# Patient Record
Sex: FEMALE | Race: BLACK OR AFRICAN AMERICAN | NOT HISPANIC OR LATINO
[De-identification: names, ages, dates, MRNs, and addresses within clinical notes are randomized per-mention and may not be internally consistent; named-entity substitution may affect disease eponyms.]

---

## 2017-03-17 ENCOUNTER — APPOINTMENT (OUTPATIENT)
Dept: INTERNAL MEDICINE | Facility: CLINIC | Age: 67
End: 2017-03-17

## 2017-03-17 VITALS
DIASTOLIC BLOOD PRESSURE: 90 MMHG | SYSTOLIC BLOOD PRESSURE: 181 MMHG | WEIGHT: 119 LBS | TEMPERATURE: 98.1 F | HEART RATE: 73 BPM | BODY MASS INDEX: 22.47 KG/M2 | HEIGHT: 61 IN | OXYGEN SATURATION: 98 %

## 2017-04-03 ENCOUNTER — APPOINTMENT (OUTPATIENT)
Dept: SURGERY | Facility: CLINIC | Age: 67
End: 2017-04-03

## 2017-04-03 VITALS
TEMPERATURE: 98 F | DIASTOLIC BLOOD PRESSURE: 87 MMHG | SYSTOLIC BLOOD PRESSURE: 186 MMHG | HEART RATE: 70 BPM | WEIGHT: 116.25 LBS | HEIGHT: 61 IN | BODY MASS INDEX: 21.95 KG/M2 | OXYGEN SATURATION: 98 %

## 2017-04-03 DIAGNOSIS — J45.909 UNSPECIFIED ASTHMA, UNCOMPLICATED: ICD-10-CM

## 2017-05-03 PROBLEM — J45.909 ASTHMA: Status: ACTIVE | Noted: 2017-04-03

## 2017-05-08 ENCOUNTER — APPOINTMENT (OUTPATIENT)
Dept: SURGERY | Facility: CLINIC | Age: 67
End: 2017-05-08

## 2017-05-08 VITALS
SYSTOLIC BLOOD PRESSURE: 200 MMHG | HEIGHT: 61 IN | BODY MASS INDEX: 22.28 KG/M2 | WEIGHT: 118 LBS | OXYGEN SATURATION: 97 % | DIASTOLIC BLOOD PRESSURE: 90 MMHG | HEART RATE: 64 BPM

## 2017-05-18 ENCOUNTER — EMERGENCY (EMERGENCY)
Facility: HOSPITAL | Age: 67
LOS: 1 days | Discharge: HOME | End: 2017-05-18
Attending: EMERGENCY MEDICINE | Admitting: EMERGENCY MEDICINE
Payer: MEDICARE

## 2017-05-18 VITALS
DIASTOLIC BLOOD PRESSURE: 79 MMHG | OXYGEN SATURATION: 95 % | SYSTOLIC BLOOD PRESSURE: 169 MMHG | HEART RATE: 94 BPM | RESPIRATION RATE: 19 BRPM | TEMPERATURE: 97 F

## 2017-05-18 VITALS
RESPIRATION RATE: 18 BRPM | TEMPERATURE: 98 F | OXYGEN SATURATION: 98 % | HEART RATE: 76 BPM | DIASTOLIC BLOOD PRESSURE: 81 MMHG | SYSTOLIC BLOOD PRESSURE: 168 MMHG | WEIGHT: 115.52 LBS

## 2017-05-18 DIAGNOSIS — Z79.2 LONG TERM (CURRENT) USE OF ANTIBIOTICS: ICD-10-CM

## 2017-05-18 DIAGNOSIS — I25.10 ATHEROSCLEROTIC HEART DISEASE OF NATIVE CORONARY ARTERY WITHOUT ANGINA PECTORIS: ICD-10-CM

## 2017-05-18 DIAGNOSIS — Z79.899 OTHER LONG TERM (CURRENT) DRUG THERAPY: ICD-10-CM

## 2017-05-18 DIAGNOSIS — R20.2 PARESTHESIA OF SKIN: ICD-10-CM

## 2017-05-18 DIAGNOSIS — E78.5 HYPERLIPIDEMIA, UNSPECIFIED: ICD-10-CM

## 2017-05-18 DIAGNOSIS — R10.9 UNSPECIFIED ABDOMINAL PAIN: ICD-10-CM

## 2017-05-18 DIAGNOSIS — N18.5 CHRONIC KIDNEY DISEASE, STAGE 5: ICD-10-CM

## 2017-05-18 DIAGNOSIS — I12.9 HYPERTENSIVE CHRONIC KIDNEY DISEASE WITH STAGE 1 THROUGH STAGE 4 CHRONIC KIDNEY DISEASE, OR UNSPECIFIED CHRONIC KIDNEY DISEASE: ICD-10-CM

## 2017-05-18 LAB
ALBUMIN SERPL ELPH-MCNC: 4 G/DL — SIGNIFICANT CHANGE UP (ref 3.3–5)
ALP SERPL-CCNC: 116 U/L — SIGNIFICANT CHANGE UP (ref 40–120)
ALT FLD-CCNC: 7 U/L — LOW (ref 10–45)
ANION GAP SERPL CALC-SCNC: 17 MMOL/L — SIGNIFICANT CHANGE UP (ref 5–17)
AST SERPL-CCNC: 21 U/L — SIGNIFICANT CHANGE UP (ref 10–40)
BASOPHILS NFR BLD AUTO: 0.6 % — SIGNIFICANT CHANGE UP (ref 0–2)
BILIRUB SERPL-MCNC: 0.3 MG/DL — SIGNIFICANT CHANGE UP (ref 0.2–1.2)
BUN SERPL-MCNC: 12 MG/DL — SIGNIFICANT CHANGE UP (ref 7–23)
CALCIUM SERPL-MCNC: 9.6 MG/DL — SIGNIFICANT CHANGE UP (ref 8.4–10.5)
CHLORIDE SERPL-SCNC: 96 MMOL/L — SIGNIFICANT CHANGE UP (ref 96–108)
CO2 SERPL-SCNC: 22 MMOL/L — SIGNIFICANT CHANGE UP (ref 22–31)
CREAT SERPL-MCNC: 3.1 MG/DL — HIGH (ref 0.5–1.3)
EOSINOPHIL NFR BLD AUTO: 1.6 % — SIGNIFICANT CHANGE UP (ref 0–6)
GLUCOSE SERPL-MCNC: 91 MG/DL — SIGNIFICANT CHANGE UP (ref 70–99)
HCT VFR BLD CALC: 30.3 % — LOW (ref 34.5–45)
HGB BLD-MCNC: 10.4 G/DL — LOW (ref 11.5–15.5)
LIDOCAIN IGE QN: 41 U/L — SIGNIFICANT CHANGE UP (ref 7–60)
LYMPHOCYTES # BLD AUTO: 22 % — SIGNIFICANT CHANGE UP (ref 13–44)
MCHC RBC-ENTMCNC: 33.1 PG — SIGNIFICANT CHANGE UP (ref 27–34)
MCHC RBC-ENTMCNC: 34.3 G/DL — SIGNIFICANT CHANGE UP (ref 32–36)
MCV RBC AUTO: 96.5 FL — SIGNIFICANT CHANGE UP (ref 80–100)
MONOCYTES NFR BLD AUTO: 9.2 % — SIGNIFICANT CHANGE UP (ref 2–14)
NEUTROPHILS NFR BLD AUTO: 66.6 % — SIGNIFICANT CHANGE UP (ref 43–77)
PLATELET # BLD AUTO: 257 K/UL — SIGNIFICANT CHANGE UP (ref 150–400)
POTASSIUM SERPL-MCNC: 5.1 MMOL/L — SIGNIFICANT CHANGE UP (ref 3.5–5.3)
POTASSIUM SERPL-SCNC: 5.1 MMOL/L — SIGNIFICANT CHANGE UP (ref 3.5–5.3)
PROT SERPL-MCNC: 7.5 G/DL — SIGNIFICANT CHANGE UP (ref 6–8.3)
RBC # BLD: 3.14 M/UL — LOW (ref 3.8–5.2)
RBC # FLD: 14.1 % — SIGNIFICANT CHANGE UP (ref 10.3–16.9)
SODIUM SERPL-SCNC: 135 MMOL/L — SIGNIFICANT CHANGE UP (ref 135–145)
WBC # BLD: 5.1 K/UL — SIGNIFICANT CHANGE UP (ref 3.8–10.5)
WBC # FLD AUTO: 5.1 K/UL — SIGNIFICANT CHANGE UP (ref 3.8–10.5)

## 2017-05-18 PROCEDURE — 36415 COLL VENOUS BLD VENIPUNCTURE: CPT

## 2017-05-18 PROCEDURE — 99284 EMERGENCY DEPT VISIT MOD MDM: CPT | Mod: 25

## 2017-05-18 PROCEDURE — 70450 CT HEAD/BRAIN W/O DYE: CPT | Mod: 26

## 2017-05-18 PROCEDURE — 80053 COMPREHEN METABOLIC PANEL: CPT

## 2017-05-18 PROCEDURE — 83690 ASSAY OF LIPASE: CPT

## 2017-05-18 PROCEDURE — 70450 CT HEAD/BRAIN W/O DYE: CPT

## 2017-05-18 PROCEDURE — 85025 COMPLETE CBC W/AUTO DIFF WBC: CPT

## 2017-05-18 PROCEDURE — 93010 ELECTROCARDIOGRAM REPORT: CPT

## 2017-05-18 PROCEDURE — 93005 ELECTROCARDIOGRAM TRACING: CPT

## 2017-05-18 RX ORDER — CYCLOBENZAPRINE HYDROCHLORIDE 10 MG/1
1 TABLET, FILM COATED ORAL
Qty: 16 | Refills: 0 | OUTPATIENT
Start: 2017-05-18

## 2017-05-18 NOTE — ED PROVIDER NOTE - NS ED MD SCRIBE ATTENDING SCRIBE SECTIONS
HISTORY OF PRESENT ILLNESS/PROGRESS NOTE/RESULTS/PHYSICAL EXAM/HIV/VITAL SIGNS( Pullset)/REVIEW OF SYSTEMS/PAST MEDICAL/SURGICAL/SOCIAL HISTORY/DISPOSITION

## 2017-05-18 NOTE — ED PROVIDER NOTE - PMH
Anemia  2/2 CKD  Asthma    CAD (coronary artery disease)    CKD (chronic kidney disease)  Stage 5CKD  CVA (cerebral infarction)    HLD (hyperlipidemia)    Hypertension    MI, old

## 2017-05-18 NOTE — ED PROVIDER NOTE - MEDICAL DECISION MAKING DETAILS
Pt remains well-appearing, afebrile, nontoxic. No acute findings on neuro exam. Head CT without acute findings. Discussed findings with pt's nephrologist Dr. Sepulveda, who will f/u with pt. Pt requesting muscle relaxant for chronic back pain - will prescribe.

## 2017-05-18 NOTE — ED PROVIDER NOTE - OBJECTIVE STATEMENT
65 yo F with hx of CVA (1.5 months ago), CKD, CAD, HLD, HTN, and MI p/w occasional arm numbness. Pt reports numbness from elbow down, either in the left arm or the right arm. Pt gets dialysis in left arm. Pt was sent here by Dr. Jay Sepulveda at the dialysis center. Last dialysis treatment today. No recent falls or head strikes. Pt also notes chronic lower back pain, occasional headaches, LE muscle spasms, and right side pain. 65 yo F with hx of CVA (1.5 months ago), CKD, CAD, HLD, HTN, and MI p/w occasional arm numbness. Pt reports numbness from the elbow down, either in the left arm or the right arm. Pt gets dialysis in left arm. Pt was sent here by Dr. Jay Sepulveda at the dialysis center. Last dialysis treatment today. No recent falls or head strikes. Pt also notes chronic lower back pain, occasional headaches, LE muscle spasms, and right side pain.

## 2017-05-18 NOTE — ED PROVIDER NOTE - PROGRESS NOTE DETAILS
The scribe's documentation has been prepared under my direction and personally reviewed by me in its entirety. I confirm that the note above accurately reflects all work, treatment, procedures, and medical decision making performed by me.  - Dr Dale

## 2017-05-18 NOTE — ED ADULT TRIAGE NOTE - CHIEF COMPLAINT QUOTE
c/o lower back pain and abdominal pain . pt reports lower back pain x 1 year and RUQ pain x 3 months described as sharp pain. Lay n/v/d, f/c. PHX HTN, ESRD.

## 2017-05-18 NOTE — ED PROVIDER NOTE - NEUROLOGICAL, MLM
Cranial nerves 2-12 are intact. Normal speech. Normal sensory exam. 5/5 bl upper extremity and lower extremity strength.

## 2017-06-09 ENCOUNTER — RECORD ABSTRACTING (OUTPATIENT)
Age: 67
End: 2017-06-09

## 2017-06-09 DIAGNOSIS — Z63.4 DISAPPEARANCE AND DEATH OF FAMILY MEMBER: ICD-10-CM

## 2017-06-09 DIAGNOSIS — Z82.49 FAMILY HISTORY OF ISCHEMIC HEART DISEASE AND OTHER DISEASES OF THE CIRCULATORY SYSTEM: ICD-10-CM

## 2017-06-09 DIAGNOSIS — R94.31 ABNORMAL ELECTROCARDIOGRAM [ECG] [EKG]: ICD-10-CM

## 2017-06-09 SDOH — SOCIAL STABILITY - SOCIAL INSECURITY: DISSAPEARANCE AND DEATH OF FAMILY MEMBER: Z63.4

## 2017-07-06 ENCOUNTER — INPATIENT (INPATIENT)
Facility: HOSPITAL | Age: 67
LOS: 3 days | Discharge: HOME | DRG: 291 | End: 2017-07-10
Attending: STUDENT IN AN ORGANIZED HEALTH CARE EDUCATION/TRAINING PROGRAM | Admitting: STUDENT IN AN ORGANIZED HEALTH CARE EDUCATION/TRAINING PROGRAM
Payer: MEDICARE

## 2017-07-06 VITALS
HEART RATE: 87 BPM | OXYGEN SATURATION: 88 % | TEMPERATURE: 96 F | RESPIRATION RATE: 28 BRPM | HEIGHT: 62 IN | WEIGHT: 115.08 LBS | DIASTOLIC BLOOD PRESSURE: 91 MMHG | SYSTOLIC BLOOD PRESSURE: 201 MMHG

## 2017-07-06 DIAGNOSIS — J96.01 ACUTE RESPIRATORY FAILURE WITH HYPOXIA: ICD-10-CM

## 2017-07-06 DIAGNOSIS — R65.10 SYSTEMIC INFLAMMATORY RESPONSE SYNDROME (SIRS) OF NON-INFECTIOUS ORIGIN WITHOUT ACUTE ORGAN DYSFUNCTION: ICD-10-CM

## 2017-07-06 DIAGNOSIS — R63.8 OTHER SYMPTOMS AND SIGNS CONCERNING FOOD AND FLUID INTAKE: ICD-10-CM

## 2017-07-06 DIAGNOSIS — I50.9 HEART FAILURE, UNSPECIFIED: ICD-10-CM

## 2017-07-06 DIAGNOSIS — I16.1 HYPERTENSIVE EMERGENCY: ICD-10-CM

## 2017-07-06 DIAGNOSIS — J90 PLEURAL EFFUSION, NOT ELSEWHERE CLASSIFIED: ICD-10-CM

## 2017-07-06 DIAGNOSIS — I25.10 ATHEROSCLEROTIC HEART DISEASE OF NATIVE CORONARY ARTERY WITHOUT ANGINA PECTORIS: ICD-10-CM

## 2017-07-06 DIAGNOSIS — R74.8 ABNORMAL LEVELS OF OTHER SERUM ENZYMES: ICD-10-CM

## 2017-07-06 DIAGNOSIS — R04.0 EPISTAXIS: ICD-10-CM

## 2017-07-06 DIAGNOSIS — N18.6 END STAGE RENAL DISEASE: ICD-10-CM

## 2017-07-06 DIAGNOSIS — I10 ESSENTIAL (PRIMARY) HYPERTENSION: ICD-10-CM

## 2017-07-06 DIAGNOSIS — I77.0 ARTERIOVENOUS FISTULA, ACQUIRED: Chronic | ICD-10-CM

## 2017-07-06 DIAGNOSIS — R06.02 SHORTNESS OF BREATH: ICD-10-CM

## 2017-07-06 DIAGNOSIS — J45.909 UNSPECIFIED ASTHMA, UNCOMPLICATED: ICD-10-CM

## 2017-07-06 LAB
ALBUMIN SERPL ELPH-MCNC: 4.1 G/DL — SIGNIFICANT CHANGE UP (ref 3.3–5)
ALP SERPL-CCNC: 179 U/L — HIGH (ref 40–120)
ALT FLD-CCNC: 12 U/L — SIGNIFICANT CHANGE UP (ref 10–45)
ANION GAP SERPL CALC-SCNC: 16 MMOL/L — SIGNIFICANT CHANGE UP (ref 5–17)
APTT BLD: 33.5 SEC — SIGNIFICANT CHANGE UP (ref 27.5–37.4)
AST SERPL-CCNC: 24 U/L — SIGNIFICANT CHANGE UP (ref 10–40)
BASE EXCESS BLDV CALC-SCNC: -0.1 MMOL/L — SIGNIFICANT CHANGE UP
BASOPHILS NFR BLD AUTO: 0.3 % — SIGNIFICANT CHANGE UP (ref 0–2)
BILIRUB SERPL-MCNC: 0.4 MG/DL — SIGNIFICANT CHANGE UP (ref 0.2–1.2)
BUN SERPL-MCNC: 37 MG/DL — HIGH (ref 7–23)
CALCIUM SERPL-MCNC: 10.3 MG/DL — SIGNIFICANT CHANGE UP (ref 8.4–10.5)
CHLORIDE SERPL-SCNC: 89 MMOL/L — LOW (ref 96–108)
CK MB CFR SERPL CALC: 1.9 NG/ML — SIGNIFICANT CHANGE UP (ref 0–6.7)
CK MB CFR SERPL CALC: 2.2 NG/ML — SIGNIFICANT CHANGE UP (ref 0–6.7)
CK SERPL-CCNC: 59 U/L — SIGNIFICANT CHANGE UP (ref 25–170)
CK SERPL-CCNC: 81 U/L — SIGNIFICANT CHANGE UP (ref 25–170)
CO2 SERPL-SCNC: 22 MMOL/L — SIGNIFICANT CHANGE UP (ref 22–31)
CREAT SERPL-MCNC: 6 MG/DL — HIGH (ref 0.5–1.3)
EOSINOPHIL NFR BLD AUTO: 1.3 % — SIGNIFICANT CHANGE UP (ref 0–6)
GAS PNL BLDV: SIGNIFICANT CHANGE UP
GLUCOSE SERPL-MCNC: 105 MG/DL — HIGH (ref 70–99)
HBV SURFACE AB SER-ACNC: SIGNIFICANT CHANGE UP
HBV SURFACE AG SER-ACNC: SIGNIFICANT CHANGE UP
HCO3 BLDV-SCNC: 23 MMOL/L — SIGNIFICANT CHANGE UP (ref 20–27)
HCT VFR BLD CALC: 34.2 % — LOW (ref 34.5–45)
HCV AB S/CO SERPL IA: 0.16 S/CO — SIGNIFICANT CHANGE UP
HCV AB SERPL-IMP: SIGNIFICANT CHANGE UP
HGB BLD-MCNC: 11.5 G/DL — SIGNIFICANT CHANGE UP (ref 11.5–15.5)
INR BLD: 1.02 — SIGNIFICANT CHANGE UP (ref 0.88–1.16)
LACTATE SERPL-SCNC: 1 MMOL/L — SIGNIFICANT CHANGE UP (ref 0.5–2)
LYMPHOCYTES # BLD AUTO: 5.4 % — LOW (ref 13–44)
MAGNESIUM SERPL-MCNC: 1.8 MG/DL — SIGNIFICANT CHANGE UP (ref 1.6–2.6)
MCHC RBC-ENTMCNC: 31.1 PG — SIGNIFICANT CHANGE UP (ref 27–34)
MCHC RBC-ENTMCNC: 33.6 G/DL — SIGNIFICANT CHANGE UP (ref 32–36)
MCV RBC AUTO: 92.4 FL — SIGNIFICANT CHANGE UP (ref 80–100)
MONOCYTES NFR BLD AUTO: 6.3 % — SIGNIFICANT CHANGE UP (ref 2–14)
NEUTROPHILS NFR BLD AUTO: 86.7 % — HIGH (ref 43–77)
NT-PROBNP SERPL-SCNC: HIGH PG/ML (ref 0–300)
PCO2 BLDV: 32 MMHG — LOW (ref 41–51)
PH BLDV: 7.46 — HIGH (ref 7.32–7.43)
PHOSPHATE SERPL-MCNC: 4.5 MG/DL — SIGNIFICANT CHANGE UP (ref 2.5–4.5)
PLATELET # BLD AUTO: 323 K/UL — SIGNIFICANT CHANGE UP (ref 150–400)
PO2 BLDV: 90 MMHG — SIGNIFICANT CHANGE UP
POTASSIUM SERPL-MCNC: 5.9 MMOL/L — HIGH (ref 3.5–5.3)
POTASSIUM SERPL-SCNC: 5.9 MMOL/L — HIGH (ref 3.5–5.3)
PROT SERPL-MCNC: 7.7 G/DL — SIGNIFICANT CHANGE UP (ref 6–8.3)
PROTHROM AB SERPL-ACNC: 11.3 SEC — SIGNIFICANT CHANGE UP (ref 9.8–12.7)
RBC # BLD: 3.7 M/UL — LOW (ref 3.8–5.2)
RBC # FLD: 19.1 % — HIGH (ref 10.3–16.9)
SAO2 % BLDV: 97 % — SIGNIFICANT CHANGE UP
SODIUM SERPL-SCNC: 127 MMOL/L — LOW (ref 135–145)
TROPONIN T SERPL-MCNC: 0.02 NG/ML — HIGH (ref 0–0.01)
TROPONIN T SERPL-MCNC: 0.03 NG/ML — HIGH (ref 0–0.01)
WBC # BLD: 17 K/UL — HIGH (ref 3.8–10.5)
WBC # FLD AUTO: 17 K/UL — HIGH (ref 3.8–10.5)

## 2017-07-06 PROCEDURE — 30903 CONTROL OF NOSEBLEED: CPT

## 2017-07-06 PROCEDURE — 99291 CRITICAL CARE FIRST HOUR: CPT | Mod: 25

## 2017-07-06 PROCEDURE — 99291 CRITICAL CARE FIRST HOUR: CPT

## 2017-07-06 PROCEDURE — 93306 TTE W/DOPPLER COMPLETE: CPT | Mod: 26

## 2017-07-06 PROCEDURE — 93010 ELECTROCARDIOGRAM REPORT: CPT

## 2017-07-06 PROCEDURE — 93010 ELECTROCARDIOGRAM REPORT: CPT | Mod: 59

## 2017-07-06 PROCEDURE — 71010: CPT | Mod: 26

## 2017-07-06 RX ORDER — AZITHROMYCIN 500 MG/1
500 TABLET, FILM COATED ORAL ONCE
Qty: 0 | Refills: 0 | Status: COMPLETED | OUTPATIENT
Start: 2017-07-07 | End: 2017-07-07

## 2017-07-06 RX ORDER — IPRATROPIUM/ALBUTEROL SULFATE 18-103MCG
3 AEROSOL WITH ADAPTER (GRAM) INHALATION
Qty: 0 | Refills: 0 | Status: COMPLETED | OUTPATIENT
Start: 2017-07-06 | End: 2017-07-06

## 2017-07-06 RX ORDER — AZITHROMYCIN 500 MG/1
500 TABLET, FILM COATED ORAL ONCE
Qty: 0 | Refills: 0 | Status: DISCONTINUED | OUTPATIENT
Start: 2017-07-06 | End: 2017-07-06

## 2017-07-06 RX ORDER — CARVEDILOL PHOSPHATE 80 MG/1
25 CAPSULE, EXTENDED RELEASE ORAL EVERY 12 HOURS
Qty: 0 | Refills: 0 | Status: DISCONTINUED | OUTPATIENT
Start: 2017-07-06 | End: 2017-07-10

## 2017-07-06 RX ORDER — FUROSEMIDE 40 MG
40 TABLET ORAL ONCE
Qty: 0 | Refills: 0 | Status: COMPLETED | OUTPATIENT
Start: 2017-07-06 | End: 2017-07-06

## 2017-07-06 RX ORDER — CINACALCET 30 MG/1
60 TABLET, FILM COATED ORAL DAILY
Qty: 0 | Refills: 0 | Status: DISCONTINUED | OUTPATIENT
Start: 2017-07-06 | End: 2017-07-10

## 2017-07-06 RX ORDER — NIFEDIPINE 30 MG
90 TABLET, EXTENDED RELEASE 24 HR ORAL ONCE
Qty: 0 | Refills: 0 | Status: COMPLETED | OUTPATIENT
Start: 2017-07-06 | End: 2017-07-06

## 2017-07-06 RX ORDER — CARVEDILOL PHOSPHATE 80 MG/1
25 CAPSULE, EXTENDED RELEASE ORAL ONCE
Qty: 0 | Refills: 0 | Status: COMPLETED | OUTPATIENT
Start: 2017-07-06 | End: 2017-07-06

## 2017-07-06 RX ORDER — HYDRALAZINE HCL 50 MG
10 TABLET ORAL ONCE
Qty: 0 | Refills: 0 | Status: COMPLETED | OUTPATIENT
Start: 2017-07-06 | End: 2017-07-06

## 2017-07-06 RX ORDER — HEPARIN SODIUM 5000 [USP'U]/ML
5000 INJECTION INTRAVENOUS; SUBCUTANEOUS EVERY 8 HOURS
Qty: 0 | Refills: 0 | Status: DISCONTINUED | OUTPATIENT
Start: 2017-07-06 | End: 2017-07-10

## 2017-07-06 RX ORDER — NIFEDIPINE 30 MG
90 TABLET, EXTENDED RELEASE 24 HR ORAL DAILY
Qty: 0 | Refills: 0 | Status: DISCONTINUED | OUTPATIENT
Start: 2017-07-06 | End: 2017-07-10

## 2017-07-06 RX ORDER — CEFTRIAXONE 500 MG/1
1 INJECTION, POWDER, FOR SOLUTION INTRAMUSCULAR; INTRAVENOUS EVERY 24 HOURS
Qty: 0 | Refills: 0 | Status: DISCONTINUED | OUTPATIENT
Start: 2017-07-07 | End: 2017-07-07

## 2017-07-06 RX ORDER — FUROSEMIDE 40 MG
80 TABLET ORAL ONCE
Qty: 0 | Refills: 0 | Status: COMPLETED | OUTPATIENT
Start: 2017-07-06 | End: 2017-07-06

## 2017-07-06 RX ORDER — SIMVASTATIN 20 MG/1
20 TABLET, FILM COATED ORAL AT BEDTIME
Qty: 0 | Refills: 0 | Status: DISCONTINUED | OUTPATIENT
Start: 2017-07-06 | End: 2017-07-10

## 2017-07-06 RX ORDER — CEFTRIAXONE 500 MG/1
1 INJECTION, POWDER, FOR SOLUTION INTRAMUSCULAR; INTRAVENOUS ONCE
Qty: 0 | Refills: 0 | Status: COMPLETED | OUTPATIENT
Start: 2017-07-06 | End: 2017-07-06

## 2017-07-06 RX ORDER — ASPIRIN/CALCIUM CARB/MAGNESIUM 324 MG
81 TABLET ORAL DAILY
Qty: 0 | Refills: 0 | Status: DISCONTINUED | OUTPATIENT
Start: 2017-07-06 | End: 2017-07-10

## 2017-07-06 RX ORDER — FUROSEMIDE 40 MG
80 TABLET ORAL
Qty: 0 | Refills: 0 | Status: DISCONTINUED | OUTPATIENT
Start: 2017-07-06 | End: 2017-07-09

## 2017-07-06 RX ADMIN — Medication 90 MILLIGRAM(S): at 06:26

## 2017-07-06 RX ADMIN — CARVEDILOL PHOSPHATE 25 MILLIGRAM(S): 80 CAPSULE, EXTENDED RELEASE ORAL at 06:26

## 2017-07-06 RX ADMIN — Medication 90 MILLIGRAM(S): at 10:13

## 2017-07-06 RX ADMIN — Medication 80 MILLIGRAM(S): at 17:39

## 2017-07-06 RX ADMIN — CARVEDILOL PHOSPHATE 25 MILLIGRAM(S): 80 CAPSULE, EXTENDED RELEASE ORAL at 17:39

## 2017-07-06 RX ADMIN — Medication 20 MILLIGRAM(S): at 06:26

## 2017-07-06 RX ADMIN — Medication 3 MILLILITER(S): at 03:52

## 2017-07-06 RX ADMIN — HEPARIN SODIUM 5000 UNIT(S): 5000 INJECTION INTRAVENOUS; SUBCUTANEOUS at 21:17

## 2017-07-06 RX ADMIN — SIMVASTATIN 20 MILLIGRAM(S): 20 TABLET, FILM COATED ORAL at 21:17

## 2017-07-06 RX ADMIN — Medication 3 MILLILITER(S): at 03:12

## 2017-07-06 RX ADMIN — Medication 80 MILLIGRAM(S): at 10:12

## 2017-07-06 RX ADMIN — Medication 10 MILLIGRAM(S): at 04:43

## 2017-07-06 RX ADMIN — Medication 81 MILLIGRAM(S): at 11:50

## 2017-07-06 RX ADMIN — Medication 40 MILLIGRAM(S): at 04:44

## 2017-07-06 RX ADMIN — Medication 10 MILLIGRAM(S): at 03:12

## 2017-07-06 RX ADMIN — Medication 3 MILLILITER(S): at 03:33

## 2017-07-06 RX ADMIN — CEFTRIAXONE 100 GRAM(S): 500 INJECTION, POWDER, FOR SOLUTION INTRAMUSCULAR; INTRAVENOUS at 06:26

## 2017-07-06 NOTE — ED PROVIDER NOTE - OBJECTIVE STATEMENT
66F hx HTN, ESRD (TRS), CAD, CVA, asthma, c/o SOB. pt states SOB worsening tonight. no chest pain, no cough, no fevers. no n/v/d.  +LE swelling. pt states was fully dialyzed tuesday.  SOB worse with exertion and laying down.  pt also c/o R nose bleed, put guaze inside nose. states has been compliant with BP medication.

## 2017-07-06 NOTE — CONSULT NOTE ADULT - PROBLEM SELECTOR RECOMMENDATION 9
- the patient needs urgent dialysis for fluid overload and uncontrolled BP   - we will do 3 hours with 4 liters ultrafiltration   - she has left arm AVF   - on sensipar   - follow up the daily weight and urine output and fluid intake   - put her on renal diet after the NPO   - the pulmonafry team is involved - considering thoracocentesis

## 2017-07-06 NOTE — CONSULT NOTE ADULT - ASSESSMENT
This is 66 year old female with past medical history stated above. In fluid overload and uncontrolled blood pressure

## 2017-07-06 NOTE — H&P ADULT - PROBLEM SELECTOR PLAN 6
patient with troponin of .02 likely secondary to demand ischemia in setting of malignant HTN; EKG unchanged from prior with no Acute ST-T wave changes  -	continue to trend

## 2017-07-06 NOTE — H&P ADULT - PROBLEM SELECTOR PLAN 2
upon arrival; Patient with CXR showing pleural effusion and pulmonary congestion on both the right and left side. PE also showing JVD, crackles at the right and left lung fields and LE edema with epistaxis s/p nasal packing; S/p enalapril 20mg , Lasix, hydralazine 10 mg oral and IV; nifedipine 90 mg; Carvedilol 25 mg with minimal response  -	Goal of around 160-170 in the next 24 hours  -	c/w home meds of nifedipine, Lasix, carvedilol, enalapril and metolazone   -	Renal consulted- will need HD today  -IV labetalol (HR above 90) or IV hydralazine (HR below 90)as needed if pressures elevated

## 2017-07-06 NOTE — ED PROVIDER NOTE - CARE PLAN
Principal Discharge DX:	Pleural effusion  Secondary Diagnosis:	SOB (shortness of breath)  Secondary Diagnosis:	Epistaxis

## 2017-07-06 NOTE — ED ADULT NURSE NOTE - OBJECTIVE STATEMENT
Pt c/o sob and epitaxis from both nares. Pt with dialysis Tues, Thurs, and Saturday. O2 Sat  on arrival 88% on RA. Pt place on 2L NC. Pt has no pitting edema in BLE. (L>R)

## 2017-07-06 NOTE — H&P ADULT - PROBLEM SELECTOR PLAN 4
patient with recurrent multiple ED visits in the past for epistaxis requiring nasal packing; s/p nasal packing in the ED; most likely 2/2 to Hypertensive emergency  -monitor H/H  - continue with nasal packing

## 2017-07-06 NOTE — H&P ADULT - NSHPPHYSICALEXAM_GEN_ALL_CORE
VITAL SIGNS:  T(C): 35.6 (07-06-17 @ 02:18), Max: 35.6 (07-06-17 @ 02:18)  T(F): 96 (07-06-17 @ 02:18), Max: 96 (07-06-17 @ 02:18)  HR: 84 (07-06-17 @ 06:07) (80 - 87)  BP: 201/99 (07-06-17 @ 06:07) (193/93 - 205/98)  BP(mean): --  RR: 16 (07-06-17 @ 06:07) (16 - 28)  SpO2: 93% (07-06-17 @ 06:07) (88% - 98%)  Wt(kg): --    PHYSICAL EXAM:    Constitutional: Seated in stretcher in mild respiratory distress  Head: NC/AT  Eyes: PERRL, EOMI, clear conjunctiva  ENT: no nasal discharge; uvula midline, no oropharyngeal erythema or exudates; MMM  Neck: supple; +JVD   Respiratory: tachypneic with increased work of breathing, unable to speak in full sentences, decreased breath sounds on the R lower lung field, crackles on R middle/upper lung fields, R lung with crackles in middle and lower lung fields  Cardiac: +S1/S2 +S3; RRR; no M/R  Gastrointestinal: soft, NT/ND; no rebound or guarding; +BSx4  Extremities: WWP, no clubbing or cyanosis; 1+ pedal edema  Musculoskeletal: 2/5 strength in the b/l lower extremities  Vascular: 2+ radial, DP pulses B/L  Dermatologic: skin warm, dry and intact  Lymphatic: no submandibular or cervical LAD  Neurologic: AAOx3; CNII-XII grossly intact  Psychiatric: affect and characteristics of appearance, verbalizations, behaviors are appropriate

## 2017-07-06 NOTE — ED PROVIDER NOTE - CRITICAL CARE PROVIDED
interpretation of diagnostic studies/additional history taking/documentation/consultation with other physicians/direct patient care (not related to procedure)

## 2017-07-06 NOTE — H&P ADULT - NSHPLABSRESULTS_GEN_ALL_CORE
.  LABS:                         11.5   17.0  )-----------( 323      ( 06 Jul 2017 03:05 )             34.2     07-06    127<L>  |  89<L>  |  37<H>  ----------------------------<  105<H>  5.9<H>   |  22  |  6.00<H>    Ca    10.3      06 Jul 2017 03:05  Phos  4.5     07-06  Mg     1.8     07-06    TPro  7.7  /  Alb  4.1  /  TBili  0.4  /  DBili  x   /  AST  24  /  ALT  12  /  AlkPhos  179<H>  07-06        CARDIAC MARKERS ( 06 Jul 2017 03:05 )  x     / 0.02 ng/mL / 81 U/L / x     / 1.9 ng/mL      Serum Pro-Brain Natriuretic Peptide: [id number] pg/mL (07-06 @ 03:05)    Lactate, Blood: 1.0 mmoL/L (07-06 @ 03:20)      RADIOLOGY, EKG & ADDITIONAL TESTS: Reviewed.

## 2017-07-06 NOTE — CONSULT NOTE ADULT - PROBLEM SELECTOR RECOMMENDATION 2
likely from ESRD/CHF vs complicated parapneumonic effusion  - Bedside US after HD showed large effusion on the Lt side and small one on the Rt  - Effusions appeared to be simple without loculations  - At present, patient does not appear to be in distress and no coags available  - Will consider thoracentesis in the am if patient continues to have shortness of breath  - Send PT/PTT  - Repeat CXR in am.

## 2017-07-06 NOTE — CONSULT NOTE ADULT - SUBJECTIVE AND OBJECTIVE BOX
Patient is a 66y with past medical history for HTN, on HD - M, W, F. She came overnight for nose bleeding found to be in respiratory distress, and to have uncontrolled /80. The patient is complaining of Shortness of breath. Last HD was on Thursday - 3 hours with 4 litetrs UF         PAST MEDICAL & SURGICAL HISTORY:  Anemia: 2/2 CKD  Asthma  MI, old  CAD (coronary artery disease)  HLD (hyperlipidemia)  CKD (chronic kidney disease): Stage 5CKD  CVA (cerebral infarction)  Hypertension  AV fistula      MEDICATIONS  (STANDING):  heparin  Injectable 5000 Unit(s) SubCutaneous every 8 hours  simvastatin 20 milliGRAM(s) Oral at bedtime  NIFEdipine XL 90 milliGRAM(s) Oral daily  cinacalcet 60 milliGRAM(s) Oral daily  furosemide    Tablet 80 milliGRAM(s) Oral two times a day  aspirin enteric coated 81 milliGRAM(s) Oral daily  carvedilol 25 milliGRAM(s) Oral every 12 hours  metolazone 10 milliGRAM(s) Oral daily    MEDICATIONS  (PRN):      Allergies    No Known Allergies    Intolerances        SOCIAL HISTORY:    FAMILY HISTORY:  No pertinent family history in first degree relatives      T(C): , Max: 36.5 (07-06-17 @ 12:30)  T(F): , Max: 97.7 (07-06-17 @ 12:30)  HR: 80 (07-06-17 @ 12:53)  BP: 215/98 (07-06-17 @ 12:53)  BP(mean): 140 (07-06-17 @ 12:53)  RR: 16 (07-06-17 @ 12:53)  SpO2: 97% (07-06-17 @ 12:53)  Wt(kg): --    Height (cm): 157.48 (07-06 @ 02:18)  Weight (kg): 52.2 (07-06 @ 02:18)  BMI (kg/m2): 21 (07-06 @ 02:18)  BSA (m2): 1.51 (07-06 @ 02:18)      LABS:                        11.5   17.0  )-----------( 323      ( 06 Jul 2017 03:05 )             34.2     07-06    127<L>  |  89<L>  |  37<H>  ----------------------------<  105<H>  5.9<H>   |  22  |  6.00<H>    Ca    10.3      06 Jul 2017 03:05  Phos  4.5     07-06  Mg     1.8     07-06    TPro  7.7  /  Alb  4.1  /  TBili  0.4  /  DBili  x   /  AST  24  /  ALT  12  /  AlkPhos  179<H>  07-06    Hepatitis B Surface Antibody: Nonreact (07-06 @ 14:17)  Hepatitis C Virus S/CO Ratio: 0.16 S/CO (07-06 @ 14:17)        Physical exam: alert and oriented, in respiratory distress, sitting in her bed, crackles on both sides of the lungs, decreased breath sounds on the left base, no tachycardia, abdomen not distended, normal bowel sounds, no peripheral edema       RADIOLOGY & ADDITIONAL STUDIES:    the XR - pulmonary congestion with bilateral pleural effusion, prominent on the left side, possible consolidation on the right lower lobe

## 2017-07-06 NOTE — H&P ADULT - ATTENDING COMMENTS
Pt improved post HD but pulm evaluating for left sided thoracentesis. continue abx and ENT to see for epistaxis.

## 2017-07-06 NOTE — CONSULT NOTE ADULT - ASSESSMENT
65 y/o lady admitted to the hospital for a CHF exacerbation and in an fluid overload state. She has a PMH of ESRD on HD (TTS), HFpEF, HTN, CAD and questionable CVA's in the past. The patient was found to be in HTN urgency on admission. CXR from admission showed b/l pleural effusions.

## 2017-07-06 NOTE — CONSULT NOTE ADULT - SUBJECTIVE AND OBJECTIVE BOX
67yo F with PMHx of ESRD on HD, CAD, dCHF (EF 75% , HTN, asthma presents with shortness of breath and epistaxis. Patient reports that shortness of breath has been ongoing for the past few months, but worsened this afternoon. At baseline, patient can walk half a block. She also reports lower extremity swelling and orthopnea. No cough, fever, chills, sore throat. Patient was recently in [place], [place] and came back on Saturday. Also reports epistaxis which started this afternoon as well. She has had two episodes over the past week, for which she went to the ED and had nasal packing. She also has prior episodes of epistaxis which improved with nasal packing.     In the ED, VS: 96.0 201/91 87 28 88% RA  Given Duonebs, Lasix 40mg IV, Hydralazine 10mg IV    PMHx: as above  PSHx:   Home Meds: Albuterol, Nifedipine 90mg BID, Enalapril 20mg BID, Carvedilol 25mg BID, Furosemide 80mg QD, Meotlazone 10mg QD, Sensipar 60mg QD, Simvastatin 20mg QHS, Aspirin 81mg QD  Allergies: NKDA  SHx: denies alcohol use, illicit drug use, no cigarette use. Lives with daughter      OBJECTIVE:    VITAL SIGNS:  ICU Vital Signs Last 24 Hrs  T(C): 35.6 (2017 02:18), Max: 35.6 (2017 02:18)  T(F): 96 (2017 02:18), Max: 96 (2017 02:18)  HR: 80 (2017 04:45) (80 - 87)  BP: 197/94 (2017 04:45) (193/93 - 203/100)  BP(mean): --  ABP: --  ABP(mean): --  RR: 20 (2017 04:45) (20 - 28)  SpO2: 98% (2017 04:45) (88% - 98%)    PHYSICAL EXAM:    General: mild respiratory distress on nonrebreather  HEENT: NC/AT; PERRL, clear conjunctiva, blood soaked nasal packing in R nostril  Neck: supple  Respiratory: diminished breath sounds on L to mid lung field with fine crackles, diminished breath sounds on R base with crackles  Cardiovascular: +S1/S2; RRR, no murmurs or rubs  Abdomen: soft, NT/ND; +BS x4, vertical  scar  Extremities: WWP, 2+ peripheral pulses b/l; trace LE edema  Skin: normal color and turgor; no rash  Neurological: AAOx3, no focal deficits    MEDICATIONS:  MEDICATIONS  (STANDING):    MEDICATIONS  (PRN):      ALLERGIES:  Allergies    No Known Allergies        LABS:                        11.5   17.0  )-----------( 323      ( 2017 03:05 )             34.2     07-06    127<L>  |  89<L>  |  37<H>  ----------------------------<  105<H>  5.9<H>   |  22  |  6.00<H>    Ca    10.3      2017 03:05  Phos  4.5     07-06  Mg     1.8     07-    TPro  7.7  /  Alb  4.1  /  TBili  0.4  /  DBili  x   /  AST  24  /  ALT  12  /  AlkPhos  179<H>  -          RADIOLOGY & ADDITIONAL TESTS: Reviewed.    ASSESSMENT: 67yo F with PMHx of ESRD on HD, CAD, dCHF (EF 75% , HTN, asthma presents with shortness of breath and epistaxis.    PLAN:    #NEURO:    #CARDIO:    #PULM    FEN - no IVF; replete lytes prn; NPO    PPx - HSQ    CODE - FULL.    DISPO - continue telemetry monitoring in ICU-step down level of care on [facility]. 67yo F with PMHx of ESRD on HD, CAD, dCHF (EF 75% 2015), HTN, asthma presents with shortness of breath and epistaxis. Patient reports that shortness of breath has been ongoing for the past few months, but worsened this afternoon. At baseline, patient can walk half a block. She also reports lower extremity swelling and orthopnea. No cough, fever, chills, sore throat. Patient was recently in [place], [place] and came back on Saturday. Also reports epistaxis which started this afternoon as well. She has had two episodes over the past week, for which she went to the ED and had nasal packing. She also has prior episodes of epistaxis which improved with nasal packing.     In the ED, VS: 96.0 201/91 87 28 88% RA  Given Duonebs, Lasix 40mg IV, Hydralazine 10mg IV    PMHx: as above  PSHx:   Home Meds: Albuterol, Nifedipine 90mg BID, Enalapril 20mg BID, Carvedilol 25mg BID, Furosemide 80mg QD, Meotlazone 10mg QD, Sensipar 60mg QD, Simvastatin 20mg QHS, Aspirin 81mg QD  Allergies: NKDA  SHx: denies alcohol use, illicit drug use, no cigarette use. Lives with daughter      OBJECTIVE:    VITAL SIGNS:  ICU Vital Signs Last 24 Hrs  T(C): 35.6 (2017 02:18), Max: 35.6 (2017 02:18)  T(F): 96 (2017 02:18), Max: 96 (2017 02:18)  HR: 80 (2017 04:45) (80 - 87)  BP: 197/94 (2017 04:45) (193/93 - 203/100)  BP(mean): --  ABP: --  ABP(mean): --  RR: 20 (2017 04:45) (20 - 28)  SpO2: 98% (2017 04:45) (88% - 98%)    PHYSICAL EXAM:    General: mild respiratory distress on nonrebreather  HEENT: NC/AT; PERRL, clear conjunctiva, blood soaked nasal packing in R nostril  Neck: supple  Respiratory: diminished breath sounds on L to mid lung field with fine crackles, diminished breath sounds on R base with crackles  Cardiovascular: +S1/S2; RRR, no murmurs or rubs  Abdomen: soft, NT/ND; +BS x4, vertical  scar  Extremities: WWP, 2+ peripheral pulses b/l; trace LE edema  Skin: normal color and turgor; no rash  Neurological: AAOx3, no focal deficits    LABS:                        11.5   17.0  )-----------( 323      ( 2017 03:05 )             34.2     07-06    127<L>  |  89<L>  |  37<H>  ----------------------------<  105<H>  5.9<H>   |  22  |  6.00<H>    Ca    10.3      2017 03:05  Phos  4.5     07-06  Mg     1.8     07-    TPro  7.7  /  Alb  4.1  /  TBili  0.4  /  DBili  x   /  AST  24  /  ALT  12  /  AlkPhos  179<H>  -          RADIOLOGY & ADDITIONAL TESTS: Reviewed.    ASSESSMENT: 67yo F with PMHx of ESRD on HD, CAD, dCHF (EF 75% 2015), HTN, asthma presents with shortness of breath and epistaxis.    PLAN:    #NEURO: AAOx3, no focal deficits.     #CARDIO  CHFpEF (Echo 2015 with EF     #PULM    FEN - no IVF; replete lytes prn; NPO    PPx - HSQ    CODE - FULL.    DISPO - continue telemetry monitoring in ICU-step down level of care on [facility]. 65yo F with PMHx of ESRD on HD, CAD, dCHF (EF 75% 2015), HTN, asthma presents with shortness of breath and epistaxis. Patient reports that shortness of breath has been ongoing for the past few months, but worsened this afternoon. At baseline, patient can walk half a block. She also reports lower extremity swelling and orthopnea. No cough, fever, chills, sore throat. Patient was recently in [place], [place] and came back on Saturday. Also reports epistaxis which started this afternoon as well. She has had two episodes over the past week, for which she went to the ED and had nasal packing. She also has prior episodes of epistaxis which improved with nasal packing.     In the ED, VS: 96.0 201/91 87 28 88% RA  Given Duonebs, Lasix 40mg IV, Hydralazine 10mg IV    PMHx: as above  PSHx:   Home Meds: Albuterol, Nifedipine 90mg BID, Enalapril 20mg BID, Carvedilol 25mg BID, Furosemide 80mg QD, Meotlazone 10mg QD, Sensipar 60mg QD, Simvastatin 20mg QHS, Aspirin 81mg QD  Allergies: NKDA  SHx: denies alcohol use, illicit drug use, no cigarette use. Lives with daughter      OBJECTIVE:    VITAL SIGNS:  ICU Vital Signs Last 24 Hrs  T(C): 35.6 (2017 02:18), Max: 35.6 (2017 02:18)  T(F): 96 (2017 02:18), Max: 96 (2017 02:18)  HR: 80 (2017 04:45) (80 - 87)  BP: 197/94 (2017 04:45) (193/93 - 203/100)  BP(mean): --  ABP: --  ABP(mean): --  RR: 20 (2017 04:45) (20 - 28)  SpO2: 98% (2017 04:45) (88% - 98%)    PHYSICAL EXAM:    General: mild respiratory distress on nonrebreather  HEENT: NC/AT; PERRL, clear conjunctiva, blood soaked nasal packing in R nostril  Neck: supple  Respiratory: diminished breath sounds on L to mid lung field with fine crackles, diminished breath sounds on R base with crackles  Cardiovascular: +S1/S2; RRR, no murmurs or rubs  Abdomen: soft, NT/ND; +BS x4, vertical  scar  Extremities: WWP, 2+ peripheral pulses b/l; trace LE edema, LUE fistula with palpable thrill  Skin: normal color and turgor; no rash  Neurological: AAOx3, no focal deficits    LABS:                        11.5   17.0  )-----------( 323      ( 2017 03:05 )             34.2     07-06    127<L>  |  89<L>  |  37<H>  ----------------------------<  105<H>  5.9<H>   |  22  |  6.00<H>    Ca    10.3      2017 03:05  Phos  4.5     07-06  Mg     1.8     07-06    TPro  7.7  /  Alb  4.1  /  TBili  0.4  /  DBili  x   /  AST  24  /  ALT  12  /  AlkPhos  179<H>  07-06          RADIOLOGY & ADDITIONAL TESTS: Reviewed.    ASSESSMENT: 65yo F with PMHx of ESRD on HD, CAD, dCHF (EF 75% 2015), HTN, asthma presents with shortness of breath and epistaxis.    PLAN:    #NEURO: AAOx3, no focal deficits.     #CARDIO  HTN Urgency - SBP 190s-200s not controlled with Hydralazine 10mg IV x2. Asymptomatic  -resume home medications  -may require additional Labetalol / Hydralazine pushes to decrease BP 25% for the first 24 hours  CHFpEF (Echo 2015 with EF 75%) - patient with bilateral pleural effusions, L>>R, orthopnea and trace edema. BNP 18k. May have CHF exacerbation.   -Recommend Lasix 80mg IVQ, Metolazone 10mg   -required HD today      #PULM  -Bilateral pleural effusions - L>>R. Likely 2/2 CHF exacerbation vs. PNA  -Saturating well on Nasal Mask, maintain O2 sat > 94%, monitor closely for increase in work of breathing    #ID  Patient meets SIRS criteria with hypothermia, tachycardia, tachypnea and leukocytosis of 17, neutrophil predominant. No lactic acidosis. Has bilateral pleural effusions, cannot rule out underlying consolidations. Sepsis 2/2 PNA.   -Ceftriaxone / Zithromax for CAP   -obtain blood and sputum cultures    #RENAL  ESRD on HD - requires HD today. Has hyponatremia which may be from fluid overload and hyperkalemia 5.9. No EKG changes.   -contact renal in the AM    #HEM   Recurrent epistaxis of unknown etiology. Stopped with nasal packing  -consider ENT consult if bleeding recurs    FEN - no IVF; replete lytes prn; renal diet    PPx - HSQ    CODE - FULL.    DISPO - medicine telemetry

## 2017-07-06 NOTE — H&P ADULT - ASSESSMENT
67yo F with PMHx of ESRD on HD (T,TH,SAT), CAD, dCHF (EF 75% 11/2015), HTN, ?CVAs, asthma presents with shortness of breath and epistaxis secondary to hypertensive emergency and CHF exacerbation with likely underlying pneumonia.

## 2017-07-06 NOTE — H&P ADULT - PROBLEM SELECTOR PLAN 5
patient with dialysis on T,TH, SAT- patient is compliant with regiment- BMP also showing hyperkalemia of 5.9  -	nephro consulted- will dialyze TODAY patient with dialysis on T,TH, SAT- patient is compliant with regiment- BMP also showing hyperkalemia of 5.9  -	nephro consulted- will dialyze TODAY  -  continue home dose sincalet 60 mg QD

## 2017-07-06 NOTE — ED PROVIDER NOTE - MEDICAL DECISION MAKING DETAILS
SOB, CIFUENTES, crackles, decreased BS, hypoxic on RA, hypertense, no chest pain  -check labs, ekg, cxr, albuterol/atrovent nebs, hydralazine

## 2017-07-06 NOTE — PROGRESS NOTE ADULT - SUBJECTIVE AND OBJECTIVE BOX
Patient was seen and evaluated on dialysis.   Patient is tolerating the procedure well.   HR: 88 (07-06-17 @ 15:30)  BP: 177/83 (07-06-17 @ 15:30) pusle 65, /67  Continue dialysis:   Dialyzer:  180        QB:  400      QD: 500 2k bath  Goal UF 4 over 3 Hours

## 2017-07-06 NOTE — ED PROVIDER NOTE - PROGRESS NOTE DETAILS
R rhinorocket placed, pt with large L pleural effusion. oxygenation improves with NRB.  ICU consulted pt accepted to [facility], recommend ceft/azithro R rhinorocket placed, pt with b/l pleural effusion L>R. oxygenation improves with NRB.  ICU consulted

## 2017-07-06 NOTE — H&P ADULT - PROBLEM SELECTOR PLAN 7
CAD- currently asymptomatic; EKG unchanged from prior; patient unclear about whether he has had a n CATH in the past   -	f/u with Dr Dunham regarding outpatient records  -	continue ASA 81 mg QD  -	continue simvastatin 20 mg QD     CVA- patient with reported hx of strokes in the past; patient reports episode of slurring 2 weeks that resolved on its own- did not seek medical attention at this time; neuro exam with non-focal findings; CN 2-12 intact B/L; patient currently complaing of generalized B/L LE weakness- described as “feeling heavy”; most likely 2/2 to her underlying edema  -continue to monitor  -consider CT scan if symptoms of overall weakness doesn’t resolve CAD- currently asymptomatic; EKG unchanged from prior; patient unclear about whether she has had a diagnostic catherization in the past   -	f/u with Dr Dunham regarding outpatient records  -	continue ASA 81 mg QD  -	continue simvastatin 20 mg QD     CVA- patient with reported hx of strokes in the past; patient reports episode of slurring 2 weeks that resolved on its own- did not seek medical attention at this time; neuro exam with non-focal findings; CN 2-12 intact B/L; patient currently complaing of generalized B/L LE weakness- described as “feeling heavy”; most likely 2/2 to her underlying edema  -continue to monitor  -consider CT scan if symptoms of overall weakness doesn’t resolve

## 2017-07-06 NOTE — CONSULT NOTE ADULT - PROBLEM SELECTOR RECOMMENDATION 9
likely 2/2 CHF exacerbation. Possible underlying pneumonia cannot be ruled out  - CXR shows b/l moderate-large pleural effusions with vascular congestion  - Received dialysis with removal of 3.6L of fluids  - IV diuretics per primary, though as patient is oligouric/anuric unlikely to have an effect  - O2 as needed. Currently on ventimask and comfortable  - No accessory muscle use, in no acute distress  - antibiotics per primary  - blood cultures ordered

## 2017-07-06 NOTE — H&P ADULT - HISTORY OF PRESENT ILLNESS
In the ED Vital Signs: Tmax 96, HR 80 - 87, /93 - 205/98, RR 20 - 28, SpO2: 88% - 98%. Pt received 65yo F with PMHx of ESRD on HD (T,TH,SAT), CAD, dCHF (EF 75% 11/2015), HTN, asthma presents with shortness of breath and epistaxis. Patient reports that shortness of breath has been ongoing for the past few months, but worsened this afternoon. At baseline, patient can walk half a block. She also reports lower extremity swelling and orthopnea. No cough, fever, chills, sore throat. Patient was recently in [place], [place] and came back on Saturday. Also reports epistaxis which started this afternoon. She has had two episodes over the past week, for which she went to the ED and had nasal packing. She also has prior episodes of epistaxis which improved with nasal packing.     In the ED Vital Signs: Tmax 96, HR 80 - 87, /93 - 205/98, RR 20 - 28, SpO2: 88% - 98% on RA. Given Duonebs, Lasix 40mg IV, Hydralazine 10mg IV. Pt admitted to [facility] for telemetry monitoring of hypertensive emergency. 67yo F with PMHx of ESRD on HD (T,TH,SAT), CAD, dCHF (EF 75% 11/2015), HTN, ?CVAs, asthma presents with shortness of breath and epistaxis. Patient reports that shortness of breath has been ongoing for the past few months, but worsened this afternoon. At baseline, patient can walk half a block. She also reports worsening lower extremity swelling, orthopnea and PND. Patient recently returned from [place] on Saturday, where she was hospitalized for epistaxis secondary to HTN and received 1 unit of PRBC. Also reports epistaxis which started this afternoon. She has had two episodes over the past week, for which she went to the ED and had nasal packing. She also has prior episodes of epistaxis which improved with nasal packing. +Weakness, fatigue, and nausea. States that she cannot lift her b/l lower extremities d/t weakness since yesterday evening.  No associated pain, urinary or fecal incontinence. She denies headache, dizziness, vision changes, chest pain, palpitations, cough, abdominal pain, vomiting, diarrhea, F/U/D, fever and chills.     In the ED Vital Signs: Tmax 96, HR 80 - 87, /93 - 205/98, RR 20 - 28, SpO2: 88% - 98% on RA. Given Duonebs, Lasix 40mg IV, Hydralazine 10mg IV. Pt admitted to [facility] for telemetry monitoring of hypertensive emergency.

## 2017-07-06 NOTE — CONSULT NOTE ADULT - PROBLEM SELECTOR RECOMMENDATION 2
- the patient is with uncontrolled BP   - she is on Nefedipine XL 90, she is on Carvediolol 12.5 mg twice a day, metolazone 10 mg, clonidine 0.1 mg every 8 hours  - consider adding Hydralazine 50 mg three times a day

## 2017-07-06 NOTE — ED PROVIDER NOTE - ENMT, MLM
Airway patent, Nasal mucosa clear. Mouth with normal mucosa. Throat has no vesicles, no oropharyngeal exudates and uvula is midline. blood from R nares

## 2017-07-06 NOTE — CONSULT NOTE ADULT - SUBJECTIVE AND OBJECTIVE BOX
Patient is a 66y old  Female who presents with a chief complaint of shortness of breath    HPI:  67yo F with PMHx of ESRD on HD (T,TH,SAT), CAD, dCHF (EF 75% 11/2015), HTN, ?CVAs, asthma presents with shortness of breath and epistaxis. Patient reports that shortness of breath has been ongoing for the past few months, but worsened this afternoon. At baseline, patient can walk half a block. She also reports worsening lower extremity swelling, orthopnea and PND. Patient recently returned from [place] on Saturday, where she was hospitalized for epistaxis secondary to HTN and received 1 unit of PRBC.     Patient is now s/p HD with removal of 3.6L and feels much better. She reports that she has never had fluid removed from her lungs. She says she does make some urine and has been getting aggressive diuresis while in the hospital.    +Weakness, fatigue, and nausea. States that she cannot lift her b/l lower extremities d/t weakness since yesterday evening.  No associated pain, urinary or fecal incontinence. She denies headache, dizziness, vision changes, chest pain, palpitations, cough, abdominal pain, vomiting, diarrhea, F/U/D, fever and chills.       PAST MEDICAL & SURGICAL HISTORY:  Anemia: 2/2 CKD  Asthma  MI, old  CAD (coronary artery disease)  HLD (hyperlipidemia)  CKD (chronic kidney disease): Stage 5CKD  CVA (cerebral infarction)  Hypertension  AV fistula      FAMILY HISTORY:  No pertinent family history in first degree relatives      SOCIAL HISTORY:  Smoking Status: [ ] Current, [ ] Former, [x] Never  Pack Years:    MEDICATIONS:  Pulmonary:    Antimicrobials:  azithromycin  IVPB 500 milliGRAM(s) IV Intermittent Once    Anticoagulants:  heparin  Injectable 5000 Unit(s) SubCutaneous every 8 hours  aspirin enteric coated 81 milliGRAM(s) Oral daily    Onc:    GI/:    Endocrine:  simvastatin 20 milliGRAM(s) Oral at bedtime    Cardiac:  NIFEdipine XL 90 milliGRAM(s) Oral daily  furosemide    Tablet 80 milliGRAM(s) Oral two times a day  carvedilol 25 milliGRAM(s) Oral every 12 hours  metolazone 10 milliGRAM(s) Oral daily    Other Medications:  simvastatin 20 milliGRAM(s) Oral at bedtime  cinacalcet 60 milliGRAM(s) Oral daily      Allergies    No Known Allergies    Intolerances        Vital Signs Last 24 Hrs  T(C): 36.5 (06 Jul 2017 12:53), Max: 36.5 (06 Jul 2017 12:30)  T(F): 97.7 (06 Jul 2017 12:53), Max: 97.7 (06 Jul 2017 12:30)  HR: 80 (06 Jul 2017 12:53) (76 - 87)  BP: 215/98 (06 Jul 2017 12:53) (189/93 - 215/98)  BP(mean): 140 (06 Jul 2017 12:53) (140 - 144)  RR: 16 (06 Jul 2017 12:53) (16 - 28)  SpO2: 97% (06 Jul 2017 12:53) (88% - 100%)    Physical Exam:  Constitutional: NAD. Resting comfortably in bed  Eyes: PERRL, EOMI, clear conjunctiva  Nose: Nasal packing in place.  Neck: supple; No JVD at present  Respiratory: Not tachypneic, decreased breath sounds b/l bases L>R. mild crackles b/l bases.  Cardiac: +S1/S2 +S3; RRR; no M/R  Gastrointestinal: soft, NT/ND; no rebound or guarding; +BSx4  Extremities: WWP, no clubbing or cyanosis; 1+ pedal edema  Musculoskeletal: 2/5 strength in the b/l lower extremities  Neurologic: AAOx3; CNII-XII grossly intact  Psychiatric: affect and characteristics of appearance, verbalizations, behaviors are appropriate      LABS:      CBC Full  -  ( 06 Jul 2017 03:05 )  WBC Count : 17.0 K/uL  Hemoglobin : 11.5 g/dL  Hematocrit : 34.2 %  Platelet Count - Automated : 323 K/uL  Mean Cell Volume : 92.4 fL  Mean Cell Hemoglobin : 31.1 pg  Mean Cell Hemoglobin Concentration : 33.6 g/dL  Auto Neutrophil # : x  Auto Lymphocyte # : x  Auto Monocyte # : x  Auto Eosinophil # : x  Auto Basophil # : x  Auto Neutrophil % : 86.7 %  Auto Lymphocyte % : 5.4 %  Auto Monocyte % : 6.3 %  Auto Eosinophil % : 1.3 %  Auto Basophil % : 0.3 %    07-06    127<L>  |  89<L>  |  37<H>  ----------------------------<  105<H>  5.9<H>   |  22  |  6.00<H>    Ca    10.3      06 Jul 2017 03:05  Phos  4.5     07-06  Mg     1.8     07-06    TPro  7.7  /  Alb  4.1  /  TBili  0.4  /  DBili  x   /  AST  24  /  ALT  12  /  AlkPhos  179<H>  07-06                      RADIOLOGY & ADDITIONAL STUDIES (The following images were personally reviewed): Patient is a 66y old  Female who presents with a chief complaint of shortness of breath    HPI:  67yo F with PMHx of ESRD on HD (T,TH,SAT), CAD, dCHF (EF 75% 11/2015), HTN, ?CVAs, asthma presents with shortness of breath and epistaxis. Patient reports that shortness of breath has been ongoing for the past few months, but worsened this afternoon. At baseline, patient can walk half a block. She also reports worsening lower extremity swelling, orthopnea and PND. Patient recently returned from [place] on Saturday, where she was hospitalized for epistaxis secondary to HTN and received 1 unit of PRBC.     Patient is now s/p HD with removal of 3.6L and feels much better. She reports that she has never had fluid removed from her lungs. She says she does make some urine and has been getting aggressive diuresis while in the hospital.    +Weakness, fatigue, and nausea. States that she cannot lift her b/l lower extremities d/t weakness since yesterday evening.  No associated pain, urinary or fecal incontinence. She denies headache, dizziness, vision changes, chest pain, palpitations, cough, abdominal pain, vomiting, diarrhea, F/U/D, fever and chills.       ROS:  Per HPI, otherwise 12 point ROS negative    PAST MEDICAL & SURGICAL HISTORY:  Anemia: 2/2 CKD  Asthma  MI, old  CAD (coronary artery disease)  HLD (hyperlipidemia)  CKD (chronic kidney disease): Stage 5CKD  CVA (cerebral infarction)  Hypertension  AV fistula      FAMILY HISTORY:  No pertinent family history in first degree relatives      SOCIAL HISTORY:  Smoking Status: [ ] Current, [ ] Former, [x] Never  Pack Years:    MEDICATIONS:  Pulmonary:    Antimicrobials:  azithromycin  IVPB 500 milliGRAM(s) IV Intermittent Once    Anticoagulants:  heparin  Injectable 5000 Unit(s) SubCutaneous every 8 hours  aspirin enteric coated 81 milliGRAM(s) Oral daily    Onc:    GI/:    Endocrine:  simvastatin 20 milliGRAM(s) Oral at bedtime    Cardiac:  NIFEdipine XL 90 milliGRAM(s) Oral daily  furosemide    Tablet 80 milliGRAM(s) Oral two times a day  carvedilol 25 milliGRAM(s) Oral every 12 hours  metolazone 10 milliGRAM(s) Oral daily    Other Medications:  simvastatin 20 milliGRAM(s) Oral at bedtime  cinacalcet 60 milliGRAM(s) Oral daily      Allergies    No Known Allergies    Intolerances        Vital Signs Last 24 Hrs  T(C): 36.5 (06 Jul 2017 12:53), Max: 36.5 (06 Jul 2017 12:30)  T(F): 97.7 (06 Jul 2017 12:53), Max: 97.7 (06 Jul 2017 12:30)  HR: 80 (06 Jul 2017 12:53) (76 - 87)  BP: 215/98 (06 Jul 2017 12:53) (189/93 - 215/98)  BP(mean): 140 (06 Jul 2017 12:53) (140 - 144)  RR: 16 (06 Jul 2017 12:53) (16 - 28)  SpO2: 97% (06 Jul 2017 12:53) (88% - 100%)    Physical Exam:  Constitutional: NAD. Resting comfortably in bed  Eyes: PERRL, EOMI, clear conjunctiva  Nose: Nasal packing in place.  Neck: supple; No JVD at present  Respiratory: Not tachypneic, decreased breath sounds b/l bases L>R. mild crackles b/l bases.  Cardiac: +S1/S2 +S3; RRR; no M/R  Gastrointestinal: soft, NT/ND; no rebound or guarding; +BSx4  Extremities: WWP, no clubbing or cyanosis; 1+ pedal edema  Musculoskeletal: 2/5 strength in the b/l lower extremities  Neurologic: AAOx3; CNII-XII grossly intact  Psychiatric: affect and characteristics of appearance, verbalizations, behaviors are appropriate      LABS:      CBC Full  -  ( 06 Jul 2017 03:05 )  WBC Count : 17.0 K/uL  Hemoglobin : 11.5 g/dL  Hematocrit : 34.2 %  Platelet Count - Automated : 323 K/uL  Mean Cell Volume : 92.4 fL  Mean Cell Hemoglobin : 31.1 pg  Mean Cell Hemoglobin Concentration : 33.6 g/dL  Auto Neutrophil # : x  Auto Lymphocyte # : x  Auto Monocyte # : x  Auto Eosinophil # : x  Auto Basophil # : x  Auto Neutrophil % : 86.7 %  Auto Lymphocyte % : 5.4 %  Auto Monocyte % : 6.3 %  Auto Eosinophil % : 1.3 %  Auto Basophil % : 0.3 %    07-06    127<L>  |  89<L>  |  37<H>  ----------------------------<  105<H>  5.9<H>   |  22  |  6.00<H>    Ca    10.3      06 Jul 2017 03:05  Phos  4.5     07-06  Mg     1.8     07-06    TPro  7.7  /  Alb  4.1  /  TBili  0.4  /  DBili  x   /  AST  24  /  ALT  12  /  AlkPhos  179<H>  07-06                      RADIOLOGY & ADDITIONAL STUDIES (The following images were personally reviewed): Patient is a 66y old  Female who presents with a chief complaint of shortness of breath    HPI:  65yo F with PMHx of ESRD on HD (T,TH,SAT), CAD, dCHF (EF 75% 11/2015), HTN, ?CVAs, asthma presents with shortness of breath and epistaxis. Patient reports that shortness of breath has been ongoing for the past few months, but worsened this afternoon. At baseline, patient can walk half a block. She also reports worsening lower extremity swelling, orthopnea and PND. Patient recently returned from [place] on Saturday, where she was hospitalized for epistaxis secondary to HTN and received 1 unit of PRBC.     Patient is now s/p HD with removal of 3.6L and feels much better. She reports that she has never had fluid removed from her lungs. She says she does make some urine and has been getting aggressive diuresis while in the hospital.    +Weakness, fatigue, and nausea. States that she cannot lift her b/l lower extremities d/t weakness since yesterday evening.  No associated pain, urinary or fecal incontinence. She denies headache, dizziness, vision changes, chest pain, palpitations, cough, abdominal pain, vomiting, diarrhea, F/U/D, fever and chills.       ROS:  Per HPI, otherwise 12 point ROS negative    PAST MEDICAL & SURGICAL HISTORY:  Anemia: 2/2 CKD  Asthma  MI, old  CAD (coronary artery disease)  HLD (hyperlipidemia)  CKD (chronic kidney disease): Stage 5CKD  CVA (cerebral infarction)  Hypertension  AV fistula      FAMILY HISTORY:  No pertinent family history in first degree relatives      SOCIAL HISTORY:  Smoking Status: [ ] Current, [ ] Former, [x] Never  Pack Years:    MEDICATIONS:  Pulmonary:    Antimicrobials:  azithromycin  IVPB 500 milliGRAM(s) IV Intermittent Once    Anticoagulants:  heparin  Injectable 5000 Unit(s) SubCutaneous every 8 hours  aspirin enteric coated 81 milliGRAM(s) Oral daily    Onc:    GI/:    Endocrine:  simvastatin 20 milliGRAM(s) Oral at bedtime    Cardiac:  NIFEdipine XL 90 milliGRAM(s) Oral daily  furosemide    Tablet 80 milliGRAM(s) Oral two times a day  carvedilol 25 milliGRAM(s) Oral every 12 hours  metolazone 10 milliGRAM(s) Oral daily    Other Medications:  simvastatin 20 milliGRAM(s) Oral at bedtime  cinacalcet 60 milliGRAM(s) Oral daily      Allergies    No Known Allergies    Intolerances        Vital Signs Last 24 Hrs  T(C): 36.5 (06 Jul 2017 12:53), Max: 36.5 (06 Jul 2017 12:30)  T(F): 97.7 (06 Jul 2017 12:53), Max: 97.7 (06 Jul 2017 12:30)  HR: 80 (06 Jul 2017 12:53) (76 - 87)  BP: 215/98 (06 Jul 2017 12:53) (189/93 - 215/98)  BP(mean): 140 (06 Jul 2017 12:53) (140 - 144)  RR: 16 (06 Jul 2017 12:53) (16 - 28)  SpO2: 97% (06 Jul 2017 12:53) (88% - 100%)    Physical Exam:  Constitutional: NAD. Resting comfortably in bed  Eyes: PERRL, EOMI, clear conjunctiva  Nose: Nasal packing in place.  Neck: supple; No JVD at present  Respiratory: Not tachypneic, decreased breath sounds b/l bases L>R. mild crackles b/l bases.  Cardiac: +S1/S2 +S3; RRR; no M/R  Gastrointestinal: soft, NT/ND; no rebound or guarding; +BSx4  Extremities: WWP, no clubbing or cyanosis; 1+ pedal edema  Musculoskeletal: 2/5 strength in the b/l lower extremities  Neurologic: AAOx3; CNII-XII grossly intact  Psychiatric: affect and characteristics of appearance, verbalizations, behaviors are appropriate      LABS:      CBC Full  -  ( 06 Jul 2017 03:05 )  WBC Count : 17.0 K/uL  Hemoglobin : 11.5 g/dL  Hematocrit : 34.2 %  Platelet Count - Automated : 323 K/uL  Mean Cell Volume : 92.4 fL  Mean Cell Hemoglobin : 31.1 pg  Mean Cell Hemoglobin Concentration : 33.6 g/dL  Auto Neutrophil # : x  Auto Lymphocyte # : x  Auto Monocyte # : x  Auto Eosinophil # : x  Auto Basophil # : x  Auto Neutrophil % : 86.7 %  Auto Lymphocyte % : 5.4 %  Auto Monocyte % : 6.3 %  Auto Eosinophil % : 1.3 %  Auto Basophil % : 0.3 %    07-06    127<L>  |  89<L>  |  37<H>  ----------------------------<  105<H>  5.9<H>   |  22  |  6.00<H>    Ca    10.3      06 Jul 2017 03:05  Phos  4.5     07-06  Mg     1.8     07-06    TPro  7.7  /  Alb  4.1  /  TBili  0.4  /  DBili  x   /  AST  24  /  ALT  12  /  AlkPhos  179<H>  07-06      RADIOLOGY & ADDITIONAL STUDIES (The following images were personally reviewed):

## 2017-07-06 NOTE — H&P ADULT - PROBLEM SELECTOR PLAN 3
patient met criteria with elevated leukocytosis with neutrophil predominance and tachypnea; findings most likely 2/2 to underlying PNA  -	Continue with cef and azithromycin   -	f/u bcx

## 2017-07-06 NOTE — H&P ADULT - PROBLEM SELECTOR PLAN 8
Acute on chronic Diastolic heart failure- recent echo with EF of 70%   -	contact Dr Dunham, patients outpatient cardiologist  -	repeat echo   -	DASH diet

## 2017-07-07 ENCOUNTER — RESULT REVIEW (OUTPATIENT)
Age: 67
End: 2017-07-07

## 2017-07-07 DIAGNOSIS — Z29.9 ENCOUNTER FOR PROPHYLACTIC MEASURES, UNSPECIFIED: ICD-10-CM

## 2017-07-07 DIAGNOSIS — D64.89 OTHER SPECIFIED ANEMIAS: ICD-10-CM

## 2017-07-07 LAB
ANION GAP SERPL CALC-SCNC: 12 MMOL/L — SIGNIFICANT CHANGE UP (ref 5–17)
APTT BLD: 29.2 SEC — SIGNIFICANT CHANGE UP (ref 27.5–37.4)
B PERT IGG+IGM PNL SER: SIGNIFICANT CHANGE UP
BLD GP AB SCN SERPL QL: NEGATIVE — SIGNIFICANT CHANGE UP
BUN SERPL-MCNC: 22 MG/DL — SIGNIFICANT CHANGE UP (ref 7–23)
CALCIUM SERPL-MCNC: 9.8 MG/DL — SIGNIFICANT CHANGE UP (ref 8.4–10.5)
CHLORIDE SERPL-SCNC: 92 MMOL/L — LOW (ref 96–108)
CHOLEST FLD-MCNC: 28 MG/DL — SIGNIFICANT CHANGE UP
CO2 SERPL-SCNC: 27 MMOL/L — SIGNIFICANT CHANGE UP (ref 22–31)
COLOR FLD: YELLOW — SIGNIFICANT CHANGE UP
COMMENT - FLUIDS: SIGNIFICANT CHANGE UP
CREAT SERPL-MCNC: 4.9 MG/DL — HIGH (ref 0.5–1.3)
FLUID INTAKE SUBSTANCE CLASS: SIGNIFICANT CHANGE UP
FLUID SEGMENTED GRANULOCYTES: 3 % — SIGNIFICANT CHANGE UP
GLUCOSE FLD-MCNC: 104 MG/DL — SIGNIFICANT CHANGE UP
GLUCOSE SERPL-MCNC: 106 MG/DL — HIGH (ref 70–99)
HCT VFR BLD CALC: 29 % — LOW (ref 34.5–45)
HGB BLD-MCNC: 9.7 G/DL — LOW (ref 11.5–15.5)
INR BLD: 1.07 — SIGNIFICANT CHANGE UP (ref 0.88–1.16)
LDH SERPL L TO P-CCNC: 57 U/L — SIGNIFICANT CHANGE UP
LYMPHOCYTES # FLD: 18 % — SIGNIFICANT CHANGE UP
MAGNESIUM SERPL-MCNC: 2 MG/DL — SIGNIFICANT CHANGE UP (ref 1.6–2.6)
MCHC RBC-ENTMCNC: 31 PG — SIGNIFICANT CHANGE UP (ref 27–34)
MCHC RBC-ENTMCNC: 33.4 G/DL — SIGNIFICANT CHANGE UP (ref 32–36)
MCV RBC AUTO: 92.7 FL — SIGNIFICANT CHANGE UP (ref 80–100)
MONOS+MACROS # FLD: 19 % — SIGNIFICANT CHANGE UP
PH FLD: 7.56 — SIGNIFICANT CHANGE UP
PHOSPHATE SERPL-MCNC: 4.7 MG/DL — HIGH (ref 2.5–4.5)
PLATELET # BLD AUTO: 221 K/UL — SIGNIFICANT CHANGE UP (ref 150–400)
POTASSIUM SERPL-MCNC: 4.6 MMOL/L — SIGNIFICANT CHANGE UP (ref 3.5–5.3)
POTASSIUM SERPL-SCNC: 4.6 MMOL/L — SIGNIFICANT CHANGE UP (ref 3.5–5.3)
PROT FLD-MCNC: 2.1 G/DL — SIGNIFICANT CHANGE UP
PROTHROM AB SERPL-ACNC: 11.9 SEC — SIGNIFICANT CHANGE UP (ref 9.8–12.7)
RBC # BLD: 3.13 M/UL — LOW (ref 3.8–5.2)
RBC # FLD: 19.6 % — HIGH (ref 10.3–16.9)
RCV VOL RI: 1370 /UL — HIGH (ref 0–5)
RH IG SCN BLD-IMP: POSITIVE — SIGNIFICANT CHANGE UP
SODIUM SERPL-SCNC: 131 MMOL/L — LOW (ref 135–145)
SPECIMEN SOURCE FLD: SIGNIFICANT CHANGE UP
TOTAL NUCLEATED CELL COUNT, BODY FLUID: 40 /UL — HIGH (ref 0–5)
TUBE TYPE: SIGNIFICANT CHANGE UP
WBC # BLD: 5.4 K/UL — SIGNIFICANT CHANGE UP (ref 3.8–10.5)
WBC # FLD AUTO: 5.4 K/UL — SIGNIFICANT CHANGE UP (ref 3.8–10.5)

## 2017-07-07 PROCEDURE — 99222 1ST HOSP IP/OBS MODERATE 55: CPT | Mod: 25,GC

## 2017-07-07 PROCEDURE — 75572 CT HRT W/3D IMAGE: CPT | Mod: 26

## 2017-07-07 PROCEDURE — 99233 SBSQ HOSP IP/OBS HIGH 50: CPT | Mod: GC

## 2017-07-07 PROCEDURE — 71010: CPT | Mod: 26,76

## 2017-07-07 PROCEDURE — 32555 ASPIRATE PLEURA W/ IMAGING: CPT

## 2017-07-07 RX ORDER — NIFEDIPINE 30 MG
60 TABLET, EXTENDED RELEASE 24 HR ORAL DAILY
Qty: 0 | Refills: 0 | Status: DISCONTINUED | OUTPATIENT
Start: 2017-07-07 | End: 2017-07-07

## 2017-07-07 RX ORDER — AZITHROMYCIN 500 MG/1
250 TABLET, FILM COATED ORAL DAILY
Qty: 0 | Refills: 0 | Status: COMPLETED | OUTPATIENT
Start: 2017-07-08 | End: 2017-07-10

## 2017-07-07 RX ORDER — OXYMETAZOLINE HYDROCHLORIDE 0.5 MG/ML
3 SPRAY NASAL ONCE
Qty: 0 | Refills: 0 | Status: COMPLETED | OUTPATIENT
Start: 2017-07-07 | End: 2017-07-07

## 2017-07-07 RX ORDER — NIFEDIPINE 30 MG
60 TABLET, EXTENDED RELEASE 24 HR ORAL DAILY
Qty: 0 | Refills: 0 | Status: DISCONTINUED | OUTPATIENT
Start: 2017-07-07 | End: 2017-07-10

## 2017-07-07 RX ADMIN — Medication 90 MILLIGRAM(S): at 10:11

## 2017-07-07 RX ADMIN — HEPARIN SODIUM 5000 UNIT(S): 5000 INJECTION INTRAVENOUS; SUBCUTANEOUS at 05:35

## 2017-07-07 RX ADMIN — CARVEDILOL PHOSPHATE 25 MILLIGRAM(S): 80 CAPSULE, EXTENDED RELEASE ORAL at 17:34

## 2017-07-07 RX ADMIN — Medication 60 MILLIGRAM(S): at 23:09

## 2017-07-07 RX ADMIN — OXYMETAZOLINE HYDROCHLORIDE 3 SPRAY(S): 0.5 SPRAY NASAL at 23:09

## 2017-07-07 RX ADMIN — Medication 80 MILLIGRAM(S): at 05:35

## 2017-07-07 RX ADMIN — Medication 80 MILLIGRAM(S): at 17:34

## 2017-07-07 RX ADMIN — HEPARIN SODIUM 5000 UNIT(S): 5000 INJECTION INTRAVENOUS; SUBCUTANEOUS at 23:08

## 2017-07-07 RX ADMIN — Medication 0.1 MILLIGRAM(S): at 00:20

## 2017-07-07 RX ADMIN — CARVEDILOL PHOSPHATE 25 MILLIGRAM(S): 80 CAPSULE, EXTENDED RELEASE ORAL at 05:35

## 2017-07-07 RX ADMIN — AZITHROMYCIN 255 MILLIGRAM(S): 500 TABLET, FILM COATED ORAL at 05:29

## 2017-07-07 RX ADMIN — SIMVASTATIN 20 MILLIGRAM(S): 20 TABLET, FILM COATED ORAL at 23:09

## 2017-07-07 RX ADMIN — CEFTRIAXONE 100 GRAM(S): 500 INJECTION, POWDER, FOR SOLUTION INTRAMUSCULAR; INTRAVENOUS at 06:29

## 2017-07-07 RX ADMIN — CINACALCET 60 MILLIGRAM(S): 30 TABLET, FILM COATED ORAL at 11:26

## 2017-07-07 RX ADMIN — HEPARIN SODIUM 5000 UNIT(S): 5000 INJECTION INTRAVENOUS; SUBCUTANEOUS at 13:57

## 2017-07-07 RX ADMIN — Medication 0.1 MILLIGRAM(S): at 07:53

## 2017-07-07 RX ADMIN — Medication 81 MILLIGRAM(S): at 11:25

## 2017-07-07 RX ADMIN — Medication 20 MILLIGRAM(S): at 17:34

## 2017-07-07 NOTE — PROGRESS NOTE ADULT - PROBLEM SELECTOR PLAN 1
Likely 2/2 fluid overload and b/l pleural effusions. s/p HD (7/6) with 4L removed and thoracentesis (7/7) with 1.1L removed. Improved since admission  Echo: EF 65-70%. RVOT concern for veg v thrombus v mass. BCx: neg @ 1d   Followed by Cards: Dr. Shannan Coelho  - Per card, CT Cardiac structural w/ con  - f/u thoracentesis labs  - continue ceftriaxone and azithromycin (complete 7/10) 2/2 admission with SIRS and PNA  - strict IO  - continue  Lasix 80 PO BID, metolazone 10 mg POd  - Duoneb and Supplemental Oxygen PRN

## 2017-07-07 NOTE — PROGRESS NOTE ADULT - PROBLEM SELECTOR PLAN 3
Recurrent multiple ED visits in the past for epistaxis requiring nasal packing; s/p nasal rhino-rocket. HH stable  - ENT: no active bleeding, deflate w/ afrin when HTN controlled

## 2017-07-07 NOTE — PROGRESS NOTE ADULT - PROBLEM SELECTOR PLAN 2
upon arrival c/b epistaxis; Patient currently controlled 140-150s systolic. Followed by cards.   - c/w home meds of nifedipine 60mg PO, Lasix, carvedilol 25mg BID, enalapril 20mg BID, Nifedipine 60mg POd and metolazone 10mg POd  - Renal following: plan for HD 7/8

## 2017-07-07 NOTE — PROGRESS NOTE ADULT - ATTENDING COMMENTS
Comfortable post 1100cc removed from left pleural space. Pt for SEAN today to assess RVOT artifact vs. veg vs thrombus. Blood Cx done and pending. CXR post thoracentesis pending. continue CAP rx for now and f/u with renal. No epistaxis and BP well controlled overnight. Adjust BP meds per Cardiology. Comfortable post 1100cc removed from left pleural space. Pt for SEAN today to assess RVOT artifact vs. veg vs thrombus. Blood Cx done and pending. CXR post thoracentesis pending. continue CAP rx for now and f/u with renal. No epistaxis and BP well controlled overnight. Adjust BP meds per Cardiology.  CXR post thoracentesis markedly improved. some perihilar air bronchograms noted so complete 7 days of CAP treatment.

## 2017-07-07 NOTE — PROGRESS NOTE ADULT - PROBLEM SELECTOR PLAN 1
likely 2/2 CHF exacerbation. Possible underlying pneumonia cannot be ruled out  - CXR shows b/l moderate-large pleural effusions with vascular congestion  - O2 as needed. Currently on ventimask and comfortable  - No accessory muscle use, in no acute distress  - antibiotics per primary  - blood cultures ordered.

## 2017-07-07 NOTE — PROGRESS NOTE ADULT - PROBLEM SELECTOR PLAN 2
- the BP with better control now on Nifedipine 60 mg, Lasix 80 mg, metalazone, Hydralazine 10 mg every 8 hours, clonidine 0.1 mg three times a day, carvedilol 25 mg twice a day, Enalaprin 20 mg daily

## 2017-07-07 NOTE — PROGRESS NOTE ADULT - ASSESSMENT
65yo F with PMHx of ESRD on HD (T,TH,SAT), CAD, dCHF (EF 75% 11/2015), HTN, asthma presents with shortness of breath and epistaxis secondary to hypertensive emergency, found to have be fluid overloaded with b/l pleural effusions s/p thoracentesis (1.1L) and HD. On Echo, concerning for RVOT vegetation v thrombus with plans for CT Cardiac w con

## 2017-07-07 NOTE — CONSULT NOTE ADULT - ASSESSMENT
well known to me. has mid cavitary obliteration treated with carvedilol to help with her chronic sob. is not compliant with meds and diet. there is some cognitive delay.  would resume all home meds.  echo reviewed would obtain structural CT to evaluate the mass most likely clot clinically does not appear to have endocarditis and would be a very unusual location. no need for SEAN may not really add much at this time.

## 2017-07-07 NOTE — PROGRESS NOTE ADULT - ASSESSMENT
67 y/o lady admitted to the hospital for a CHF exacerbation and in an fluid overload state. She has a PMH of ESRD on HD (TTS), HFpEF, HTN, CAD and questionable CVA's in the past. The patient was found to be in HTN urgency on admission. CXR from admission showed b/l pleural effusions.

## 2017-07-07 NOTE — PROGRESS NOTE ADULT - PROBLEM SELECTOR PLAN 1
- the patient received HD on 7/6 - 3 hours with 4.1 liters UF   - next HD tomorrow - 7/7  - s/p thoracocentesis - 1.5 liters evacuate - XR after the procedure looks better  - keep her on renal diet   - she is on cinacalcet

## 2017-07-07 NOTE — PROGRESS NOTE ADULT - ASSESSMENT
This is 66 year old female with past medical history stated above. Last HD yesterday on 7/6/2017 3 hours with UF of 4.1 liters. Today feels better, s/p thoracocentesis in the morning 1.5 liters evacauted. With better control of the BP.

## 2017-07-07 NOTE — CONSULT NOTE ADULT - SUBJECTIVE AND OBJECTIVE BOX
CC: Epistaxis    HPI:  Patient is a 66y with past medical history for HTN, ESRD on HD. Seen in ED overnight for epistaxis, rhinorocket placed in the right with good effect. Admitted for respiratory distress, and uncontrolled /80.     Pt reports that she has epistaxis occasionally, usually self limiting. Follow by an ENT regarding her nosebleeds. Minimal blood tinged discharge from packing.    PAST MEDICAL & SURGICAL HISTORY:  Anemia: 2/2 CKD  Asthma  MI, old  CAD (coronary artery disease)  HLD (hyperlipidemia)  CKD (chronic kidney disease): Stage 5CKD  CVA (cerebral infarction)  Hypertension  AV fistula      MEDICATIONS  (STANDING):  heparin  Injectable 5000 Unit(s) SubCutaneous every 8 hours  simvastatin 20 milliGRAM(s) Oral at bedtime  NIFEdipine XL 90 milliGRAM(s) Oral daily  cinacalcet 60 milliGRAM(s) Oral daily  furosemide    Tablet 80 milliGRAM(s) Oral two times a day  aspirin enteric coated 81 milliGRAM(s) Oral daily  carvedilol 25 milliGRAM(s) Oral every 12 hours  metolazone 10 milliGRAM(s) Oral daily    Exam:  NAD, comfortable at rest  rhinorocket in right nares  No bleeding from anterior  Old blood in the posterior nasopharynx  no active bleed noted  Last SBP 190s

## 2017-07-07 NOTE — PROCEDURE NOTE - NSPROCDETAILS_GEN_ALL_CORE
connection to syringe/ultrasound assessment of effusion (localization)/location identified, draped/prepped, sterile technique used, needle inserted/introduced/catheter inserted over needle/ultrasound assessment of effusion (size)/Seldinger technique

## 2017-07-07 NOTE — PROGRESS NOTE ADULT - SUBJECTIVE AND OBJECTIVE BOX
Interval Events:  Patient seen and examined at bedside. She says she feels much better this am. Reports mild shortness of breath.    MEDICATIONS:  Pulmonary:    Antimicrobials:  cefTRIAXone   IVPB 1 Gram(s) IV Intermittent every 24 hours    Anticoagulants:  heparin  Injectable 5000 Unit(s) SubCutaneous every 8 hours  aspirin enteric coated 81 milliGRAM(s) Oral daily    Cardiac:  NIFEdipine XL 90 milliGRAM(s) Oral daily  furosemide    Tablet 80 milliGRAM(s) Oral two times a day  carvedilol 25 milliGRAM(s) Oral every 12 hours  metolazone 10 milliGRAM(s) Oral daily  enalapril 20 milliGRAM(s) Oral two times a day  NIFEdipine XL 60 milliGRAM(s) Oral daily    Endocrine:  simvastatin 20 milliGRAM(s) Oral at bedtime    Allergies    No Known Allergies    Intolerances        Vital Signs Last 24 Hrs  T(C): 36.6 (07 Jul 2017 09:42), Max: 36.9 (06 Jul 2017 21:35)  T(F): 97.9 (07 Jul 2017 09:42), Max: 98.4 (06 Jul 2017 21:35)  HR: 65 (07 Jul 2017 08:45) (65 - 88)  BP: 143/74 (07 Jul 2017 08:45) (143/74 - 215/98)  BP(mean): 114 (07 Jul 2017 01:03) (110 - 140)  RR: 20 (07 Jul 2017 08:45) (16 - 20)  SpO2: 99% (07 Jul 2017 08:45) (97% - 100%)    07-06 @ 07:01  -  07-07 @ 07:00  --------------------------------------------------------  IN: 250 mL / OUT: 4100 mL / NET: -3850 mL    Physical Exam:  Constitutional: NAD. Resting comfortably in bed. On a venti-mask  Eyes: PERRL, EOMI, clear conjunctiva  Nose: Nasal packing in place.  Neck: supple; No JVD at present  Respiratory: Not tachypneic, decreased breath sounds b/l bases L>R. Mild crackles b/l bases.  Cardiac: +S1/S2 +S3; RRR; no M/R  Gastrointestinal: soft, NT/ND; no rebound or guarding; +BSx4  Extremities: WWP, no clubbing or cyanosis; 1+ pedal edema  Musculoskeletal: 2/5 strength in the b/l lower extremities  Neurologic: AAOx3.  Psychiatric: affect and characteristics of appearance, verbalizations, behaviors are appropriate        LABS:      CBC Full  -  ( 07 Jul 2017 06:20 )  WBC Count : 5.4 K/uL  Hemoglobin : 9.7 g/dL  Hematocrit : 29.0 %  Platelet Count - Automated : 221 K/uL  Mean Cell Volume : 92.7 fL  Mean Cell Hemoglobin : 31.0 pg  Mean Cell Hemoglobin Concentration : 33.4 g/dL  Auto Neutrophil # : x  Auto Lymphocyte # : x  Auto Monocyte # : x  Auto Eosinophil # : x  Auto Basophil # : x  Auto Neutrophil % : x  Auto Lymphocyte % : x  Auto Monocyte % : x  Auto Eosinophil % : x  Auto Basophil % : x    07-07    131<L>  |  92<L>  |  22  ----------------------------<  106<H>  4.6   |  27  |  4.90<H>    Ca    9.8      07 Jul 2017 06:20  Phos  4.7     07-07  Mg     2.0     07-07    TPro  7.7  /  Alb  4.1  /  TBili  0.4  /  DBili  x   /  AST  24  /  ALT  12  /  AlkPhos  179<H>  07-06    PT/INR - ( 07 Jul 2017 06:20 )   PT: 11.9 sec;   INR: 1.07          PTT - ( 07 Jul 2017 06:20 )  PTT:29.2 sec    RADIOLOGY & ADDITIONAL STUDIES (The following images were personally reviewed):< from: Xray Chest 1 View AP-PORTABLE IMMEDIATE (07.06.17 @ 03:00) >  Bilateral effusions and underlying infiltrate/atelectasis. Possible   air-fluid level of the right lung base. PA and lateral views the chest   are recommended for further evaluation.    < end of copied text >

## 2017-07-07 NOTE — PROGRESS NOTE ADULT - PROBLEM SELECTOR PLAN 4
HD on T,TH, SAT  - Renal following: HD planned for tomorrow  -  continue home dose cinacalcet 60 mg QD

## 2017-07-07 NOTE — CONSULT NOTE ADULT - SUBJECTIVE AND OBJECTIVE BOX
INTERVAL HISTORY: 66 female esrd on hd admitted volume overload hypertensive emergency with epistaxis. status post hemodialysis and thoracentesis. today she feels ok no complaints asked to see patient for mass in RVOT   	  MEDICATIONS:  NIFEdipine XL 90 milliGRAM(s) Oral daily  furosemide    Tablet 80 milliGRAM(s) Oral two times a day  carvedilol 25 milliGRAM(s) Oral every 12 hours  metolazone 10 milliGRAM(s) Oral daily  enalapril 20 milliGRAM(s) Oral two times a day  NIFEdipine XL 60 milliGRAM(s) Oral daily  cefTRIAXone   IVPB 1 Gram(s) IV Intermittent every 24 hours  simvastatin 20 milliGRAM(s) Oral at bedtime  heparin  Injectable 5000 Unit(s) SubCutaneous every 8 hours  aspirin enteric coated 81 milliGRAM(s) Oral daily  oxymetazoline 0.05% Nasal Spray 3 Spray(s) Right Nostril once      REVIEW OF SYSTEMS:  CONSTITUTIONAL: No fever, weight loss, or fatigue  EYES: No eye pain, visual disturbances, or discharge  ENMT:  No difficulty hearing, tinnitus, vertigo; No sinus or throat pain  NECK: No pain or stiffness  RESPIRATORY: No cough, wheezing, chills or hemoptysis; No Shortness of Breath  CARDIOVASCULAR: No chest pain, palpitations, passing out, dizziness, or leg swelling  GASTROINTESTINAL: No abdominal or epigastric pain. No nausea, vomiting, or hematemesis; No diarrhea or constipation. No melena or hematochezia.  GENITOURINARY: No dysuria, frequency, hematuria, or incontinence  NEUROLOGICAL: No headaches, memory loss, loss of strength, numbness, or tremors  SKIN: No itching, burning, rashes, or lesions   LYMPH Nodes: No enlarged glands  ENDOCRINE: No heat or cold intolerance; No hair loss  MUSCULOSKELETAL: No joint pain or swelling; No muscle, back, or extremity pain  PSYCHIATRIC: No depression, anxiety, mood swings, or difficulty sleeping  HEME/LYMPH: No easy bruising, or bleeding gums  ALLERY AND IMMUNOLOGIC: No hives or eczema	    [ ] All others negative	  [ ] Unable to obtain    PHYSICAL EXAM:  T(C): 36.6 (07-07-17 @ 09:42), Max: 36.9 (07-06-17 @ 21:35)  HR: 68 (07-07-17 @ 13:00) (65 - 88)  BP: 155/81 (07-07-17 @ 13:00) (143/74 - 177/83)  RR: 16 (07-07-17 @ 13:00) (16 - 20)  SpO2: 98% (07-07-17 @ 13:00) (97% - 100%)  Wt(kg): --  I&O's Summary    06 Jul 2017 07:01  -  07 Jul 2017 07:00  --------------------------------------------------------  IN: 250 mL / OUT: 4100 mL / NET: -3850 mL          Appearance: Normal	  HEENT:   Normal oral mucosa, PERRL, EOMI Nasal packing  Lymphatic: No lymphadenopathy  Cardiovascular: Normal S1 S2 JUAN JOSE, No JVD, No murmurs, No edema  Respiratory: Lungs clear to auscultation	  Psychiatry: A & O x 3, Mood & affect appropriate  Gastrointestinal:  Soft, Non-tender, + BS	  Skin: No rashes, No ecchymoses, No cyanosis  Neurologic: Non-focal  Extremities: Normal range of motion, No clubbing, cyanosis or edema  Vascular: Peripheral pulses palpable 2+ bilaterally    TELEMETRY: 	 sinus rhythm  ECG:  	  RADIOLOGY:   DIAGNOSTIC TESTING:  [ ] Echocardiogram:  normal ef mobile echodensity rvot near pulmonic valve. no phtn.                          9.7    5.4   )-----------( 221      ( 07 Jul 2017 06:20 )             29.0     07-07    131<L>  |  92<L>  |  22  ----------------------------<  106<H>  4.6   |  27  |  4.90<H>    Ca    9.8      07 Jul 2017 06:20  Phos  4.7     07-07  Mg     2.0     07-07    TPro  7.7  /  Alb  4.1  /  TBili  0.4  /  DBili  x   /  AST  24  /  ALT  12  /  AlkPhos  179<H>  07-06    proBNP:   Lipid Profile:   HgA1c:   TSH:     ASSESSMENT/PLAN:

## 2017-07-07 NOTE — PROGRESS NOTE ADULT - SUBJECTIVE AND OBJECTIVE BOX
Patient is a 66y with past medical history for HTN, on HD - M, W, F. She came overnight for nose bleeding found to be in respiratory distress, and to have uncontrolled /80. The patient is complaining of Shortness of breath. The patient was on HD yesterday 3 hours with 4.1 ultrafiltration. Today feels better, earlier in morning got thoracocentesis 1.5 liter evacuated.     Patient seen and examined at bedside.     heparin  Injectable 5000 Unit(s) every 8 hours  simvastatin 20 milliGRAM(s) at bedtime  NIFEdipine XL 90 milliGRAM(s) daily  cinacalcet 60 milliGRAM(s) daily  furosemide    Tablet 80 milliGRAM(s) two times a day  aspirin enteric coated 81 milliGRAM(s) daily  carvedilol 25 milliGRAM(s) every 12 hours  metolazone 10 milliGRAM(s) daily  oxymetazoline 0.05% Nasal Spray 3 Spray(s) once  enalapril 20 milliGRAM(s) two times a day  NIFEdipine XL 60 milliGRAM(s) daily      Allergies    No Known Allergies    Intolerances        T(C): , Max: 36.9 (07-06-17 @ 21:35)  T(F): , Max: 98.4 (07-06-17 @ 21:35)  HR: 68 (07-07-17 @ 13:00)  BP: 155/81 (07-07-17 @ 13:00)  BP(mean): 114 (07-07-17 @ 01:03)  RR: 16 (07-07-17 @ 13:00)  SpO2: 98% (07-07-17 @ 13:00)  Wt(kg): --    07-06 @ 07:01  -  07-07 @ 07:00  --------------------------------------------------------  IN:    IV PiggyBack: 250 mL  Total IN: 250 mL    OUT:    Other: 4100 mL  Total OUT: 4100 mL    Total NET: -3850 mL    Physical exam: alert and oriented, normal air entry in the lungs, no wheezing, crackles bilaterally,   abdomen soft, non tender,, non distended, mild peripheral edema      07-07 @ 07:01  -  07-07 @ 15:46  --------------------------------------------------------  IN:  Total IN: 0 mL    OUT:    Other: 1100 mL  Total OUT: 1100 mL    Total NET: -1100 mL              LABS:                        9.7    5.4   )-----------( 221      ( 07 Jul 2017 06:20 )             29.0     07-07    131<L>  |  92<L>  |  22  ----------------------------<  106<H>  4.6   |  27  |  4.90<H>    Ca    9.8      07 Jul 2017 06:20  Phos  4.7     07-07  Mg     2.0     07-07    TPro  7.7  /  Alb  4.1  /  TBili  0.4  /  DBili  x   /  AST  24  /  ALT  12  /  AlkPhos  179<H>  07-06      PT/INR - ( 07 Jul 2017 06:20 )   PT: 11.9 sec;   INR: 1.07          PTT - ( 07 Jul 2017 06:20 )  PTT:29.2 sec          RADIOLOGY & ADDITIONAL STUDIES:

## 2017-07-07 NOTE — PROGRESS NOTE ADULT - SUBJECTIVE AND OBJECTIVE BOX
OVERNIGHT EVENTS: ANGELA     SUBJECTIVE / INTERVAL HPI: Patient seen and examined at bedside.   Patient slept well overnight. Pt notes improved SOB, currently SpO2 95-97% on RA. Improved epistaxis with decreased BP (currently 140-150 systolic), rhino-rocket in place.  Denies CP, endorses mild SOB (improved since yesterday), HA, N/V/D, Ab Pain, neuro sx     VITAL SIGNS:  Vital Signs Last 24 Hrs  T(C): 36.6 (07 Jul 2017 04:55), Max: 36.9 (06 Jul 2017 21:35)  T(F): 97.9 (07 Jul 2017 04:55), Max: 98.4 (06 Jul 2017 21:35)  HR: 71 (07 Jul 2017 05:00) (71 - 88)  BP: 156/83 (07 Jul 2017 05:00) (154/79 - 215/98)  BP(mean): 114 (07 Jul 2017 01:03) (110 - 144)  RR: 18 (07 Jul 2017 05:00) (16 - 20)  SpO2: 97% (07 Jul 2017 05:00) (94% - 100%)    PHYSICAL EXAM:    General: WDWN  HEENT: NC/AT; PERRL, anicteric sclera; MMM  Neck: supple  Cardiovascular: +S1/S2; RRR  Respiratory: CTA B/L; no W/R/R  Gastrointestinal: soft, NT/ND; +BSx4  Extremities: WWP; no edema, clubbing or cyanosis  Vascular: 2+ radial, DP/PT pulses B/L  Neurological: AAOx3; no focal deficits    MEDICATIONS:  MEDICATIONS  (STANDING):  heparin  Injectable 5000 Unit(s) SubCutaneous every 8 hours  simvastatin 20 milliGRAM(s) Oral at bedtime  NIFEdipine XL 90 milliGRAM(s) Oral daily  cinacalcet 60 milliGRAM(s) Oral daily  furosemide    Tablet 80 milliGRAM(s) Oral two times a day  aspirin enteric coated 81 milliGRAM(s) Oral daily  carvedilol 25 milliGRAM(s) Oral every 12 hours  cefTRIAXone   IVPB 1 Gram(s) IV Intermittent every 24 hours  metolazone 10 milliGRAM(s) Oral daily  cloNIDine 0.1 milliGRAM(s) Oral every 8 hours    MEDICATIONS  (PRN):      ALLERGIES:  Allergies    No Known Allergies    Intolerances        LABS:                        9.7    5.4   )-----------( 221      ( 07 Jul 2017 06:20 )             29.0     07-07    131<L>  |  92<L>  |  22  ----------------------------<  106<H>  4.6   |  27  |  4.90<H>    Ca    9.8      07 Jul 2017 06:20  Phos  4.7     07-07  Mg     2.0     07-07    TPro  7.7  /  Alb  4.1  /  TBili  0.4  /  DBili  x   /  AST  24  /  ALT  12  /  AlkPhos  179<H>  07-06    PT/INR - ( 07 Jul 2017 06:20 )   PT: 11.9 sec;   INR: 1.07          PTT - ( 07 Jul 2017 06:20 )  PTT:29.2 sec    CAPILLARY BLOOD GLUCOSE  122 (07 Jul 2017 06:55)          RADIOLOGY & ADDITIONAL TESTS: Reviewed.  7/06: Echocardiogram: EF 65-70%. Mild echodensity in RVOT, differntial vegetation v thrombus OVERNIGHT EVENTS: ANGELA     SUBJECTIVE / INTERVAL HPI: Patient seen and examined at bedside.   Patient slept well overnight. Pt notes improved SOB, currently SpO2 95-97% on RA. Improved epistaxis with decreased BP (currently 140-150 systolic), rhino-rocket in place.  Denies CP, endorses mild SOB (improved since yesterday), HA, N/V/D, Ab Pain, neuro sx.    In AM, patient had thoracocentesis removed 1.1L fluid from L lung, labs sent.      VITAL SIGNS:  Vital Signs Last 24 Hrs  T(C): 36.6 (07 Jul 2017 04:55), Max: 36.9 (06 Jul 2017 21:35)  T(F): 97.9 (07 Jul 2017 04:55), Max: 98.4 (06 Jul 2017 21:35)  HR: 71 (07 Jul 2017 05:00) (71 - 88)  BP: 156/83 (07 Jul 2017 05:00) (154/79 - 215/98)  BP(mean): 114 (07 Jul 2017 01:03) (110 - 144)  RR: 18 (07 Jul 2017 05:00) (16 - 20)  SpO2: 97% (07 Jul 2017 05:00) (94% - 100%)    PHYSICAL EXAM:    General: WDWN  HEENT: NC/AT; PERRL, anicteric sclera; MMM  Neck: supple  Cardiovascular: +S1/S2; RRR  Respiratory: CTA B/L; no W/R/R  Gastrointestinal: soft, NT/ND; +BSx4  Extremities: WWP; no edema, clubbing or cyanosis  Vascular: 2+ radial, DP/PT pulses B/L  Neurological: AAOx3; no focal deficits    MEDICATIONS:  MEDICATIONS  (STANDING):  heparin  Injectable 5000 Unit(s) SubCutaneous every 8 hours  simvastatin 20 milliGRAM(s) Oral at bedtime  NIFEdipine XL 90 milliGRAM(s) Oral daily  cinacalcet 60 milliGRAM(s) Oral daily  furosemide    Tablet 80 milliGRAM(s) Oral two times a day  aspirin enteric coated 81 milliGRAM(s) Oral daily  carvedilol 25 milliGRAM(s) Oral every 12 hours  cefTRIAXone   IVPB 1 Gram(s) IV Intermittent every 24 hours  metolazone 10 milliGRAM(s) Oral daily  cloNIDine 0.1 milliGRAM(s) Oral every 8 hours    MEDICATIONS  (PRN):      ALLERGIES:  Allergies    No Known Allergies    Intolerances        LABS:                        9.7    5.4   )-----------( 221      ( 07 Jul 2017 06:20 )             29.0     07-07    131<L>  |  92<L>  |  22  ----------------------------<  106<H>  4.6   |  27  |  4.90<H>    Ca    9.8      07 Jul 2017 06:20  Phos  4.7     07-07  Mg     2.0     07-07    TPro  7.7  /  Alb  4.1  /  TBili  0.4  /  DBili  x   /  AST  24  /  ALT  12  /  AlkPhos  179<H>  07-06    PT/INR - ( 07 Jul 2017 06:20 )   PT: 11.9 sec;   INR: 1.07          PTT - ( 07 Jul 2017 06:20 )  PTT:29.2 sec    CAPILLARY BLOOD GLUCOSE  122 (07 Jul 2017 06:55)    RADIOLOGY & ADDITIONAL TESTS: Reviewed.  7/06: Echocardiogram: EF 65-70%. Mild echodensity in RVOT, differntial vegetation v thrombus

## 2017-07-07 NOTE — CONSULT NOTE ADULT - CONSULT REQUESTED BY NAME
Dr. Jackson
Dr. Finn
[facility]
Internal Medicine
[facility]

## 2017-07-07 NOTE — CONSULT NOTE ADULT - ASSESSMENT
65yo p/w uncontrolled hypertension and epistaxis. No current nosebleed w rhinorocket in place.   - Rhinorocket can be deflated when hypertension is controlled.  - If there is no obvious bleeding, spray afrin around the rhinorocket and remove deflated rhinorocket gently.   - If bleeding recurs after removing rhinorocket spray afrin and hold pressure for 20 mins   - Contact ENT if bleeding is not controlled.   - Follow up with ENT after discharge    D/w Chief

## 2017-07-07 NOTE — PROGRESS NOTE ADULT - PROBLEM SELECTOR PLAN 2
likely from ESRD/CHF  - S/p Lt thoracentesis on 7/7/17 with removal of 1L of serous fluid  - Fluid studies consistent with transudate.  - post CXR shows no Pneumothorax.  - Send PT/PTT  - Repeat CXR in am..

## 2017-07-08 LAB
ANION GAP SERPL CALC-SCNC: 15 MMOL/L — SIGNIFICANT CHANGE UP (ref 5–17)
BUN SERPL-MCNC: 37 MG/DL — HIGH (ref 7–23)
CALCIUM SERPL-MCNC: 9.4 MG/DL — SIGNIFICANT CHANGE UP (ref 8.4–10.5)
CHLORIDE SERPL-SCNC: 89 MMOL/L — LOW (ref 96–108)
CO2 SERPL-SCNC: 25 MMOL/L — SIGNIFICANT CHANGE UP (ref 22–31)
CREAT SERPL-MCNC: 7 MG/DL — HIGH (ref 0.5–1.3)
GLUCOSE SERPL-MCNC: 86 MG/DL — SIGNIFICANT CHANGE UP (ref 70–99)
GRAM STN FLD: SIGNIFICANT CHANGE UP
HCT VFR BLD CALC: 30.9 % — LOW (ref 34.5–45)
HGB BLD-MCNC: 10.4 G/DL — LOW (ref 11.5–15.5)
LDH SERPL L TO P-CCNC: 279 U/L — HIGH (ref 50–242)
MAGNESIUM SERPL-MCNC: 2 MG/DL — SIGNIFICANT CHANGE UP (ref 1.6–2.6)
MCHC RBC-ENTMCNC: 31.1 PG — SIGNIFICANT CHANGE UP (ref 27–34)
MCHC RBC-ENTMCNC: 33.7 G/DL — SIGNIFICANT CHANGE UP (ref 32–36)
MCV RBC AUTO: 92.5 FL — SIGNIFICANT CHANGE UP (ref 80–100)
NIGHT BLUE STAIN TISS: SIGNIFICANT CHANGE UP
PHOSPHATE SERPL-MCNC: 5.1 MG/DL — HIGH (ref 2.5–4.5)
PLATELET # BLD AUTO: 256 K/UL — SIGNIFICANT CHANGE UP (ref 150–400)
POTASSIUM SERPL-MCNC: 4.7 MMOL/L — SIGNIFICANT CHANGE UP (ref 3.5–5.3)
POTASSIUM SERPL-SCNC: 4.7 MMOL/L — SIGNIFICANT CHANGE UP (ref 3.5–5.3)
PROT SERPL-MCNC: 6.4 G/DL — SIGNIFICANT CHANGE UP (ref 6–8.3)
RBC # BLD: 3.34 M/UL — LOW (ref 3.8–5.2)
RBC # FLD: 19.4 % — HIGH (ref 10.3–16.9)
SODIUM SERPL-SCNC: 129 MMOL/L — LOW (ref 135–145)
SPECIMEN SOURCE: SIGNIFICANT CHANGE UP
SPECIMEN SOURCE: SIGNIFICANT CHANGE UP
WBC # BLD: 6.8 K/UL — SIGNIFICANT CHANGE UP (ref 3.8–10.5)
WBC # FLD AUTO: 6.8 K/UL — SIGNIFICANT CHANGE UP (ref 3.8–10.5)

## 2017-07-08 PROCEDURE — 99232 SBSQ HOSP IP/OBS MODERATE 35: CPT | Mod: GC

## 2017-07-08 PROCEDURE — 99233 SBSQ HOSP IP/OBS HIGH 50: CPT | Mod: GC

## 2017-07-08 PROCEDURE — 71010: CPT | Mod: 26

## 2017-07-08 RX ADMIN — HEPARIN SODIUM 5000 UNIT(S): 5000 INJECTION INTRAVENOUS; SUBCUTANEOUS at 21:58

## 2017-07-08 RX ADMIN — Medication 60 MILLIGRAM(S): at 19:00

## 2017-07-08 RX ADMIN — Medication 81 MILLIGRAM(S): at 18:57

## 2017-07-08 RX ADMIN — HEPARIN SODIUM 5000 UNIT(S): 5000 INJECTION INTRAVENOUS; SUBCUTANEOUS at 05:14

## 2017-07-08 RX ADMIN — CARVEDILOL PHOSPHATE 25 MILLIGRAM(S): 80 CAPSULE, EXTENDED RELEASE ORAL at 21:58

## 2017-07-08 RX ADMIN — AZITHROMYCIN 250 MILLIGRAM(S): 500 TABLET, FILM COATED ORAL at 18:57

## 2017-07-08 RX ADMIN — Medication 80 MILLIGRAM(S): at 05:14

## 2017-07-08 RX ADMIN — CINACALCET 60 MILLIGRAM(S): 30 TABLET, FILM COATED ORAL at 18:57

## 2017-07-08 RX ADMIN — Medication 20 MILLIGRAM(S): at 18:58

## 2017-07-08 RX ADMIN — SIMVASTATIN 20 MILLIGRAM(S): 20 TABLET, FILM COATED ORAL at 21:58

## 2017-07-08 RX ADMIN — CARVEDILOL PHOSPHATE 25 MILLIGRAM(S): 80 CAPSULE, EXTENDED RELEASE ORAL at 05:13

## 2017-07-08 RX ADMIN — Medication 20 MILLIGRAM(S): at 05:14

## 2017-07-08 NOTE — PROGRESS NOTE ADULT - SUBJECTIVE AND OBJECTIVE BOX
CC: PLEURAL EFFUSION, SOB      INTERVAL HISTORY: feels okay'  complains of some discomfort at site of paracentesis      ROS: No chest pain, no sob, no abd pain. No n/v/d    PAST MEDICAL & SURGICAL HISTORY:  Anemia: 2/2 CKD  Asthma  MI, old  CAD (coronary artery disease)  HLD (hyperlipidemia)  CKD (chronic kidney disease): Stage 5CKD  CVA (cerebral infarction)  Hypertension  No pertinent past medical history  AV fistula  No significant past surgical history      PHYSICAL EXAM:  T(C): 36.8 (07-08-17 @ 05:09), Max: 36.8 (07-08-17 @ 05:09)  HR: 71 (07-08-17 @ 05:00)  BP: 137/72 (07-08-17 @ 05:00) (137/72 - 156/72)  RR: 16 (07-08-17 @ 05:00)  SpO2: 94% (07-08-17 @ 05:00)  Wt(kg): --  I&O's Summary    07 Jul 2017 07:01  -  08 Jul 2017 07:00  --------------------------------------------------------  IN: 0 mL / OUT: 1100 mL / NET: -1100 mL      Weight 52.2 (07-06 @ 02:18)  General: AAO x 3,  NAD.  HEENT: moist mucous membranes, no pallor/cyanosis.  Neck: no JVD visible.  Cardiac: S1, S2. RRR. No murmurs   Respratory: poor inspiratory flow; decreased BS  Abdomen: soft. nontender. nondistended  Skin: no rashes.  Extremities: no LE edema b/l  Access: + bruit in LAF      DATA:                        10.4<L>  6.8   )-----------( 256      ( 08 Jul 2017 06:37 )             30.9<L>        129<L>  |  89<L>  |  37<H>  ----------------------------<  86     Ca:9.4   (08 Jul 2017 06:37)  4.7     |  25     |  7.00<H>      eGFR if Non : 6 <L>  eGFR if : 7 <L>    TPro  6.4 g/dL  /  Alb  x      /  TBili  x      /  DBili  x      /  AST  x      /  ALT  x      /  AlkPhos  x      08 Jul 2017 06:37  Lactate Dehydrogenase, Serum: 279 U/L <H> (07-08 @ 06:37)                    MEDICATIONS  (STANDING):  heparin  Injectable 5000 Unit(s) SubCutaneous every 8 hours  simvastatin 20 milliGRAM(s) Oral at bedtime  NIFEdipine XL 90 milliGRAM(s) Oral daily  cinacalcet 60 milliGRAM(s) Oral daily  furosemide    Tablet 80 milliGRAM(s) Oral two times a day  aspirin enteric coated 81 milliGRAM(s) Oral daily  carvedilol 25 milliGRAM(s) Oral every 12 hours  metolazone 10 milliGRAM(s) Oral daily  azithromycin   Tablet 250 milliGRAM(s) Oral daily  enalapril 20 milliGRAM(s) Oral two times a day  NIFEdipine XL 60 milliGRAM(s) Oral daily    MEDICATIONS  (PRN):

## 2017-07-08 NOTE — PROGRESS NOTE ADULT - SUBJECTIVE AND OBJECTIVE BOX
Patient was seen and evaluated on dialysis.   Patient is tolerating the procedure well.   HR: 69 (07-08-17 @ 14:20)  BP: 145/77 (07-08-17 @ 14:20)  Continue dialysis:   Dialyzer: [id number] QB: 400 QD: 500 2K bath    Goal UF 3kg over 3 Hours

## 2017-07-08 NOTE — PROGRESS NOTE ADULT - SUBJECTIVE AND OBJECTIVE BOX
INTERVAL HPI/OVERNIGHT EVENTS: No acute events overnight.     SUBJECTIVE: Patient seen and examined at bedside. Patient denies nausea, vomiting, fevers, chills, chest pain, shortness of breath, and abdominal pain.    OBJECTIVE:    VITAL SIGNS:  ICU Vital Signs Last 24 Hrs  T(C): 36.8 (08 Jul 2017 05:09), Max: 36.8 (08 Jul 2017 05:09)  T(F): 98.3 (08 Jul 2017 05:09), Max: 98.3 (08 Jul 2017 05:09)  HR: 68 (08 Jul 2017 09:30) (66 - 71)  BP: 148/72 (08 Jul 2017 09:30) (137/72 - 156/72)  BP(mean): 99 (08 Jul 2017 05:00) (99 - 103)  ABP: --  ABP(mean): --  RR: 17 (08 Jul 2017 09:30) (15 - 17)  SpO2: 96% (08 Jul 2017 09:30) (94% - 98%)        07-07 @ 07:01  -  07-08 @ 07:00  --------------------------------------------------------  IN: 0 mL / OUT: 1100 mL / NET: -1100 mL      CAPILLARY BLOOD GLUCOSE  122 (07 Jul 2017 06:55)          PHYSICAL EXAM:    General: NAD  HEENT: NC/AT; clear conjunctiva  Neck: supple  Respiratory: CTA b/l, no crackles, wheezes, or rhonchi appreciated  Cardiovascular: +S1/S2; RRR, no murmurs, rubs, or gallops appreciated  Abdomen: soft, NT/ND; +BS x4  Extremities: no lower extremity edema noted  Skin: normal color and turgor  Neurological: alert and oriented to person and situation (time not assessed), no focal deficits appreciated    MEDICATIONS:  MEDICATIONS  (STANDING):  heparin  Injectable 5000 Unit(s) SubCutaneous every 8 hours  simvastatin 20 milliGRAM(s) Oral at bedtime  NIFEdipine XL 90 milliGRAM(s) Oral daily  cinacalcet 60 milliGRAM(s) Oral daily  furosemide    Tablet 80 milliGRAM(s) Oral two times a day  aspirin enteric coated 81 milliGRAM(s) Oral daily  carvedilol 25 milliGRAM(s) Oral every 12 hours  metolazone 10 milliGRAM(s) Oral daily  azithromycin   Tablet 250 milliGRAM(s) Oral daily  enalapril 20 milliGRAM(s) Oral two times a day  NIFEdipine XL 60 milliGRAM(s) Oral daily    MEDICATIONS  (PRN):      ALLERGIES:  Allergies    No Known Allergies    Intolerances        LABS:                        10.4   6.8   )-----------( 256      ( 08 Jul 2017 06:37 )             30.9     07-08    129<L>  |  89<L>  |  37<H>  ----------------------------<  86  4.7   |  25  |  7.00<H>    Ca    9.4      08 Jul 2017 06:37  Phos  5.1     07-08  Mg     2.0     07-08    TPro  6.4  /  Alb  x   /  TBili  x   /  DBili  x   /  AST  x   /  ALT  x   /  AlkPhos  x   07-08    PT/INR - ( 07 Jul 2017 06:20 )   PT: 11.9 sec;   INR: 1.07          PTT - ( 07 Jul 2017 06:20 )  PTT:29.2 sec      RADIOLOGY & ADDITIONAL TESTS: Reviewed.    ASSESSMENT: 66F PMH ESRD Tu/Th/Sat HD, CAD, diastolic CHF (EF 75% 11/2015), HTN, asthma, presents w/ SOB, epistaxis, d/t to hypertensive emergency, found to be fluid overloaded. Plueral effusion s/p thoracentesis with 1.1L drained. Echo concerning for RVOT vegetation v thrombus. Planned cardiac CT w/ contrast      PLAN:  1. SOB   - Fluid overload   - B/l pleural effusion s/p 4L removed HD 2 days ago, 1.1L removed via thoracentesis yesterday   - Improved since admission   - diastolic     FEN - no IVF; replete lytes prn; NPO    PPx - HSQ    CODE - FULL.    DISPO - continue telemetry monitoring in ICU-step down level of care on [facility]. INTERVAL HPI/OVERNIGHT EVENTS: No acute events overnight.     SUBJECTIVE: Patient seen and examined at bedside. Patient denies nausea, vomiting, fevers, chills, chest pain, shortness of breath, and abdominal pain.    OBJECTIVE:    VITAL SIGNS:  ICU Vital Signs Last 24 Hrs  T(C): 36.8 (08 Jul 2017 05:09), Max: 36.8 (08 Jul 2017 05:09)  T(F): 98.3 (08 Jul 2017 05:09), Max: 98.3 (08 Jul 2017 05:09)  HR: 68 (08 Jul 2017 09:30) (66 - 71)  BP: 148/72 (08 Jul 2017 09:30) (137/72 - 156/72)  BP(mean): 99 (08 Jul 2017 05:00) (99 - 103)  ABP: --  ABP(mean): --  RR: 17 (08 Jul 2017 09:30) (15 - 17)  SpO2: 96% (08 Jul 2017 09:30) (94% - 98%)        07-07 @ 07:01  -  07-08 @ 07:00  --------------------------------------------------------  IN: 0 mL / OUT: 1100 mL / NET: -1100 mL      CAPILLARY BLOOD GLUCOSE  122 (07 Jul 2017 06:55)          PHYSICAL EXAM:    General: NAD  HEENT: NC/AT; clear conjunctiva  Neck: supple  Respiratory: Fine crackles on exam b/l worst at bases, no rhonchi or wheeze appreciated  Cardiovascular: +S1/S2; RRR, 3/6 systolic murmur appreciated at R sternal border  Abdomen: soft, ND, mild tenderness to palpation in LLQ, LUQ; +BS x4  Extremities: no lower extremity edema noted  Skin: normal color and turgor  Neurological: alert and oriented to person and situation (time not assessed), no focal deficits appreciated    MEDICATIONS:  MEDICATIONS  (STANDING):  heparin  Injectable 5000 Unit(s) SubCutaneous every 8 hours  simvastatin 20 milliGRAM(s) Oral at bedtime  NIFEdipine XL 90 milliGRAM(s) Oral daily  cinacalcet 60 milliGRAM(s) Oral daily  furosemide    Tablet 80 milliGRAM(s) Oral two times a day  aspirin enteric coated 81 milliGRAM(s) Oral daily  carvedilol 25 milliGRAM(s) Oral every 12 hours  metolazone 10 milliGRAM(s) Oral daily  azithromycin   Tablet 250 milliGRAM(s) Oral daily  enalapril 20 milliGRAM(s) Oral two times a day  NIFEdipine XL 60 milliGRAM(s) Oral daily    MEDICATIONS  (PRN):      ALLERGIES:  Allergies    No Known Allergies    Intolerances        LABS:                        10.4   6.8   )-----------( 256      ( 08 Jul 2017 06:37 )             30.9     07-08    129<L>  |  89<L>  |  37<H>  ----------------------------<  86  4.7   |  25  |  7.00<H>    Ca    9.4      08 Jul 2017 06:37  Phos  5.1     07-08  Mg     2.0     07-08    TPro  6.4  /  Alb  x   /  TBili  x   /  DBili  x   /  AST  x   /  ALT  x   /  AlkPhos  x   07-08    PT/INR - ( 07 Jul 2017 06:20 )   PT: 11.9 sec;   INR: 1.07          PTT - ( 07 Jul 2017 06:20 )  PTT:29.2 sec      RADIOLOGY & ADDITIONAL TESTS: Reviewed.    ASSESSMENT: 66F PMH ESRD Tu/Th/Sat HD, CAD, diastolic CHF (EF 75% 11/2015), HTN, asthma, presents w/ SOB, epistaxis, d/t to hypertensive emergency, found to be fluid overloaded. Plueral effusion s/p thoracentesis with 1.1L drained. Echo concerning for RVOT vegetation v thrombus. Planned cardiac CT w/ contrast      PLAN:  1. SOB   - Fluid overload.    - B/l pleural effusion s/p 4L removed HD 2 days ago, 1.1L removed via thoracentesis yesterday   - Improved since admission   - Can d/c Lasix    2. ESRD   - HD today, 3kg UF as tolerated per renal   - Cinecalcet    3. HTN   - Enalapril, nifedipine, coreg   - Per renal, can d/c diuretics since UOP minimal    4. Epistaxis   - s/p rhino-rocket, CBC stable   - ENT: no active bleed, deflate w/ Afrin when HTN controlled    5. CAD   - Simvastatin   - ASA 81    6. Asthma   - Duoneb PRN    FEN - no IVF; replete lytes prn; renal diet    PPx - HSQ, SCD    CODE - FULL.    DISPO - continue telemetry monitoring in ICU-step down level of care on [address].

## 2017-07-08 NOTE — PROGRESS NOTE ADULT - ASSESSMENT
67yo F with PMHx of ESRD on HD (T,TH,SAT), CAD, dCHF (EF 75% 11/2015), HTN, asthma presents with shortness of breath and epistaxis secondary to hypertensive emergency

## 2017-07-08 NOTE — PROGRESS NOTE ADULT - SUBJECTIVE AND OBJECTIVE BOX
ENT Consult Note Update     Patient is a 66y with past medical history for HTN, ESRD on HD. Seen in ED overnight for epistaxis, rhinorocket placed in the right naris with good effect. Admitted for respiratory distress, and uncontrolled /80.     Pt reports that she has epistaxis occasionally, usually self limiting. Follow by an ENT regarding her nosebleeds. Minimal blood tinged discharge from packing.    Overnight events/Interval HPI: BP control significantly improved. Now normotensive. No additional episodes of epistaxis.       Allergies    No Known Allergies    Intolerances        MEDICATIONS:  Antiinfectives:   azithromycin   Tablet 250 milliGRAM(s) Oral daily    IV fluids:    Hematologic/Anticoagulation:  heparin  Injectable 5000 Unit(s) SubCutaneous every 8 hours  aspirin enteric coated 81 milliGRAM(s) Oral daily    Pain medications/Neuro:    Endocrine Medications:   simvastatin 20 milliGRAM(s) Oral at bedtime    All other standing medications:   NIFEdipine XL 90 milliGRAM(s) Oral daily  cinacalcet 60 milliGRAM(s) Oral daily  furosemide    Tablet 80 milliGRAM(s) Oral two times a day  carvedilol 25 milliGRAM(s) Oral every 12 hours  metolazone 10 milliGRAM(s) Oral daily  enalapril 20 milliGRAM(s) Oral two times a day  NIFEdipine XL 60 milliGRAM(s) Oral daily    All other PRN medications:      Vital Signs Last 24 Hrs  T(C): 36.4 (08 Jul 2017 10:13), Max: 36.8 (08 Jul 2017 05:09)  T(F): 97.5 (08 Jul 2017 10:13), Max: 98.3 (08 Jul 2017 05:09)  HR: 68 (08 Jul 2017 10:45) (66 - 71)  BP: 148/72 (08 Jul 2017 10:45) (137/72 - 156/72)  BP(mean): 99 (08 Jul 2017 05:00) (99 - 103)  RR: 17 (08 Jul 2017 10:45) (15 - 17)  SpO2: 96% (08 Jul 2017 09:30) (94% - 98%)      07-07 @ 07:01  -  07-08 @ 07:00  --------------------------------------------------------  IN:  Total IN: 0 mL    OUT:    Other: 1100 mL  Total OUT: 1100 mL    Total NET: -1100 mL            PHYSICAL EXAM:  NAD, comfortable at rest  rhinorocket in right naris  No bleeding anteriorly  OC/Opx clear no     LABS:  CBC-                        10.4   6.8   )-----------( 256      ( 08 Jul 2017 06:37 )             30.9     BMP/CMP-  08 Jul 2017 06:37    129    |  89     |  37     ----------------------------<  86     4.7     |  25     |  7.00     Ca    9.4        08 Jul 2017 06:37  Phos  5.1       08 Jul 2017 06:37  Mg     2.0       08 Jul 2017 06:37    TPro  6.4    /  Alb  x      /  TBili  x      /  DBili  x      /  AST  x      /  ALT  x      /  AlkPhos  x      08 Jul 2017 06:37    Coagulation Studies-  PT/INR - ( 07 Jul 2017 06:20 )   PT: 11.9 sec;   INR: 1.07          PTT - ( 07 Jul 2017 06:20 )  PTT:29.2 sec  Endocrine Panel-  Calcium, Total Serum: 9.4 mg/dL (07-08 @ 06:37)      65yo p/w uncontrolled hypertension and epistaxis. Now with BP controlled and no active bleeding. Balloon taken down with no e/o recurrent bleeding. Plan to keep deflated rhinorocket in place overnight and re-evaluate in the morning.   - Please DO NOT remove rhinorocket  - If pt has recurrent episode of epistaxis, please reinflate the rhinorocket and page ENT   - Will plan to d/c rhinorocket tomorrow if no recurrent episodes of epistaxis.   - Follow up with ENT after discharge    D/w attending

## 2017-07-08 NOTE — PROGRESS NOTE ADULT - PROBLEM SELECTOR PLAN 2
BP better controlled now  s/p UF with HD on Thursday  further UF today  on Enalapril, Nifedipine. Coreg  can discontinue diuretics since UO minimal

## 2017-07-08 NOTE — PROGRESS NOTE ADULT - PROBLEM SELECTOR PLAN 1
The pleural effusion is transudative in nature.  Culture negative.  Likely secondary to heart failure exacerbation and renal failure.  Recommend continued dialysis with fluid removal.  CT chest The pleural effusion is transudative in nature.  Culture negative.  Likely secondary to heart failure exacerbation and renal failure.  Recommend continued dialysis with fluid removal.

## 2017-07-08 NOTE — PROGRESS NOTE ADULT - SUBJECTIVE AND OBJECTIVE BOX
Interval Events:  Patient seen and examined at bedside.  Breathing feels improved.          Vital Signs Last 24 Hrs  T(C): 36.4 (08 Jul 2017 10:13), Max: 36.8 (08 Jul 2017 05:09)  T(F): 97.5 (08 Jul 2017 10:13), Max: 98.3 (08 Jul 2017 05:09)  HR: 68 (08 Jul 2017 09:30) (66 - 71)  BP: 148/72 (08 Jul 2017 09:30) (137/72 - 156/72)  BP(mean): 99 (08 Jul 2017 05:00) (99 - 103)  RR: 17 (08 Jul 2017 09:30) (15 - 17)  SpO2: 96% (08 Jul 2017 09:30) (94% - 98%)    07-07 @ 07:01  -  07-08 @ 07:00  --------------------------------------------------------  IN: 0 mL / OUT: 1100 mL / NET: -1100 mL          PHYSICAL EXAM:    General: Well developed; well nourished; in no acute distress  Neck: Supple; non tender; no masses  Respiratory: +rales bilaterally  Cardiovascular: Regular rate and rhythm. S1 and S2 Normal; No murmurs, gallops or rubs  Extremities: Normal range of motion, No clubbing, cyanosis or edema  Neurological: Alert and oriented x3  Skin: Warm and dry. No obvious rash    LABS:      CBC Full  -  ( 08 Jul 2017 06:37 )  WBC Count : 6.8 K/uL  Hemoglobin : 10.4 g/dL  Hematocrit : 30.9 %  Platelet Count - Automated : 256 K/uL  Mean Cell Volume : 92.5 fL  Mean Cell Hemoglobin : 31.1 pg  Mean Cell Hemoglobin Concentration : 33.7 g/dL      07-08    129<L>  |  89<L>  |  37<H>  ----------------------------<  86  4.7   |  25  |  7.00<H>    Ca    9.4      08 Jul 2017 06:37  Phos  5.1     07-08  Mg     2.0     07-08    TPro  6.4  /  Alb  x   /  TBili  x   /  DBili  x   /  AST  x   /  ALT  x   /  AlkPhos  x   07-08    PT/INR - ( 07 Jul 2017 06:20 )   PT: 11.9 sec;   INR: 1.07          PTT - ( 07 Jul 2017 06:20 )  PTT:29.2 sec                  RADIOLOGY & ADDITIONAL STUDIES (The following images were personally reviewed):   CXR: improvement in left sided effusion. Interval Events:  Patient seen and examined at bedside.  Breathing feels improved.          Vital Signs Last 24 Hrs  T(C): 36.4 (08 Jul 2017 10:13), Max: 36.8 (08 Jul 2017 05:09)  T(F): 97.5 (08 Jul 2017 10:13), Max: 98.3 (08 Jul 2017 05:09)  HR: 68 (08 Jul 2017 09:30) (66 - 71)  BP: 148/72 (08 Jul 2017 09:30) (137/72 - 156/72)  BP(mean): 99 (08 Jul 2017 05:00) (99 - 103)  RR: 17 (08 Jul 2017 09:30) (15 - 17)  SpO2: 96% (08 Jul 2017 09:30) (94% - 98%)    07-07 @ 07:01  -  07-08 @ 07:00  --------------------------------------------------------  IN: 0 mL / OUT: 1100 mL / NET: -1100 mL          PHYSICAL EXAM:    General: Well developed; well nourished; in no acute distress  Neck: Supple; non tender; no masses  Respiratory: +rales bilaterally  Cardiovascular: Regular rate and rhythm. S1 and S2 Normal; No murmurs, gallops or rubs  Extremities: Normal range of motion, No clubbing, cyanosis or edema  Neurological: Alert and oriented x3  Skin: Warm and dry. No obvious rash    LABS:      CBC Full  -  ( 08 Jul 2017 06:37 )  WBC Count : 6.8 K/uL  Hemoglobin : 10.4 g/dL  Hematocrit : 30.9 %  Platelet Count - Automated : 256 K/uL  Mean Cell Volume : 92.5 fL  Mean Cell Hemoglobin : 31.1 pg  Mean Cell Hemoglobin Concentration : 33.7 g/dL      07-08    129<L>  |  89<L>  |  37<H>  ----------------------------<  86  4.7   |  25  |  7.00<H>    Ca    9.4      08 Jul 2017 06:37  Phos  5.1     07-08  Mg     2.0     07-08    TPro  6.4  /  Alb  x   /  TBili  x   /  DBili  x   /  AST  x   /  ALT  x   /  AlkPhos  x   07-08    PT/INR - ( 07 Jul 2017 06:20 )   PT: 11.9 sec;   INR: 1.07          PTT - ( 07 Jul 2017 06:20 )  PTT:29.2 sec          RADIOLOGY & ADDITIONAL STUDIES (The following images were personally reviewed):   CXR: improvement in left sided effusion.

## 2017-07-09 DIAGNOSIS — I50.33 ACUTE ON CHRONIC DIASTOLIC (CONGESTIVE) HEART FAILURE: ICD-10-CM

## 2017-07-09 LAB
ANION GAP SERPL CALC-SCNC: 14 MMOL/L — SIGNIFICANT CHANGE UP (ref 5–17)
BUN SERPL-MCNC: 22 MG/DL — SIGNIFICANT CHANGE UP (ref 7–23)
CALCIUM SERPL-MCNC: 8.9 MG/DL — SIGNIFICANT CHANGE UP (ref 8.4–10.5)
CHLORIDE SERPL-SCNC: 94 MMOL/L — LOW (ref 96–108)
CO2 SERPL-SCNC: 27 MMOL/L — SIGNIFICANT CHANGE UP (ref 22–31)
CREAT SERPL-MCNC: 5.1 MG/DL — HIGH (ref 0.5–1.3)
CULTURE RESULTS: NO GROWTH — SIGNIFICANT CHANGE UP
GLUCOSE SERPL-MCNC: 89 MG/DL — SIGNIFICANT CHANGE UP (ref 70–99)
HCT VFR BLD CALC: 30.1 % — LOW (ref 34.5–45)
HGB BLD-MCNC: 10 G/DL — LOW (ref 11.5–15.5)
MAGNESIUM SERPL-MCNC: 1.9 MG/DL — SIGNIFICANT CHANGE UP (ref 1.6–2.6)
MCHC RBC-ENTMCNC: 31 PG — SIGNIFICANT CHANGE UP (ref 27–34)
MCHC RBC-ENTMCNC: 33.2 G/DL — SIGNIFICANT CHANGE UP (ref 32–36)
MCV RBC AUTO: 93.2 FL — SIGNIFICANT CHANGE UP (ref 80–100)
PHOSPHATE SERPL-MCNC: 4.7 MG/DL — HIGH (ref 2.5–4.5)
PLATELET # BLD AUTO: 236 K/UL — SIGNIFICANT CHANGE UP (ref 150–400)
POTASSIUM SERPL-MCNC: 4.1 MMOL/L — SIGNIFICANT CHANGE UP (ref 3.5–5.3)
POTASSIUM SERPL-SCNC: 4.1 MMOL/L — SIGNIFICANT CHANGE UP (ref 3.5–5.3)
RBC # BLD: 3.23 M/UL — LOW (ref 3.8–5.2)
RBC # FLD: 19.5 % — HIGH (ref 10.3–16.9)
SODIUM SERPL-SCNC: 135 MMOL/L — SIGNIFICANT CHANGE UP (ref 135–145)
SPECIMEN SOURCE: SIGNIFICANT CHANGE UP
WBC # BLD: 6.1 K/UL — SIGNIFICANT CHANGE UP (ref 3.8–10.5)
WBC # FLD AUTO: 6.1 K/UL — SIGNIFICANT CHANGE UP (ref 3.8–10.5)

## 2017-07-09 PROCEDURE — 99233 SBSQ HOSP IP/OBS HIGH 50: CPT | Mod: GC

## 2017-07-09 RX ORDER — POLYETHYLENE GLYCOL 3350 17 G/17G
17 POWDER, FOR SOLUTION ORAL DAILY
Qty: 0 | Refills: 0 | Status: DISCONTINUED | OUTPATIENT
Start: 2017-07-09 | End: 2017-07-10

## 2017-07-09 RX ADMIN — Medication 90 MILLIGRAM(S): at 09:58

## 2017-07-09 RX ADMIN — SIMVASTATIN 20 MILLIGRAM(S): 20 TABLET, FILM COATED ORAL at 21:56

## 2017-07-09 RX ADMIN — CARVEDILOL PHOSPHATE 25 MILLIGRAM(S): 80 CAPSULE, EXTENDED RELEASE ORAL at 05:13

## 2017-07-09 RX ADMIN — Medication 80 MILLIGRAM(S): at 18:12

## 2017-07-09 RX ADMIN — CARVEDILOL PHOSPHATE 25 MILLIGRAM(S): 80 CAPSULE, EXTENDED RELEASE ORAL at 18:12

## 2017-07-09 RX ADMIN — Medication 60 MILLIGRAM(S): at 19:39

## 2017-07-09 RX ADMIN — Medication 81 MILLIGRAM(S): at 11:14

## 2017-07-09 RX ADMIN — HEPARIN SODIUM 5000 UNIT(S): 5000 INJECTION INTRAVENOUS; SUBCUTANEOUS at 05:14

## 2017-07-09 RX ADMIN — Medication 20 MILLIGRAM(S): at 05:13

## 2017-07-09 RX ADMIN — Medication 80 MILLIGRAM(S): at 05:14

## 2017-07-09 RX ADMIN — AZITHROMYCIN 250 MILLIGRAM(S): 500 TABLET, FILM COATED ORAL at 11:14

## 2017-07-09 RX ADMIN — Medication 20 MILLIGRAM(S): at 15:50

## 2017-07-09 NOTE — PROGRESS NOTE ADULT - ASSESSMENT
65yo F with PMHx of ESRD on HD (T,TH,SAT), CAD, dCHF (EF 75% 11/2015), HTN, asthma presents with shortness of breath and epistaxis secondary to hypertensive emergency

## 2017-07-09 NOTE — PROGRESS NOTE ADULT - PROBLEM SELECTOR PLAN 3
Recurrent multiple ED visits in the past for epistaxis requiring nasal packing; s/p nasal rhino-rocket. HH stable, no longer having epistaxis  - ENT: no active bleeding. If bleeding resumes, spray afrin 5+, firm pressure 15min. if no resolve, consult ENT  Sinus Precautions: no nose blowing, sneeze open mouth, HOB 30deg, no foreign bodies. F/u Dr. Briggs outpatient

## 2017-07-09 NOTE — PROGRESS NOTE ADULT - SUBJECTIVE AND OBJECTIVE BOX
TRANSFER NOTE [facility] to [facility]    OVERNIGHT EVENTS: ANGELA  SUBJECTIVE / INTERVAL HPI: Patient seen and examined at bedside.   Patient slept well. no longer c/o SOB, OOB to chair, able to lie flat. No longer rhino-rocket in place, fell out but seen by ENT. No loner having epistaxis.   Denies CP, SOB, N/V/D, abd pain, numbness, tingling, HA, new neuro symptoms, change in bowel/bladder habits      VITAL SIGNS:  Vital Signs Last 24 Hrs  T(C): 36.9 (09 Jul 2017 09:52), Max: 36.9 (09 Jul 2017 09:52)  T(F): 98.4 (09 Jul 2017 09:52), Max: 98.4 (09 Jul 2017 09:52)  HR: 71 (09 Jul 2017 08:53) (69 - 81)  BP: 144/75 (09 Jul 2017 08:53) (144/75 - 169/86)  BP(mean): 103 (09 Jul 2017 08:53) (103 - 114)  RR: 16 (09 Jul 2017 08:53) (16 - 18)  SpO2: 98% (09 Jul 2017 08:53) (96% - 99%)    PHYSICAL EXAM:    General: WDWN  HEENT: NC/AT; PERRL, anicteric sclera; MMM  Neck: supple  Cardiovascular: +S1/S2; RRR  Respiratory: CTA B/L; no W/R/R  Gastrointestinal: soft, NT/ND; +BSx4  Extremities: WWP; no edema, clubbing or cyanosis  Vascular: 2+ radial, DP/PT pulses B/L  Neurological: AAOx3; no focal deficits    MEDICATIONS:  MEDICATIONS  (STANDING):  heparin  Injectable 5000 Unit(s) SubCutaneous every 8 hours  simvastatin 20 milliGRAM(s) Oral at bedtime  NIFEdipine XL 90 milliGRAM(s) Oral daily  cinacalcet 60 milliGRAM(s) Oral daily  furosemide    Tablet 80 milliGRAM(s) Oral two times a day  aspirin enteric coated 81 milliGRAM(s) Oral daily  carvedilol 25 milliGRAM(s) Oral every 12 hours  metolazone 10 milliGRAM(s) Oral daily  azithromycin   Tablet 250 milliGRAM(s) Oral daily  enalapril 20 milliGRAM(s) Oral two times a day  NIFEdipine XL 60 milliGRAM(s) Oral daily    MEDICATIONS  (PRN):      ALLERGIES:  Allergies    No Known Allergies    Intolerances        LABS:                        10.0   6.1   )-----------( 236      ( 09 Jul 2017 07:52 )             30.1     07-09    135  |  94<L>  |  22  ----------------------------<  89  4.1   |  27  |  5.10<H>    Ca    8.9      09 Jul 2017 07:52  Phos  4.7     07-09  Mg     1.9     07-09    TPro  6.4  /  Alb  x   /  TBili  x   /  DBili  x   /  AST  x   /  ALT  x   /  AlkPhos  x   07-08        CAPILLARY BLOOD GLUCOSE          RADIOLOGY & ADDITIONAL TESTS: Reviewed.    ASSESSMENT:    PLAN: TRANSFER NOTE [facility] to [facility]    HOSPITAL COURSE    67yo F with PMHx of ESRD on HD (T,TH,SAT), CAD, dCHF (EF 75%), HTN, asthma presents with SOB and epistaxis secondary to hypertensive emergency, found to have be fluid overloaded with b/l pleural effusions. Patient received hemodialysis (4L removed) with mild improvement in SOB and minimally change to sBP as they remained in the 190s. Patient was started on 0.1mg clonidine TID with significant improvement sBP 150s and subsequent cessation of her epistaxis. Pulmonary was consulted for her sb/l effusions and performed a thoracentesis (1.1L, transudative) with a further improvement in patient's SOB.  An Echo showed a "mobile hypodensity," concerning for either a thrombus or vegetation, although patient has been afebrile, WBC returned to WNL and BCx negative. A follow up CT Cardiac showed thickening pulmonary valve; patient is followed closely by Dr. Coelho who readjusted her medications, specifically discontinuing clonidine 2/2 compliance concerns. Patient's BP have remained stable in 140-150s. ENT has been following for epistaxis and removed the rhino-rocket with recs for outpatient f/u with Dr. Briggs. Patient is no longer SOB, tolerating PO, ambulating and has been HD stable- she is safe for transfer to regional medical floor.     OVERNIGHT EVENTS: ANGELA  SUBJECTIVE / INTERVAL HPI: Patient seen and examined at bedside.   Patient slept well. no longer c/o SOB, OOB to chair, able to lie flat. No longer rhino-rocket in place, fell out but seen by ENT. No loner having epistaxis.   Denies CP, SOB, N/V/D, abd pain, numbness, tingling, HA, new neuro symptoms, change in bowel/bladder habits      VITAL SIGNS:  Vital Signs Last 24 Hrs  T(C): 36.9 (09 Jul 2017 09:52), Max: 36.9 (09 Jul 2017 09:52)  T(F): 98.4 (09 Jul 2017 09:52), Max: 98.4 (09 Jul 2017 09:52)  HR: 71 (09 Jul 2017 08:53) (69 - 81)  BP: 144/75 (09 Jul 2017 08:53) (144/75 - 169/86)  BP(mean): 103 (09 Jul 2017 08:53) (103 - 114)  RR: 16 (09 Jul 2017 08:53) (16 - 18)  SpO2: 98% (09 Jul 2017 08:53) (96% - 99%)    PHYSICAL EXAM:    General: WDWN  HEENT: NC/AT; PERRL, anicteric sclera; MMM  Neck: supple  Cardiovascular: +S1/S2; RRR  Respiratory: CTA B/L; no W/R/R  Gastrointestinal: soft, NT/ND; +BSx4  Extremities: WWP; no edema, clubbing or cyanosis  Vascular: 2+ radial, DP/PT pulses B/L  Neurological: AAOx3; no focal deficits    MEDICATIONS:  MEDICATIONS  (STANDING):  heparin  Injectable 5000 Unit(s) SubCutaneous every 8 hours  simvastatin 20 milliGRAM(s) Oral at bedtime  NIFEdipine XL 90 milliGRAM(s) Oral daily  cinacalcet 60 milliGRAM(s) Oral daily  furosemide    Tablet 80 milliGRAM(s) Oral two times a day  aspirin enteric coated 81 milliGRAM(s) Oral daily  carvedilol 25 milliGRAM(s) Oral every 12 hours  metolazone 10 milliGRAM(s) Oral daily  azithromycin   Tablet 250 milliGRAM(s) Oral daily  enalapril 20 milliGRAM(s) Oral two times a day  NIFEdipine XL 60 milliGRAM(s) Oral daily    MEDICATIONS  (PRN):      ALLERGIES:  Allergies    No Known Allergies    Intolerances        LABS:                        10.0   6.1   )-----------( 236      ( 09 Jul 2017 07:52 )             30.1     07-09    135  |  94<L>  |  22  ----------------------------<  89  4.1   |  27  |  5.10<H>    Ca    8.9      09 Jul 2017 07:52  Phos  4.7     07-09  Mg     1.9     07-09    TPro  6.4  /  Alb  x   /  TBili  x   /  DBili  x   /  AST  x   /  ALT  x   /  AlkPhos  x   07-08        CAPILLARY BLOOD GLUCOSE          RADIOLOGY & ADDITIONAL TESTS: Reviewed.    ASSESSMENT:    PLAN:

## 2017-07-09 NOTE — PROGRESS NOTE ADULT - PROBLEM SELECTOR PLAN 1
Likely 2/2 fluid overload and b/l pleural effusions. s/p HD (7/6) with 4L removed and thoracentesis (7/7) with 1.1L removed. Improved since admission  Echo: EF 65-70%. CT cardiac: PV thickening BCx: neg   Followed by Cards: Dr. Shannan Coelho  - continue ceftriaxone and azithromycin (complete 7/10) 2/2 admission with SIRS and PNA  - strict IO  - continue  Lasix 80 PO BID, metolazone 10 mg POd  - Duoneb and Supplemental Oxygen PRN  HD sched T TH Sat

## 2017-07-09 NOTE — PROGRESS NOTE ADULT - ASSESSMENT
65yo F with PMHx of ESRD on HD (T,TH,SAT), CAD, dCHF (EF 75% 11/2015), HTN, asthma presents with shortness of breath and epistaxis secondary to hypertensive emergency, found to have be fluid overloaded with b/l pleural effusions s/p thoracentesis (1.1L) and HD; symptoms have since resolved. Cardiac CT showed thickening of the pulmonic valve.

## 2017-07-09 NOTE — PROGRESS NOTE ADULT - ASSESSMENT
67yo F with PMHx of ESRD on HD (T,TH,SAT), CAD, dCHF (EF 75% 11/2015), HTN, asthma presents with shortness of breath and epistaxis secondary to hypertensive emergency, found to have be fluid overloaded with b/l pleural effusions s/p thoracentesis (1.1L) and HD; symptoms have since resolved. Cardiac CT showed thickening of the pulmonic valve. 66F PMH ESRD on HD (T,TH,SAT), CAD, dCHF (EF 75% 11/2015), HTN, asthma presents with shortness of breath and epistaxis found to have hypertensive emergency, found to have be fluid overloaded with b/l pleural effusions s/p thoracentesis (1.1L) and HD; symptoms have since resolved. Cardiac CT showed thickening of the pulmonic valve, could not rule out small mass on leaflet. Afebrile during length of stay w/ resolved WBC

## 2017-07-09 NOTE — PROGRESS NOTE ADULT - PROBLEM SELECTOR PLAN 7
Regular, Renal restrictions      FULL CODE Regular, Renal restrictions. Tolerating PO well      FULL CODE

## 2017-07-09 NOTE — PROGRESS NOTE ADULT - PROBLEM SELECTOR PLAN 2
upon arrival c/b epistaxis; Patient currently controlled 140-150s systolic. Followed by cards.   - c/w home meds of nifedipine 60mg and 90mg POd, Lasix 80mg BID, carvedilol 25mg BID, enalapril 20mg BID  and metolazone 10mg POd  - Renal following: plan for HD T TH Sat

## 2017-07-09 NOTE — PROGRESS NOTE ADULT - PROBLEM SELECTOR PLAN 1
Likely 2/2 fluid overload and b/l pleural effusions. s/p HD (7/6) with 4L removed and thoracentesis (7/7) with 1.1L removed. Improved since admission  Echo: EF 65-70%. CT cardiac: PV thickening BCx: neg   Followed by Cards: Dr. Shannan Coelho  - continue ceftriaxone and azithromycin (complete 7/10) 2/2 admission with SIRS and PNA  - strict IO  - continue  Lasix 80 PO BID, metolazone 10 mg POd  - Duoneb and Supplemental Oxygen PRN  HD sched T TH Sat h/o dCHF; initially dyspneic likely 2/2 fluid overload and b/l pleural effusions. s/p HD x2 and thoracentesis (7/7) with 1.1L removed. Dyspnea improved since admission, lungs CTA  Echo: EF 65-70%. CT cardiac: PV thickening but could not rule out small mass on leaflet; BCx: neg   Followed by Cards: Dr. Shannan Coelho  - pt finishing CTX/Azithro for PNA suspicion as pt met SIRS criteria (tachypnea and elevated WBC to 17) on admission and CXR concerning for congestion vs infiltrate  - strict IO  - continue  Lasix 80 PO BID, metolazone 10 mg POd, enalipril 20mg bid, coreg 25mg bid, nifedipine 60mg qd  - Duoneb and Supplemental Oxygen PRN  HD sched T TH Sat; planned next for Tuesday h/o dCHF; initially dyspneic likely 2/2 fluid overload and b/l pleural effusions. s/p HD x2 and thoracentesis (7/7) with 1.1L removed. Dyspnea improved since admission, lungs CTA; possibly CHF exacerbation 2/2 PNA infxn vs HD non-compliance vs med non-compliance  Echo: EF 65-70%. CT cardiac: PV thickening but could not rule out small mass on leaflet; BCx: neg   Followed by Cards: Dr. Shannan Coelho  - pt finishing CTX/Azithro for PNA suspicion as pt met SIRS criteria (tachypnea and elevated WBC to 17) on admission and CXR concerning for congestion vs infiltrate  - strict IO  - continue  Lasix 80 PO BID, metolazone 10 mg POd, enalipril 20mg bid, coreg 25mg bid, nifedipine 60mg qd  - Duoneb and Supplemental Oxygen PRN  HD sched T TH Sat; planned next for Tuesday

## 2017-07-09 NOTE — PROGRESS NOTE ADULT - SUBJECTIVE AND OBJECTIVE BOX
TRANSFER ACCEPTANCE NOTE:  Hospital Course:  66F PMH ESRD on HD (T,TH,SAT), CAD, dCHF (EF 75%), HTN, asthma presents with SOB and epistaxis secondary to hypertensive emergency, found to have be fluid overloaded with b/l pleural effusions. Patient received hemodialysis (4L removed) with mild improvement in SOB and minimally change to sBP as they remained in the 190s. Patient was started on 0.1mg clonidine TID with significant improvement sBP 150s and subsequent cessation of her epistaxis. Pulmonary was consulted for her sb/l effusions and performed a thoracentesis (1.1L, transudative) with a further improvement in patient's SOB.  An Echo showed a "mobile hypodensity," concerning for either a thrombus or vegetation, although patient has been afebrile, WBC returned to WNL and BCx negative. A follow up CT Cardiac showed thickening pulmonary valve; patient is followed closely by Dr. Coelho who readjusted her medications, specifically discontinuing clonidine 2/2 compliance concerns. Patient's BP have remained stable in 140-150s. ENT has been following for epistaxis and removed the rhino-rocket with recs for outpatient f/u with Dr. Briggs. Patient is no longer SOB, tolerating PO, ambulating and has been HD stable- she is safe for transfer to regional medical floor.     VITAL SIGNS:  T(F): 98.4 (07-09-17 @ 09:52)  HR: 79 (07-09-17 @ 12:49)  BP: 155/86 (07-09-17 @ 12:49)  RR: 16 (07-09-17 @ 12:49)  SpO2: 100% (07-09-17 @ 12:49)  Wt(kg): --    PHYSICAL EXAM:    Constitutional:  Eyes:  ENMT:  Neck:  Respiratory:  Cardiovascular:  Gastrointestinal:  Extremities:  Vascular:  Neurological:  Musculoskeletal:    MEDICATIONS  (STANDING):  heparin  Injectable 5000 Unit(s) SubCutaneous every 8 hours  simvastatin 20 milliGRAM(s) Oral at bedtime  NIFEdipine XL 90 milliGRAM(s) Oral daily  cinacalcet 60 milliGRAM(s) Oral daily  furosemide    Tablet 80 milliGRAM(s) Oral two times a day  aspirin enteric coated 81 milliGRAM(s) Oral daily  carvedilol 25 milliGRAM(s) Oral every 12 hours  metolazone 10 milliGRAM(s) Oral daily  azithromycin   Tablet 250 milliGRAM(s) Oral daily  enalapril 20 milliGRAM(s) Oral two times a day  NIFEdipine XL 60 milliGRAM(s) Oral daily    MEDICATIONS  (PRN):      Allergies    No Known Allergies    Intolerances        LABS:                        10.0   6.1   )-----------( 236      ( 09 Jul 2017 07:52 )             30.1     07-09    135  |  94<L>  |  22  ----------------------------<  89  4.1   |  27  |  5.10<H>    Ca    8.9      09 Jul 2017 07:52  Phos  4.7     07-09  Mg     1.9     07-09    TPro  6.4  /  Alb  x   /  TBili  x   /  DBili  x   /  AST  x   /  ALT  x   /  AlkPhos  x   07-08          RADIOLOGY & ADDITIONAL TESTS: TRANSFER ACCEPTANCE NOTE:  Hospital Course:  66F PMH ESRD on HD (T,TH,SAT), CAD, dCHF (EF 75%), HTN, asthma p/w dyspnea and epistaxis found to be in HTN emergency w/ SBP in 200s and found to have be fluid overloaded with b/l pleural effusions. There was concern for PNA as pt w/ b/l CXR findings read as possible congestion vs infiltrate and was thus started on Abx (CTX, azithro). Patient received hemodialysis (4L removed) with mild improvement in SOB and minimally change to SBP as they remained in the 190s. Was started on 0.1mg clonidine TID with significant improvement SBP 150s and subsequent cessation of her epistaxis. Pulmonary was consulted for her b/l effusions and performed a thoracentesis (1.1L, transudative) with a further improvement in patient's SOB.  An Echo EF 65-70% showed a "mobile hypodensity," concerning for either a thrombus or vegetation, although patient has been afebrile, WBC returned to WNL and BCx negative. A follow up CT Cardiac showed thickening pulmonary valve but could not exclude small mobile leaflet mass. Not started on empiric endocarditis tx as pt afebrile, WBC resolved, and no evidence of pulmonary emboli. patient is followed closely by Dr. Coelho who readjusted her medications, specifically discontinuing clonidine 2/2 compliance concerns. Patient's BP have remained stable in 140-150s. ENT has been following for epistaxis who placed rhino-rocket which was removed today 7/9 with recs for outpatient f/u with Dr. Briggs. Patient is no longer SOB, tolerating PO, ambulating and has been HD stable and accepted for transfer to [facility]    VITAL SIGNS:  T(F): 98.4 (07-09-17 @ 09:52)  HR: 79 (07-09-17 @ 12:49)  BP: 155/86 (07-09-17 @ 12:49)  RR: 16 (07-09-17 @ 12:49)  SpO2: 100% (07-09-17 @ 12:49)  Wt(kg): --    PHYSICAL EXAM:    Constitutional: WDWN, NAD  Eyes:  ENMT:  Neck:  Respiratory:  Cardiovascular:  Gastrointestinal:  Extremities:  Vascular:  Neurological:  Musculoskeletal:    MEDICATIONS  (STANDING):  heparin  Injectable 5000 Unit(s) SubCutaneous every 8 hours  simvastatin 20 milliGRAM(s) Oral at bedtime  NIFEdipine XL 90 milliGRAM(s) Oral daily  cinacalcet 60 milliGRAM(s) Oral daily  furosemide    Tablet 80 milliGRAM(s) Oral two times a day  aspirin enteric coated 81 milliGRAM(s) Oral daily  carvedilol 25 milliGRAM(s) Oral every 12 hours  metolazone 10 milliGRAM(s) Oral daily  azithromycin   Tablet 250 milliGRAM(s) Oral daily  enalapril 20 milliGRAM(s) Oral two times a day  NIFEdipine XL 60 milliGRAM(s) Oral daily    MEDICATIONS  (PRN):      Allergies    No Known Allergies    Intolerances        LABS:                        10.0   6.1   )-----------( 236      ( 09 Jul 2017 07:52 )             30.1     07-09    135  |  94<L>  |  22  ----------------------------<  89  4.1   |  27  |  5.10<H>    Ca    8.9      09 Jul 2017 07:52  Phos  4.7     07-09  Mg     1.9     07-09    TPro  6.4  /  Alb  x   /  TBili  x   /  DBili  x   /  AST  x   /  ALT  x   /  AlkPhos  x   07-08          RADIOLOGY & ADDITIONAL TESTS: TRANSFER ACCEPTANCE NOTE:  Hospital Course:  66F PMH ESRD on HD (T,TH,SAT), CAD, dCHF (EF 75%), HTN, asthma p/w dyspnea and epistaxis found to be in HTN emergency w/ SBP in 200s and found to have be fluid overloaded with b/l pleural effusions. There was concern for PNA as pt w/ b/l CXR findings read as possible congestion vs infiltrate and was thus started on Abx (CTX, azithro). Patient received hemodialysis (4L removed) with mild improvement in SOB and minimally change to SBP as they remained in the 190s. Was started on 0.1mg clonidine TID with significant improvement SBP 150s and subsequent cessation of her epistaxis. Pulmonary was consulted for her b/l effusions and performed a thoracentesis (1.1L, transudative) with a further improvement in patient's SOB.  An Echo EF 65-70% showed a "mobile hypodensity," concerning for either a thrombus or vegetation, although patient has been afebrile, WBC returned to WNL and BCx negative. A follow up CT Cardiac showed thickening pulmonary valve but could not exclude small mobile leaflet mass. Not started on empiric endocarditis tx as pt afebrile, WBC resolved, and no evidence of pulmonary emboli. patient is followed closely by Dr. Coelho who readjusted her medications, specifically discontinuing clonidine 2/2 compliance concerns. Patient's BP have remained stable in 140-150s. ENT has been following for epistaxis who placed rhino-rocket which was removed today 7/9 with recs for outpatient f/u with Dr. Briggs. Patient is no longer SOB, tolerating PO, ambulating and has been HD stable and accepted for transfer to [facility]    VITAL SIGNS:  T(F): 98.4 (07-09-17 @ 09:52)  HR: 79 (07-09-17 @ 12:49)  BP: 155/86 (07-09-17 @ 12:49)  RR: 16 (07-09-17 @ 12:49)  SpO2: 100% (07-09-17 @ 12:49)  Wt(kg): --    PHYSICAL EXAM:    Constitutional: WDWN, NAD  ENMT: MMM, no JVD  Respiratory: CTA b/l  Cardiovascular: RRR, S1/S2, 2-3/6 systolic murmur best heard in pulmonic region  Gastrointestinal: BS present, soft, ND, tender between epigastric and RUQ regions where reducible mass was palpated (pt states she has been seen in the past for this and states she was told no intervention is needed at this time  Extremities: no edema in LE b/l    MEDICATIONS  (STANDING):  heparin  Injectable 5000 Unit(s) SubCutaneous every 8 hours  simvastatin 20 milliGRAM(s) Oral at bedtime  NIFEdipine XL 90 milliGRAM(s) Oral daily  cinacalcet 60 milliGRAM(s) Oral daily  furosemide    Tablet 80 milliGRAM(s) Oral two times a day  aspirin enteric coated 81 milliGRAM(s) Oral daily  carvedilol 25 milliGRAM(s) Oral every 12 hours  metolazone 10 milliGRAM(s) Oral daily  azithromycin   Tablet 250 milliGRAM(s) Oral daily  enalapril 20 milliGRAM(s) Oral two times a day  NIFEdipine XL 60 milliGRAM(s) Oral daily    MEDICATIONS  (PRN):      Allergies    No Known Allergies    Intolerances        LABS:                        10.0   6.1   )-----------( 236      ( 09 Jul 2017 07:52 )             30.1     07-09    135  |  94<L>  |  22  ----------------------------<  89  4.1   |  27  |  5.10<H>    Ca    8.9      09 Jul 2017 07:52  Phos  4.7     07-09  Mg     1.9     07-09    TPro  6.4  /  Alb  x   /  TBili  x   /  DBili  x   /  AST  x   /  ALT  x   /  AlkPhos  x   07-08          RADIOLOGY & ADDITIONAL TESTS:

## 2017-07-09 NOTE — PROGRESS NOTE ADULT - SUBJECTIVE AND OBJECTIVE BOX
CC: PLEURAL EFFUSION, SOB      INTERVAL HISTORY: feels okay  no complaints      ROS: No chest pain, no sob, no abd pain. No n/v/d    PAST MEDICAL & SURGICAL HISTORY:  Anemia: 2/2 CKD  Asthma  MI, old  CAD (coronary artery disease)  HLD (hyperlipidemia)  CKD (chronic kidney disease): Stage 5CKD  CVA (cerebral infarction)  Hypertension  No pertinent past medical history  AV fistula  No significant past surgical history      PHYSICAL EXAM:  T(C): 36.8 (07-09-17 @ 05:28), Max: 36.8 (07-08-17 @ 13:10)  HR: 74 (07-09-17 @ 05:18)  BP: 149/77 (07-09-17 @ 05:18) (144/79 - 169/86)  RR: 16 (07-09-17 @ 05:18)  SpO2: 99% (07-09-17 @ 05:18)  Wt(kg): --  I&O's Summary    08 Jul 2017 07:01  -  09 Jul 2017 07:00  --------------------------------------------------------  IN: 0 mL / OUT: 3100 mL / NET: -3100 mL      Weight 52.2 (07-06 @ 02:18)  General: AAO x 3,  NAD.  HEENT: moist mucous membranes, no pallor/cyanosis.  Neck: no JVD visible.  Cardiac: S1, S2. RRR. No murmurs   Respratory: CTA b/l, no access muscle use.   Abdomen: soft. nontender. nondistended  Skin: no rashes.  Extremities: no LE edema b/l  Access: + bruit in LAF      DATA:                        10.4<L>  6.8   )-----------( 256      ( 08 Jul 2017 06:37 )             30.9<L>        129<L>  |  89<L>  |  37<H>  ----------------------------<  86     Ca:9.4   (08 Jul 2017 06:37)  4.7     |  25     |  7.00<H>        TPro  6.4 g/dL  /  Alb  x      /  TBili  x      /  DBili  x      /  AST  x      /  ALT  x      /  AlkPhos  x      08 Jul 2017 06:37  Lactate Dehydrogenase, Serum: 279 U/L <H> (07-08 @ 06:37)                    MEDICATIONS  (STANDING):  heparin  Injectable 5000 Unit(s) SubCutaneous every 8 hours  simvastatin 20 milliGRAM(s) Oral at bedtime  NIFEdipine XL 90 milliGRAM(s) Oral daily  cinacalcet 60 milliGRAM(s) Oral daily  furosemide    Tablet 80 milliGRAM(s) Oral two times a day  aspirin enteric coated 81 milliGRAM(s) Oral daily  carvedilol 25 milliGRAM(s) Oral every 12 hours  metolazone 10 milliGRAM(s) Oral daily  azithromycin   Tablet 250 milliGRAM(s) Oral daily  enalapril 20 milliGRAM(s) Oral two times a day  NIFEdipine XL 60 milliGRAM(s) Oral daily    MEDICATIONS  (PRN):

## 2017-07-09 NOTE — PROGRESS NOTE ADULT - SUBJECTIVE AND OBJECTIVE BOX
ENT [facility] Consult NOTE    Patient is a 66y with past medical history for HTN, ESRD on HD. Seen in ED overnight for epistaxis, rhinorocket placed in the right naris with good effect. Admitted for respiratory distress, and uncontrolled /80.     Pt reports that she has epistaxis occasionally, usually self limiting. Follow by an ENT regarding her nosebleeds. Minimal blood tinged discharge from packing.    Overnight events: balloon taken down yesterday. per patient, rhinorocket fell out overnight and was put back in by primary team. No additional episodes of bleeding. BP controlled.      Allergies    No Known Allergies    Intolerances        MEDICATIONS:  Antiinfectives:   azithromycin   Tablet 250 milliGRAM(s) Oral daily    IV fluids:    Hematologic/Anticoagulation:  heparin  Injectable 5000 Unit(s) SubCutaneous every 8 hours  aspirin enteric coated 81 milliGRAM(s) Oral daily    Pain medications/Neuro:    Endocrine Medications:   simvastatin 20 milliGRAM(s) Oral at bedtime    All other standing medications:   NIFEdipine XL 90 milliGRAM(s) Oral daily  cinacalcet 60 milliGRAM(s) Oral daily  furosemide    Tablet 80 milliGRAM(s) Oral two times a day  carvedilol 25 milliGRAM(s) Oral every 12 hours  metolazone 10 milliGRAM(s) Oral daily  enalapril 20 milliGRAM(s) Oral two times a day  NIFEdipine XL 60 milliGRAM(s) Oral daily    All other PRN medications:      Vital Signs Last 24 Hrs  T(C): 36.9 (09 Jul 2017 09:52), Max: 36.9 (09 Jul 2017 09:52)  T(F): 98.4 (09 Jul 2017 09:52), Max: 98.4 (09 Jul 2017 09:52)  HR: 71 (09 Jul 2017 08:53) (69 - 81)  BP: 144/75 (09 Jul 2017 08:53) (144/75 - 169/86)  BP(mean): 103 (09 Jul 2017 08:53) (103 - 114)  RR: 16 (09 Jul 2017 08:53) (16 - 18)  SpO2: 98% (09 Jul 2017 08:53) (96% - 99%)      07-08 @ 07:01  -  07-09 @ 07:00  --------------------------------------------------------  IN:  Total IN: 0 mL    OUT:    Other: 3100 mL  Total OUT: 3100 mL    Total NET: -3100 mL      07-09 @ 07:01  -  07-09 @ 11:08  --------------------------------------------------------  IN:    Oral Fluid: 150 mL  Total IN: 150 mL    OUT:  Total OUT: 0 mL    Total NET: 150 mL            PHYSICAL EXAM:    NAD, comfortable at rest  rhinorocket falling out of right naris, removed  No bleeding     LABS:  CBC-                        10.0   6.1   )-----------( 236      ( 09 Jul 2017 07:52 )             30.1     BMP/CMP-  09 Jul 2017 07:52    135    |  94     |  22     ----------------------------<  89     4.1     |  27     |  5.10     Ca    8.9        09 Jul 2017 07:52  Phos  4.7       09 Jul 2017 07:52  Mg     1.9       09 Jul 2017 07:52    TPro  6.4    /  Alb  x      /  TBili  x      /  DBili  x      /  AST  x      /  ALT  x      /  AlkPhos  x      08 Jul 2017 06:37    Coagulation Studies-    Endocrine Panel-  Calcium, Total Serum: 8.9 mg/dL (07-09 @ 07:52)          67yo p/w uncontrolled hypertension and epistaxis. Now with BP controlled and no active bleeding. Balloon taken down yesterday with no e/o recurrent bleeding. Now removed.   - If pt has recurrent episode of epistaxis, please spray afrin 5+ sprays hold steady, firm pressure x 15 min at the cartilaginous portion of the nose. If bleeding does not stop, page ENT   - Sinus precautions: no nose blowing x 1 week, sneeze with mouth open, keep head of bed elevated 30 degrees, no foreign bodies in the nose, bowel regimen (no straining)  - Follow up with Dr. Briggs 1-2 weeks after discharge or can follow up with outside ENT    d/w Dr. Briggs who agrees with the above     page ENT with any questions or concerns.

## 2017-07-09 NOTE — PROGRESS NOTE ADULT - PROBLEM SELECTOR PLAN 2
upon arrival c/b epistaxis; Patient currently controlled 140-150s systolic. Followed by cards.   - c/w home meds of nifedipine 60mg and 90mg POd, Lasix 80mg BID, carvedilol 25mg BID, enalapril 20mg BID  and metolazone 10mg POd  - Renal following: plan for HD T TH Sat  upon arrival c/b epistaxis; SBP minimally decreased to 190s s/p HD, started on clonidine 0.1mg TID w/ significant improvement. Patient currently controlled 140-150s systolic. Followed by cards - d/c-ed clonidine for probable compliance issues.  - c/w home meds of nifedipine 60mg and 90mg POd, Lasix 80mg BID, carvedilol 25mg BID, enalapril 20mg BID  and metolazone 10mg POd  - Renal following: plan for HD T TH Sat

## 2017-07-10 ENCOUNTER — TRANSCRIPTION ENCOUNTER (OUTPATIENT)
Age: 67
End: 2017-07-10

## 2017-07-10 VITALS
HEART RATE: 74 BPM | DIASTOLIC BLOOD PRESSURE: 89 MMHG | TEMPERATURE: 97 F | OXYGEN SATURATION: 99 % | SYSTOLIC BLOOD PRESSURE: 174 MMHG | RESPIRATION RATE: 17 BRPM

## 2017-07-10 LAB
ANION GAP SERPL CALC-SCNC: 17 MMOL/L — SIGNIFICANT CHANGE UP (ref 5–17)
BUN SERPL-MCNC: 35 MG/DL — HIGH (ref 7–23)
CALCIUM SERPL-MCNC: 9.2 MG/DL — SIGNIFICANT CHANGE UP (ref 8.4–10.5)
CHLORIDE SERPL-SCNC: 90 MMOL/L — LOW (ref 96–108)
CO2 SERPL-SCNC: 24 MMOL/L — SIGNIFICANT CHANGE UP (ref 22–31)
CREAT SERPL-MCNC: 6.7 MG/DL — HIGH (ref 0.5–1.3)
GLUCOSE SERPL-MCNC: 85 MG/DL — SIGNIFICANT CHANGE UP (ref 70–99)
HCT VFR BLD CALC: 31.2 % — LOW (ref 34.5–45)
HGB BLD-MCNC: 10.5 G/DL — LOW (ref 11.5–15.5)
MAGNESIUM SERPL-MCNC: 1.9 MG/DL — SIGNIFICANT CHANGE UP (ref 1.6–2.6)
MCHC RBC-ENTMCNC: 31.3 PG — SIGNIFICANT CHANGE UP (ref 27–34)
MCHC RBC-ENTMCNC: 33.7 G/DL — SIGNIFICANT CHANGE UP (ref 32–36)
MCV RBC AUTO: 93.1 FL — SIGNIFICANT CHANGE UP (ref 80–100)
NON-GYNECOLOGICAL CYTOLOGY STUDY: SIGNIFICANT CHANGE UP
PHOSPHATE SERPL-MCNC: 5.4 MG/DL — HIGH (ref 2.5–4.5)
PLATELET # BLD AUTO: 260 K/UL — SIGNIFICANT CHANGE UP (ref 150–400)
POTASSIUM SERPL-MCNC: 4.2 MMOL/L — SIGNIFICANT CHANGE UP (ref 3.5–5.3)
POTASSIUM SERPL-SCNC: 4.2 MMOL/L — SIGNIFICANT CHANGE UP (ref 3.5–5.3)
RBC # BLD: 3.35 M/UL — LOW (ref 3.8–5.2)
RBC # FLD: 19.5 % — HIGH (ref 10.3–16.9)
SODIUM SERPL-SCNC: 131 MMOL/L — LOW (ref 135–145)
WBC # BLD: 6.7 K/UL — SIGNIFICANT CHANGE UP (ref 3.8–10.5)
WBC # FLD AUTO: 6.7 K/UL — SIGNIFICANT CHANGE UP (ref 3.8–10.5)

## 2017-07-10 PROCEDURE — 87015 SPECIMEN INFECT AGNT CONCNTJ: CPT

## 2017-07-10 PROCEDURE — 96374 THER/PROPH/DIAG INJ IV PUSH: CPT | Mod: XU

## 2017-07-10 PROCEDURE — 75572 CT HRT W/3D IMAGE: CPT

## 2017-07-10 PROCEDURE — 82803 BLOOD GASES ANY COMBINATION: CPT

## 2017-07-10 PROCEDURE — 87102 FUNGUS ISOLATION CULTURE: CPT

## 2017-07-10 PROCEDURE — 86803 HEPATITIS C AB TEST: CPT

## 2017-07-10 PROCEDURE — 93306 TTE W/DOPPLER COMPLETE: CPT | Mod: 26

## 2017-07-10 PROCEDURE — 84157 ASSAY OF PROTEIN OTHER: CPT

## 2017-07-10 PROCEDURE — 85027 COMPLETE CBC AUTOMATED: CPT

## 2017-07-10 PROCEDURE — 89051 BODY FLUID CELL COUNT: CPT

## 2017-07-10 PROCEDURE — 99291 CRITICAL CARE FIRST HOUR: CPT | Mod: 25

## 2017-07-10 PROCEDURE — 87075 CULTR BACTERIA EXCEPT BLOOD: CPT

## 2017-07-10 PROCEDURE — 94640 AIRWAY INHALATION TREATMENT: CPT

## 2017-07-10 PROCEDURE — 82945 GLUCOSE OTHER FLUID: CPT

## 2017-07-10 PROCEDURE — 83880 ASSAY OF NATRIURETIC PEPTIDE: CPT

## 2017-07-10 PROCEDURE — 88305 TISSUE EXAM BY PATHOLOGIST: CPT

## 2017-07-10 PROCEDURE — 96376 TX/PRO/DX INJ SAME DRUG ADON: CPT | Mod: XU

## 2017-07-10 PROCEDURE — 82553 CREATINE MB FRACTION: CPT

## 2017-07-10 PROCEDURE — 83735 ASSAY OF MAGNESIUM: CPT

## 2017-07-10 PROCEDURE — 87116 MYCOBACTERIA CULTURE: CPT

## 2017-07-10 PROCEDURE — 84484 ASSAY OF TROPONIN QUANT: CPT

## 2017-07-10 PROCEDURE — 87340 HEPATITIS B SURFACE AG IA: CPT

## 2017-07-10 PROCEDURE — 30903 CONTROL OF NOSEBLEED: CPT | Mod: RT

## 2017-07-10 PROCEDURE — 93306 TTE W/DOPPLER COMPLETE: CPT

## 2017-07-10 PROCEDURE — 36415 COLL VENOUS BLD VENIPUNCTURE: CPT

## 2017-07-10 PROCEDURE — 83615 LACTATE (LD) (LDH) ENZYME: CPT

## 2017-07-10 PROCEDURE — 93005 ELECTROCARDIOGRAM TRACING: CPT | Mod: XU

## 2017-07-10 PROCEDURE — 84155 ASSAY OF PROTEIN SERUM: CPT

## 2017-07-10 PROCEDURE — 86706 HEP B SURFACE ANTIBODY: CPT

## 2017-07-10 PROCEDURE — 80053 COMPREHEN METABOLIC PANEL: CPT

## 2017-07-10 PROCEDURE — 80048 BASIC METABOLIC PNL TOTAL CA: CPT

## 2017-07-10 PROCEDURE — 83986 ASSAY PH BODY FLUID NOS: CPT

## 2017-07-10 PROCEDURE — 71045 X-RAY EXAM CHEST 1 VIEW: CPT

## 2017-07-10 PROCEDURE — 86850 RBC ANTIBODY SCREEN: CPT

## 2017-07-10 PROCEDURE — 90935 HEMODIALYSIS ONE EVALUATION: CPT

## 2017-07-10 PROCEDURE — 87070 CULTURE OTHR SPECIMN AEROBIC: CPT

## 2017-07-10 PROCEDURE — 84311 SPECTROPHOTOMETRY: CPT

## 2017-07-10 PROCEDURE — 85730 THROMBOPLASTIN TIME PARTIAL: CPT

## 2017-07-10 PROCEDURE — 99239 HOSP IP/OBS DSCHRG MGMT >30: CPT

## 2017-07-10 PROCEDURE — 86900 BLOOD TYPING SEROLOGIC ABO: CPT

## 2017-07-10 PROCEDURE — 87206 SMEAR FLUORESCENT/ACID STAI: CPT

## 2017-07-10 PROCEDURE — 87040 BLOOD CULTURE FOR BACTERIA: CPT

## 2017-07-10 PROCEDURE — 85610 PROTHROMBIN TIME: CPT

## 2017-07-10 PROCEDURE — 85025 COMPLETE CBC W/AUTO DIFF WBC: CPT

## 2017-07-10 PROCEDURE — 84100 ASSAY OF PHOSPHORUS: CPT

## 2017-07-10 PROCEDURE — 88112 CYTOPATH CELL ENHANCE TECH: CPT

## 2017-07-10 PROCEDURE — 96375 TX/PRO/DX INJ NEW DRUG ADDON: CPT | Mod: XU

## 2017-07-10 PROCEDURE — 87205 SMEAR GRAM STAIN: CPT

## 2017-07-10 PROCEDURE — 86901 BLOOD TYPING SEROLOGIC RH(D): CPT

## 2017-07-10 PROCEDURE — 82550 ASSAY OF CK (CPK): CPT

## 2017-07-10 PROCEDURE — 83605 ASSAY OF LACTIC ACID: CPT

## 2017-07-10 RX ORDER — ALBUTEROL 90 UG/1
2 AEROSOL, METERED ORAL
Qty: 1 | Refills: 0 | OUTPATIENT
Start: 2017-07-10 | End: 2017-08-09

## 2017-07-10 RX ORDER — NIFEDIPINE 30 MG
1 TABLET, EXTENDED RELEASE 24 HR ORAL
Qty: 30 | Refills: 0 | OUTPATIENT
Start: 2017-07-10 | End: 2017-08-09

## 2017-07-10 RX ORDER — NIFEDIPINE 30 MG
90 TABLET, EXTENDED RELEASE 24 HR ORAL DAILY
Qty: 0 | Refills: 0 | Status: CANCELLED | OUTPATIENT
Start: 2018-06-05 | End: 2017-07-10

## 2017-07-10 RX ORDER — ASPIRIN/CALCIUM CARB/MAGNESIUM 324 MG
1 TABLET ORAL
Qty: 0 | Refills: 0 | COMMUNITY
Start: 2017-07-10

## 2017-07-10 RX ORDER — CARVEDILOL PHOSPHATE 80 MG/1
1 CAPSULE, EXTENDED RELEASE ORAL
Qty: 0 | Refills: 0 | COMMUNITY

## 2017-07-10 RX ORDER — CARVEDILOL PHOSPHATE 80 MG/1
1 CAPSULE, EXTENDED RELEASE ORAL
Qty: 60 | Refills: 0 | OUTPATIENT
Start: 2017-07-10 | End: 2017-08-09

## 2017-07-10 RX ORDER — FUROSEMIDE 40 MG
1 TABLET ORAL
Qty: 60 | Refills: 0 | OUTPATIENT
Start: 2017-07-10 | End: 2017-08-09

## 2017-07-10 RX ORDER — SIMVASTATIN 20 MG/1
1 TABLET, FILM COATED ORAL
Qty: 30 | Refills: 0 | OUTPATIENT
Start: 2017-07-10 | End: 2017-08-09

## 2017-07-10 RX ORDER — FUROSEMIDE 40 MG
80 TABLET ORAL
Qty: 0 | Refills: 0 | Status: DISCONTINUED | OUTPATIENT
Start: 2017-07-10 | End: 2017-07-10

## 2017-07-10 RX ORDER — ASPIRIN/CALCIUM CARB/MAGNESIUM 324 MG
1 TABLET ORAL
Qty: 30 | Refills: 0 | OUTPATIENT
Start: 2017-07-10 | End: 2017-08-09

## 2017-07-10 RX ORDER — CINACALCET 30 MG/1
1 TABLET, FILM COATED ORAL
Qty: 0 | Refills: 0 | COMMUNITY

## 2017-07-10 RX ORDER — FUROSEMIDE 40 MG
1 TABLET ORAL
Qty: 0 | Refills: 0 | COMMUNITY

## 2017-07-10 RX ORDER — SIMVASTATIN 20 MG/1
1 TABLET, FILM COATED ORAL
Qty: 0 | Refills: 0 | COMMUNITY
Start: 2017-07-10

## 2017-07-10 RX ORDER — CINACALCET 30 MG/1
1 TABLET, FILM COATED ORAL
Qty: 30 | Refills: 0 | OUTPATIENT
Start: 2017-07-10 | End: 2017-08-09

## 2017-07-10 RX ORDER — ASPIRIN/CALCIUM CARB/MAGNESIUM 324 MG
1 TABLET ORAL
Qty: 0 | Refills: 0 | COMMUNITY

## 2017-07-10 RX ORDER — NIFEDIPINE 30 MG
1 TABLET, EXTENDED RELEASE 24 HR ORAL
Qty: 30 | Refills: 0 | COMMUNITY
Start: 2017-07-10 | End: 2017-08-09

## 2017-07-10 RX ORDER — FUROSEMIDE 40 MG
40 TABLET ORAL ONCE
Qty: 0 | Refills: 0 | Status: COMPLETED | OUTPATIENT
Start: 2017-07-10 | End: 2017-07-10

## 2017-07-10 RX ADMIN — CARVEDILOL PHOSPHATE 25 MILLIGRAM(S): 80 CAPSULE, EXTENDED RELEASE ORAL at 17:58

## 2017-07-10 RX ADMIN — Medication 80 MILLIGRAM(S): at 17:59

## 2017-07-10 RX ADMIN — Medication 20 MILLIGRAM(S): at 06:19

## 2017-07-10 RX ADMIN — CARVEDILOL PHOSPHATE 25 MILLIGRAM(S): 80 CAPSULE, EXTENDED RELEASE ORAL at 06:19

## 2017-07-10 RX ADMIN — AZITHROMYCIN 250 MILLIGRAM(S): 500 TABLET, FILM COATED ORAL at 14:09

## 2017-07-10 RX ADMIN — Medication 40 MILLIGRAM(S): at 09:05

## 2017-07-10 RX ADMIN — Medication 81 MILLIGRAM(S): at 14:09

## 2017-07-10 RX ADMIN — Medication 20 MILLIGRAM(S): at 17:58

## 2017-07-10 RX ADMIN — Medication 60 MILLIGRAM(S): at 18:00

## 2017-07-10 RX ADMIN — Medication 90 MILLIGRAM(S): at 09:05

## 2017-07-10 RX ADMIN — CINACALCET 60 MILLIGRAM(S): 30 TABLET, FILM COATED ORAL at 14:09

## 2017-07-10 NOTE — PROGRESS NOTE ADULT - PROVIDER SPECIALTY LIST ADULT
ENT
ENT
Internal Medicine
Nephrology
Pulmonology
Pulmonology
Internal Medicine

## 2017-07-10 NOTE — PROGRESS NOTE ADULT - PROBLEM SELECTOR PLAN 3
Recurrent multiple ED visits in the past for epistaxis requiring nasal packing; s/p nasal rhino-rocket. HH stable, no longer having epistaxis  - ENT: no active bleeding. If bleeding resumes, spray afrin 5+, firm pressure 15min. if no resolve, consult ENT  Sinus Precautions: no nose blowing, sneeze open mouth, HOB 30deg, no foreign bodies. F/u Dr. Briggs outpatient Recurrent multiple ED visits in the past for epistaxis requiring nasal packing; s/p nasal rhino-rocket. HH stable, no longer having epistaxis  - ENT: no active bleeding. If bleeding resumes, spray afrin 5+, firm pressure 15min. if no resolve, consult ENT  Sinus Precautions: no nose blowing, sneeze open mouth, HOB 30deg, no foreign bodies, bowel regimen (no straining)  - F/u Dr. Briggs outpatient

## 2017-07-10 NOTE — DISCHARGE NOTE ADULT - ADDITIONAL INSTRUCTIONS
Dr. Vero Briggs MD, ENT  Address: [address] [place] [address], [address], [place] [address]  Phone: [phone]    Dr. Fito Dunham MD Cardiology  Address: [address] [place] [address]  Phone: [phone] You will follow up with your Cardiologist Dr. Dunham on Thursday July 20th at 1:30 PM  You will follow up with your ENT doctor Dr. Briggs as outpatient on Friday July 28th at 1:30 PM    Dr. Vero Briggs MD, ENT  Address: [address] [place] [address], [address] [place] [address]  Phone: [phone]    Dr. Fito Dunham MD Cardiology  Address: [address] [place] [address]  Phone: [phone]

## 2017-07-10 NOTE — DISCHARGE NOTE ADULT - SECONDARY DIAGNOSIS.
Hypertensive emergency Epistaxis SIRS (systemic inflammatory response syndrome) Acute on chronic diastolic congestive heart failure ESRD (end stage renal disease)

## 2017-07-10 NOTE — PROGRESS NOTE ADULT - SUBJECTIVE AND OBJECTIVE BOX
Patient is a 66y with past medical history for HTN, on HD - M, W, F. She came overnight for nose bleeding found to be in respiratory distress, and to have uncontrolled /80. Re sarted on HD in the hospital last HD on 7/8 with UF - 3.1 liters. Today feels better    Patient seen and examined at bedside.     heparin  Injectable 5000 Unit(s) every 8 hours  simvastatin 20 milliGRAM(s) at bedtime  NIFEdipine XL 90 milliGRAM(s) daily  cinacalcet 60 milliGRAM(s) daily  aspirin enteric coated 81 milliGRAM(s) daily  carvedilol 25 milliGRAM(s) every 12 hours  azithromycin   Tablet 250 milliGRAM(s) daily  enalapril 20 milliGRAM(s) two times a day  NIFEdipine XL 60 milliGRAM(s) daily  polyethylene glycol 3350 17 Gram(s) daily PRN  furosemide    Tablet 80 milliGRAM(s) two times a day  metolazone 10 milliGRAM(s) daily      Allergies    No Known Allergies    Intolerances        T(C): , Max: 37.2 (07-09-17 @ 20:33)  T(F): , Max: 99 (07-09-17 @ 20:33)  HR: 71 (07-10-17 @ 09:33)  BP: 159/76 (07-10-17 @ 09:33)  BP(mean): 114 (07-09-17 @ 12:49)  RR: 17 (07-10-17 @ 09:33)  SpO2: 97% (07-10-17 @ 09:33)  Wt(kg): --    07-09 @ 07:01  -  07-10 @ 07:00  --------------------------------------------------------  IN:    Oral Fluid: 390 mL  Total IN: 390 mL    OUT:  Total OUT: 0 mL    Total NET: 390 mL      Physical exam: alert and oriented, mild JVD   Lungs: still has crackles bilaterally, no wheezing, normal air entry in the lungs   Heart: rrr, normal s1/s2, no murmurs, rubs or gallops,   Abdomen - soft, non-tender, non-distended, normal bowel sounds   Extremities: has mild peripehral edema, Av fistula on the left fore arm         LABS:                        10.5   6.7   )-----------( 260      ( 10 Jul 2017 06:41 )             31.2     07-10    131<L>  |  90<L>  |  35<H>  ----------------------------<  85  4.2   |  24  |  6.70<H>    Ca    9.2      10 Jul 2017 06:41  Phos  5.4     07-10  Mg     1.9     07-10                  RADIOLOGY & ADDITIONAL STUDIES:    < from: Xray Chest 1 View AP -PORTABLE-Routine (07.08.17 @ 07:19) >  Impression: Congestion and/or infiltrates. Small bilateral effusions    < end of copied text >

## 2017-07-10 NOTE — PROGRESS NOTE ADULT - SUBJECTIVE AND OBJECTIVE BOX
PGY1 EVENT NOTE    House staff made aware by nurse that pt was refusing Hep SubQ injections. Spoke to pt at bedside. Pt understands that the injections are to prevent blood clot formation in blood vessels. Pt states "I don't want these injections because they hurt and I already have a hernia in my abdomen." The benefits and risks of heparin were discussed with the pt; risks including blood clot formation that could potentially be harmful. Pt seemed more amendable to heparin injections after this conversation and wanted to think about it. Will f/u with pt regarding this matter.

## 2017-07-10 NOTE — PROGRESS NOTE ADULT - PROBLEM SELECTOR PLAN 2
upon arrival c/b epistaxis; SBP minimally decreased to 190s s/p HD, started on clonidine 0.1mg TID w/ significant improvement. Patient currently controlled 170s systolic. Followed by cards - d/c-ed clonidine for probable compliance issues.  - c/w home meds of nifedipine 60mg and 90mg POd, carvedilol 25mg BID, enalapril 20mg BID   - Renal following: plan for HD T TH Sat

## 2017-07-10 NOTE — DISCHARGE NOTE ADULT - CARE PROVIDERS DIRECT ADDRESSES
[email or web address]

## 2017-07-10 NOTE — DISCHARGE NOTE ADULT - PLAN OF CARE
Resolution of shortness of breath You were found to have fluid in your lungs, which was aspirated with a needle. Analysis of the fluid suggests the etiology of the fluid retention to be a combination of acute exacerbation of heart failure and end stage renal disease. You will follow up with your Cardiologist Dr. Dunham on Thursday July 20th at 1:30 PM Blood pressure control with systolic blood pressure goal of 140-160s Your blood pressure was found to be extremely elevated, which caused you to have nose bleeds. You were started on medication to control your blood pressure. You will continue your home blood pressure medication as prescribed. Cessation of nose bleed You were found to have nose bleed as a result of your high blood pressure. The bleeding was controlled with nose packing.  If you have recurrent episode of epistaxis, spray afrin in each nostril hold steady, firm pressure for 15 min at the cartilaginous portion of the nose. If bleeding does not stop, go to the hospital.   As a precaution you should not blow your nose for 1 week,  sneeze with mouth open, or introduce foreign bodies in the nose. Avoid straining when using the bathroom. Keep the head of your bed elevated 30 degrees. You will follow up with Dr. Briggs as outpatient on Friday July 28th at 1:30 PM Continue dialysis You will continue with your usual dialysis schedule as outpatient You were found to have fluids in your lung caused by an exacerbation of your heart failure. will follow up with your cardiologist Dr. Dunham as outpatient at the address listed bellow. You were found to have fluid in your lungs, which was aspirated with a needle. Analysis of the fluid suggests the etiology of the fluid retention to be a combination of acute exacerbation of heart failure and end stage renal disease. You were given antibiotics during your stay to cover any possibility of concurrent lung infection. You completed the full antibiotic during your stay. You will follow up with your Cardiologist Dr. Dunham on Thursday July 20th at 1:30 PM Your blood pressure was found to be extremely elevated, which caused you to have nose bleeds. You were started on medication to control your blood pressure. Please continue your home blood pressure medication as prescribed.

## 2017-07-10 NOTE — PROGRESS NOTE ADULT - ASSESSMENT
This is 66 year old female with past medical history stated above. Admitted for fluid overload and uncontrolled BP Last HD on 7/8 - 3.1 liters UF> today no need of urgent HD

## 2017-07-10 NOTE — DISCHARGE NOTE ADULT - NS AS ACTIVITY OBS
Showering allowed/No Heavy lifting/straining/Bathing allowed/Driving allowed/Walking-Outdoors allowed/Walking-Indoors allowed/Stairs allowed

## 2017-07-10 NOTE — PROGRESS NOTE ADULT - PROBLEM SELECTOR PLAN 2
- the BP with better control now on Nifedipine 60 mg and 90 mg , Lasix 80 mg, metalazone, Hydralazine 10 mg every 8 hours, carvedilol 25 mg twice a day, Enalaprin 20 mg daily. The blood pressure is again trending up after the clonidine was stopped in the hospital  Consider re starting the clonidine if the BP is still on the higher side

## 2017-07-10 NOTE — PROGRESS NOTE ADULT - PROBLEM SELECTOR PLAN 1
h/o dCHF; initially dyspneic likely 2/2 fluid overload and b/l pleural effusions. s/p HD x2 and thoracentesis (7/7) with 1.1L removed, protein ratio 0.3, transudative. Dyspnea improved since admission, lungs CTA; possibly CHF exacerbation 2/2 PNA infxn vs HD non-compliance vs med non-compliance  Echo: EF 65-70%. CT cardiac: PV thickening but could not rule out small mass on leaflet; BCx: neg   Followed by Cards: Dr. Shannan Coelho  - pt finishing [facility] for PNA suspicion as pt met SIRS criteria (tachypnea and elevated WBC to 17) on admission and CXR concerning for congestion vs infiltrate  - strict IO  - continue enalipril 20mg bid, coreg 25mg bid, nifedipine 60mg and 90mg qd  - Duoneb and Supplemental Oxygen PRN  HD sched T TH Sat; planned next for Tuesday h/o dCHF; initially dyspneic likely 2/2 fluid overload and b/l pleural effusions. s/p HD x2 and thoracentesis (7/7) with 1.1L removed, protein ratio 0.3, transudative. Dyspnea improved since admission, lungs CTA; possibly CHF exacerbation 2/2 PNA infxn vs HD non-compliance vs med non-compliance  Echo: EF 65-70%. CT cardiac: PV thickening but could not rule out small mass on leaflet; BCx: neg   Followed by Cards: Dr. Shannan Coelho  - pt finishing [facility] for PNA suspicion as pt met SIRS criteria (tachypnea and elevated WBC to 17) on admission and CXR concerning for congestion vs infiltrate  - strict IO  - continue enalipril 20mg bid, coreg 25mg bid, nifedipine 60mg and 90mg qd  HD sched T TH Sat; planned next for Tuesday h/o dCHF; initially dyspneic likely 2/2 fluid overload and b/l pleural effusions. s/p HD x2 and thoracentesis (7/7) with 1.1L removed, protein ratio 0.3, transudative. Dyspnea improved since admission, lungs CTA; possibly CHF exacerbation 2/2 PNA infxn vs HD non-compliance vs med non-compliance  Echo: EF 65-70%. CT cardiac: PV thickening but could not rule out small mass on leaflet; BCx: neg   Followed by Cards: Dr. Shannan Coelho  - pt finishing [facility] today (500 mg IV once, 250 mg IV x2 doses) s/p CTX 1g IV x 2 for PNA suspicion as pt met SIRS criteria (tachypnea and elevated WBC to 17) on admission and CXR concerning for congestion vs infiltrate  - strict IO  - continue enalipril 20mg bid, coreg 25mg bid, nifedipine 60mg and 90mg qd  HD sched T TH Sat; planned next for Tuesday

## 2017-07-10 NOTE — DISCHARGE NOTE ADULT - MEDICATION SUMMARY - MEDICATIONS TO STOP TAKING
I will STOP taking the medications listed below when I get home from the hospital:  None I will STOP taking the medications listed below when I get home from the hospital:    cyclobenzaprine 5 mg oral tablet  -- 1 tab(s) by mouth 2 times a day  -- Some non-prescription drugs may aggravate your condition.  Read all labels carefully.  If a warning appears, check with your doctor before taking.  This drug may impair the ability to drive or operate machinery.  Use care until you become familiar with its effects.

## 2017-07-10 NOTE — PROGRESS NOTE ADULT - SUBJECTIVE AND OBJECTIVE BOX
INTERVAL HPI/OVERNIGHT EVENTS: No acute events overnight, pt refusing Hep SQ. , BP meds given at 6:15 am    ROS  CV: Denies chest pain, palpitations, headaches, blurry vision, epistaxis    RESP: Denies SOB  GI: Denies abdominal pain, constipation, diarrhea, nausea, vomiting  : Denies dysuria, hematuria, flank or back pain  ID: Denies fevers, chills  MSK: Denies joint pain   DERM: Denies any rashes, bruising, pruritis  ENDO: Denies heat or cold intolerances, changes in weight, thinning of hair  PSYCH: Denies any mood changes    VITAL SIGNS:  T(F): 98 (07-10-17 @ 05:45)  HR: 77 (07-10-17 @ 05:45)  BP: 172/84 (07-10-17 @ 05:45)  RR: 18 (07-10-17 @ 05:45)  SpO2: 97% (07-10-17 @ 05:45)  Wt(kg): --    PHYSICAL EXAM:    Constitutional: WDWN, NAD, resting comfortably   Head: NC/AT  Eyes: PERRL, EOMI, anicteric sclera, no mucosal pallor  ENT: no nasal discharge; nasal mucosa pink with no dry blood, uvula midline, no oropharyngeal erythema or exudates; MMM  Neck: supple; no JVD or thyromegaly, no lymphadenopathy  Respiratory: CTA B/L; no W/R/R, no retractions  Cardiac: +S1/S2; RRR; holosystolic murmur, no rubs or gallops   Gastrointestinal: abdomen soft, NT/ND; no rebound or guarding; +BSx4  Back: spine midline, no bony tenderness or step-offs; no CVAT B/L  Extremities: warm; no calf tenderness, no pedal edema, no cyanosis, no clubbing  Musculoskeletal: NROM x4; no joint swelling, tenderness or erythema  Vascular: 2+ DP/PT pulses B/L  Dermatologic: no rash, no petechia   Lymphatic: no submandibular or cervical LAD  Neurologic: AAOx3; CNII-XII grossly intact; sensory symmetrically intact in B/L LE   Psychiatric: pleasant and conversive; affect and characteristics of appearance, verbalizations, behaviors are appropriate      MEDICATIONS  (STANDING):  heparin  Injectable 5000 Unit(s) SubCutaneous every 8 hours  simvastatin 20 milliGRAM(s) Oral at bedtime  NIFEdipine XL 90 milliGRAM(s) Oral daily  cinacalcet 60 milliGRAM(s) Oral daily  aspirin enteric coated 81 milliGRAM(s) Oral daily  carvedilol 25 milliGRAM(s) Oral every 12 hours  azithromycin   Tablet 250 milliGRAM(s) Oral daily  enalapril 20 milliGRAM(s) Oral two times a day  NIFEdipine XL 60 milliGRAM(s) Oral daily    MEDICATIONS  (PRN):  polyethylene glycol 3350 17 Gram(s) Oral daily PRN Constipation      Allergies    No Known Allergies    Intolerances        LABS:                        10.5   6.7   )-----------( 260      ( 10 Jul 2017 06:41 )             31.2     07-10    131<L>  |  90<L>  |  35<H>  ----------------------------<  85  4.2   |  24  |  6.70<H>    Ca    9.2      10 Jul 2017 06:41  Phos  5.4     07-10  Mg     1.9     07-10            RADIOLOGY & ADDITIONAL TESTS:  < from: Xray Chest 1 View AP -PORTABLE-Routine (07.08.17 @ 07:19) >  Congestion and/or infiltrates. Small bilateral effusions    < end of copied text >  < from: CT Heart without Coronaries w/ IV Cont (07.07.17 @ 14:53) >  Bibasilar consolidation and groundglass opacity consistent with   infectious/inflammatory pneumonitis, with possible underlying component   of pulmonary edema; moderate cardiomegaly.    < end of copied text > INTERVAL HPI/OVERNIGHT EVENTS: No acute events overnight, pt refusing Hep SQ. , BP meds given at 6:15 am    ROS  CV: Denies chest pain, palpitations, headaches, blurry vision, epistaxis    RESP: Denies SOB  GI: Denies abdominal pain, constipation, diarrhea, nausea, vomiting  : Denies dysuria, hematuria, flank or back pain  ID: Denies fevers, chills  MSK: Denies joint pain   DERM: Denies any rashes, bruising, pruritis  ENDO: Denies heat or cold intolerances, changes in weight, thinning of hair  PSYCH: Denies any mood changes    VITAL SIGNS:  T(F): 98 (07-10-17 @ 05:45)  HR: 77 (07-10-17 @ 05:45)  BP: 172/84 (07-10-17 @ 05:45)  RR: 18 (07-10-17 @ 05:45)  SpO2: 97% (07-10-17 @ 05:45)  Wt(kg): --    PHYSICAL EXAM:    Constitutional: WDWN, NAD, resting comfortably   Head: NC/AT  Eyes: PERRL, EOMI, anicteric sclera, no mucosal pallor  ENT: no nasal discharge; nasal mucosa pink with no dry blood, uvula midline, no oropharyngeal erythema or exudates; MMM  Neck: supple; no JVD or thyromegaly, no lymphadenopathy  Respiratory: CTA B/L; no W/R/R, no retractions  Cardiac: +S1/S2; RRR; holosystolic murmur, no rubs or gallops   Gastrointestinal: abdomen soft, NT/ND; no rebound or guarding; +BSx4  Back: spine midline, no bony tenderness or step-offs; no CVAT B/L  Extremities: warm; no calf tenderness, no pedal edema, no cyanosis, no clubbing  Musculoskeletal: NROM x4; no joint swelling, tenderness or erythema  Vascular: 2+ DP/PT pulses B/L  Dermatologic: no rash, no petechia   Lymphatic: no submandibular or cervical LAD  Neurologic: AAOx3; CNII-XII grossly intact; sensory symmetrically intact in B/L LE   Psychiatric: pleasant and conversive; affect and characteristics of appearance, verbalizations, behaviors are appropriate      MEDICATIONS  (STANDING):  heparin  Injectable 5000 Unit(s) SubCutaneous every 8 hours  simvastatin 20 milliGRAM(s) Oral at bedtime  NIFEdipine XL 90 milliGRAM(s) Oral daily  cinacalcet 60 milliGRAM(s) Oral daily  aspirin enteric coated 81 milliGRAM(s) Oral daily  carvedilol 25 milliGRAM(s) Oral every 12 hours  azithromycin   Tablet 250 milliGRAM(s) Oral daily  enalapril 20 milliGRAM(s) Oral two times a day  NIFEdipine XL 60 milliGRAM(s) Oral daily    MEDICATIONS  (PRN):  polyethylene glycol 3350 17 Gram(s) Oral daily PRN Constipation      Allergies    No Known Allergies    Intolerances        LABS:                        10.5   6.7   )-----------( 260      ( 10 Jul 2017 06:41 )             31.2     07-10    131<L>  |  90<L>  |  35<H>  ----------------------------<  85  4.2   |  24  |  6.70<H>    Ca    9.2      10 Jul 2017 06:41  Phos  5.4     07-10  Mg     1.9     07-10            RADIOLOGY & ADDITIONAL TESTS:  < from: Xray Chest 1 View AP -PORTABLE-Routine (07.08.17 @ 07:19) >  Congestion and/or infiltrates. Small bilateral effusions    < end of copied text >  < from: CT Heart without Coronaries w/ IV Cont (07.07.17 @ 14:53) >  Bibasilar consolidation and groundglass opacity consistent with   infectious/inflammatory pneumonitis, with possible underlying component   of pulmonary edema; moderate cardiomegaly.    < end of copied text >    ECHO (7/6/17): There is severe concentric left ventricular hypertrophy.The left   ventricular ejection fraction is estimated to be 65-70%.The right ventricle is normal   in size and function.The left atrium is dilated.Aortic valve sclerosis.No   aortic regurgitation noted. Mitral annular calcification noted. Mitral valve   thickening noted.There is trace mitral regurgitation.There is trace tricuspid  regurgitation. There is trace pulmonic regurgitation.A small to moderate pericardial effusion is noted predominantly along the   right sided chambers. Mobile echodensity   noted in the RVOT near the pulmonic valve, differential includes vegetation versus   thrombus

## 2017-07-10 NOTE — DISCHARGE NOTE ADULT - MEDICATION SUMMARY - MEDICATIONS TO TAKE
I will START or STAY ON the medications listed below when I get home from the hospital:    Aspirin Enteric Coated 81 mg oral delayed release tablet  -- 1 tab(s) by mouth once a day - for coronary artery disease  -- Indication: For CAD (coronary artery disease)    enalapril 20 mg oral tablet  -- 1 tab(s) by mouth 2 times a day - for hypertension  -- Indication: For Essential hypertension    simvastatin 20 mg oral tablet  -- 1 tab(s) by mouth once a day (at bedtime) - for high cholesterol  -- Indication: For CAD (coronary artery disease)    Tessalon Perles 100 mg oral capsule  -- 1 cap(s) by mouth every 8 hours  -- May cause drowsiness.  Alcohol may intensify this effect.  Use care when operating dangerous machinery.  Swallow whole.  Do not crush.    -- Indication: For Cough    carvedilol 25 mg oral tablet  -- 1 tab(s) by mouth 2 times a day - for hypertension  -- Indication: For Essential hypertension    Ventolin HFA 90 mcg/inh inhalation aerosol  -- 2 puff(s) inhaled every 6 hours - for asthma  -- For inhalation only.  It is very important that you take or use this exactly as directed.  Do not skip doses or discontinue unless directed by your doctor.  Obtain medical advice before taking any non-prescription drugs as some may affect the action of this medication.  Shake well before use.    -- Indication: For Asthma    Ventolin HFA 90 mcg/inh inhalation aerosol  -- 2 puff(s) inhaled every 6 hours  -- For inhalation only.  It is very important that you take or use this exactly as directed.  Do not skip doses or discontinue unless directed by your doctor.  Obtain medical advice before taking any non-prescription drugs as some may affect the action of this medication.  Shake well before use.    -- Indication: For Asthma    NIFEdipine 60 mg oral tablet, extended release  -- 1 tab(s) by mouth once a day in the morning - for hypertension  -- Indication: For Essential hypertension    NIFEdipine 90 mg oral tablet, extended release  -- 1 tab(s) by mouth once a day at bedtime- for hypertension  -- Indication: For Essential hypertension    furosemide 80 mg oral tablet  -- 1 tab(s) by mouth 2 times a day - for hypertension  -- Indication: For Essential hypertension    metOLazone 10 mg oral tablet  -- 1 tab(s) by mouth once a day - for hypertension  -- Indication: For Essential hypertension    Sensipar 60 mg oral tablet  -- 1 tab(s) by mouth once a day - for end stage renal disease  -- Indication: For ESRD (end stage renal disease) I will START or STAY ON the medications listed below when I get home from the hospital:    aspirin 81 mg oral delayed release tablet  -- 1 tab(s) by mouth once a day  -- Indication: For CAD (coronary artery disease)    enalapril 20 mg oral tablet  -- 1 tab(s) by mouth 2 times a day  -- Indication: For CAD (coronary artery disease)    simvastatin 20 mg oral tablet  -- 1 tab(s) by mouth once a day (at bedtime)  -- Indication: For CAD (coronary artery disease)    carvedilol 25 mg oral tablet  -- 1 tab(s) by mouth every 12 hours  -- Indication: For CAD (coronary artery disease)    Ventolin HFA 90 mcg/inh inhalation aerosol  -- 2 puff(s) inhaled every 6 hours - for asthma  -- For inhalation only.  It is very important that you take or use this exactly as directed.  Do not skip doses or discontinue unless directed by your doctor.  Obtain medical advice before taking any non-prescription drugs as some may affect the action of this medication.  Shake well before use.    -- Indication: For Asthma    NIFEdipine 60 mg oral tablet, extended release  -- 1 tab(s) by mouth once a day in the morning  -- Indication: For Hypertension    NIFEdipine 90 mg oral tablet, extended release  -- 1 tab(s) by mouth once a day at bedtime  -- Indication: For Hypertension    metOLazone 10 mg oral tablet  -- 1 tab(s) by mouth once a day 30 minutes before PM torsemide  -- Indication: For CHF (congestive heart failure)    torsemide 20 mg oral tablet  -- 2 tab(s) by mouth 2 times a day  -- Avoid prolonged or excessive exposure to direct and/or artificial sunlight while taking this medication.  It is very important that you take or use this exactly as directed.  Do not skip doses or discontinue unless directed by your doctor.  It may be advisable to drink a full glass orange juice or eat a banana daily while taking this medication.    -- Indication: For CHF (congestive heart failure)    Sensipar 60 mg oral tablet  -- 1 tab(s) by mouth once a day  -- Indication: For ESRD (end stage renal disease)

## 2017-07-10 NOTE — PROGRESS NOTE ADULT - ATTENDING COMMENTS
Pt. seen and examined by me at 2:15PM; I have read Dr. Richardson's note, I agree w/ his findings and plan of care as documented; today's TTE reviewed, no mass seen on pulmonic valve; cont. BP rx regimen per Renal and Cardiology recs; next HD tomorrow; d/c home today w/ outpatient ENT f/u

## 2017-07-10 NOTE — DISCHARGE NOTE ADULT - CARE PROVIDER_API CALL
Fito Dunham), Cardiology; Internal Medicine  [address] [place] [address]  Phone: [phone]  Fax: [phone]    Vero Briggs), Otolaryngology  [address] [place] [address]  Phone: [phone]  Fax: [phone]

## 2017-07-10 NOTE — DISCHARGE NOTE ADULT - CARE PLAN
Principal Discharge DX:	Fluid overload  Goal:	Resolution of shortness of breath  Instructions for follow-up, activity and diet:	You were found to have fluid in your lungs, which was aspirated with a needle. Analysis of the fluid suggests the etiology of the fluid retention to be a combination of acute exacerbation of heart failure and end stage renal disease. You will follow up with your Cardiologist Dr. Dunham on Thursday July 20th at 1:30 PM  Secondary Diagnosis:	Hypertensive emergency  Goal:	Blood pressure control with systolic blood pressure goal of 140-160s  Instructions for follow-up, activity and diet:	Your blood pressure was found to be extremely elevated, which caused you to have nose bleeds. You were started on medication to control your blood pressure. You will continue your home blood pressure medication as prescribed.  Secondary Diagnosis:	Epistaxis  Secondary Diagnosis:	SIRS (systemic inflammatory response syndrome)  Secondary Diagnosis:	Acute on chronic diastolic congestive heart failure  Secondary Diagnosis:	ESRD (end stage renal disease) Principal Discharge DX:	Fluid overload  Goal:	Resolution of shortness of breath  Instructions for follow-up, activity and diet:	You were found to have fluid in your lungs, which was aspirated with a needle. Analysis of the fluid suggests the etiology of the fluid retention to be a combination of acute exacerbation of heart failure and end stage renal disease. You were given antibiotics during your stay to cover any possibility of concurrent lung infection. You completed the full antibiotic during your stay. You will follow up with your Cardiologist Dr. Dunham on Thursday July 20th at 1:30 PM  Secondary Diagnosis:	Hypertensive emergency  Goal:	Blood pressure control with systolic blood pressure goal of 140-160s  Instructions for follow-up, activity and diet:	Your blood pressure was found to be extremely elevated, which caused you to have nose bleeds. You were started on medication to control your blood pressure. You will continue your home blood pressure medication as prescribed.  Secondary Diagnosis:	Epistaxis  Goal:	Cessation of nose bleed  Instructions for follow-up, activity and diet:	You were found to have nose bleed as a result of your high blood pressure. The bleeding was controlled with nose packing.  If you have recurrent episode of epistaxis, spray afrin in each nostril hold steady, firm pressure for 15 min at the cartilaginous portion of the nose. If bleeding does not stop, go to the hospital.   As a precaution you should not blow your nose for 1 week,  sneeze with mouth open, or introduce foreign bodies in the nose. Avoid straining when using the bathroom. Keep the head of your bed elevated 30 degrees. You will follow up with Dr. Briggs as outpatient on Friday July 28th at 1:30 PM  Secondary Diagnosis:	Acute on chronic diastolic congestive heart failure  Goal:	Resolution of shortness of breath  Instructions for follow-up, activity and diet:	You were found to have fluids in your lung caused by an exacerbation of your heart failure. will follow up with your cardiologist Dr. Dunham as outpatient at the address listed bellow.  Secondary Diagnosis:	ESRD (end stage renal disease)  Goal:	Continue dialysis  Instructions for follow-up, activity and diet:	You will continue with your usual dialysis schedule as outpatient Principal Discharge DX:	Fluid overload  Goal:	Resolution of shortness of breath  Instructions for follow-up, activity and diet:	You were found to have fluid in your lungs, which was aspirated with a needle. Analysis of the fluid suggests the etiology of the fluid retention to be a combination of acute exacerbation of heart failure and end stage renal disease. You were given antibiotics during your stay to cover any possibility of concurrent lung infection. You completed the full antibiotic during your stay. You will follow up with your Cardiologist Dr. Dunham on Thursday July 20th at 1:30 PM  Secondary Diagnosis:	Hypertensive emergency  Goal:	Blood pressure control with systolic blood pressure goal of 140-160s  Instructions for follow-up, activity and diet:	Your blood pressure was found to be extremely elevated, which caused you to have nose bleeds. You were started on medication to control your blood pressure. Please continue your home blood pressure medication as prescribed.  Secondary Diagnosis:	Epistaxis  Goal:	Cessation of nose bleed  Instructions for follow-up, activity and diet:	You were found to have nose bleed as a result of your high blood pressure. The bleeding was controlled with nose packing.  If you have recurrent episode of epistaxis, spray afrin in each nostril hold steady, firm pressure for 15 min at the cartilaginous portion of the nose. If bleeding does not stop, go to the hospital.   As a precaution you should not blow your nose for 1 week,  sneeze with mouth open, or introduce foreign bodies in the nose. Avoid straining when using the bathroom. Keep the head of your bed elevated 30 degrees. You will follow up with Dr. Briggs as outpatient on Friday July 28th at 1:30 PM  Secondary Diagnosis:	Acute on chronic diastolic congestive heart failure  Goal:	Resolution of shortness of breath  Instructions for follow-up, activity and diet:	You were found to have fluids in your lung caused by an exacerbation of your heart failure. will follow up with your cardiologist Dr. Dunham as outpatient at the address listed bellow.  Secondary Diagnosis:	ESRD (end stage renal disease)  Goal:	Continue dialysis  Instructions for follow-up, activity and diet:	You will continue with your usual dialysis schedule as outpatient

## 2017-07-10 NOTE — DISCHARGE NOTE ADULT - HOSPITAL COURSE
67 yo F with PMHx ESRD on HD (T,TH,SAT), CAD, dCHF (EF 75%), poorly controlled HTN, asthma p/w dyspnea and epistaxis found to be in HTN emergency w/ SBP in 200s and found to have be fluid overloaded with b/l pleural effusions. There was concern for PNA as pt w/ b/l CXR findings read as possible congestion vs infiltrate and was thus pt was started on Abx (CTX, azithro). Patient received hemodialysis (4L removed) with mild improvement in SOB and minimally change to SBP as they remained in the 190s. Was started on 0.1mg clonidine TID with significant improvement SBP 150s and subsequent cessation of her epistaxis. Pulmonary was consulted for her b/l effusions and performed a thoracentesis (1.1L, transudative) with a further improvement in patient's SOB.    ECHO with EF 65-70% showed a "mobile hypodensity," concerning for either a thrombus or vegetation, although patient has been afebrile, WBC returned to WNL and BCx negative after 3 days. A follow up CT Cardiac showed thickening pulmonary valve but could not exclude small mobile leaflet mass. Pt was not started on empiric endocarditis tx as pt afebrile, WBC resolved, and there was no evidence of pulmonary emboli. Clonidine was DCed 2/2 compliance concerns. Patient's BP have remained stable in 140-150s. ENT has been following for epistaxis who placed rhino-rocket which was removed today 7/9 with recs for outpatient f/u with Dr. Briggs. Diueretics were DCed due to poor urine output, SBPs subsequently increased in 170s. Lasix 40 mg IV was restarted. Patient is no longer SOB, tolerating PO, ambulating and has been HD stable. 65 yo F with PMHx ESRD on HD (T,TH,SAT), CAD, dCHF (EF 75%), poorly controlled HTN, asthma p/w dyspnea and epistaxis found to be in HTN emergency w/ SBP in 200s and found to have be fluid overloaded with b/l pleural effusions. There was concern for PNA as pt w/ b/l CXR findings read as possible congestion vs infiltrate and was thus pt was started on Abx (CTX, azithro). Patient received hemodialysis (4L removed) with mild improvement in SOB and minimally change to SBP as they remained in the 190s. Was started on 0.1mg clonidine TID with significant improvement SBP 150s and subsequent cessation of her epistaxis. Pulmonary was consulted for her b/l effusions and performed a thoracentesis (1.1L, transudative) with a further improvement in patient's SOB.    ECHO with EF 65-70% showed a "mobile hypodensity," concerning for either a thrombus or vegetation, although patient has been afebrile, WBC returned to WNL and BCx negative after 3 days. A follow up CT Cardiac showed thickening pulmonary valve but could not exclude small mobile leaflet mass. Pt was not started on empiric endocarditis tx as pt afebrile, WBC resolved, and there was no evidence of pulmonary emboli. Clonidine was DCed 2/2 compliance concerns. Patient's BP have remained stable in 140-150s. ENT has been following for epistaxis who placed rhino-rocket which was removed today 7/9 with recs for outpatient f/u with Dr. Briggs. Diueretics were DCed due to poor urine output, SBPs subsequently increased in 170s. Lasix 40 mg IV was given with good response with SBP 150s. Home lasix and metolazole were restarted.  Patient is no longer experienced SOB, was tolerating PO, ambulating and has been HD stable.    Repeat echo was done to assess for possible thrombus 65 yo F with PMHx ESRD on HD (T,TH,SAT), CAD, dCHF (EF 75%), poorly controlled HTN, asthma p/w dyspnea and epistaxis found to be in HTN emergency w/ SBP in 200s and found to have be fluid overloaded with b/l pleural effusions. There was concern for PNA as pt w/ b/l CXR findings read as possible congestion vs infiltrate and was thus pt was started on Abx (CTX, azithro). Patient received hemodialysis (4L removed) with mild improvement in SOB and minimally change to SBP as they remained in the 190s. Was started on 0.1mg clonidine TID with significant improvement SBP 150s and subsequent cessation of her epistaxis. Pulmonary was consulted for her b/l effusions and performed a thoracentesis (1.1L, transudative) with a further improvement in patient's SOB.    ECHO with EF 65-70% showed a "mobile hypodensity," concerning for either a thrombus or vegetation, although patient has been afebrile, WBC returned to WNL and BCx negative after 3 days. A follow up CT Cardiac showed thickening pulmonary valve but could not exclude small mobile leaflet mass. Pt was not started on empiric endocarditis tx as pt afebrile, WBC resolved, and there was no evidence of pulmonary emboli. Clonidine was DCed 2/2 compliance concerns. Patient's BP have remained stable in 140-150s. ENT has been following for epistaxis who placed rhino-rocket which was removed today 7/9 with recs for outpatient f/u with Dr. Briggs. Diueretics were DCed due to poor urine output, SBPs subsequently increased in 170s. Lasix 40 mg IV was given with good response with SBP 150s. Home lasix and metolazole were restarted.  Patient is no longer experienced SOB, was tolerating PO, ambulating and has been HD stable.    Repeat echo was done to evaluate the right heart given previous ECHO results.    ECHO (7/6/17): There is severe concentric left ventricular hypertrophy. The left ventricular ejection fraction is estimated to be 65-70%.The right ventricle is normal in size and function. The left atrium is dilated. Aortic valve sclerosis. No aortic regurgitation noted. Mitral annular calcification noted. Mitral valve thickening noted. There is trace mitral regurgitation. There is trace tricuspid regurgitation. There is trace pulmonic regurgitation. A small to moderate pericardial effusion is noted predominantly along the right sided chambers. Mobile echodensity noted in the RVOT near the pulmonic valve, differential includes vegetation versus thrombus 67 yo F with PMHx ESRD on HD (T,TH,SAT), CAD, dCHF (EF 75%), poorly controlled HTN, asthma p/w dyspnea and epistaxis found to be in HTN emergency w/ SBP in 200s and found to have be fluid overloaded with b/l pleural effusions. There was concern for PNA as pt w/ b/l CXR findings read as possible congestion vs infiltrate and was thus pt was started on Abx (CTX, azithro). Patient received hemodialysis (4L removed) with mild improvement in SOB and minimally change to SBP as they remained in the 190s. Was started on 0.1mg clonidine TID with significant improvement SBP 150s and subsequent cessation of her epistaxis. Pulmonary was consulted for her b/l effusions and performed a thoracentesis (1.1L, transudative) with a further improvement in patient's SOB.    ECHO with EF 65-70% showed a "mobile hypodensity," concerning for either a thrombus or vegetation, although patient has been afebrile, WBC returned to WNL and BCx negative after 3 days. A follow up CT Cardiac showed thickening pulmonary valve but could not exclude small mobile leaflet mass. Pt was not started on empiric endocarditis tx as pt afebrile, WBC resolved, and there was no evidence of pulmonary emboli. Clonidine was DCed 2/2 compliance concerns. Patient's BP have remained stable in 140-150s. ENT has been following for epistaxis who placed rhino-rocket which was removed today 7/9 with recs for outpatient f/u with Dr. Briggs. Diueretics were DCed due to poor urine output, SBPs subsequently increased in 170s. Lasix 40 mg IV was given with good response with SBP 150s. Home lasix and metolazole were restarted.  Patient is no longer experienced SOB, was tolerating PO, ambulating and has been HD stable.    Repeat echo was done to evaluate the right heart given previous ECHO results.    ECHO (7/6/17): There is severe concentric left ventricular hypertrophy. The left ventricular ejection fraction is estimated to be 65-70%.The right ventricle is normal in size and function. The left atrium is dilated. Aortic valve sclerosis. No aortic regurgitation noted. Mitral annular calcification noted. Mitral valve thickening noted. There is trace mitral regurgitation. There is trace tricuspid regurgitation. There is trace pulmonic regurgitation. A small to moderate pericardial effusion is noted predominantly along the right sided chambers. Mobile echodensity noted in the RVOT near the pulmonic valve, differential includes vegetation versus thrombus      ECHO (7/10/17): he left ventricular ejection fraction is estimated to be 60-65%There is mild concentric left ventricular hypertrophy.The left   atrial size is normal.The mitral inflow pattern is consistent with impaired left ventricular relaxation with mildly elevated left atrial pressure   (8-14mmHg). Right atrial size is normal. The right ventricle is normal in size and function.  There is mild to moderate aortic valve thickening. No aortic regurgitation noted.  There is severe mitral annular calcification. There is mild to moderate mitral regurgitation. The mean pressure gradient is 10 mmHg,   at a heart rate of 67 bpm.  There is trace tricuspid regurgitation.There   is no   echocardiographic evidence for pulmonary hypertension.Structurally normal   pulmonic valve. There is trace pulmonic regurgitation. No mass noted on   pulmonic valve.  A small pericardial effusion noted.Left pleural effusion   noted. 65 yo F with PMHx ESRD on HD (T,TH,SAT), CAD, dCHF (EF 75%), poorly controlled HTN, asthma p/w dyspnea and epistaxis found to be in HTN emergency w/ SBP in 200s and found to have be fluid overloaded with b/l pleural effusions. There was concern for PNA as pt w/ b/l CXR findings read as possible congestion vs infiltrate and was thus pt was started on Abx (CTX, azithro). Patient received hemodialysis (4L removed) with mild improvement in SOB and minimally change to SBP as they remained in the 190s. Was started on 0.1mg clonidine TID with significant improvement SBP 150s and subsequent cessation of her epistaxis. Pulmonary was consulted for her b/l effusions and performed a thoracentesis (1.1L, transudative) with a further improvement in patient's SOB.    ECHO with EF 65-70% showed a "mobile hypodensity," concerning for either a thrombus or vegetation, although patient has been afebrile, WBC returned to WNL and BCx negative after 3 days. A follow up CT Cardiac showed thickening pulmonary valve but could not exclude small mobile leaflet mass. Pt was not started on empiric endocarditis tx as pt afebrile, WBC resolved, and there was no evidence of pulmonary emboli. Clonidine was DCed 2/2 compliance concerns. Patient's BP have remained stable in 140-150s. ENT has been following for epistaxis who placed rhino-rocket which was removed today 7/9 with recs for outpatient f/u with Dr. Briggs. Diueretics were DCed due to poor urine output, SBPs subsequently increased in 170s. Lasix 40 mg IV was given with good response with SBP 150s. Home lasix and metolazole were restarted.  Patient is no longer experienced SOB, was tolerating PO, ambulating and has been HD stable.  Repeat echo negative for possible thrombus in the right heart.     ECHO (7/6/17): There is severe concentric left ventricular hypertrophy. The left ventricular ejection fraction is estimated to be 65-70%.The right ventricle is normal in size and function. The left atrium is dilated. Aortic valve sclerosis. No aortic regurgitation noted. Mitral annular calcification noted. Mitral valve thickening noted. There is trace mitral regurgitation. There is trace tricuspid regurgitation. There is trace pulmonic regurgitation. A small to moderate pericardial effusion is noted predominantly along the right sided chambers. Mobile echodensity noted in the RVOT near the pulmonic valve, differential includes vegetation versus thrombus      ECHO (7/10/17): The left ventricular ejection fraction is estimated to be 60-65%There is mild concentric left ventricular hypertrophy.The left   atrial size is normal.The mitral inflow pattern is consistent with impaired left ventricular relaxation with mildly elevated left atrial pressure   (8-14mmHg). Right atrial size is normal. The right ventricle is normal in size and function.  There is mild to moderate aortic valve thickening. No aortic regurgitation noted.  There is severe mitral annular calcification. There is mild to moderate mitral regurgitation. The mean pressure gradient is 10 mmHg, at a heart rate of 67 bpm. There is trace tricuspid regurgitation. There is no echocardiographic evidence for pulmonary hypertension. Structurally normal pulmonic valve. There is trace pulmonic regurgitation. No mass noted on pulmonic valve.  A small pericardial effusion noted. Left pleural effusion noted. 65 yo F with PMHx ESRD on HD (T,TH,SAT), CAD, dCHF (EF 75%), poorly controlled HTN, asthma p/w dyspnea and epistaxis found to be in HTN emergency w/ SBP in 200s and found to have be fluid overloaded with b/l pleural effusions. There was concern for PNA as pt w/ b/l CXR findings read as possible congestion vs infiltrate and was thus pt was started on Abx (CTX, azithro). Patient received hemodialysis (4L removed) with mild improvement in SOB and minimally change to SBP as they remained in the 190s. Was started on 0.1mg clonidine TID with significant improvement SBP 150s and subsequent cessation of her epistaxis. Pulmonary was consulted for her b/l effusions and performed a thoracentesis (1.1L, transudative) with a further improvement in patient's SOB.    ECHO with EF 65-70% showed a "mobile hypodensity," concerning for either a thrombus or vegetation, although patient has been afebrile, WBC returned to WNL and BCx negative after 3 days. A follow up CT Cardiac showed thickening pulmonary valve but could not exclude small mobile leaflet mass. Pt was not started on empiric endocarditis tx as pt afebrile, WBC resolved, and there was no evidence of pulmonary emboli. Clonidine was DCed 2/2 compliance concerns. Patient's BP have remained stable in 140-150s. ENT has been following for epistaxis who placed rhino-rocket which was removed today 7/9 with recs for outpatient f/u with Dr. Briggs. Diueretics were DCed due to poor urine output, SBPs subsequently increased in 170s. Lasix 40 mg IV was given with good response with SBP 150s. Home lasix and metolazole were restarted.  Patient is no longer experienced SOB, was tolerating PO, ambulating and has been HD stable.  Repeat echo negative for possible thrombus in the right heart which was neg. Pt HD stable and ready for d/c.     ECHO (7/6/17): There is severe concentric left ventricular hypertrophy. The left ventricular ejection fraction is estimated to be 65-70%.The right ventricle is normal in size and function. The left atrium is dilated. Aortic valve sclerosis. No aortic regurgitation noted. Mitral annular calcification noted. Mitral valve thickening noted. There is trace mitral regurgitation. There is trace tricuspid regurgitation. There is trace pulmonic regurgitation. A small to moderate pericardial effusion is noted predominantly along the right sided chambers. Mobile echodensity noted in the RVOT near the pulmonic valve, differential includes vegetation versus thrombus      ECHO (7/10/17): The left ventricular ejection fraction is estimated to be 60-65%There is mild concentric left ventricular hypertrophy.The left   atrial size is normal.The mitral inflow pattern is consistent with impaired left ventricular relaxation with mildly elevated left atrial pressure   (8-14mmHg). Right atrial size is normal. The right ventricle is normal in size and function.  There is mild to moderate aortic valve thickening. No aortic regurgitation noted.  There is severe mitral annular calcification. There is mild to moderate mitral regurgitation. The mean pressure gradient is 10 mmHg, at a heart rate of 67 bpm. There is trace tricuspid regurgitation. There is no echocardiographic evidence for pulmonary hypertension. Structurally normal pulmonic valve. There is trace pulmonic regurgitation. No mass noted on pulmonic valve.  A small pericardial effusion noted. Left pleural effusion noted.

## 2017-07-10 NOTE — PROGRESS NOTE ADULT - PROBLEM SELECTOR PROBLEM 3
Anemia due to other cause, not classified
Anemia due to other cause, not classified
Epistaxis
Pleural effusion
Pleural effusion

## 2017-07-10 NOTE — DISCHARGE NOTE ADULT - PATIENT PORTAL LINK FT
“You can access the [facility] Patient Portal, offered by [facility], by registering with the following website: [email or web address]”

## 2017-07-10 NOTE — PROGRESS NOTE ADULT - PROBLEM SELECTOR PROBLEM 2
Essential hypertension
Essential hypertension
Hypertensive emergency
Essential hypertension
Essential hypertension
Pleural effusion

## 2017-07-10 NOTE — PROGRESS NOTE ADULT - ASSESSMENT
66F PMH ESRD on HD (T,TH,SAT), CAD, dCHF (EF 75% 11/2015), HTN, asthma presents with shortness of breath and epistaxis found to have hypertensive emergency, found to have be fluid overloaded with b/l pleural effusions s/p thoracentesis (1.1L) and HD; symptoms have since resolved. Cardiac CT showed thickening of the pulmonic valve, could not rule out small mass on leaflet. Afebrile during length of stay w/ resolved WBC 66F PMH ESRD on HD (T,TH,SAT), CAD, dCHF (EF 75% 11/2015), HTN, asthma presents with shortness of breath and epistaxis found to have hypertensive emergency, found to have be fluid overloaded with b/l pleural effusions s/p thoracentesis (1.1L) and HD; symptoms have since resolved. ECHO showed mobile echodensity in the RVOT near the pulmonic valve, concerning for vegetation versus thrombus. Cardiac CT showed thickening of the pulmonic valve, could not rule out small mass on leaflet. Afebrile during length of stay w/ resolved WBC, Blood cxs NGTD at 3 days.

## 2017-07-11 ENCOUNTER — APPOINTMENT (OUTPATIENT)
Dept: OTOLARYNGOLOGY | Facility: CLINIC | Age: 67
End: 2017-07-11

## 2017-07-11 LAB
CULTURE RESULTS: SIGNIFICANT CHANGE UP
SPECIMEN SOURCE: SIGNIFICANT CHANGE UP

## 2017-07-12 DIAGNOSIS — R04.0 EPISTAXIS: ICD-10-CM

## 2017-07-12 DIAGNOSIS — J98.11 ATELECTASIS: ICD-10-CM

## 2017-07-12 DIAGNOSIS — I16.0 HYPERTENSIVE URGENCY: ICD-10-CM

## 2017-07-12 DIAGNOSIS — I25.10 ATHEROSCLEROTIC HEART DISEASE OF NATIVE CORONARY ARTERY WITHOUT ANGINA PECTORIS: ICD-10-CM

## 2017-07-12 DIAGNOSIS — I25.2 OLD MYOCARDIAL INFARCTION: ICD-10-CM

## 2017-07-12 DIAGNOSIS — I50.33 ACUTE ON CHRONIC DIASTOLIC (CONGESTIVE) HEART FAILURE: ICD-10-CM

## 2017-07-12 DIAGNOSIS — I13.2 HYPERTENSIVE HEART AND CHRONIC KIDNEY DISEASE WITH HEART FAILURE AND WITH STAGE 5 CHRONIC KIDNEY DISEASE, OR END STAGE RENAL DISEASE: ICD-10-CM

## 2017-07-12 DIAGNOSIS — D63.1 ANEMIA IN CHRONIC KIDNEY DISEASE: ICD-10-CM

## 2017-07-12 DIAGNOSIS — Z86.73 PERSONAL HISTORY OF TRANSIENT ISCHEMIC ATTACK (TIA), AND CEREBRAL INFARCTION WITHOUT RESIDUAL DEFICITS: ICD-10-CM

## 2017-07-12 DIAGNOSIS — Z99.2 DEPENDENCE ON RENAL DIALYSIS: ICD-10-CM

## 2017-07-12 DIAGNOSIS — Z79.82 LONG TERM (CURRENT) USE OF ASPIRIN: ICD-10-CM

## 2017-07-12 DIAGNOSIS — N18.6 END STAGE RENAL DISEASE: ICD-10-CM

## 2017-07-12 DIAGNOSIS — E78.5 HYPERLIPIDEMIA, UNSPECIFIED: ICD-10-CM

## 2017-07-13 LAB
SPECIMEN SOURCE: SIGNIFICANT CHANGE UP
SPECIMEN SOURCE: SIGNIFICANT CHANGE UP

## 2017-07-17 ENCOUNTER — APPOINTMENT (OUTPATIENT)
Dept: SURGERY | Facility: CLINIC | Age: 67
End: 2017-07-17

## 2017-07-17 VITALS
SYSTOLIC BLOOD PRESSURE: 179 MMHG | OXYGEN SATURATION: 98 % | HEART RATE: 70 BPM | WEIGHT: 120.38 LBS | HEIGHT: 61 IN | BODY MASS INDEX: 22.73 KG/M2 | DIASTOLIC BLOOD PRESSURE: 82 MMHG

## 2017-07-20 ENCOUNTER — APPOINTMENT (OUTPATIENT)
Dept: HEART AND VASCULAR | Facility: CLINIC | Age: 67
End: 2017-07-20

## 2017-07-20 VITALS
SYSTOLIC BLOOD PRESSURE: 160 MMHG | HEART RATE: 81 BPM | WEIGHT: 117.07 LBS | BODY MASS INDEX: 22.1 KG/M2 | HEIGHT: 60.98 IN | OXYGEN SATURATION: 98 % | TEMPERATURE: 98.8 F | DIASTOLIC BLOOD PRESSURE: 70 MMHG

## 2017-07-20 DIAGNOSIS — I10 ESSENTIAL (PRIMARY) HYPERTENSION: ICD-10-CM

## 2017-07-20 DIAGNOSIS — I63.9 CEREBRAL INFARCTION, UNSPECIFIED: ICD-10-CM

## 2017-07-24 ENCOUNTER — INPATIENT (INPATIENT)
Facility: HOSPITAL | Age: 67
LOS: 3 days | Discharge: HOME | DRG: 189 | End: 2017-07-28
Attending: INTERNAL MEDICINE | Admitting: INTERNAL MEDICINE
Payer: MEDICARE

## 2017-07-24 VITALS
OXYGEN SATURATION: 87 % | RESPIRATION RATE: 18 BRPM | HEART RATE: 80 BPM | HEIGHT: 62 IN | SYSTOLIC BLOOD PRESSURE: 192 MMHG | DIASTOLIC BLOOD PRESSURE: 99 MMHG | TEMPERATURE: 97 F | WEIGHT: 110.23 LBS

## 2017-07-24 DIAGNOSIS — I77.0 ARTERIOVENOUS FISTULA, ACQUIRED: Chronic | ICD-10-CM

## 2017-07-24 LAB
ALBUMIN SERPL ELPH-MCNC: 4.5 G/DL — SIGNIFICANT CHANGE UP (ref 3.3–5)
ALP SERPL-CCNC: 192 U/L — HIGH (ref 40–120)
ALT FLD-CCNC: 10 U/L — SIGNIFICANT CHANGE UP (ref 10–45)
ANION GAP SERPL CALC-SCNC: 20 MMOL/L — HIGH (ref 5–17)
AST SERPL-CCNC: 29 U/L — SIGNIFICANT CHANGE UP (ref 10–40)
BASOPHILS NFR BLD AUTO: 0.7 % — SIGNIFICANT CHANGE UP (ref 0–2)
BILIRUB SERPL-MCNC: 0.4 MG/DL — SIGNIFICANT CHANGE UP (ref 0.2–1.2)
BUN SERPL-MCNC: 46 MG/DL — HIGH (ref 7–23)
CALCIUM SERPL-MCNC: 9.7 MG/DL — SIGNIFICANT CHANGE UP (ref 8.4–10.5)
CHLORIDE SERPL-SCNC: 89 MMOL/L — LOW (ref 96–108)
CK MB CFR SERPL CALC: 1.8 NG/ML — SIGNIFICANT CHANGE UP (ref 0–6.7)
CK SERPL-CCNC: 104 U/L — SIGNIFICANT CHANGE UP (ref 25–170)
CO2 SERPL-SCNC: 21 MMOL/L — LOW (ref 22–31)
CREAT SERPL-MCNC: 7 MG/DL — HIGH (ref 0.5–1.3)
EOSINOPHIL NFR BLD AUTO: 2 % — SIGNIFICANT CHANGE UP (ref 0–6)
GLUCOSE SERPL-MCNC: 111 MG/DL — HIGH (ref 70–99)
HCT VFR BLD CALC: 37.3 % — SIGNIFICANT CHANGE UP (ref 34.5–45)
HGB BLD-MCNC: 12.4 G/DL — SIGNIFICANT CHANGE UP (ref 11.5–15.5)
LYMPHOCYTES # BLD AUTO: 12.3 % — LOW (ref 13–44)
MCHC RBC-ENTMCNC: 32 PG — SIGNIFICANT CHANGE UP (ref 27–34)
MCHC RBC-ENTMCNC: 33.2 G/DL — SIGNIFICANT CHANGE UP (ref 32–36)
MCV RBC AUTO: 96.4 FL — SIGNIFICANT CHANGE UP (ref 80–100)
MONOCYTES NFR BLD AUTO: 8.4 % — SIGNIFICANT CHANGE UP (ref 2–14)
NEUTROPHILS NFR BLD AUTO: 76.6 % — SIGNIFICANT CHANGE UP (ref 43–77)
NT-PROBNP SERPL-SCNC: HIGH PG/ML (ref 0–300)
PLATELET # BLD AUTO: 274 K/UL — SIGNIFICANT CHANGE UP (ref 150–400)
POTASSIUM SERPL-MCNC: 5.8 MMOL/L — HIGH (ref 3.5–5.3)
POTASSIUM SERPL-SCNC: 5.8 MMOL/L — HIGH (ref 3.5–5.3)
PROT SERPL-MCNC: 8.9 G/DL — HIGH (ref 6–8.3)
RBC # BLD: 3.87 M/UL — SIGNIFICANT CHANGE UP (ref 3.8–5.2)
RBC # FLD: 18.1 % — HIGH (ref 10.3–16.9)
SODIUM SERPL-SCNC: 130 MMOL/L — LOW (ref 135–145)
TROPONIN T SERPL-MCNC: 0.03 NG/ML — HIGH (ref 0–0.01)
WBC # BLD: 8.8 K/UL — SIGNIFICANT CHANGE UP (ref 3.8–10.5)
WBC # FLD AUTO: 8.8 K/UL — SIGNIFICANT CHANGE UP (ref 3.8–10.5)

## 2017-07-24 PROCEDURE — 93010 ELECTROCARDIOGRAM REPORT: CPT

## 2017-07-24 PROCEDURE — 99291 CRITICAL CARE FIRST HOUR: CPT | Mod: 25

## 2017-07-24 PROCEDURE — 99285 EMERGENCY DEPT VISIT HI MDM: CPT | Mod: GC

## 2017-07-24 PROCEDURE — 71010: CPT | Mod: 26

## 2017-07-24 RX ORDER — FUROSEMIDE 40 MG
40 TABLET ORAL ONCE
Qty: 0 | Refills: 0 | Status: COMPLETED | OUTPATIENT
Start: 2017-07-24 | End: 2017-07-24

## 2017-07-24 RX ADMIN — Medication 40 MILLIGRAM(S): at 23:26

## 2017-07-24 NOTE — ED PROVIDER NOTE - MEDICAL DECISION MAKING DETAILS
65yo female with L sided pleural effusion, mild resp distress, initially refusing supplemental 02 HD this am. Consulted nephrology and step down. Pt stable on BiPAP at this time, lasix 80mg with no change in symptoms.

## 2017-07-24 NOTE — ED PROVIDER NOTE - OBJECTIVE STATEMENT
Last hosp admission Jul 10:    67 yo F with PMHx ESRD on HD (T,TH,SAT), CAD, dCHF (EF 75%), poorly controlled HTN, asthma p/w dyspnea and epistaxis found to be in HTN emergency w/ SBP in 200s and found to have be fluid overloaded with b/l pleural effusions. There was concern for PNA as pt w/ b/l CXR findings read as possible congestion vs infiltrate and was thus pt was started on Abx (CTX, azithro). Patient received hemodialysis (4L removed) with mild improvement in SOB and minimally change to SBP as they remained in the 190s. Was started on 0.1mg clonidine TID with significant improvement SBP 150s and subsequent cessation of her epistaxis. Pulmonary was consulted for her b/l effusions and performed a thoracentesis (1.1L, transudative) with a further improvement in patient's SOB.    ECHO with EF 65-70% showed a "mobile hypodensity," concerning for either a thrombus or vegetation, although patient has been afebrile, WBC returned to WNL and BCx negative after 3 days. A follow up CT Cardiac showed thickening pulmonary valve but could not exclude small mobile leaflet mass. Pt was not started on empiric endocarditis tx as pt afebrile, WBC resolved, and there was no evidence of pulmonary emboli. Clonidine was DCed 2/2 compliance concerns. Patient's BP have remained stable in 140-150s. ENT has been following for epistaxis who placed rhino-rocket which was removed today 7/9 with recs for outpatient f/u with Dr. Briggs. Diueretics were DCed due to poor urine output, SBPs subsequently increased in 170s. Lasix 40 mg IV was given with good response with SBP 150s. Home lasix and metolazole were restarted.  Patient is no longer experienced SOB, was tolerating PO, ambulating and has been HD stable.  Repeat echo negative for possible thrombus in the right heart which was neg. Pt HD stable and ready for d/c 65yo female ESRD makes small amount of urine, diastolic HF (EF65%) presenting with SOB since this am. Pt received HD this am [address] Does not recall name of her nephrologist. Denies fever, chills, cough. Same symptoms earlier this month.       Last hosp admission Jul 10:    67 yo F with PMHx ESRD on HD (T,TH,SAT), CAD, dCHF (EF 75%), poorly controlled HTN, asthma p/w dyspnea and epistaxis found to be in HTN emergency w/ SBP in 200s and found to have be fluid overloaded with b/l pleural effusions. There was concern for PNA as pt w/ b/l CXR findings read as possible congestion vs infiltrate and was thus pt was started on Abx (CTX, azithro). Patient received hemodialysis (4L removed) with mild improvement in SOB and minimally change to SBP as they remained in the 190s. Was started on 0.1mg clonidine TID with significant improvement SBP 150s and subsequent cessation of her epistaxis. Pulmonary was consulted for her b/l effusions and performed a thoracentesis (1.1L, transudative) with a further improvement in patient's SOB.    ECHO with EF 65-70% showed a "mobile hypodensity," concerning for either a thrombus or vegetation, although patient has been afebrile, WBC returned to WNL and BCx negative after 3 days. A follow up CT Cardiac showed thickening pulmonary valve but could not exclude small mobile leaflet mass. Pt was not started on empiric endocarditis tx as pt afebrile, WBC resolved, and there was no evidence of pulmonary emboli. Clonidine was DCed 2/2 compliance concerns. Patient's BP have remained stable in 140-150s. ENT has been following for epistaxis who placed rhino-rocket which was removed today 7/9 with recs for outpatient f/u with Dr. Briggs. Diueretics were DCed due to poor urine output, SBPs subsequently increased in 170s. Lasix 40 mg IV was given with good response with SBP 150s. Home lasix and metolazole were restarted.  Patient is no longer experienced SOB, was tolerating PO, ambulating and has been HD stable.  Repeat echo negative for possible thrombus in the right heart which was neg. Pt HD stable and ready for d/c

## 2017-07-25 ENCOUNTER — RESULT REVIEW (OUTPATIENT)
Age: 67
End: 2017-07-25

## 2017-07-25 DIAGNOSIS — N18.6 END STAGE RENAL DISEASE: ICD-10-CM

## 2017-07-25 DIAGNOSIS — I50.9 HEART FAILURE, UNSPECIFIED: ICD-10-CM

## 2017-07-25 DIAGNOSIS — R63.8 OTHER SYMPTOMS AND SIGNS CONCERNING FOOD AND FLUID INTAKE: ICD-10-CM

## 2017-07-25 DIAGNOSIS — I25.10 ATHEROSCLEROTIC HEART DISEASE OF NATIVE CORONARY ARTERY WITHOUT ANGINA PECTORIS: ICD-10-CM

## 2017-07-25 DIAGNOSIS — J96.90 RESPIRATORY FAILURE, UNSPECIFIED, UNSPECIFIED WHETHER WITH HYPOXIA OR HYPERCAPNIA: ICD-10-CM

## 2017-07-25 DIAGNOSIS — R06.00 DYSPNEA, UNSPECIFIED: ICD-10-CM

## 2017-07-25 DIAGNOSIS — E87.5 HYPERKALEMIA: ICD-10-CM

## 2017-07-25 DIAGNOSIS — I10 ESSENTIAL (PRIMARY) HYPERTENSION: ICD-10-CM

## 2017-07-25 DIAGNOSIS — E87.1 HYPO-OSMOLALITY AND HYPONATREMIA: ICD-10-CM

## 2017-07-25 DIAGNOSIS — J90 PLEURAL EFFUSION, NOT ELSEWHERE CLASSIFIED: ICD-10-CM

## 2017-07-25 DIAGNOSIS — Z29.9 ENCOUNTER FOR PROPHYLACTIC MEASURES, UNSPECIFIED: ICD-10-CM

## 2017-07-25 DIAGNOSIS — N18.9 CHRONIC KIDNEY DISEASE, UNSPECIFIED: ICD-10-CM

## 2017-07-25 LAB
ANION GAP SERPL CALC-SCNC: 17 MMOL/L — SIGNIFICANT CHANGE UP (ref 5–17)
BUN SERPL-MCNC: 52 MG/DL — HIGH (ref 7–23)
CALCIUM SERPL-MCNC: 9 MG/DL — SIGNIFICANT CHANGE UP (ref 8.4–10.5)
CHLORIDE SERPL-SCNC: 91 MMOL/L — LOW (ref 96–108)
CO2 SERPL-SCNC: 22 MMOL/L — SIGNIFICANT CHANGE UP (ref 22–31)
CREAT SERPL-MCNC: 7.6 MG/DL — HIGH (ref 0.5–1.3)
GLUCOSE SERPL-MCNC: 97 MG/DL — SIGNIFICANT CHANGE UP (ref 70–99)
HCT VFR BLD CALC: 30.6 % — LOW (ref 34.5–45)
HGB BLD-MCNC: 10.4 G/DL — LOW (ref 11.5–15.5)
MAGNESIUM SERPL-MCNC: 1.9 MG/DL — SIGNIFICANT CHANGE UP (ref 1.6–2.6)
MCHC RBC-ENTMCNC: 31.9 PG — SIGNIFICANT CHANGE UP (ref 27–34)
MCHC RBC-ENTMCNC: 34 G/DL — SIGNIFICANT CHANGE UP (ref 32–36)
MCV RBC AUTO: 93.9 FL — SIGNIFICANT CHANGE UP (ref 80–100)
PHOSPHATE SERPL-MCNC: 6.3 MG/DL — HIGH (ref 2.5–4.5)
PLATELET # BLD AUTO: 226 K/UL — SIGNIFICANT CHANGE UP (ref 150–400)
POTASSIUM SERPL-MCNC: 5 MMOL/L — SIGNIFICANT CHANGE UP (ref 3.5–5.3)
POTASSIUM SERPL-SCNC: 5 MMOL/L — SIGNIFICANT CHANGE UP (ref 3.5–5.3)
RBC # BLD: 3.26 M/UL — LOW (ref 3.8–5.2)
RBC # FLD: 18.7 % — HIGH (ref 10.3–16.9)
SODIUM SERPL-SCNC: 130 MMOL/L — LOW (ref 135–145)
WBC # BLD: 6.1 K/UL — SIGNIFICANT CHANGE UP (ref 3.8–10.5)
WBC # FLD AUTO: 6.1 K/UL — SIGNIFICANT CHANGE UP (ref 3.8–10.5)

## 2017-07-25 PROCEDURE — 99223 1ST HOSP IP/OBS HIGH 75: CPT | Mod: 25,GC

## 2017-07-25 PROCEDURE — 32555 ASPIRATE PLEURA W/ IMAGING: CPT | Mod: GC

## 2017-07-25 PROCEDURE — 71010: CPT | Mod: 26

## 2017-07-25 RX ORDER — SIMVASTATIN 20 MG/1
20 TABLET, FILM COATED ORAL AT BEDTIME
Qty: 0 | Refills: 0 | Status: DISCONTINUED | OUTPATIENT
Start: 2017-07-25 | End: 2017-07-28

## 2017-07-25 RX ORDER — CINACALCET 30 MG/1
60 TABLET, FILM COATED ORAL DAILY
Qty: 0 | Refills: 0 | Status: DISCONTINUED | OUTPATIENT
Start: 2017-07-25 | End: 2017-07-28

## 2017-07-25 RX ORDER — HEPARIN SODIUM 5000 [USP'U]/ML
500 INJECTION INTRAVENOUS; SUBCUTANEOUS ONCE
Qty: 0 | Refills: 0 | Status: COMPLETED | OUTPATIENT
Start: 2017-07-25 | End: 2017-07-25

## 2017-07-25 RX ORDER — HEPARIN SODIUM 5000 [USP'U]/ML
5000 INJECTION INTRAVENOUS; SUBCUTANEOUS EVERY 8 HOURS
Qty: 0 | Refills: 0 | Status: DISCONTINUED | OUTPATIENT
Start: 2017-07-25 | End: 2017-07-27

## 2017-07-25 RX ORDER — HEPARIN SODIUM 5000 [USP'U]/ML
INJECTION INTRAVENOUS; SUBCUTANEOUS
Qty: 0 | Refills: 0 | Status: COMPLETED | OUTPATIENT
Start: 2017-07-25 | End: 2017-07-25

## 2017-07-25 RX ORDER — FUROSEMIDE 40 MG
40 TABLET ORAL ONCE
Qty: 0 | Refills: 0 | Status: COMPLETED | OUTPATIENT
Start: 2017-07-25 | End: 2017-07-25

## 2017-07-25 RX ORDER — NIFEDIPINE 30 MG
90 TABLET, EXTENDED RELEASE 24 HR ORAL AT BEDTIME
Qty: 0 | Refills: 0 | Status: DISCONTINUED | OUTPATIENT
Start: 2017-07-25 | End: 2017-07-28

## 2017-07-25 RX ORDER — CARVEDILOL PHOSPHATE 80 MG/1
25 CAPSULE, EXTENDED RELEASE ORAL EVERY 12 HOURS
Qty: 0 | Refills: 0 | Status: DISCONTINUED | OUTPATIENT
Start: 2017-07-25 | End: 2017-07-28

## 2017-07-25 RX ORDER — NIFEDIPINE 30 MG
60 TABLET, EXTENDED RELEASE 24 HR ORAL DAILY
Qty: 0 | Refills: 0 | Status: DISCONTINUED | OUTPATIENT
Start: 2017-07-25 | End: 2017-07-28

## 2017-07-25 RX ORDER — HEPARIN SODIUM 5000 [USP'U]/ML
1000 INJECTION INTRAVENOUS; SUBCUTANEOUS ONCE
Qty: 0 | Refills: 0 | Status: COMPLETED | OUTPATIENT
Start: 2017-07-25 | End: 2017-07-25

## 2017-07-25 RX ADMIN — HEPARIN SODIUM 500 UNIT(S): 5000 INJECTION INTRAVENOUS; SUBCUTANEOUS at 14:45

## 2017-07-25 RX ADMIN — SIMVASTATIN 20 MILLIGRAM(S): 20 TABLET, FILM COATED ORAL at 23:29

## 2017-07-25 RX ADMIN — CARVEDILOL PHOSPHATE 25 MILLIGRAM(S): 80 CAPSULE, EXTENDED RELEASE ORAL at 07:58

## 2017-07-25 RX ADMIN — Medication 60 MILLIGRAM(S): at 07:59

## 2017-07-25 RX ADMIN — Medication 20 MILLIGRAM(S): at 07:58

## 2017-07-25 RX ADMIN — Medication 40 MILLIGRAM(S): at 07:59

## 2017-07-25 RX ADMIN — Medication 40 MILLIGRAM(S): at 00:43

## 2017-07-25 RX ADMIN — CARVEDILOL PHOSPHATE 25 MILLIGRAM(S): 80 CAPSULE, EXTENDED RELEASE ORAL at 18:21

## 2017-07-25 RX ADMIN — Medication 20 MILLIGRAM(S): at 18:21

## 2017-07-25 RX ADMIN — HEPARIN SODIUM 1000 UNIT(S): 5000 INJECTION INTRAVENOUS; SUBCUTANEOUS at 13:45

## 2017-07-25 RX ADMIN — HEPARIN SODIUM 5000 UNIT(S): 5000 INJECTION INTRAVENOUS; SUBCUTANEOUS at 23:29

## 2017-07-25 NOTE — CONSULT NOTE ADULT - SUBJECTIVE AND OBJECTIVE BOX
66 year old F w/ PMH of ESRD (T/T/Sa) on HD, dCHF (EF 65%), CAD, anemia, uncontrolled HTN. the patient came in overnight because of progressive shortness of breath for 1 day. In the ED lasix was given and given trial with nasal cannula and after that with BIPAP. No need of HD overnight. The patient is getting regularly HD - last one on Saturday - 3 hours with 2 liters UF. still makes a little bit of urine. When I saw the patient in the morning lying in her bed on oxygen feels better compared to yesterday but still short of breath. Denies any chest pain, cough or fever. 2 weeks ago she was hospitalized again for progressive shortness of breath and un controlled BP.         PAST MEDICAL & SURGICAL HISTORY:  Anemia: 2/2 CKD  Asthma  MI, old  CAD (coronary artery disease)  HLD (hyperlipidemia)  CKD (chronic kidney disease): Stage 5CKD  CVA (cerebral infarction)  Hypertension  AV fistula      MEDICATIONS  (STANDING):  heparin  Injectable 5000 Unit(s) SubCutaneous every 8 hours  enalapril 20 milliGRAM(s) Oral two times a day  simvastatin 20 milliGRAM(s) Oral at bedtime  carvedilol 25 milliGRAM(s) Oral every 12 hours  NIFEdipine XL 60 milliGRAM(s) Oral daily  NIFEdipine XL 90 milliGRAM(s) Oral at bedtime  torsemide 40 milliGRAM(s) Oral two times a day  metolazone 10 milliGRAM(s) Oral at bedtime  cinacalcet 60 milliGRAM(s) Oral daily  heparin  Injectable      heparin  Injectable 1000 Unit(s) IV Push once  heparin  Injectable 500 Unit(s) IV Push once    MEDICATIONS  (PRN):      Allergies    No Known Allergies    Intolerances        SOCIAL HISTORY:    FAMILY HISTORY:  No pertinent family history in first degree relatives      T(C): , Max: 36.6 (07-25-17 @ 00:45)  T(F): , Max: 97.8 (07-25-17 @ 00:45)  HR: 72 (07-25-17 @ 09:40)  BP: 130/75 (07-25-17 @ 08:35)  BP(mean): 97 (07-25-17 @ 08:35)  RR: 23 (07-25-17 @ 04:32)  SpO2: 99% (07-25-17 @ 09:40)  Wt(kg): --    Height (cm): 157.48 (07-24 @ 22:37)  Weight (kg): 50 (07-24 @ 22:37)  BMI (kg/m2): 20.2 (07-24 @ 22:37)  BSA (m2): 1.48 (07-24 @ 22:37)      Physical exam:   Alert and oriented  Mild JVD   tongue moist   Normal air entry bilaterally, decreased breaath sounds on the left base abd crackles on the right   HEart: rrr, normal s1/s2, no murmurs, rubs or gallops,   Abdomen soft, non tender, non -distened, normal bowel sounds   Extremities: no peripheral edema, 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 with good bruit     LABS:                        10.4   6.1   )-----------( 226      ( 25 Jul 2017 06:25 )             30.6     07-25    130<L>  |  91<L>  |  52<H>  ----------------------------<  97  5.0   |  22  |  7.60<H>    Ca    9.0      25 Jul 2017 06:25  Phos  6.3     07-25  Mg     1.9     07-25    TPro  8.9<H>  /  Alb  4.5  /  TBili  0.4  /  DBili  x   /  AST  29  /  ALT  10  /  AlkPhos  192<H>  07-24    Creatine Kinase, Serum: 104 U/L [25 - 170] (07-24 @ 23:16)              RADIOLOGY & ADDITIONAL STUDIES:        < from: Xray Chest 1 View AP-PORTABLE IMMEDIATE (07.24.17 @ 23:21) >      Impression:  Interval increase in size of small right and large left pleural   effusions. Underlying pneumonia cannot be ruled out. Mild pulmonary   vascular congestion.      < end of copied text >

## 2017-07-25 NOTE — H&P ADULT - PROBLEM SELECTOR PLAN 3
Known diastolic CHF (EF 65%) with BNP of 09443, possible CHF exacerbation  - continue home carvedilol, enalapril, nifedipine, simbastatin, and torsemide Known diastolic CHF (EF 65%) with BNP of 64441, possible CHF exacerbation  - continue home carvedilol, enalapril, nifedipine, simbastatin, metolazone and torsemide Acute on chronic diastolic CHF (EF 65%) with BNP of 26202  - continue home carvedilol, enalapril, nifedipine, metolazone and torsemide chronic diastolic CHF (EF 65%) with BNP of 90011, unclear if this is acute CHF exacerbation or fluid overload in the setting of needing dialysis  - continue home carvedilol, enalapril, nifedipine, metolazone and torsemide

## 2017-07-25 NOTE — H&P ADULT - PROBLEM SELECTOR PLAN 8
DVT - HSQ 5000 U q8    Full Code    Admit to [facility] F - No IVF  E - replete lytes, K>4 Mag>2  N - NPO while on BiPAP

## 2017-07-25 NOTE — PROGRESS NOTE ADULT - SUBJECTIVE AND OBJECTIVE BOX
Patient was seen and evaluated on dialysis.   Patient is tolerating the procedure well.   HR: 70 (07-25-17 @ 13:45)  BP: 170/86 (07-25-17 @ 13:45) pusle 65, /67  Continue dialysis:   Dialyzer:  180        QB: 400        QD: 500  Goal UF 3.5 over 4 Hours

## 2017-07-25 NOTE — H&P ADULT - ASSESSMENT
66 year old F w/ PMH of ESRD on HD, dCHF (EF 65%), CAD, anemia, uncontrolled HTN, with chronic left sided effusion presenting with SOB. 66 year old F w/ PMH of ESRD on HD, dCHF (EF 65%), CAD, anemia, uncontrolled HTN, with chronic left sided effusion presenting with SOB admitted for hypoxic respiratory failure. 66 year old F w/ PMH of ESRD on HD, dCHF (EF 65%), CAD, anemia, uncontrolled HTN, with chronic left sided effusion presenting with SOB admitted for hypoxic respiratory failure secondary to fluid overload

## 2017-07-25 NOTE — ED ADULT NURSE NOTE - OBJECTIVE STATEMENT
Pt c/o sob onset yesterday. Pt states she goes for dialysis Tues, Thurs, and Sat. O2 sat on RA 94%. Pt denies chest pain. Pt with AV fistula on left lower arm

## 2017-07-25 NOTE — PROCEDURE NOTE - NSPROCDETAILS_GEN_ALL_CORE
Seldinger technique/location identified, draped/prepped, sterile technique used, needle inserted/introduced/catheter inserted over needle/ultrasound assessment of effusion (localization)/ultrasound assessment of effusion (size)/connection to syringe

## 2017-07-25 NOTE — CONSULT NOTE ADULT - ATTENDING COMMENTS
66F ESRD/HD CAD dCHF recent L pleural effusion transudate, now with recurrence causing SOB which is well controlled with BIPAP. HTN poorly controlled. currently sbp 170 while on BIPAP. plan to admit to medical stepdown, treat HTN with iv labetalol pushes if needed, cont po HTN meds when possible, plan to consult pulmonary for thoracentesis in am.

## 2017-07-25 NOTE — CONSULT NOTE ADULT - PROBLEM SELECTOR RECOMMENDATION 9
- the patient regularly is getting HD on TTS - for thelast time dialyzed on saturday   - we will do HD today for fluid overload   - we will try at least 2.5 to 3 liters UF   - Potassium 5.0, bicarbonate - 22, phosphate - 6.3  - cinacalcet 60 mcg

## 2017-07-25 NOTE — H&P ADULT - PROBLEM SELECTOR PLAN 2
-208/. Pt has known history of HTN, BPs have been decreasing.   - if patient SBPs go above 190, give patient labetolol push  - resume home medications -208/. Pt has known history of HTN, BPs have been decreasing since receiving lasix 80mg   - if patient SBPs go above 190, give patient labetolol push  - resume home medications -208/. Pt has known history of HTN, BPs have been improving since receiving lasix 80mg   - if patient SBPs go above 190, give patient labetolol push  - resume home medications

## 2017-07-25 NOTE — CONSULT NOTE ADULT - PROBLEM SELECTOR RECOMMENDATION 2
- on carvedilol 25 mg twice a day  - on metolazone 10 mg   - on enalapril 20 mg   - on torasemide 40 mg   on Nifedipine 60 mg + 90 mg in the evening   Please consider Hydralazine if the BP is not under control

## 2017-07-25 NOTE — H&P ADULT - PROBLEM SELECTOR PLAN 5
F - No IVF  E - replete lytes, K>4 Mag>2  N - NPO Na 130, likely due to hypervolemic hyponatremia.  - f/u BMP Na 130, likely due to hypervolemic hyponatremia. Patient was diuresed.   - f/u BMP Na 130, likely due to hypervolemic hyponatremia. Patient was diuresed  - plan for dialysis  - f/u BMP

## 2017-07-25 NOTE — H&P ADULT - PROBLEM SELECTOR PLAN 7
DVT - HSQ 5000 U q8    Full Code    Admit to [facility] - No IVF  E - replete lytes, K>4 Mag>2  N - NPO F - No IVF  E - replete lytes, K>4 Mag>2  N - NPO while on BiPAP Previous history of CAD  - on simvastatin 20mg

## 2017-07-25 NOTE — H&P ADULT - PROBLEM SELECTOR PLAN 4
ESRD HD T/T/S (?). Renal aware of patient and recommended patient receive lasix 80.  - renal is following patient ESRD HD T/T/S. Renal aware of patient and recommended patient receive lasix 80. Last dialysis on Saturday  - renal is following patient  - plan for dialysis today

## 2017-07-25 NOTE — H&P ADULT - NSHPPHYSICALEXAM_GEN_ALL_CORE
VITAL SIGNS:  T(F): 97.8 (07-25-17 @ 00:45)  HR: 73 (07-25-17 @ 01:46)  BP: 172/96 (07-25-17 @ 01:46)  RR: 22 (07-25-17 @ 01:46)  SpO2: 99% (07-25-17 @ 01:46)  Wt(kg): --    PHYSICAL EXAM:    Constitutional: NAD, on BiPAP  Eyes: PERRL, EOMI, sclera non-icteric  Neck: supple, trachea midline, no masses, no JVD  Respiratory: CTA b/l, good air entry b/l, no wheezing, no rhonchi, no rales, without accessory muscle use and no intercostal retractions  Cardiovascular: RRR, normal S1S2, no M/R/G  Gastrointestinal: soft, NTND, no masses palpable, BS normal  Extremities: Warm, well perfused, pulses equal bilateral upper and lower extremities, no edema, no clubbing  Neurological: AAOx3, CN Grossly intact  Skin: Normal temperature, warm, dry VITAL SIGNS:  T(F): 97.8 (07-25-17 @ 00:45)  HR: 73 (07-25-17 @ 01:46)  BP: 172/96 (07-25-17 @ 01:46)  RR: 22 (07-25-17 @ 01:46)  SpO2: 99% (07-25-17 @ 01:46)  Wt(kg): --    PHYSICAL EXAM:    Constitutional: NAD, on BiPAP  Eyes: PERRL, EOMI, sclera non-icteric  Mouth: Mucous membranes moist  Neck: supple, trachea midline, no masses, JVD up to level of the jaw  Respiratory: Bilateral crackles R>L, decreased breath sounds on left  Cardiovascular: RRR, normal S1S2, no M/R/G  Gastrointestinal: soft, hernias palpated in periumbilical and supraumbilic areas, mildly tender, reducible.  Extremities: Warm, well perfused, pulses equal bilateral upper and lower extremities, 1+ edema in legs bilaterally  Neurological: AAOx3, CN Grossly intact  Skin: Normal temperature, warm, dry

## 2017-07-25 NOTE — H&P ADULT - PROBLEM SELECTOR PLAN 1
likely secondary to bilateral pleural effusions w/ L>R, effusions likely multifactorial in etiology: renal failure vs heart failure  - s/p thoracentesis showing transudate  - s/p 80mg lasix in ED, strict Is/Os  - continue BiPAP, maintain sat >94%  - consult pulm in AM for thoracentesis likely secondary to bilateral pleural effusions w/ L>R, effusions likely multifactorial in etiology: 2/2 to acute on chronic CHF exacerbation 2/2 to worsening ESRD. Pt receiving ESRD, unclear how much fluid was removed during dialysis today. S/P thoracentesis showing transudate in previous admission, likely similar etiology this episode. Low suspicion for infectious etiology at this time given pt is afebrile without leukocytosis.  - s/p 80mg lasix in ED with improved BPs, consider further lasix PRN  - strict Is/Os  - continue BiPAP, maintain sat >94%  - consult pulm in AM for thoracentesis likely secondary to bilateral pleural effusions w/ L>R, effusions likely multifactorial in etiology: 2/2 to acute on chronic CHF exacerbation 2/2 to worsening ESRD. Pt receiving ESRD, unclear how much fluid was removed during dialysis. S/P thoracentesis showing transudate in previous admission, likely similar etiology this episode. Low suspicion for infectious etiology at this time given pt is afebrile without leukocytosis.  - s/p 80mg lasix in ED with improved BPs, consider further lasix PRN  - strict Is/Os  - continue BiPAP, maintain sat >94%  - consult pulm in AM for thoracentesis  - plan for dialysis today. hypoxic respiratory failure likely secondary to bilateral pleural effusions w/ L>R, effusions likely multifactorial in etiology: 2/2 to acute on chronic CHF exacerbation 2/2 to worsening ESRD. Pt receiving HD. S/P thoracentesis showing transudate in previous admission, likely similar etiology this episode. Low suspicion for infectious etiology at this time given pt is afebrile without leukocytosis.  - s/p 80mg lasix in ED with improved BPs, consider further lasix PRN  - strict Is/Os  - continue BiPAP, maintain sat >94%  - consult pulm in AM for thoracentesis  - plan for dialysis today.

## 2017-07-25 NOTE — H&P ADULT - HISTORY OF PRESENT ILLNESS
66 year old F w/ PMH of ESRD on HD, dCHF (EF 65%), CAD, anemia, uncontrolled HTN, with chronic left sided effusion presenting with SOB. Patient received HD today but after dialysis she began experiencing shortness of breath and fatigue. Patient on BIPAP and able to give limited history. She was seen here 2 weeks ago for a very similar presentation, received HD with removal of 4L as well as a thoracentesis with 1.1L removed, showing a transudative effusion. Patient denies any fevers, chills, or chest pain.      In the ED, T 97.4, HR 80, /104, RR 18, O2 Sat 87% on RA, which improved to 95% on NC. Patient was then placed on BIPAP. Chest xray showed re-accumulation of her left sided effusion. Renal was consulted in ED and recommended 80 mg lasix as patient still makes urine. 66 year old F w/ PMH of ESRD (T/T/Sa) on HD, dCHF (EF 65%), CAD, anemia, uncontrolled HTN, with chronic left sided effusion presenting with SOB. Patient stated she received HD Saturday, since yesterday she began experiencing shortness of breath and fatigue. Patient on BIPAP and able to give limited history. She was seen here 2 weeks ago for a very similar presentation, received HD with removal of 4L as well as a thoracentesis with 1.1L removed, showing a transudative effusion. Patient denies any fevers, chills, or chest pain.      In the ED, T 97.4, HR 80, /104, RR 18, O2 Sat 87% on RA, which improved to 95% on NC. Patient was then placed on BIPAP. Chest xray showed re-accumulation of her left sided effusion. Renal was consulted in ED and recommended 80 mg lasix as patient still makes urine.

## 2017-07-25 NOTE — H&P ADULT - PROBLEM SELECTOR PROBLEM 7
Need for prophylactic measure Nutrition, metabolism, and development symptoms CAD (coronary artery disease)

## 2017-07-25 NOTE — CONSULT NOTE ADULT - SUBJECTIVE AND OBJECTIVE BOX
HPI: 66 year old F w/ PMH of ESRD on HD, dCHF (EF 65%), CAD, anemia, uncontrolled HTN, with chronic left sided effusion presenting with SOB. Patient received HD today but after dialysis began experiencing shortness of breath and fatigue. Patient on BIPAP and able to give limited history. In the ED, T 97.4, HR 80, /104, RR 18, O2 Sat 87% on RA, which improved to 95% on NC. Patient was then placed on BIPAP. She was seen here 2 weeks ago for a very similar presentation, received HD with removal of 4L as well as a thoracentesis with 1.1L removed, showing a transudative effusion. Patient denies any fevers, chills, or chest pain. She was on clonidine a few weeks ago but it was stopped secondary to noncompliance. Chest xray showed re-accumulation of her left sided effusion. Renal was consulted in ED and recommended 80 mg lasix as patient still makes urine.     PMHx: as above  PSHx:   Home Meds: Albuterol, Nifedipine 90mg BID, Enalapril 20mg BID, Carvedilol 25mg BID, Furosemide 80mg QD, Meotlazone 10mg QD, Sensipar 60mg QD, Simvastatin 20mg QHS, Aspirin 81mg QD  Allergies: NKDA  SHx: denies alcohol use, illicit drug use, no cigarette use. Lives with daughter    VITAL SIGNS:  Vital Signs Last 24 Hrs  T(C): 36.6 (2017 00:45), Max: 36.6 (2017 00:45)  T(F): 97.8 (2017 00:45), Max: 97.8 (2017 00:45)  HR: 77 (2017 01:01) (77 - 80)  BP: 199/99 (2017 00:45) (192/99 - 208/104)  BP(mean): --  RR: 18 (2017 00:45) (18 - 18)  SpO2: 99% (2017 01:01) (87% - 99%)    PHYSICAL EXAM:    General: appears chronically ill, on BIPAP, not using accessory muscles  HEENT: NC/AT; PERRL  Neck: supple  Cardiovascular: +S1/S2; RRR  Respiratory: no breath sounds at left base to mid lung, otherwise clear  Gastrointestinal: soft, NT/D, +hernia  Extremities: WWP; 1+ edema,     MEDICATIONS:      ALLERGIES:  Allergies    No Known Allergies    Intolerances        LABS:                        12.4   8.8   )-----------( 274      ( 2017 23:16 )             37.3     07-24    130<L>  |  89<L>  |  46<H>  ----------------------------<  111<H>  5.8<H>   |  21<L>  |  7.00<H>    Ca    9.7      2017 23:16    TPro  8.9<H>  /  Alb  4.5  /  TBili  0.4  /  DBili  x   /  AST  29  /  ALT  10  /  AlkPhos  192<H>  07-24        CAPILLARY BLOOD GLUCOSE          RADIOLOGY & ADDITIONAL TESTS: Reviewed.    ASSESSMENT: 66 year old F w/ PMH of ESRD on HD, dCHF (EF 65%), CAD, anemia, uncontrolled HTN, with chronic left sided effusion presenting with SOB.    PLAN:     #Respiratory Distress: likely secondary to bilateral pleural effusions w/ L>R  -s/p thoracentesis last time which showed transudate, unclear if secondary to heart failure vs ESRD  -s/p 80 mg lasix in ED  -recommend resuming home medications for HTN  -c/w BIPAP  -recommend repeat thoracentesis vs HD  -also w/ BNP of 21K, possible CHF exacerbation    Dispo: HPI: 66 year old F w/ PMH of ESRD on HD, dCHF (EF 65%), CAD, anemia, uncontrolled HTN, with chronic left sided effusion presenting with SOB. Patient received HD today but after dialysis began experiencing shortness of breath and fatigue. Patient on BIPAP and able to give limited history. In the ED, T 97.4, HR 80, /104, RR 18, O2 Sat 87% on RA, which improved to 95% on NC. Patient was then placed on BIPAP. She was seen here 2 weeks ago for a very similar presentation, received HD with removal of 4L as well as a thoracentesis with 1.1L removed, showing a transudative effusion. Patient denies any fevers, chills, or chest pain. She was on clonidine a few weeks ago but it was stopped secondary to noncompliance. Chest xray showed re-accumulation of her left sided effusion. Renal was consulted in ED and recommended 80 mg lasix as patient still makes urine.     PMHx: as above  PSHx:   Home Meds: Albuterol, Nifedipine 90mg BID, Enalapril 20mg BID, Carvedilol 25mg BID, Furosemide 80mg QD, Meotlazone 10mg QD, Sensipar 60mg QD, Simvastatin 20mg QHS, Aspirin 81mg QD  Allergies: NKDA  SHx: denies alcohol use, illicit drug use, no cigarette use. Lives with daughter    VITAL SIGNS:  Vital Signs Last 24 Hrs  T(C): 36.6 (2017 00:45), Max: 36.6 (2017 00:45)  T(F): 97.8 (2017 00:45), Max: 97.8 (2017 00:45)  HR: 77 (2017 01:01) (77 - 80)  BP: 199/99 (2017 00:45) (192/99 - 208/104)  BP(mean): --  RR: 18 (2017 00:45) (18 - 18)  SpO2: 99% (2017 01:01) (87% - 99%)    PHYSICAL EXAM:    General: appears chronically ill, on BIPAP, not using accessory muscles  HEENT: NC/AT; PERRL  Neck: supple  Cardiovascular: +S1/S2; RRR, +systolic murmur  Respiratory: no breath sounds at left base to mid lung, crackles R base  Gastrointestinal: soft, NT/D, +hernia  Extremities: WWP; trace to 1+ edema,     MEDICATIONS:      ALLERGIES:  Allergies    No Known Allergies    Intolerances        LABS:                        12.4   8.8   )-----------( 274      ( 2017 23:16 )             37.3     07-24    130<L>  |  89<L>  |  46<H>  ----------------------------<  111<H>  5.8<H>   |  21<L>  |  7.00<H>    Ca    9.7      2017 23:16    TPro  8.9<H>  /  Alb  4.5  /  TBili  0.4  /  DBili  x   /  AST  29  /  ALT  10  /  AlkPhos  192<H>  07-24        CAPILLARY BLOOD GLUCOSE          RADIOLOGY & ADDITIONAL TESTS: Reviewed.    ASSESSMENT: 66 year old F w/ PMH of ESRD on HD, dCHF (EF 65%), CAD, anemia, uncontrolled HTN, with chronic left sided effusion presenting with SOB.    PLAN:     #Pulm:  1. Respiratory Distress: likely secondary to bilateral pleural effusions w/ L>R, effusions likely multifactorial in etiology: renal failure vs heart failure  -s/p thoracentesis last time which showed transudate  -s/p 80 mg lasix in ED, f/u urine output   -c/w BIPAP, maintain sat >94%  -recommend repeat thoracentesis in AM vs HD    #Cardio  1. Pump: hx of dCHF, -also w/ BNP of 21K, possible CHF exacerbation  -c/w home statin, ACE-i, torsemide, and metolazone  2. HTN urgency: likely not contributing to pleural effusion, possible mild pulmonary congestion on xray,   -if continues to have systolic 190s-200s recommend labetolol push  -recommend resuming home medications for HTN    #Renal:  1. ESRD on HD: prior documentation states ,,Sat however patient received HD today  -f/u renal recs    Dispo: [facility]

## 2017-07-25 NOTE — CONSULT NOTE ADULT - ASSESSMENT
This is 66 year old female with PMH stated above. Admitted for fluid overload and left sided pleural effusion. We will do HD today with target at least 2.5 to 3 liters

## 2017-07-25 NOTE — H&P ADULT - PROBLEM SELECTOR PLAN 6
DVT - HSQ 5000 U q8    Full Code    Admit to [facility] - No IVF  E - replete lytes, K>4 Mag>2  N - NPO K 5.8, likely due to ESRD  - f/u BMP K 5.8, likely due to ESRD. EKG shows no peaked Ts, prolonged QRS, prolonged RI  - plan for dialysis  - f/u BMP

## 2017-07-26 LAB
ANION GAP SERPL CALC-SCNC: 14 MMOL/L — SIGNIFICANT CHANGE UP (ref 5–17)
BUN SERPL-MCNC: 23 MG/DL — SIGNIFICANT CHANGE UP (ref 7–23)
CALCIUM SERPL-MCNC: 9.8 MG/DL — SIGNIFICANT CHANGE UP (ref 8.4–10.5)
CHLORIDE SERPL-SCNC: 95 MMOL/L — LOW (ref 96–108)
CO2 SERPL-SCNC: 27 MMOL/L — SIGNIFICANT CHANGE UP (ref 22–31)
CREAT SERPL-MCNC: 4.8 MG/DL — HIGH (ref 0.5–1.3)
GLUCOSE SERPL-MCNC: 76 MG/DL — SIGNIFICANT CHANGE UP (ref 70–99)
HCT VFR BLD CALC: 31.9 % — LOW (ref 34.5–45)
HGB BLD-MCNC: 10.9 G/DL — LOW (ref 11.5–15.5)
MAGNESIUM SERPL-MCNC: 2.1 MG/DL — SIGNIFICANT CHANGE UP (ref 1.6–2.6)
MCHC RBC-ENTMCNC: 32.3 PG — SIGNIFICANT CHANGE UP (ref 27–34)
MCHC RBC-ENTMCNC: 34.2 G/DL — SIGNIFICANT CHANGE UP (ref 32–36)
MCV RBC AUTO: 94.7 FL — SIGNIFICANT CHANGE UP (ref 80–100)
OSMOLALITY SERPL: 281 MOSM/KG — SIGNIFICANT CHANGE UP (ref 280–301)
PHOSPHATE SERPL-MCNC: 5.8 MG/DL — HIGH (ref 2.5–4.5)
PLATELET # BLD AUTO: 232 K/UL — SIGNIFICANT CHANGE UP (ref 150–400)
POTASSIUM SERPL-MCNC: 4 MMOL/L — SIGNIFICANT CHANGE UP (ref 3.5–5.3)
POTASSIUM SERPL-SCNC: 4 MMOL/L — SIGNIFICANT CHANGE UP (ref 3.5–5.3)
RBC # BLD: 3.37 M/UL — LOW (ref 3.8–5.2)
RBC # FLD: 18.5 % — HIGH (ref 10.3–16.9)
SODIUM SERPL-SCNC: 136 MMOL/L — SIGNIFICANT CHANGE UP (ref 135–145)
WBC # BLD: 4.4 K/UL — SIGNIFICANT CHANGE UP (ref 3.8–10.5)
WBC # FLD AUTO: 4.4 K/UL — SIGNIFICANT CHANGE UP (ref 3.8–10.5)

## 2017-07-26 PROCEDURE — 71010: CPT | Mod: 26

## 2017-07-26 PROCEDURE — 99233 SBSQ HOSP IP/OBS HIGH 50: CPT | Mod: GC

## 2017-07-26 PROCEDURE — 99221 1ST HOSP IP/OBS SF/LOW 40: CPT | Mod: GC

## 2017-07-26 RX ADMIN — Medication 20 MILLIGRAM(S): at 17:55

## 2017-07-26 RX ADMIN — Medication 40 MILLIGRAM(S): at 00:04

## 2017-07-26 RX ADMIN — HEPARIN SODIUM 5000 UNIT(S): 5000 INJECTION INTRAVENOUS; SUBCUTANEOUS at 06:18

## 2017-07-26 RX ADMIN — HEPARIN SODIUM 5000 UNIT(S): 5000 INJECTION INTRAVENOUS; SUBCUTANEOUS at 21:26

## 2017-07-26 RX ADMIN — Medication 60 MILLIGRAM(S): at 12:50

## 2017-07-26 RX ADMIN — Medication 40 MILLIGRAM(S): at 21:26

## 2017-07-26 RX ADMIN — Medication 90 MILLIGRAM(S): at 00:04

## 2017-07-26 RX ADMIN — Medication 90 MILLIGRAM(S): at 21:25

## 2017-07-26 RX ADMIN — CARVEDILOL PHOSPHATE 25 MILLIGRAM(S): 80 CAPSULE, EXTENDED RELEASE ORAL at 17:55

## 2017-07-26 RX ADMIN — CARVEDILOL PHOSPHATE 25 MILLIGRAM(S): 80 CAPSULE, EXTENDED RELEASE ORAL at 06:18

## 2017-07-26 RX ADMIN — Medication 20 MILLIGRAM(S): at 06:18

## 2017-07-26 RX ADMIN — SIMVASTATIN 20 MILLIGRAM(S): 20 TABLET, FILM COATED ORAL at 21:26

## 2017-07-26 RX ADMIN — Medication 40 MILLIGRAM(S): at 12:48

## 2017-07-26 NOTE — PROGRESS NOTE ADULT - PROBLEM SELECTOR PLAN 2
-208/. Pt has known history of HTN, BPs have been improving since receiving lasix 80mg   - if patient SBPs go above 190, give patient labetolol push  - resume home medications

## 2017-07-26 NOTE — PROGRESS NOTE ADULT - PROBLEM SELECTOR PLAN 3
chronic diastolic CHF (EF 65%) with BNP of 31449, unclear if this is acute CHF exacerbation or fluid overload in the setting of needing dialysis  - continue home carvedilol, enalapril, nifedipine, metolazone and torsemide

## 2017-07-26 NOTE — PROGRESS NOTE ADULT - PROBLEM SELECTOR PLAN 1
hypoxic respiratory failure likely secondary to bilateral pleural effusions w/ L>R, effusions likely multifactorial in etiology: 2/2 to acute on chronic CHF exacerbation 2/2 to worsening ESRD. Pt receiving HD. S/P thoracentesis showing transudate in previous admission, likely similar etiology this episode. Low suspicion for infectious etiology at this time given pt is afebrile without leukocytosis.  - s/p 80mg lasix in ED with improved BPs, consider further lasix PRN  - strict Is/Os  - continue BiPAP, maintain sat >94%  - consult pulm in AM for thoracentesis  - plan for dialysis today. hypoxic respiratory failure likely secondary to bilateral pleural effusions w/ L>R, effusions likely multifactorial in etiology: 2/2 to acute on chronic CHF exacerbation 2/2 to worsening ESRD. Pt receiving HD. S/P thoracentesis showing transudate in previous admission, likely similar etiology this episode. Low suspicion for infectious etiology at this time given pt is afebrile without leukocytosis. Thoracocentesis performed yesterday with 1.8ml of transudative fluid. Etiology likely similar to last admission.   - s/p 80mg lasix in ED with improved BPs, consider further lasix PRN  - strict Is/Os  - Patient now off BiPap and with SpO2 100% on RA

## 2017-07-26 NOTE — PROGRESS NOTE ADULT - PROBLEM SELECTOR PLAN 4
ESRD HD T/T/S. Renal aware of patient and recommended patient receive lasix 80. Last dialysis on Saturday  - renal is following patient  - plan for dialysis today ESRD HD T/T/S. Renal aware of patient and recommended patient receive lasix 80. Dialysis yesterday, removed 3.5L of fluid  - renal is following patient  - plan for dialysis today

## 2017-07-26 NOTE — PROGRESS NOTE ADULT - SUBJECTIVE AND OBJECTIVE BOX
Objective and subjective   Today feels better, out of oxygen. The shortness of breath is better, no chest pain or fever. Yesterday was dialyzed 4 hours with UF 3.5 liters. Thoracocentesis was done with 1.8 liter drainage         66 year old F w/ PMH of ESRD (T/T/Sa) on HD, dCHF (EF 65%), CAD, anemia, uncontrolled HTN. the patient came in overnight because of progressive shortness of breath for 1 day. In the ED lasix was given and given trial with nasal cannula and after that with BIPAP. No need of HD overnight. The patient is getting regularly HD - last one on Saturday - 3 hours with 2 liters UF. still makes a little bit of urine. When I saw the patient in the morning lying in her bed on oxygen feels better compared to yesterday but still short of breath. Denies any chest pain, cough or fever. 2 weeks ago she was hospitalized again for progressive shortness of breath and un controlled BP. The renal service follows the case for the need of HD. Last Hd 7.25      Patient seen and examined at bedside.     heparin  Injectable 5000 Unit(s) every 8 hours  enalapril 20 milliGRAM(s) two times a day  simvastatin 20 milliGRAM(s) at bedtime  carvedilol 25 milliGRAM(s) every 12 hours  NIFEdipine XL 60 milliGRAM(s) daily  NIFEdipine XL 90 milliGRAM(s) at bedtime  torsemide 40 milliGRAM(s) two times a day  metolazone 10 milliGRAM(s) at bedtime  cinacalcet 60 milliGRAM(s) daily      Allergies    No Known Allergies    Intolerances        T(C): , Max: 37.2 (07-26-17 @ 02:18)  T(F): , Max: 98.9 (07-26-17 @ 02:18)  HR: 74 (07-26-17 @ 12:44)  BP: 167/80 (07-26-17 @ 12:44)  BP(mean): 115 (07-26-17 @ 12:44)  RR: 18 (07-26-17 @ 12:44)  SpO2: 100% (07-26-17 @ 12:44)  Wt(kg): --    07-25 @ 07:01  -  07-26 @ 07:00  --------------------------------------------------------  IN:  Total IN: 0 mL    OUT:    Other: 3500 mL  Total OUT: 3500 mL    Total NET: -3500 mL          Physical exam:   Alert and oriented  Mild JVD   tongue moist   Normal air entry bilaterally, decreased breaath sounds on the left base abd crackles on the right   HEart: rrr, normal s1/s2, no murmurs, rubs or gallops,   Abdomen soft, non tender, non -distened, normal bowel sounds   Extremities: no peripheral edema, 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 with good bruit             LABS:                        10.9   4.4   )-----------( 232      ( 26 Jul 2017 06:47 )             31.9     07-26    136  |  95<L>  |  23  ----------------------------<  76  4.0   |  27  |  4.80<H>    Ca    9.8      26 Jul 2017 06:47  Phos  5.8     07-26  Mg     2.1     07-26    TPro  8.9<H>  /  Alb  4.5  /  TBili  0.4  /  DBili  x   /  AST  29  /  ALT  10  /  AlkPhos  192<H>  07-24    Osmolality, Serum: 281 mosm/kg [280 - 301] (07-26 @ 06:47)              RADIOLOGY & ADDITIONAL STUDIES:      < from: Xray Chest 1 View AP- PORTABLE-Urgent (07.26.17 @ 08:55) >    A single portable view of the chest is submitted. Comparison is made to   the most recent prior study dated 7/25/2017. Right pleural effusion has   resolved. The left pleural effusion is decreased. There is residual   infiltration in the periphery of the left midlung.     Impression:    Decreased pleural effusions.    < end of copied text >

## 2017-07-26 NOTE — PROGRESS NOTE ADULT - PROBLEM SELECTOR PLAN 5
Na 130, likely due to hypervolemic hyponatremia. Patient was diuresed  - plan for dialysis  - f/u BMP Resolved - on admission Na 130, likely due to hypervolemic hyponatremia. Patient was diuresed. Now 136.     - f/u BMP

## 2017-07-26 NOTE — PROGRESS NOTE ADULT - PROBLEM SELECTOR PLAN 6
K 5.8, likely due to ESRD. EKG shows no peaked Ts, prolonged QRS, prolonged OH  - plan for dialysis  - f/u BMP Resolved K 4.0 s/p dialysis- initially K 5.8, likely due to ESRD. EKG shows no peaked Ts, prolonged QRS, prolonged CA.   - f/u BMP

## 2017-07-26 NOTE — PROGRESS NOTE ADULT - PROBLEM SELECTOR PLAN 1
- no need of HD for today   - last HD done see above   - electrolytes acceptable   - phosphorus - 5.8   - cinacalcet 60 mcg.

## 2017-07-26 NOTE — PROGRESS NOTE ADULT - PROBLEM SELECTOR PLAN 2
on carvedilol 25 mg twice a day  - on metolazone 10 mg   - on enalapril 20 mg   - on torasemide 40 mg   on Nifedipine 60 mg + 90 mg in the evening   Please consider Hydralazine if the BP is not under control.

## 2017-07-26 NOTE — PROGRESS NOTE ADULT - SUBJECTIVE AND OBJECTIVE BOX
OVERNIGHT EVENTS:    SUBJECTIVE / INTERVAL HPI: Patient seen and examined at bedside.     VITAL SIGNS:  Vital Signs Last 24 Hrs  T(C): 36.9 (26 Jul 2017 09:15), Max: 37.2 (26 Jul 2017 02:18)  T(F): 98.5 (26 Jul 2017 09:15), Max: 98.9 (26 Jul 2017 02:18)  HR: 69 (26 Jul 2017 10:28) (68 - 85)  BP: 160/80 (26 Jul 2017 08:27) (160/80 - 189/91)  BP(mean): 115 (26 Jul 2017 08:27) (109 - 129)  RR: 18 (26 Jul 2017 08:27) (11 - 24)  SpO2: 100% (26 Jul 2017 10:28) (97% - 100%)    PHYSICAL EXAM:    General: WDWN  HEENT: NC/AT; PERRL, anicteric sclera; MMM  Neck: supple  Cardiovascular: +S1/S2; RRR  Respiratory: CTA B/L; no W/R/R  Gastrointestinal: soft, NT/ND; +BSx4  Extremities: WWP; no edema, clubbing or cyanosis  Vascular: 2+ radial, DP/PT pulses B/L  Neurological: AAOx3; no focal deficits    MEDICATIONS:  MEDICATIONS  (STANDING):  heparin  Injectable 5000 Unit(s) SubCutaneous every 8 hours  enalapril 20 milliGRAM(s) Oral two times a day  simvastatin 20 milliGRAM(s) Oral at bedtime  carvedilol 25 milliGRAM(s) Oral every 12 hours  NIFEdipine XL 60 milliGRAM(s) Oral daily  NIFEdipine XL 90 milliGRAM(s) Oral at bedtime  torsemide 40 milliGRAM(s) Oral two times a day  metolazone 10 milliGRAM(s) Oral at bedtime  cinacalcet 60 milliGRAM(s) Oral daily    MEDICATIONS  (PRN):      ALLERGIES:  Allergies    No Known Allergies    Intolerances        LABS:                        10.9   4.4   )-----------( 232      ( 26 Jul 2017 06:47 )             31.9     07-26    136  |  95<L>  |  23  ----------------------------<  76  4.0   |  27  |  4.80<H>    Ca    9.8      26 Jul 2017 06:47  Phos  5.8     07-26  Mg     2.1     07-26    TPro  8.9<H>  /  Alb  4.5  /  TBili  0.4  /  DBili  x   /  AST  29  /  ALT  10  /  AlkPhos  192<H>  07-24        CAPILLARY BLOOD GLUCOSE          RADIOLOGY & ADDITIONAL TESTS: Reviewed.    ASSESSMENT:    PLAN: Transfer Note:   66 year old F w/ PMH of ESRD on HD, dCHF (EF 65%), CAD, anemia, uncontrolled HTN, with chronic left sided effusion presenting with SOB admitted for hypoxic respiratory failure secondary to fluid overload. Patient was recently discharged ~1 week PTA after pleural effusion with thoracocentesis draining 1.1L of fluid and HD taking off 4L of fluid. In the ED, T 97.4, HR 80, /104, RR 18, O2 Sat 87% on RA, which improved to 95% on NC. BNP 08029 on admission. Patient was then placed on BIPAP. Chest xray showed re-accumulation of her left sided effusion. Renal was consulted in ED and recommended 80 mg lasix as patient still makes urine. Patient went for HD yesterday and 3.5L was removed. Thoracocentesis was performed and 1.8L of fluid was recovered from the left lung. Transudative effusion. Today, chest xray is improved with residual pleural effusion. Protein in pleural fluid is elevated and will work up MM as possible etiology.     OVERNIGHT EVENTS:ANGELA    SUBJECTIVE / INTERVAL HPI: Patient seen and examined at bedside. Patient reports SOB and cough have improved. Denies headache, chest pain, abdominal pain, nausea, vomiting, diarrhea, constipation.     VITAL SIGNS:  Vital Signs Last 24 Hrs  T(C): 36.9 (26 Jul 2017 09:15), Max: 37.2 (26 Jul 2017 02:18)  T(F): 98.5 (26 Jul 2017 09:15), Max: 98.9 (26 Jul 2017 02:18)  HR: 69 (26 Jul 2017 10:28) (68 - 85)  BP: 160/80 (26 Jul 2017 08:27) (160/80 - 189/91)  BP(mean): 115 (26 Jul 2017 08:27) (109 - 129)  RR: 18 (26 Jul 2017 08:27) (11 - 24)  SpO2: 100% (26 Jul 2017 10:28) (97% - 100%)    PHYSICAL EXAM:    General: WDWN  HEENT: NC/AT; PERRL, anicteric sclera; MMM  Neck: supple  Cardiovascular: +S1/S2; rales on exam  Respiratory: CTA B/L; no W/R/R  Gastrointestinal: soft, NT/ND; +BSx4  Extremities: WWP; no edema, clubbing or cyanosis  Vascular: 2+ radial, DP/PT pulses B/L  Neurological: AAOx3; no focal deficits    MEDICATIONS:  MEDICATIONS  (STANDING):  heparin  Injectable 5000 Unit(s) SubCutaneous every 8 hours  enalapril 20 milliGRAM(s) Oral two times a day  simvastatin 20 milliGRAM(s) Oral at bedtime  carvedilol 25 milliGRAM(s) Oral every 12 hours  NIFEdipine XL 60 milliGRAM(s) Oral daily  NIFEdipine XL 90 milliGRAM(s) Oral at bedtime  torsemide 40 milliGRAM(s) Oral two times a day  metolazone 10 milliGRAM(s) Oral at bedtime  cinacalcet 60 milliGRAM(s) Oral daily    MEDICATIONS  (PRN):      ALLERGIES:  Allergies    No Known Allergies    Intolerances        LABS:                        10.9   4.4   )-----------( 232      ( 26 Jul 2017 06:47 )             31.9     07-26    136  |  95<L>  |  23  ----------------------------<  76  4.0   |  27  |  4.80<H>    Ca    9.8      26 Jul 2017 06:47  Phos  5.8     07-26  Mg     2.1     07-26    TPro  8.9<H>  /  Alb  4.5  /  TBili  0.4  /  DBili  x   /  AST  29  /  ALT  10  /  AlkPhos  192<H>  07-24        CAPILLARY BLOOD GLUCOSE          RADIOLOGY & ADDITIONAL TESTS: Reviewed. Transfer Note:   66 year old F w/ PMH of ESRD on HD, dCHF (EF 65%), CAD, anemia, uncontrolled HTN, with chronic left sided effusion presenting with SOB admitted for hypoxic respiratory failure secondary to fluid overload. Patient was recently discharged ~1 week PTA after pleural effusion with thoracocentesis draining 1.1L of fluid and HD taking off 4L of fluid. In the ED, T 97.4, HR 80, /104, RR 18, O2 Sat 87% on RA, which improved to 95% on NC. BNP 14253 on admission. Patient was restarted on home meds for dCHF (EF 60-65%) which include carvedilol, enalapril, nifedipine, metolazone and torsemide. Patient was placed on BIPAP. Chest xray showed re-accumulation of her left sided effusion. Renal was consulted in ED and recommended 80 mg lasix as patient still makes urine. Patient went for HD yesterday and 3.5L was removed. Thoracocentesis was performed and 1.8L of fluid was recovered from the left lung. Transudative effusion. Today, chest xray is improved with residual pleural effusion. Protein in pleural fluid is elevated and will work up MM as possible etiology.     OVERNIGHT EVENTS:ANGELA    SUBJECTIVE / INTERVAL HPI: Patient seen and examined at bedside. Patient reports SOB and cough have improved. Denies headache, chest pain, abdominal pain, nausea, vomiting, diarrhea, constipation.     VITAL SIGNS:  Vital Signs Last 24 Hrs  T(C): 36.9 (26 Jul 2017 09:15), Max: 37.2 (26 Jul 2017 02:18)  T(F): 98.5 (26 Jul 2017 09:15), Max: 98.9 (26 Jul 2017 02:18)  HR: 69 (26 Jul 2017 10:28) (68 - 85)  BP: 160/80 (26 Jul 2017 08:27) (160/80 - 189/91)  BP(mean): 115 (26 Jul 2017 08:27) (109 - 129)  RR: 18 (26 Jul 2017 08:27) (11 - 24)  SpO2: 100% (26 Jul 2017 10:28) (97% - 100%)    PHYSICAL EXAM:    General: WDWN  HEENT: NC/AT; PERRL, anicteric sclera; MMM  Neck: supple  Cardiovascular: +S1/S2; rales on exam  Respiratory: CTA B/L; no W/R/R  Gastrointestinal: soft, NT/ND; +BSx4  Extremities: WWP; no edema, clubbing or cyanosis  Vascular: 2+ radial, DP/PT pulses B/L  Neurological: AAOx3; no focal deficits    MEDICATIONS:  MEDICATIONS  (STANDING):  heparin  Injectable 5000 Unit(s) SubCutaneous every 8 hours  enalapril 20 milliGRAM(s) Oral two times a day  simvastatin 20 milliGRAM(s) Oral at bedtime  carvedilol 25 milliGRAM(s) Oral every 12 hours  NIFEdipine XL 60 milliGRAM(s) Oral daily  NIFEdipine XL 90 milliGRAM(s) Oral at bedtime  torsemide 40 milliGRAM(s) Oral two times a day  metolazone 10 milliGRAM(s) Oral at bedtime  cinacalcet 60 milliGRAM(s) Oral daily    MEDICATIONS  (PRN):      ALLERGIES:  Allergies    No Known Allergies    Intolerances        LABS:                        10.9   4.4   )-----------( 232      ( 26 Jul 2017 06:47 )             31.9     07-26    136  |  95<L>  |  23  ----------------------------<  76  4.0   |  27  |  4.80<H>    Ca    9.8      26 Jul 2017 06:47  Phos  5.8     07-26  Mg     2.1     07-26    TPro  8.9<H>  /  Alb  4.5  /  TBili  0.4  /  DBili  x   /  AST  29  /  ALT  10  /  AlkPhos  192<H>  07-24        CAPILLARY BLOOD GLUCOSE          RADIOLOGY & ADDITIONAL TESTS: Reviewed. Transfer Note:   66 year old F w/ PMH of ESRD on HD, dCHF (EF 65%), CAD, anemia, uncontrolled HTN, with chronic left sided effusion presenting with SOB admitted for hypoxic respiratory failure secondary to fluid overload. Patient was recently discharged ~1 week PTA after pleural effusion with thoracocentesis draining 1.1L of fluid and HD taking off 4L of fluid. In the ED, T 97.4, HR 80, /104, RR 18, O2 Sat 87% on RA, which improved to 95% on NC. BNP 08042 on admission. Patient was restarted on home meds for dCHF (EF 60-65%) which include carvedilol, enalapril, nifedipine, metolazone and torsemide. Patient was placed on BIPAP. Chest xray showed re-accumulation of her left sided effusion. Renal was consulted in ED and recommended 80 mg lasix as patient still makes urine. Patient went for HD yesterday and 3.5L was removed. Thoracocentesis was performed and 1.8L of fluid was recovered from the left lung. Transudative effusion. Today, chest xray is improved with residual pleural effusion. Protein in pleural fluid is elevated and will work up MM as possible etiology.     Nephrologist: Dr. Jay Tran Derick: [phone]    OVERNIGHT EVENTS:ANGELA    SUBJECTIVE / INTERVAL HPI: Patient seen and examined at bedside. Patient reports SOB and cough have improved. Denies headache, chest pain, abdominal pain, nausea, vomiting, diarrhea, constipation.     VITAL SIGNS:  Vital Signs Last 24 Hrs  T(C): 36.9 (26 Jul 2017 09:15), Max: 37.2 (26 Jul 2017 02:18)  T(F): 98.5 (26 Jul 2017 09:15), Max: 98.9 (26 Jul 2017 02:18)  HR: 69 (26 Jul 2017 10:28) (68 - 85)  BP: 160/80 (26 Jul 2017 08:27) (160/80 - 189/91)  BP(mean): 115 (26 Jul 2017 08:27) (109 - 129)  RR: 18 (26 Jul 2017 08:27) (11 - 24)  SpO2: 100% (26 Jul 2017 10:28) (97% - 100%)    PHYSICAL EXAM:    General: WDWN  HEENT: NC/AT; PERRL, anicteric sclera; MMM  Neck: supple  Cardiovascular: +S1/S2; rales on exam  Respiratory: CTA B/L; no W/R/R  Gastrointestinal: soft, NT/ND; +BSx4  Extremities: WWP; no edema, clubbing or cyanosis  Vascular: 2+ radial, DP/PT pulses B/L  Neurological: AAOx3; no focal deficits    MEDICATIONS:  MEDICATIONS  (STANDING):  heparin  Injectable 5000 Unit(s) SubCutaneous every 8 hours  enalapril 20 milliGRAM(s) Oral two times a day  simvastatin 20 milliGRAM(s) Oral at bedtime  carvedilol 25 milliGRAM(s) Oral every 12 hours  NIFEdipine XL 60 milliGRAM(s) Oral daily  NIFEdipine XL 90 milliGRAM(s) Oral at bedtime  torsemide 40 milliGRAM(s) Oral two times a day  metolazone 10 milliGRAM(s) Oral at bedtime  cinacalcet 60 milliGRAM(s) Oral daily    MEDICATIONS  (PRN):      ALLERGIES:  Allergies    No Known Allergies    Intolerances        LABS:                        10.9   4.4   )-----------( 232      ( 26 Jul 2017 06:47 )             31.9     07-26    136  |  95<L>  |  23  ----------------------------<  76  4.0   |  27  |  4.80<H>    Ca    9.8      26 Jul 2017 06:47  Phos  5.8     07-26  Mg     2.1     07-26    TPro  8.9<H>  /  Alb  4.5  /  TBili  0.4  /  DBili  x   /  AST  29  /  ALT  10  /  AlkPhos  192<H>  07-24        CAPILLARY BLOOD GLUCOSE          RADIOLOGY & ADDITIONAL TESTS: Reviewed.

## 2017-07-26 NOTE — PROGRESS NOTE ADULT - PROBLEM SELECTOR PLAN 9
DVT - HSQ 5000 U q8    Full Code    Admit to [facility] DVT - HSQ 5000 U q8    Full Code    Stable for transfer to [facility]

## 2017-07-27 DIAGNOSIS — J90 PLEURAL EFFUSION, NOT ELSEWHERE CLASSIFIED: ICD-10-CM

## 2017-07-27 DIAGNOSIS — N18.9 CHRONIC KIDNEY DISEASE, UNSPECIFIED: ICD-10-CM

## 2017-07-27 DIAGNOSIS — I50.32 CHRONIC DIASTOLIC (CONGESTIVE) HEART FAILURE: ICD-10-CM

## 2017-07-27 LAB
ALBUMIN SERPL ELPH-MCNC: 3.1 G/DL — LOW (ref 3.3–5)
ALP SERPL-CCNC: 139 U/L — HIGH (ref 40–120)
ALT FLD-CCNC: 6 U/L — LOW (ref 10–45)
ANION GAP SERPL CALC-SCNC: 12 MMOL/L — SIGNIFICANT CHANGE UP (ref 5–17)
ANION GAP SERPL CALC-SCNC: 12 MMOL/L — SIGNIFICANT CHANGE UP (ref 5–17)
AST SERPL-CCNC: 19 U/L — SIGNIFICANT CHANGE UP (ref 10–40)
BILIRUB DIRECT SERPL-MCNC: <0.2 MG/DL — SIGNIFICANT CHANGE UP (ref 0–0.2)
BILIRUB INDIRECT FLD-MCNC: >0.1 MG/DL — LOW (ref 0.2–1)
BILIRUB SERPL-MCNC: 0.3 MG/DL — SIGNIFICANT CHANGE UP (ref 0.2–1.2)
BUN SERPL-MCNC: 11 MG/DL — SIGNIFICANT CHANGE UP (ref 7–23)
BUN SERPL-MCNC: 16 MG/DL — SIGNIFICANT CHANGE UP (ref 7–23)
BUN SERPL-MCNC: 42 MG/DL — HIGH (ref 7–23)
CALCIUM SERPL-MCNC: 10.2 MG/DL — SIGNIFICANT CHANGE UP (ref 8.4–10.5)
CALCIUM SERPL-MCNC: 9.4 MG/DL — SIGNIFICANT CHANGE UP (ref 8.4–10.5)
CHLORIDE SERPL-SCNC: 90 MMOL/L — LOW (ref 96–108)
CHLORIDE SERPL-SCNC: 93 MMOL/L — LOW (ref 96–108)
CO2 SERPL-SCNC: 26 MMOL/L — SIGNIFICANT CHANGE UP (ref 22–31)
CO2 SERPL-SCNC: 32 MMOL/L — HIGH (ref 22–31)
CREAT ?TM UR-MCNC: 13 MG/DL — SIGNIFICANT CHANGE UP
CREAT SERPL-MCNC: 3.4 MG/DL — HIGH (ref 0.5–1.3)
CREAT SERPL-MCNC: 6.8 MG/DL — HIGH (ref 0.5–1.3)
GLUCOSE SERPL-MCNC: 131 MG/DL — HIGH (ref 70–99)
GLUCOSE SERPL-MCNC: 95 MG/DL — SIGNIFICANT CHANGE UP (ref 70–99)
HCT VFR BLD CALC: 32.3 % — LOW (ref 34.5–45)
HGB BLD-MCNC: 10.5 G/DL — LOW (ref 11.5–15.5)
MAGNESIUM SERPL-MCNC: 2 MG/DL — SIGNIFICANT CHANGE UP (ref 1.6–2.6)
MCHC RBC-ENTMCNC: 31.3 PG — SIGNIFICANT CHANGE UP (ref 27–34)
MCHC RBC-ENTMCNC: 32.5 G/DL — SIGNIFICANT CHANGE UP (ref 32–36)
MCV RBC AUTO: 96.1 FL — SIGNIFICANT CHANGE UP (ref 80–100)
OSMOLALITY SERPL: 283 MOSM/KG — SIGNIFICANT CHANGE UP (ref 280–301)
OSMOLALITY UR: 229 MOSMOL/KG — SIGNIFICANT CHANGE UP (ref 100–650)
PHOSPHATE SERPL-MCNC: 6.1 MG/DL — HIGH (ref 2.5–4.5)
PLATELET # BLD AUTO: 229 K/UL — SIGNIFICANT CHANGE UP (ref 150–400)
POTASSIUM SERPL-MCNC: 3.1 MMOL/L — LOW (ref 3.5–5.3)
POTASSIUM SERPL-MCNC: 4.2 MMOL/L — SIGNIFICANT CHANGE UP (ref 3.5–5.3)
POTASSIUM SERPL-SCNC: 3.1 MMOL/L — LOW (ref 3.5–5.3)
POTASSIUM SERPL-SCNC: 4.2 MMOL/L — SIGNIFICANT CHANGE UP (ref 3.5–5.3)
PROT SERPL-MCNC: 6.2 G/DL — SIGNIFICANT CHANGE UP (ref 6–8.3)
RBC # BLD: 3.36 M/UL — LOW (ref 3.8–5.2)
RBC # FLD: 17.4 % — HIGH (ref 10.3–16.9)
SODIUM SERPL-SCNC: 128 MMOL/L — LOW (ref 135–145)
SODIUM SERPL-SCNC: 137 MMOL/L — SIGNIFICANT CHANGE UP (ref 135–145)
SODIUM UR-SCNC: 95 MMOL/L — SIGNIFICANT CHANGE UP
WBC # BLD: 5.3 K/UL — SIGNIFICANT CHANGE UP (ref 3.8–10.5)
WBC # FLD AUTO: 5.3 K/UL — SIGNIFICANT CHANGE UP (ref 3.8–10.5)

## 2017-07-27 PROCEDURE — 71010: CPT | Mod: 26

## 2017-07-27 PROCEDURE — 71250 CT THORAX DX C-: CPT | Mod: 26

## 2017-07-27 PROCEDURE — 99222 1ST HOSP IP/OBS MODERATE 55: CPT

## 2017-07-27 PROCEDURE — 99233 SBSQ HOSP IP/OBS HIGH 50: CPT

## 2017-07-27 RX ORDER — HEPARIN SODIUM 5000 [USP'U]/ML
5000 INJECTION INTRAVENOUS; SUBCUTANEOUS ONCE
Qty: 0 | Refills: 0 | Status: COMPLETED | OUTPATIENT
Start: 2017-07-27 | End: 2017-07-27

## 2017-07-27 RX ADMIN — Medication 20 MILLIGRAM(S): at 19:27

## 2017-07-27 RX ADMIN — SIMVASTATIN 20 MILLIGRAM(S): 20 TABLET, FILM COATED ORAL at 22:18

## 2017-07-27 RX ADMIN — Medication 90 MILLIGRAM(S): at 22:18

## 2017-07-27 RX ADMIN — CARVEDILOL PHOSPHATE 25 MILLIGRAM(S): 80 CAPSULE, EXTENDED RELEASE ORAL at 22:22

## 2017-07-27 NOTE — PROGRESS NOTE ADULT - SUBJECTIVE AND OBJECTIVE BOX
Patient was seen and evaluated on dialysis.   Patient is tolerating the procedure well.   HR: 70 (07-27-17 @ 10:20)  BP: 174/84 (07-27-17 @ 10:20)  Continue dialysis:   Optiflux 180, , , 2K bath   Goal UF 3 kg over 3 hours.

## 2017-07-27 NOTE — CONSULT NOTE ADULT - ASSESSMENT
65 y/o lady admitted to [facility] for respiratory failure 2/2 recurrent pleural effusion and pulm edema.

## 2017-07-27 NOTE — PROGRESS NOTE ADULT - SUBJECTIVE AND OBJECTIVE BOX
Transfer Accept Note:  66 year old F w/ PMH of ESRD on HD, dCHF (EF 65%), CAD, anemia, uncontrolled HTN, with chronic left sided effusion presented with SOB and admitted for hypoxic respiratory failure secondary to fluid overload. Patient was recently discharged ~1 week PTA after pleural effusion with thoracocentesis draining 1.1L of fluid and HD taking off 4L of fluid. In the ED, T 97.4, HR 80, /104, RR 18, O2 Sat 87% on RA, which improved to 95% on NC. BNP 68583 on admission. Patient was restarted on home meds for dCHF (EF 60-65%) which include carvedilol, enalapril, nifedipine, metolazone and torsemide and was placed on BIPAP. Chest xray showed re-accumulation of her left sided effusion. Renal was consulted in ED and recommended 80 mg lasix as patient still makes urine. Patient went for HD 7/25 and 3.5L was removed. Thoracocentesis was performed and 1.8L of fluid was recovered from the left lung. Transudative effusion. Today, chest xray is improved with residual pleural effusion. Protein in pleural fluid is elevated and will work up MM as possible etiology.  Chest CT for tomorrow as part of workup.    Nephrologist: Dr. Jay Tran Derick: [phone]    INTERVAL HPI/OVERNIGHT EVENTS:  Patient was seen and examined at bedside.  No complaints at this time. Patient denies: fever, chills, dizziness, weakness, HA, Changes in vision, CP, palpitations, SOB, cough, N/V/D/C, dysuria, changes in bowel movements, LE edema. Reports left upper quadrant abdominal pain that started after procedure.    VITAL SIGNS:  T(F): 98.1 (07-27-17 @ 02:22)  HR: 71 (07-27-17 @ 03:03)  BP: 130/79 (07-27-17 @ 02:22)  RR: 22 (07-27-17 @ 03:03)  SpO2: 98% (07-27-17 @ 03:03)  Wt(kg): --    PHYSICAL EXAM:    Constitutional: WDWN, NAD  HEENT: PERRL, EOMI, sclera non-icteric, neck supple, trachea midline, no masses, no JVD, MMM, good dentition  Respiratory: CTA b/l, good air entry b/l, no wheezing, no rhonchi, no rales, without accessory muscle use and no intercostal retractions  Cardiovascular: RRR, normal S1S2, no M/R/G  Gastrointestinal: soft, NTND, no masses palpable, BS normal  Extremities: Warm, well perfused, pulses equal bilateral upper and lower extremities, no edema, no clubbing  Neurological: AAOx3, CN Grossly intact  Skin: Normal temperature, warm, dry    MEDICATIONS  (STANDING):  heparin  Injectable 5000 Unit(s) SubCutaneous every 8 hours  enalapril 20 milliGRAM(s) Oral two times a day  simvastatin 20 milliGRAM(s) Oral at bedtime  carvedilol 25 milliGRAM(s) Oral every 12 hours  NIFEdipine XL 60 milliGRAM(s) Oral daily  NIFEdipine XL 90 milliGRAM(s) Oral at bedtime  torsemide 40 milliGRAM(s) Oral two times a day  metolazone 10 milliGRAM(s) Oral at bedtime  cinacalcet 60 milliGRAM(s) Oral daily    MEDICATIONS  (PRN):      Allergies    No Known Allergies    Intolerances        LABS:                        10.9   4.4   )-----------( 232      ( 26 Jul 2017 06:47 )             31.9     07-26    136  |  95<L>  |  23  ----------------------------<  76  4.0   |  27  |  4.80<H>    Ca    9.8      26 Jul 2017 06:47  Phos  5.8     07-26  Mg     2.1     07-26 Transfer Accept Note:  66 year old F w/ PMH of ESRD on HD, dCHF (EF 65%), CAD, anemia, uncontrolled HTN, with chronic left sided effusion presented with SOB and admitted for hypoxic respiratory failure secondary to fluid overload. Patient was recently discharged ~1 week PTA after pleural effusion with thoracocentesis draining 1.1L of fluid and HD taking off 4L of fluid. In the ED, T 97.4, HR 80, /104, RR 18, O2 Sat 87% on RA, which improved to 95% on NC. BNP 63655 on admission. Patient was restarted on home meds for dCHF (EF 60-65%) which include carvedilol, enalapril, nifedipine, metolazone and torsemide and was placed on BIPAP. Chest xray showed re-accumulation of her left sided effusion. Renal was consulted in ED and recommended 80 mg lasix as patient still makes urine. Patient went for HD 7/25 and 3.5L was removed. Thoracocentesis was performed and 1.8L of fluid was recovered from the left lung. Transudative effusion. Today, chest xray is improved with residual pleural effusion. Protein in pleural fluid is elevated and will work up MM as possible etiology.  Chest CT for tomorrow as part of workup.    Nephrologist: Dr. Jay Tran Derick: [phone]    INTERVAL HPI/OVERNIGHT EVENTS:  Patient was seen and examined at bedside.  No complaints at this time. Patient denies: fever, chills, dizziness, weakness, HA, Changes in vision, CP, palpitations, SOB, cough, N/V/D/C, dysuria, changes in bowel movements, LE edema. Reports left upper quadrant abdominal pain that started after procedure.    VITAL SIGNS:  T(F): 98.1 (07-27-17 @ 02:22)  HR: 71 (07-27-17 @ 03:03)  BP: 130/79 (07-27-17 @ 02:22)  RR: 22 (07-27-17 @ 03:03)  SpO2: 98% (07-27-17 @ 03:03)  Wt(kg): --    PHYSICAL EXAM:    General: WDWN, NAD  HEENT: NC/AT; PERRL, EOMI, anicteric sclera; MMM  Neck: supple  Cardiovascular: +S1/S2; rales on exam  Respiratory: CTA B/L; no W/R/R.  Decreased lung sounds on lower left.  No accessory muscle use or retractions  Gastrointestinal: soft, ND; +BSx4, Mildly tender in left upper quadrant  Extremities: no edema, clubbing or cyanosis  Vascular: 2+ radial, DP/PT pulses B/L  Neurological: AAOx3; no focal deficits      MEDICATIONS  (STANDING):  heparin  Injectable 5000 Unit(s) SubCutaneous every 8 hours  enalapril 20 milliGRAM(s) Oral two times a day  simvastatin 20 milliGRAM(s) Oral at bedtime  carvedilol 25 milliGRAM(s) Oral every 12 hours  NIFEdipine XL 60 milliGRAM(s) Oral daily  NIFEdipine XL 90 milliGRAM(s) Oral at bedtime  torsemide 40 milliGRAM(s) Oral two times a day  metolazone 10 milliGRAM(s) Oral at bedtime  cinacalcet 60 milliGRAM(s) Oral daily    MEDICATIONS  (PRN):      Allergies    No Known Allergies    Intolerances        LABS:                        10.9   4.4   )-----------( 232      ( 26 Jul 2017 06:47 )             31.9     07-26    136  |  95<L>  |  23  ----------------------------<  76  4.0   |  27  |  4.80<H>    Ca    9.8      26 Jul 2017 06:47  Phos  5.8     07-26  Mg     2.1     07-26

## 2017-07-27 NOTE — DIETITIAN INITIAL EVALUATION ADULT. - NS AS NUTRI INTERV ED CONTENT
Priority modifications/Nutrition relationship to health/disease/Survival information/Purpose of the nutrition education/Recommended modifications

## 2017-07-27 NOTE — PROGRESS NOTE ADULT - SUBJECTIVE AND OBJECTIVE BOX
INTERVAL HPI/OVERNIGHT EVENTS:  No overnight events. Pt resting comfortably. Pt confirmed history, reports improvement of shortness of breath s/p thoracentesis with 1.8L removed and dialysis on 7/25. Patient denies fever, chills, dizziness, weakness, CP, palpitations, cough, N/V/D/C, dysuria, changes in bowel movements, LE edema.    VITAL SIGNS:  T(F): 98.1 (07-27-17 @ 13:20)  HR: 81 (07-27-17 @ 13:20)  BP: 162/92 (07-27-17 @ 13:20)  RR: 16 (07-27-17 @ 13:20)  SpO2: 99% (07-27-17 @ 13:20)    PHYSICAL EXAM:    Constitutional: WDWN, NAD, resting comfortably in bed with HOB elevated to 30 degrees  Eyes: PERRL, EOMI, sclera non-icteric  Neck: supple, no masses, no JVD  Respiratory: B/l crackles to 2/3 up the bases, diminished breath sounds at the bases  Cardiovascular: RRR, normal S1S2, +II/VI systolic murmur loudest at the apex  Gastrointestinal: soft, normoactive BS, small ventral hernia with slight tenderness to palpation  Extremities: WWP, 2+ pulses in upper and lower extremities, no edema in LE b/l  Neurological: AAOx3  Skin: Normal temperature    MEDICATIONS  (STANDING):  heparin  Injectable 5000 Unit(s) SubCutaneous every 8 hours  enalapril 20 milliGRAM(s) Oral two times a day  simvastatin 20 milliGRAM(s) Oral at bedtime  carvedilol 25 milliGRAM(s) Oral every 12 hours  NIFEdipine XL 60 milliGRAM(s) Oral daily  NIFEdipine XL 90 milliGRAM(s) Oral at bedtime  torsemide 40 milliGRAM(s) Oral two times a day  metolazone 10 milliGRAM(s) Oral at bedtime  cinacalcet 60 milliGRAM(s) Oral daily    MEDICATIONS  (PRN):      Allergies    No Known Allergies    Intolerances        LABS:                        10.5   5.3   )-----------( 229      ( 27 Jul 2017 06:57 )             32.3     07-27    x   |  x   |  11  ----------------------------<  x   x    |  x   |  x     Ca    9.4      27 Jul 2017 06:57  Phos  6.1     07-27  Mg     2.0     07-27    TPro  6.2  /  Alb  3.1<L>  /  TBili  0.3  /  DBili  <0.2  /  AST  19  /  ALT  6<L>  /  AlkPhos  139<H>  07-27          RADIOLOGY & ADDITIONAL TESTS:  Studies reviewed.

## 2017-07-27 NOTE — PROGRESS NOTE ADULT - RS GEN PE MLT RESP DETAILS PC
good air movement/airway patent/b/l rales, decreased breath sounds on left base/normal/breath sounds equal

## 2017-07-27 NOTE — PROGRESS NOTE ADULT - PROBLEM SELECTOR PLAN 6
Resolved K 4.0 s/p dialysis- initially K 5.8, likely due to ESRD. EKG shows no peaked Ts, prolonged QRS, prolonged ND.   - f/u BMP

## 2017-07-27 NOTE — PROGRESS NOTE ADULT - SUBJECTIVE AND OBJECTIVE BOX
Patient is a 66 year old female with a history of ESRD on HD T/Th/Sat, diastolic heart failure, coronary artery disease, anemia and hypertension whom presented tot [facility] with complaints of shortness of breath. Patient seen and examined at bedside. Patient was last dialyzed 7/25. Patient underwent CT scan today. Patient also underwent thoracentesis with 1.8L fluid removal. Patient states her shortness of breath has improved.     heparin  Injectable 5000 Unit(s) every 8 hours  enalapril 20 milliGRAM(s) two times a day  simvastatin 20 milliGRAM(s) at bedtime  carvedilol 25 milliGRAM(s) every 12 hours  NIFEdipine XL 60 milliGRAM(s) daily  NIFEdipine XL 90 milliGRAM(s) at bedtime  torsemide 40 milliGRAM(s) two times a day  metolazone 10 milliGRAM(s) at bedtime  cinacalcet 60 milliGRAM(s) daily    Allergies    No Known Allergies    Intolerances    T(C): , Max: 37.2 (07-26-17 @ 14:24)  T(F): , Max: 99 (07-26-17 @ 14:24)  HR: 70 (07-27-17 @ 10:20)  BP: 174/84 (07-27-17 @ 10:20)  BP(mean): 92 (07-27-17 @ 01:00)  RR: 16 (07-27-17 @ 10:20)  SpO2: 99% (07-27-17 @ 10:20)    LABS:                        10.5   5.3   )-----------( 229      ( 27 Jul 2017 06:57 )             32.3     07-27    128<L>  |  90<L>  |  42<H>  ----------------------------<  95  4.2   |  26  |  6.80<H>    Ca    9.4      27 Jul 2017 06:57  Phos  6.1     07-27  Mg     2.0     07-27    TPro  6.2  /  Alb  3.1<L>  /  TBili  0.3  /  DBili  <0.2  /  AST  19  /  ALT  6<L>  /  AlkPhos  139<H>  07-27

## 2017-07-27 NOTE — PROGRESS NOTE ADULT - PROBLEM SELECTOR PLAN 7
Previous history of CAD  - on simvastatin 20mg
Previous history of CAD  - on simvastatin 20mg
Previous hx of CAD  - c/w simvastatin 20mg

## 2017-07-27 NOTE — PROGRESS NOTE ADULT - PROBLEM SELECTOR PLAN 3
Chronic diastolic CHF (EF 65%) with [place] 97954, unclear if this is acute CHF exacerbation or fluid overload in the setting of needing dialysis  - continue home carvedilol, enalapril, nifedipine, metolazone and torsemide

## 2017-07-27 NOTE — PROGRESS NOTE ADULT - PROBLEM SELECTOR PLAN 2
ESRD HD T/T/S ESRD HD T/T/S. Nephrology following. Pt s/p 80mg lasix in ED.  - Dialysis today  - c/w torsemide 40mg BID

## 2017-07-27 NOTE — PROGRESS NOTE ADULT - PROBLEM SELECTOR PLAN 4
ESRD HD T/T/S. Renal aware of patient and recommended patient receive lasix 80. Dialysis yesterday, removed 3.5L of fluid  - renal is following patient  - plan for dialysis today

## 2017-07-27 NOTE — PROGRESS NOTE ADULT - PROBLEM SELECTOR PLAN 9
DVT - HSQ 5000 U q8    Full Code    Stable for transfer to [facility]

## 2017-07-27 NOTE — DIETITIAN INITIAL EVALUATION ADULT. - OTHER INFO
67y/o F admitted with pleural effusion. Pt with a h/o CHF, CAD and ESRD on HD T/T/Sat. Pt to receive HD today. Pt seen resting in bed. She reports a "ok" appetite and intake; reports consuming 35-50% of meals. Pt states she is typically not a big eater and this is normal for her. Encouraged intake 2/2 increased needs from HD. Denies N/V/D/C, pain, and mechanical issues with po intake. Last BM 2x days ago. PTA pt endorses non-compliance to renal, CHF diet despite following with RD at HD clinic. Reinforced education and discussed the importance of compliance; pt does not appear to be receptive. Will follow and continue to provide diet reinforcement as appropriate.

## 2017-07-27 NOTE — PROGRESS NOTE ADULT - PROBLEM SELECTOR PLAN 8
F - No IVF  E - replete lytes, K>4 Mag>2  N - NPO while on BiPAP
F - No IVF  E - replete lytes, K>4 Mag>2  N - NPO while on BiPAP
- Renal diet  - Replete lytes K > 4 Mg >2

## 2017-07-27 NOTE — DIETITIAN INITIAL EVALUATION ADULT. - COPY OF WEIGHT IN KG
CERTIFICATE OF WORK    February 14, 2017      Re: Page Kee  [id number]  [facility] [id number]      This is to certify that Page Kee has been under my care from 2/14/2017 and can return to regular work on 2/17/17    RESTRICTIONS: home and rest      REMARKS: Influenza        SIGNATURE:___________________________________________,   2/14/2017  YASMANI Geller MD        Internal Medicine/Pediatrics  [address]  [id number] [phone]        
50

## 2017-07-27 NOTE — CONSULT NOTE ADULT - SUBJECTIVE AND OBJECTIVE BOX
Patient is a 66y old  Female who presents with a chief complaint of Respiratory distress (25 Jul 2017 03:17)      HPI:  66 year old F w/ PMH of ESRD (T/T/Sa) on HD, dCHF (EF 65%), CAD, anemia, uncontrolled HTN, with chronic left sided effusion presenting with SOB. Patient stated she received HD Saturday, since yesterday she began experiencing shortness of breath and fatigue. Patient on BIPAP and able to give limited history. She was seen here 2 weeks ago for a very similar presentation, received HD with removal of 4L as well as a thoracentesis with 1.1L removed, showing a transudative effusion. Patient denies any fevers, chills, or chest pain.     Patient had a thoracentesis done 2 days ago during which 2L was removed and the patient felt much better and reported that her dyspnea was resolved. Today, she was seen in dialysis and was saturating 99% on room air. She denies chest pain, shortness of breath, cough, fevers, chills, wheezing, hemoptysis, nightsweats.    ROS:  Per HPI, otherwise 12 point ROS negative      PAST MEDICAL & SURGICAL HISTORY:  Anemia: 2/2 CKD  Asthma  MI, old  CAD (coronary artery disease)  HLD (hyperlipidemia)  CKD (chronic kidney disease): Stage 5CKD  CVA (cerebral infarction)  Hypertension  AV fistula      FAMILY HISTORY:  No pertinent family history in first degree relatives      SOCIAL HISTORY:  Smoking Status: [ ] Current, [ ] Former, [x] Never  Pack Years:    MEDICATIONS:  Pulmonary:    Antimicrobials:    Anticoagulants:  heparin  Injectable 5000 Unit(s) SubCutaneous every 8 hours    Onc:    GI/:    Endocrine:  simvastatin 20 milliGRAM(s) Oral at bedtime    Cardiac:  enalapril 20 milliGRAM(s) Oral two times a day  carvedilol 25 milliGRAM(s) Oral every 12 hours  NIFEdipine XL 60 milliGRAM(s) Oral daily  NIFEdipine XL 90 milliGRAM(s) Oral at bedtime  torsemide 40 milliGRAM(s) Oral two times a day  metolazone 10 milliGRAM(s) Oral at bedtime    Other Medications:  simvastatin 20 milliGRAM(s) Oral at bedtime  cinacalcet 60 milliGRAM(s) Oral daily      Allergies    No Known Allergies    Intolerances        Vital Signs Last 24 Hrs  T(C): 36.7 (27 Jul 2017 13:20), Max: 37.2 (26 Jul 2017 21:43)  T(F): 98.1 (27 Jul 2017 13:20), Max: 99 (26 Jul 2017 21:43)  HR: 67 (27 Jul 2017 16:46) (65 - 81)  BP: 162/92 (27 Jul 2017 13:20) (129/68 - 174/84)  BP(mean): 92 (27 Jul 2017 01:00) (92 - 122)  RR: 16 (27 Jul 2017 16:46) (15 - 22)  SpO2: 97% (27 Jul 2017 16:46) (95% - 100%)    General: NAD, AAO x3  HEENT: No icterus,. Moist mucous membranes  Neck: No JVD noted. Supple, no meningismus  Cardio: S1, S2 noted, RRR. No murmurs, rubs or gallops  Resp: Decreased breath sounds Lt base, occasional crackles b/l bases.  Abdo: Soft, NT, bowel sounds present. No organomegaly  Extremities: No edema noted. Pulses present b/l  Neuro: AAO x3, grossly normal motor strength.  Lymphnodes: no lymphadenopathy identified.  Skin: Dry, no rashes    LABS:      CBC Full  -  ( 27 Jul 2017 06:57 )  WBC Count : 5.3 K/uL  Hemoglobin : 10.5 g/dL  Hematocrit : 32.3 %  Platelet Count - Automated : 229 K/uL  Mean Cell Volume : 96.1 fL  Mean Cell Hemoglobin : 31.3 pg  Mean Cell Hemoglobin Concentration : 32.5 g/dL  Auto Neutrophil # : x  Auto Lymphocyte # : x  Auto Monocyte # : x  Auto Eosinophil # : x  Auto Basophil # : x  Auto Neutrophil % : x  Auto Lymphocyte % : x  Auto Monocyte % : x  Auto Eosinophil % : x  Auto Basophil % : x    07-27    137  |  93<L>  |  16  ----------------------------<  131<H>  3.1<L>   |  32<H>  |  3.40<H>    Ca    10.2      27 Jul 2017 15:39  Phos  6.1     07-27  Mg     2.0     07-27    TPro  6.2  /  Alb  3.1<L>  /  TBili  0.3  /  DBili  <0.2  /  AST  19  /  ALT  6<L>  /  AlkPhos  139<H>  07-27                      RADIOLOGY & ADDITIONAL STUDIES (The following images were personally reviewed):

## 2017-07-27 NOTE — PROGRESS NOTE ADULT - PROBLEM SELECTOR PLAN 2
continue carvedilol 25 mg q12h   continue enalapril 20 mg q12h   continue metolazone 10 mg qhs   nifedipine 60 mg qam and 90 mg qhs   torsemide 40 mg q12h

## 2017-07-27 NOTE — DIETITIAN INITIAL EVALUATION ADULT. - ENERGY NEEDS
IBW 50Kg  %%  BMI 20.2    Utilized ABW to calculate needs, pt falls within % of IBW. Needs adjusted for HD. Fluids per MD 2/2 CHF.

## 2017-07-27 NOTE — PROGRESS NOTE ADULT - PROBLEM SELECTOR PLAN 1
Patient is a 66 year old female with ESRD on HD M/W/F. Patient was last dialyzed 7/25.     P - Dialysis today   Optiflux 180, , , 2K bath   Goal UF 2.5-3 kg over 4 hours

## 2017-07-27 NOTE — PROGRESS NOTE ADULT - PROBLEM SELECTOR PLAN 3
Pt with poorly controlled hypertension, reports home SBPs 160s-200s. Currently with Pt with poorly controlled hypertension, reports home SBPs 160s-200s. Currently with SBP 140s-160s.  - c/w patient's home medications of coreg 25mg BID, enalapril 20mg BID, metozalone 10mg QHS, nifedipine 60mg daily, nifedipine 90mg QHS

## 2017-07-27 NOTE — PROGRESS NOTE ADULT - PROBLEM SELECTOR PLAN 5
Pt with hyponatremia to 130 on admission, now resolved. Likely 2/2 hypervolemic hyponatremia. Pt s/p diuresis and dialysis x2.  - will continue to follow BMP

## 2017-07-27 NOTE — CONSULT NOTE ADULT - PROBLEM SELECTOR RECOMMENDATION 9
Patient s/p thoracentesis x2. Last one on 7/25/17 and 2L fluid removed.  - CT chest from 7/27/17 reviewed and shows recurrent Lt sided effusion, patient is not symptomatic yet.  - Pleural fluid studies from 1st tap were transudative but from the second tap were exudative as they meet modified Light's criteria (Cholesterol >45)  - Also, had a mildly elevated WBC count with Lymphocyte predominance  - Possible etiologies include Pseudoexudate vs malignancy  Recommend:  - Will repeat thoracentesis in am for symptomatic relief as fluid is likely to further accumulate and cause further respiratory distress.  - Will send additional fluid studies to evaluate between a true exudate vs pseudoexudate.  - Patient has an appt scheduled with Dr. Salinas on 7/31/17 at 12noon where a decision on pleuroscopy with pleural biopsy vs talc pleurodesis vs pleurX will be considered based on pleural fluid analysis  - O2 as needed, incentive spirometry.  - Thank you for the consult, will continue to follow.

## 2017-07-27 NOTE — PROGRESS NOTE ADULT - PROBLEM SELECTOR PLAN 1
hypoxic respiratory failure likely secondary to bilateral pleural effusions w/ L>R, effusions likely multifactorial in etiology: 2/2 to acute on chronic CHF exacerbation 2/2 to worsening ESRD or possibly malignant etiology. Pt receiving HD. S/P thoracentesis showing transudate in previous admission, likely similar etiology this episode. Low suspicion for infectious etiology at this time given pt is afebrile without leukocytosis. Thoracocentesis performed yesterday with 1.8ml of transudative fluid. Etiology likely similar to last admission.   - s/p 80mg lasix in ED with improved BPs, consider further lasix PRN  - strict Is/Os  - Patient now off BiPap and with SpO2 100% on RA  - CT chest 7/27

## 2017-07-27 NOTE — PROGRESS NOTE ADULT - PROBLEM SELECTOR PLAN 6
Pt initially with K 5.8 on admission, likely due to ESRD. EKG without T wave changes. Now resolved s/p dialysis, diuresis.  - will continue to follow BMP

## 2017-07-27 NOTE — PROGRESS NOTE ADULT - PROBLEM SELECTOR PLAN 5
Resolved - on admission Na 130, likely due to hypervolemic hyponatremia. Patient was diuresed. Now 136.     - f/u BMP

## 2017-07-27 NOTE — PROGRESS NOTE ADULT - PROBLEM SELECTOR PLAN 1
Pt with hypoxic respiratory failure on admission likely 2/2 bilateral L>R pleural effusions likely multifactorial in etiology related to worsening ESRD and CHF or possibly malignant etiology though thoracentesis on prior admission c/w transudative effusion. Pt receiving HD T/T/S. S/p thoracentesis during this hospitalization with 1.8L fluid removed. Low suspicion for infectious etiology at this time given pt is afebrile without leukocytosis. Etiology likely similar to last admission ~1wk prior to admission. Pt with hypoxic respiratory failure on admission likely 2/2 bilateral L>R pleural effusions likely multifactorial in etiology related to worsening ESRD and CHF or possibly malignant etiology though thoracentesis on prior admission c/w transudative effusion. Pt receiving HD T/T/S. S/p thoracentesis during this hospitalization with 1.8L fluid removed. Low suspicion for infectious etiology at this time given pt is afebrile without leukocytosis. Etiology likely similar to last admission ~1wk prior to admission.  - s/p 80mg lasix in ED  - CXR this AM with decreased pleural effusions  - CT chest 10AM demonstrated moderate to severe pulmonary edema with small right and large left pleural effusions; trace pericardial effusion and cardiomegaly.  - c/w torsemide 40mg BID  - Dialysis today  - strict Is/Os  - will repeat CXR in AM  - Will continue to monitor clinically Pt with hypoxic respiratory failure on admission likely 2/2 bilateral L>R pleural effusions likely multifactorial in etiology related to worsening ESRD and CHF or possibly malignant etiology though thoracentesis on prior admission c/w transudative effusion. Pt receiving HD T/T/S. S/p thoracentesis during this hospitalization with 1.8L fluid removed. Fluid c/w exudative effusion with lymphocytic predominance.   - s/p 80mg lasix in ED  - CXR this AM with decreased pleural effusions  - CT chest 10AM demonstrated moderate to severe pulmonary edema with small right and large left pleural effusions; trace pericardial effusion and cardiomegaly.  - Case   - c/w torsemide 40mg BID  - Dialysis today  - strict Is/Os  - will repeat CXR in AM  - Will continue to monitor clinically Pt with hypoxic respiratory failure on admission likely 2/2 bilateral L>R pleural effusions likely multifactorial in etiology related to worsening ESRD and CHF or possibly malignant etiology though thoracentesis on prior admission c/w transudative effusion. Pt receiving HD T/T/S. S/p thoracentesis during this hospitalization with 1.8L fluid removed. Fluid c/w exudative effusion with lymphocytic predominance though may be pseudoexudate. Given that effusion is unilateral and rapidly reaccumulates, cannot rule out malignancy.  - s/p 80mg lasix in ED  - CXR this AM with decreased pleural effusions  - CT chest 10AM demonstrated moderate to severe pulmonary edema with small right and large left pleural effusions; trace pericardial effusion and cardiomegaly.  - Case discussed with pulmonology, plan is to do repeat thoracentesis tomorrow for therapeutic and diagnostic purposes given prior effusion transudative and present one exudative.  - Per Pulm, pt to follow up with Dr. Salinas outpatient on Monday at noon and workup will continue workup outpatient with pleuroscopy and pleurex vs pleurodesis.  - c/w torsemide 40mg BID  - Dialysis today  - strict Is/Os  - will repeat CXR in AM  - Will continue to monitor clinically

## 2017-07-27 NOTE — PROGRESS NOTE ADULT - PROBLEM SELECTOR PLAN 4
Pt with chronic diastolic CHF (EF 65%) with BNP 57316, with moderate to severe pulmonary edema on CT scan.  - continue home carvedilol, enalapril, nifedipine, metozalone, torsemide as above

## 2017-07-28 ENCOUNTER — RESULT REVIEW (OUTPATIENT)
Age: 67
End: 2017-07-28

## 2017-07-28 ENCOUNTER — TRANSCRIPTION ENCOUNTER (OUTPATIENT)
Age: 67
End: 2017-07-28

## 2017-07-28 ENCOUNTER — APPOINTMENT (OUTPATIENT)
Dept: OTOLARYNGOLOGY | Facility: CLINIC | Age: 67
End: 2017-07-28

## 2017-07-28 VITALS
SYSTOLIC BLOOD PRESSURE: 149 MMHG | TEMPERATURE: 99 F | HEART RATE: 71 BPM | RESPIRATION RATE: 17 BRPM | DIASTOLIC BLOOD PRESSURE: 78 MMHG | OXYGEN SATURATION: 98 %

## 2017-07-28 LAB
ALBUMIN FLD-MCNC: 1.4 G/DL — SIGNIFICANT CHANGE UP
ANION GAP SERPL CALC-SCNC: 13 MMOL/L — SIGNIFICANT CHANGE UP (ref 5–17)
APTT BLD: 32 SEC — SIGNIFICANT CHANGE UP (ref 27.5–37.4)
B PERT IGG+IGM PNL SER: SIGNIFICANT CHANGE UP
BLD GP AB SCN SERPL QL: NEGATIVE — SIGNIFICANT CHANGE UP
BUN SERPL-MCNC: 27 MG/DL — HIGH (ref 7–23)
CALCIUM SERPL-MCNC: 10 MG/DL — SIGNIFICANT CHANGE UP (ref 8.4–10.5)
CHLORIDE SERPL-SCNC: 92 MMOL/L — LOW (ref 96–108)
CO2 SERPL-SCNC: 29 MMOL/L — SIGNIFICANT CHANGE UP (ref 22–31)
COLOR FLD: SIGNIFICANT CHANGE UP
COMMENT - FLUIDS: SIGNIFICANT CHANGE UP
CREAT SERPL-MCNC: 5 MG/DL — HIGH (ref 0.5–1.3)
EOSINOPHIL # FLD: 7 % — SIGNIFICANT CHANGE UP
FLUID INTAKE SUBSTANCE CLASS: SIGNIFICANT CHANGE UP
FLUID SEGMENTED GRANULOCYTES: 10 % — SIGNIFICANT CHANGE UP
GLUCOSE FLD-MCNC: 106 MG/DL — SIGNIFICANT CHANGE UP
GLUCOSE SERPL-MCNC: 89 MG/DL — SIGNIFICANT CHANGE UP (ref 70–99)
HCT VFR BLD CALC: 32 % — LOW (ref 34.5–45)
HGB BLD-MCNC: 10.4 G/DL — LOW (ref 11.5–15.5)
INR BLD: 0.99 — SIGNIFICANT CHANGE UP (ref 0.88–1.16)
LDH SERPL L TO P-CCNC: 100 U/L — SIGNIFICANT CHANGE UP
LDH SERPL L TO P-CCNC: 208 U/L — SIGNIFICANT CHANGE UP (ref 50–242)
LYMPHOCYTES # FLD: 39 % — SIGNIFICANT CHANGE UP
MAGNESIUM SERPL-MCNC: 1.9 MG/DL — SIGNIFICANT CHANGE UP (ref 1.6–2.6)
MCHC RBC-ENTMCNC: 31.7 PG — SIGNIFICANT CHANGE UP (ref 27–34)
MCHC RBC-ENTMCNC: 32.5 G/DL — SIGNIFICANT CHANGE UP (ref 32–36)
MCV RBC AUTO: 97.6 FL — SIGNIFICANT CHANGE UP (ref 80–100)
MESOTHL CELL # FLD: 2 % — SIGNIFICANT CHANGE UP
MONOS+MACROS # FLD: 35 % — SIGNIFICANT CHANGE UP
PH FLD: 7.76 — SIGNIFICANT CHANGE UP
PHOSPHATE SERPL-MCNC: 5.8 MG/DL — HIGH (ref 2.5–4.5)
PLATELET # BLD AUTO: 198 K/UL — SIGNIFICANT CHANGE UP (ref 150–400)
POTASSIUM SERPL-MCNC: 3.8 MMOL/L — SIGNIFICANT CHANGE UP (ref 3.5–5.3)
POTASSIUM SERPL-SCNC: 3.8 MMOL/L — SIGNIFICANT CHANGE UP (ref 3.5–5.3)
PROT FLD-MCNC: 2.6 G/DL — SIGNIFICANT CHANGE UP
PROT SERPL-MCNC: 6.4 G/DL — SIGNIFICANT CHANGE UP (ref 6–8.3)
PROTHROM AB SERPL-ACNC: 11 SEC — SIGNIFICANT CHANGE UP (ref 9.8–12.7)
RBC # BLD: 3.28 M/UL — LOW (ref 3.8–5.2)
RBC # FLD: 17.3 % — HIGH (ref 10.3–16.9)
RCV VOL RI: 5200 /UL — HIGH (ref 0–5)
RH IG SCN BLD-IMP: POSITIVE — SIGNIFICANT CHANGE UP
SODIUM SERPL-SCNC: 134 MMOL/L — LOW (ref 135–145)
SPECIMEN SOURCE FLD: SIGNIFICANT CHANGE UP
TOTAL NUCLEATED CELL COUNT, BODY FLUID: 93 /UL — HIGH (ref 0–5)
TRIGL FLD-MCNC: 20 MG/DL — SIGNIFICANT CHANGE UP
TUBE TYPE: SIGNIFICANT CHANGE UP
WBC # BLD: 3.8 K/UL — SIGNIFICANT CHANGE UP (ref 3.8–10.5)
WBC # FLD AUTO: 3.8 K/UL — SIGNIFICANT CHANGE UP (ref 3.8–10.5)

## 2017-07-28 PROCEDURE — 82553 CREATINE MB FRACTION: CPT

## 2017-07-28 PROCEDURE — 85730 THROMBOPLASTIN TIME PARTIAL: CPT

## 2017-07-28 PROCEDURE — 36415 COLL VENOUS BLD VENIPUNCTURE: CPT

## 2017-07-28 PROCEDURE — 84520 ASSAY OF UREA NITROGEN: CPT

## 2017-07-28 PROCEDURE — 87102 FUNGUS ISOLATION CULTURE: CPT

## 2017-07-28 PROCEDURE — 85610 PROTHROMBIN TIME: CPT

## 2017-07-28 PROCEDURE — 80053 COMPREHEN METABOLIC PANEL: CPT

## 2017-07-28 PROCEDURE — 89051 BODY FLUID CELL COUNT: CPT

## 2017-07-28 PROCEDURE — 71045 X-RAY EXAM CHEST 1 VIEW: CPT

## 2017-07-28 PROCEDURE — 84100 ASSAY OF PHOSPHORUS: CPT

## 2017-07-28 PROCEDURE — 80048 BASIC METABOLIC PNL TOTAL CA: CPT

## 2017-07-28 PROCEDURE — 87205 SMEAR GRAM STAIN: CPT

## 2017-07-28 PROCEDURE — 83986 ASSAY PH BODY FLUID NOS: CPT

## 2017-07-28 PROCEDURE — 93005 ELECTROCARDIOGRAM TRACING: CPT

## 2017-07-28 PROCEDURE — 82550 ASSAY OF CK (CPK): CPT

## 2017-07-28 PROCEDURE — 86900 BLOOD TYPING SEROLOGIC ABO: CPT

## 2017-07-28 PROCEDURE — 99239 HOSP IP/OBS DSCHRG MGMT >30: CPT

## 2017-07-28 PROCEDURE — 88305 TISSUE EXAM BY PATHOLOGIST: CPT

## 2017-07-28 PROCEDURE — 96374 THER/PROPH/DIAG INJ IV PUSH: CPT

## 2017-07-28 PROCEDURE — 87206 SMEAR FLUORESCENT/ACID STAI: CPT

## 2017-07-28 PROCEDURE — 85025 COMPLETE CBC W/AUTO DIFF WBC: CPT

## 2017-07-28 PROCEDURE — 88112 CYTOPATH CELL ENHANCE TECH: CPT

## 2017-07-28 PROCEDURE — 94660 CPAP INITIATION&MGMT: CPT

## 2017-07-28 PROCEDURE — 84484 ASSAY OF TROPONIN QUANT: CPT

## 2017-07-28 PROCEDURE — 90935 HEMODIALYSIS ONE EVALUATION: CPT

## 2017-07-28 PROCEDURE — 84155 ASSAY OF PROTEIN SERUM: CPT

## 2017-07-28 PROCEDURE — 82042 OTHER SOURCE ALBUMIN QUAN EA: CPT

## 2017-07-28 PROCEDURE — 83735 ASSAY OF MAGNESIUM: CPT

## 2017-07-28 PROCEDURE — 87075 CULTR BACTERIA EXCEPT BLOOD: CPT

## 2017-07-28 PROCEDURE — 83615 LACTATE (LD) (LDH) ENZYME: CPT

## 2017-07-28 PROCEDURE — 86901 BLOOD TYPING SEROLOGIC RH(D): CPT

## 2017-07-28 PROCEDURE — 87116 MYCOBACTERIA CULTURE: CPT

## 2017-07-28 PROCEDURE — 87015 SPECIMEN INFECT AGNT CONCNTJ: CPT

## 2017-07-28 PROCEDURE — 80076 HEPATIC FUNCTION PANEL: CPT

## 2017-07-28 PROCEDURE — 84300 ASSAY OF URINE SODIUM: CPT

## 2017-07-28 PROCEDURE — 99291 CRITICAL CARE FIRST HOUR: CPT | Mod: 25

## 2017-07-28 PROCEDURE — 71010: CPT | Mod: 26

## 2017-07-28 PROCEDURE — 84157 ASSAY OF PROTEIN OTHER: CPT

## 2017-07-28 PROCEDURE — 83930 ASSAY OF BLOOD OSMOLALITY: CPT

## 2017-07-28 PROCEDURE — 86850 RBC ANTIBODY SCREEN: CPT

## 2017-07-28 PROCEDURE — 71250 CT THORAX DX C-: CPT

## 2017-07-28 PROCEDURE — 96376 TX/PRO/DX INJ SAME DRUG ADON: CPT

## 2017-07-28 PROCEDURE — 32555 ASPIRATE PLEURA W/ IMAGING: CPT

## 2017-07-28 PROCEDURE — 82570 ASSAY OF URINE CREATININE: CPT

## 2017-07-28 PROCEDURE — 87070 CULTURE OTHR SPECIMN AEROBIC: CPT

## 2017-07-28 PROCEDURE — 83880 ASSAY OF NATRIURETIC PEPTIDE: CPT

## 2017-07-28 PROCEDURE — 84478 ASSAY OF TRIGLYCERIDES: CPT

## 2017-07-28 PROCEDURE — 99232 SBSQ HOSP IP/OBS MODERATE 35: CPT | Mod: 25

## 2017-07-28 PROCEDURE — 82945 GLUCOSE OTHER FLUID: CPT

## 2017-07-28 PROCEDURE — 84311 SPECTROPHOTOMETRY: CPT

## 2017-07-28 PROCEDURE — 83935 ASSAY OF URINE OSMOLALITY: CPT

## 2017-07-28 PROCEDURE — 85027 COMPLETE CBC AUTOMATED: CPT

## 2017-07-28 RX ADMIN — Medication 60 MILLIGRAM(S): at 06:09

## 2017-07-28 RX ADMIN — Medication 20 MILLIGRAM(S): at 06:09

## 2017-07-28 RX ADMIN — CARVEDILOL PHOSPHATE 25 MILLIGRAM(S): 80 CAPSULE, EXTENDED RELEASE ORAL at 06:09

## 2017-07-28 RX ADMIN — Medication 40 MILLIGRAM(S): at 06:09

## 2017-07-28 NOTE — PROGRESS NOTE ADULT - SUBJECTIVE AND OBJECTIVE BOX
Patient seen and examined at bedside.     Last HD was on 7/27  Tolerated well    Needed BiPAP use last night and was orthopneic and needed BiPAP  Off oxygen right now upright   Pending therapeutic thoracentesis today.     No leg edema, no nausea/vomiting, no hand tremors, or metallic taste right now.     enalapril 20 milliGRAM(s) two times a day  simvastatin 20 milliGRAM(s) at bedtime  carvedilol 25 milliGRAM(s) every 12 hours  NIFEdipine XL 60 milliGRAM(s) daily  NIFEdipine XL 90 milliGRAM(s) at bedtime  torsemide 40 milliGRAM(s) two times a day  metolazone 10 milliGRAM(s) at bedtime  cinacalcet 60 milliGRAM(s) daily      Allergies    No Known Allergies    Intolerances        T(C): , Max: 36.9 (07-27-17 @ 21:25)  T(F): , Max: 98.5 (07-27-17 @ 21:25)  HR: 73 (07-28-17 @ 06:08)  BP: 165/90 (07-28-17 @ 05:51)  BP(mean): --  RR: 20 (07-28-17 @ 05:51)  SpO2: 100% (07-28-17 @ 06:08)  Wt(kg): --    07-27 @ 07:01  -  07-28 @ 07:00  --------------------------------------------------------  IN:    Oral Fluid: 150 mL  Total IN: 150 mL    OUT:  Total OUT: 0 mL    Total NET: 150 mL        Height (cm): 157.48 (07-28 @ 05:51)  Weight (kg): 50 (07-28 @ 05:51)  BMI (kg/m2): 20.2 (07-28 @ 05:51)  BSA (m2): 1.48 (07-28 @ 05:51)      LABS:                        10.4   3.8   )-----------( 198      ( 28 Jul 2017 06:37 )             32.0     07-28    134<L>  |  92<L>  |  27<H>  ----------------------------<  89  3.8   |  29  |  5.00<H>    Ca    10.0      28 Jul 2017 06:37  Phos  5.8     07-28  Mg     1.9     07-28    TPro  6.2  /  Alb  3.1<L>  /  TBili  0.3  /  DBili  <0.2  /  AST  19  /  ALT  6<L>  /  AlkPhos  139<H>  07-27    Osmolality, Serum: 283 mosm/kg [280 - 301] (07-27 @ 15:39)        Sodium, Random Urine: 95 mmol/L (07-27 @ 19:59)  Creatinine, Random Urine: 13 mg/dL (07-27 @ 19:59)        RADIOLOGY & ADDITIONAL STUDIES:

## 2017-07-28 NOTE — DISCHARGE NOTE ADULT - HOSPITAL COURSE
66 year old F w/ PMH of ESRD on HD, dCHF (EF 65%), CAD, anemia, uncontrolled HTN, with chronic left sided effusion presented with SOB and admitted for hypoxic respiratory failure secondary to fluid overload. Patient was recently discharged ~1 week PTA after pleural effusion with thoracocentesis draining 1.1L of fluid (transudative) and HD taking off 4L of fluid. Pt was admitted to [email or web address] and followed by Pulmonology and Nephrology. She was found to have re-accumulation of L pleural effusion. She received lasix 80mg in ED followed by HD x 2 (3.5L, 3L) as well as thoracentesis x 2 (1.8L, exudative; ) throughout her stay with relief of symptoms and improvement of pleural effusion and fluid status. Pt's hospital stay was complicated by SBP 170s-200s which improved s/p lasix and administration of pt's home meds of carvedilol, enalapril, nifedipine, metozalone, torsemide. Given second effusion with different composition than recent prior effusion, pt instructed to follow up with Pulmonology for further workup to determine cause of rapidly re-accumulating pleural effusion and evaluate need for pleurx vs pleurodesis. Patient was medically optimized, stable and ready for discharge. Plan of care and return precautions were discussed with the patient who verbally stated understanding. 66 year old F w/ PMH of ESRD on HD, dCHF (EF 65%), CAD, anemia, uncontrolled HTN, with chronic left sided effusion presented with SOB and admitted for hypoxic respiratory failure secondary to fluid overload. Patient was recently discharged ~1 week PTA after pleural effusion with thoracocentesis draining 1.1L of fluid (transudative) and HD taking off 4L of fluid. Pt was admitted to [facility] and followed by Pulmonology and Nephrology. She was found to have re-accumulation of L pleural effusion. She received lasix 80mg in ED followed by HD x 2 (3.5L, 3L) as well as thoracentesis x 2 (1.8L, exudative; 800mL, analysis to follow) throughout her stay with relief of symptoms and improvement of pleural effusion and fluid status. Pt's hospital stay was complicated by SBP 170s-200s which improved s/p lasix and administration of pt's home meds of carvedilol, enalapril, nifedipine, metozalone, torsemide. Given second effusion with different composition than recent prior effusion, pt instructed to follow up with Pulmonology for further workup to determine cause of rapidly re-accumulating pleural effusion and evaluate need for pleurx vs pleurodesis. Patient was medically optimized, stable and ready for discharge. Plan of care and return precautions were discussed with the patient who verbally stated understanding. 66 year old F w/ PMH of ESRD on HD, dCHF (EF 65%), CAD, anemia, uncontrolled HTN, with chronic left sided effusion presented with SOB and admitted for hypoxic respiratory failure secondary to fluid overload. Patient was recently discharged ~1 week PTA after pleural effusion with thoracocentesis draining 1.1L of fluid (transudative) and HD taking off 4L of fluid. Pt was admitted to [email or web address] and followed by Pulmonology and Nephrology. She was found to have re-accumulation of L pleural effusion with CT showing moderate to severe pulmonary edema and large L pleural effusion. She received lasix 80mg in ED followed by HD x 2 (3.5L, 3L) as well as thoracentesis x 2 (1.8L, exudative; 800mL, analysis to follow) throughout her stay with relief of symptoms and improvement of pleural effusion and fluid status. Pt's hospital stay was complicated by SBP 170s-200s which improved s/p lasix and administration of pt's home meds of carvedilol, enalapril, nifedipine, metozalone, torsemide. Given second effusion with different composition than recent prior effusion, pt instructed to follow up with Pulmonology for further workup to determine cause of rapidly re-accumulating pleural effusion and evaluate need for pleurx vs pleurodesis. Patient was medically optimized, stable and ready for discharge. Plan of care and return precautions were discussed with the patient who verbally stated understanding. 66 year old F w/ PMH of ESRD on HD, dCHF (EF 65%), CAD, anemia, uncontrolled HTN, with chronic left sided effusion presented with SOB and admitted for hypoxic respiratory failure secondary to fluid overload. Patient was recently discharged ~1 week PTA after pleural effusion with thoracocentesis draining 1.1L of fluid (transudative) and HD taking off 4L of fluid. Pt was admitted to [facility] and followed by Pulmonology and Nephrology. She was found to have re-accumulation of L pleural effusion with CT showing moderate to severe pulmonary edema and large L pleural effusion. She received lasix 80mg in ED followed by HD x 2 (3.5L, 3L) as well as thoracentesis x 2 (1.8L, exudative; 800mL, analysis to follow) throughout her stay with relief of symptoms and improvement of pleural effusion and fluid status. Pt's hospital stay was complicated by SBP 170s-200s which improved s/p lasix and administration of pt's home meds of carvedilol, enalapril, nifedipine, metozalone, torsemide. Pt had repeat CT chest which showed reaccumulation of left pleural effusion and pt underwent 3rd thoracentesis (800cc). Pleural fluid results pending. Pt will follow-up with pulmonology for further workup to determine cause of rapidly re-accumulating pleural effusion and evaluate need for pleurx vs pleurodesis. Patient was medically optimized, stable and ready for discharge. Plan of care and return precautions were discussed with the patient who verbally stated understanding. Pt has an appointment with Dr. Salinas on Monday, July 31st, 2017.

## 2017-07-28 NOTE — PROGRESS NOTE ADULT - PROBLEM SELECTOR PLAN 1
Patient s/p thoracentesis x2. Last one on 7/25/17 and 2L fluid removed.  - CT chest from 7/27/17 reviewed and shows recurrent Lt sided effusion, patient is not symptomatic yet.  - Pleural fluid studies from 1st tap were transudative but from the second tap were exudative as they meet modified Light's criteria (Cholesterol >45)  - Also, had a mildly elevated WBC count with Lymphocyte predominance noted on 2nd thoracentesis  - S/p 3 rd thoracentesis with 800cc of serosanguinous fluid drained 7/28/17  - Closing pleural pressure was -8cm of water.  - Possible etiologies include Pseudoexudate vs malignancy  - Pleural fluid studies pending. By appearance of fluid, likely to be an exudate.  - Patient has an appt scheduled with Dr. Salinas on 7/31/17 at 12noon where a decision on pleuroscopy with pleural biopsy vs talc pleurodesis vs pleurX will be considered based on pleural fluid analysis  - O2 as needed, incentive spirometry.  - OK to DC from pulmonary standpoint. Please call us with any questions.

## 2017-07-28 NOTE — PROGRESS NOTE ADULT - SUBJECTIVE AND OBJECTIVE BOX
Interval Events:  Patient seen and examined at bedside. No acute events overnight. Patient reports no shortness of breath or chest pain.    MEDICATIONS:  Pulmonary:    Antimicrobials:    Anticoagulants:    Cardiac:  enalapril 20 milliGRAM(s) Oral two times a day  carvedilol 25 milliGRAM(s) Oral every 12 hours  NIFEdipine XL 60 milliGRAM(s) Oral daily  NIFEdipine XL 90 milliGRAM(s) Oral at bedtime  torsemide 40 milliGRAM(s) Oral two times a day  metolazone 10 milliGRAM(s) Oral at bedtime    Endocrine:  simvastatin 20 milliGRAM(s) Oral at bedtime    Allergies    No Known Allergies    Intolerances        Vital Signs Last 24 Hrs  T(C): 36.9 (28 Jul 2017 05:51), Max: 36.9 (27 Jul 2017 21:25)  T(F): 98.4 (28 Jul 2017 05:51), Max: 98.5 (27 Jul 2017 21:25)  HR: 73 (28 Jul 2017 06:08) (67 - 89)  BP: 165/90 (28 Jul 2017 05:51) (162/92 - 178/90)  BP(mean): --  RR: 20 (28 Jul 2017 05:51) (16 - 21)  SpO2: 100% (28 Jul 2017 06:08) (95% - 100%)    07-27 @ 07:01  -  07-28 @ 07:00  --------------------------------------------------------  IN: 150 mL / OUT: 0 mL / NET: 150 mL      General: NAD, AAO x3  HEENT: No icterus,. Moist mucous membranes  Neck: No JVD noted. Supple, no meningismus  Cardio: S1, S2 noted, RRR. No murmurs, rubs or gallops  Resp: Decreased breath sounds Lt base, occasional crackles b/l bases.  Abdo: Soft, NT, bowel sounds present. No organomegaly  Extremities: No edema noted. Pulses present b/l  Neuro: AAO x3, grossly normal motor strength.  Lymphnodes: no lymphadenopathy identified.  Skin: Dry, no rashes    LABS:      CBC Full  -  ( 28 Jul 2017 06:37 )  WBC Count : 3.8 K/uL  Hemoglobin : 10.4 g/dL  Hematocrit : 32.0 %  Platelet Count - Automated : 198 K/uL  Mean Cell Volume : 97.6 fL  Mean Cell Hemoglobin : 31.7 pg  Mean Cell Hemoglobin Concentration : 32.5 g/dL  Auto Neutrophil # : x  Auto Lymphocyte # : x  Auto Monocyte # : x  Auto Eosinophil # : x  Auto Basophil # : x  Auto Neutrophil % : x  Auto Lymphocyte % : x  Auto Monocyte % : x  Auto Eosinophil % : x  Auto Basophil % : x    07-28    134<L>  |  92<L>  |  27<H>  ----------------------------<  89  3.8   |  29  |  5.00<H>    Ca    10.0      28 Jul 2017 06:37  Phos  5.8     07-28  Mg     1.9     07-28    TPro  6.2  /  Alb  3.1<L>  /  TBili  0.3  /  DBili  <0.2  /  AST  19  /  ALT  6<L>  /  AlkPhos  139<H>  07-27    PT/INR - ( 28 Jul 2017 08:48 )   PT: 11.0 sec;   INR: 0.99          PTT - ( 28 Jul 2017 08:48 )  PTT:32.0 sec                  RADIOLOGY & ADDITIONAL STUDIES (The following images were personally reviewed):

## 2017-07-28 NOTE — PROGRESS NOTE ADULT - ATTENDING COMMENTS
patient states that Sensipar "makes her sick"  complains of nausea  will discontinue for now
seen and examined agree with above  symptomatic l pleural effusion s/p thoracenthesis. presumably from ESRD cardiorenal DCHF  will need CT chest to define further  High risk for morbidity, mortality secondary to the above mentioned medical conditions
Patient seen and examined with house-staff during bedside rounds.  Resident note read, including vitals, physical findings, laboratory data, and radiological reports.   Revisions included below.  Direct personal management at bed side and extensive interpretation of the data.  Plan was outlined and discussed in details with the housestaff.  Decision making of high complexity  Patient have drainage off transudative pleura The pulmonary status improved dramatically is after the thoracentesis. CT scan of the chest evaluate thelung parenchyma. Hemodialysis with fluid removal. No evidence of pneumonia.
Pt seen and examined at bedside, VSS, exam with RRR, rales on L lower half and decreased BS at base, abd soft, bruit at fistula site, no LE edema.  Labs and imaging reviewed.  65 yo F with ESRD on HD, dCHF, CAD, anemia of chornic disease, uncontrolled HTN, hyperkalemia, hyponatremia, recurrent unilateral pleural effusions  - HD today  - d/w pulm who will repeat thoracentesis tomorrow and f/u with pt on Mon for results, pleuroscopy, and final decision making regarding intervention- eg pleurx and pleurodesis  - will d/c home after thora tomorrow

## 2017-07-28 NOTE — DISCHARGE NOTE ADULT - ADDITIONAL INSTRUCTIONS
Dr. Salinas (Pulmonology) - Monday, July 31st, 2017  [address] [place] [id number]    Dr. Fito Dunham MD Cardiology  Address: [address] [place] [address]  Phone: [phone]

## 2017-07-28 NOTE — DISCHARGE NOTE ADULT - CARE PLAN
Principal Discharge DX:	Pleural effusion  Goal:	Resolution of difficulty breathing, workup and management for prevention of re-accumulation of fluid  Instructions for follow-up, activity and diet:	You were found to have fluid in your lungs again which was making it difficult to breathe. The fluid was removed with a needle by Pulmonology twice during your hospitalization for therapeutic benefit as well as diagnosis. Please follow up with Dr. Slainas on Monday, July 31st at 12PM to discuss the analysis of the fluid and determine a plan moving forward for preventing it from reaccumulating.  Secondary Diagnosis:	ESRD (end stage renal disease)  Instructions for follow-up, activity and diet:	You have a known diagnosis of chronic kidney disease for which you receive dialysis on Tuesday, Thursday, and Saturday. Please continue to follow up with your nephrologist and continue with your usual dialysis schedule.  Secondary Diagnosis:	Chronic diastolic congestive heart failure  Instructions for follow-up, activity and diet:	You have a known diagnosis of diastolic heart failure. This means that your heart does not move blood forward as strong as it used to, which can lead to fluid buildup in the lungs and contribute to shortness of breath. Please continue to take your home medications of carvedilol, enalaprin, nifedipine, metozalone, and torsemide and continue to follow up with your cardiologist as an outpatient for further management of this condition.  Secondary Diagnosis:	Hypertension  Instructions for follow-up, activity and diet:	You have a known diagnosis of high blood pressure. High blood pressure can cause damage to the brain, heart and kidneys, so it is important to manage this condition. Please continue your home medications and continue to follow up with your cardiologist and nephrologist. Principal Discharge DX:	Pleural effusion  Goal:	Resolution of difficulty breathing, workup and management for prevention of re-accumulation of fluid  Instructions for follow-up, activity and diet:	You were found to have fluid in your lungs again which was making it difficult to breathe. The fluid was removed with a needle by Pulmonology twice during your hospitalization for therapeutic benefit as well as diagnosis. Please follow up with Dr. Salinas on Monday, July 31st at 12PM to discuss the analysis of the fluid and determine a plan moving forward for preventing it from reaccumulating.  Secondary Diagnosis:	ESRD (end stage renal disease)  Instructions for follow-up, activity and diet:	You have a known diagnosis of chronic kidney disease for which you receive dialysis on Tuesday, Thursday, and Saturday. Please continue to follow up with your nephrologist and continue with your usual dialysis schedule.  Secondary Diagnosis:	Chronic diastolic congestive heart failure  Instructions for follow-up, activity and diet:	You have a known diagnosis of diastolic heart failure. This means that your heart does not move blood forward as strong as it used to, which can lead to fluid buildup in the lungs and contribute to shortness of breath. Please continue to take your home medications of carvedilol, enalaprin, nifedipine, metozalone, and torsemide and continue to follow up with your cardiologist as an outpatient for further management of this condition.  Secondary Diagnosis:	Hypertension  Instructions for follow-up, activity and diet:	You have a known diagnosis of high blood pressure. High blood pressure can cause damage to the brain, heart and kidneys, so it is important to manage this condition. Please continue your home medications and continue to follow up with your cardiologist and nephrologist.

## 2017-07-28 NOTE — PROGRESS NOTE ADULT - PROBLEM SELECTOR PROBLEM 1
ESRD (end stage renal disease)
Respiratory failure
Respiratory failure
Recurrent pleural effusion on left
Respiratory distress
ESRD (end stage renal disease)
ESRD (end stage renal disease)

## 2017-07-28 NOTE — DISCHARGE NOTE ADULT - MEDICATION SUMMARY - MEDICATIONS TO TAKE
I will START or STAY ON the medications listed below when I get home from the hospital:    aspirin 81 mg oral delayed release tablet  -- 1 tab(s) by mouth once a day  -- Indication: For CAD (coronary artery disease)    enalapril 20 mg oral tablet  -- 1 tab(s) by mouth 2 times a day  -- Indication: For Hypertension    simvastatin 20 mg oral tablet  -- 1 tab(s) by mouth once a day (at bedtime)  -- Indication: For CAD (coronary artery disease)    carvedilol 25 mg oral tablet  -- 1 tab(s) by mouth every 12 hours  -- Indication: For Hypertension    Ventolin HFA 90 mcg/inh inhalation aerosol  -- 2 puff(s) inhaled every 6 hours - for asthma  -- For inhalation only.  It is very important that you take or use this exactly as directed.  Do not skip doses or discontinue unless directed by your doctor.  Obtain medical advice before taking any non-prescription drugs as some may affect the action of this medication.  Shake well before use.    -- Indication: For Asthma    NIFEdipine 60 mg oral tablet, extended release  -- 1 tab(s) by mouth once a day in the morning  -- Indication: For Hypertension    NIFEdipine 90 mg oral tablet, extended release  -- 1 tab(s) by mouth once a day at bedtime  -- Indication: For Hypertension    metOLazone 10 mg oral tablet  -- 1 tab(s) by mouth once a day 30 minutes before PM torsemide  -- Indication: For CHF (congestive heart failure)    torsemide 20 mg oral tablet  -- 2 tab(s) by mouth 2 times a day  -- Avoid prolonged or excessive exposure to direct and/or artificial sunlight while taking this medication.  It is very important that you take or use this exactly as directed.  Do not skip doses or discontinue unless directed by your doctor.  It may be advisable to drink a full glass orange juice or eat a banana daily while taking this medication.    -- Indication: For CHF (congestive heart failure)

## 2017-07-28 NOTE — DISCHARGE NOTE ADULT - PATIENT PORTAL LINK FT
“You can access the Follow[facility] Patient Portal, offered by [facility], by registering with the following website: [email or web address]”

## 2017-07-28 NOTE — DISCHARGE NOTE ADULT - CARE PROVIDER_API CALL
Josh Salinas), Internal Medicine; Pulmonary Disease  [address] [place] [address]  Phone: [phone]  Fax: [phone]    Fito Dunham), Cardiology; Internal Medicine  [address] [place] [address]  Phone: [phone]  Fax: [phone]

## 2017-07-28 NOTE — PROGRESS NOTE ADULT - PROBLEM SELECTOR PLAN 1
Patient is a 66 year old female with ESRD on HD M/W/F. Patient was last dialyzed 7/25.     P - Although still with dyspnea, will have patient proceed with thoracentesis first as this will better alleviate her dyspnea.     Will evaluate to see if need for repeat HD later today based on clinical status. Otherwise defer to Saturday routine HD

## 2017-07-28 NOTE — PROGRESS NOTE ADULT - PROBLEM SELECTOR PROBLEM 3
Pleural effusion
CHF (congestive heart failure)
CHF (congestive heart failure)
Hypertension
Chronic kidney disease-mineral and bone disorder
Chronic kidney disease-mineral and bone disorder

## 2017-07-28 NOTE — PROGRESS NOTE ADULT - ASSESSMENT
65 y/o lady admitted to [facility] for respiratory failure 2/2 recurrent pleural effusion and pulm edema.
66 year old F w/ PMH of ESRD on HD, dCHF (EF 65%), CAD, anemia, uncontrolled HTN, with chronic left sided effusion presenting with SOB admitted for hypoxic respiratory failure secondary to fluid overload, s/p pleuracentesis with 1.8L of fluid drained yesterday and dialysis x2.
66 year old F w/ PMH of ESRD on HD, dCHF (EF 65%), CAD, anemia, uncontrolled HTN, with chronic left sided effusion presenting with SOB admitted for hypoxic respiratory failure secondary to fluid overload. Pleural effusion drained 1800ml of fluid yesterday. Chest xray much improved, with still some remaining fluid.
66 year old F w/ PMH of ESRD on HD, dCHF (EF 65%), CAD, anemia, uncontrolled HTN, with chronic left sided effusion presenting with SOB admitted for hypoxic respiratory failure secondary to fluid overload. Pleural effusion drained 1800ml of fluid yesterday. Chest xray much improved, with still some remaining fluid.
This is 66 year old female with PMH stated above. Admitted for fluid overload and left sided pleural effusion. Yesterday HD done - 4 hours with 3.5 liters UF. No need of Hd for today
Patient is a 66 year old female with a history of ESRD on HD T/Th/Sat, diastolic heart failure, coronary artery disease, anemia and hypertension whom presented tot  hospital with complaints of shortness of breath.
Patient is a 66 year old female with a history of ESRD on HD T/Th/Sat, diastolic heart failure, coronary artery disease, anemia and hypertension whom presented tot  hospital with complaints of shortness of breath. Pending therapeutic thoracentesis.

## 2017-07-28 NOTE — PROGRESS NOTE ADULT - PROBLEM SELECTOR PROBLEM 4
ESRD (end stage renal disease)
ESRD (end stage renal disease)
Chronic diastolic congestive heart failure
Pleural effusion
Pleural effusion

## 2017-07-28 NOTE — PROGRESS NOTE ADULT - PROVIDER SPECIALTY LIST ADULT
Internal Medicine
Internal Medicine
Nephrology
Pulmonology
Internal Medicine

## 2017-07-28 NOTE — PROCEDURE NOTE - NSPROCDETAILS_GEN_ALL_CORE
catheter inserted over needle/Seldinger technique/location identified, draped/prepped, sterile technique used, needle inserted/introduced

## 2017-07-28 NOTE — DISCHARGE NOTE ADULT - PLAN OF CARE
You were found to have fluid in your lungs again which was making it difficult to breathe. The fluid was removed with a needle by Pulmonology twice during your hospitalization for therapeutic benefit as well as diagnosis. Please follow up with Dr. Salinas on Monday, July 31st at 12PM to discuss the analysis of the fluid and determine a plan moving forward for preventing it from reaccumulating. Resolution of difficulty breathing, workup and management for prevention of re-accumulation of fluid You have a known diagnosis of chronic kidney disease for which you receive dialysis on Tuesday, Thursday, and Saturday. Please continue to follow up with your nephrologist and continue with your usual dialysis schedule. You have a known diagnosis of diastolic heart failure. This means that your heart does not move blood forward as strong as it used to, which can lead to fluid buildup in the lungs and contribute to shortness of breath. Please continue to take your home medications of carvedilol, enalaprin, nifedipine, metozalone, and torsemide and continue to follow up with your cardiologist as an outpatient for further management of this condition. You have a known diagnosis of high blood pressure. High blood pressure can cause damage to the brain, heart and kidneys, so it is important to manage this condition. Please continue your home medications and continue to follow up with your cardiologist and nephrologist.

## 2017-07-28 NOTE — DISCHARGE NOTE ADULT - CARE PROVIDERS DIRECT ADDRESSES
[email or web address]

## 2017-07-28 NOTE — DISCHARGE NOTE ADULT - MEDICATION SUMMARY - MEDICATIONS TO STOP TAKING
I will STOP taking the medications listed below when I get home from the hospital:    Sensipar 60 mg oral tablet  -- 1 tab(s) by mouth once a day

## 2017-07-29 LAB
GRAM STN FLD: SIGNIFICANT CHANGE UP
NIGHT BLUE STAIN TISS: SIGNIFICANT CHANGE UP
SPECIMEN SOURCE: SIGNIFICANT CHANGE UP
SPECIMEN SOURCE: SIGNIFICANT CHANGE UP

## 2017-07-30 LAB
CULTURE RESULTS: NO GROWTH — SIGNIFICANT CHANGE UP
SPECIMEN SOURCE: SIGNIFICANT CHANGE UP

## 2017-07-31 ENCOUNTER — APPOINTMENT (OUTPATIENT)
Dept: PULMONOLOGY | Facility: CLINIC | Age: 67
End: 2017-07-31
Payer: MEDICARE

## 2017-07-31 VITALS
OXYGEN SATURATION: 99 % | DIASTOLIC BLOOD PRESSURE: 80 MMHG | SYSTOLIC BLOOD PRESSURE: 150 MMHG | HEART RATE: 70 BPM | RESPIRATION RATE: 12 BRPM

## 2017-07-31 LAB — NON-GYNECOLOGICAL CYTOLOGY STUDY: SIGNIFICANT CHANGE UP

## 2017-07-31 PROCEDURE — 99215 OFFICE O/P EST HI 40 MIN: CPT

## 2017-08-01 ENCOUNTER — INPATIENT (INPATIENT)
Facility: HOSPITAL | Age: 67
LOS: 2 days | Discharge: HOME HEALTH | DRG: 166 | End: 2017-08-04
Attending: INTERNAL MEDICINE | Admitting: INTERNAL MEDICINE
Payer: MEDICARE

## 2017-08-01 ENCOUNTER — APPOINTMENT (OUTPATIENT)
Dept: PULMONOLOGY | Facility: HOSPITAL | Age: 67
End: 2017-08-01

## 2017-08-01 ENCOUNTER — RESULT REVIEW (OUTPATIENT)
Age: 67
End: 2017-08-01

## 2017-08-01 VITALS
HEART RATE: 84 BPM | DIASTOLIC BLOOD PRESSURE: 124 MMHG | SYSTOLIC BLOOD PRESSURE: 241 MMHG | OXYGEN SATURATION: 100 % | TEMPERATURE: 98 F | RESPIRATION RATE: 20 BRPM

## 2017-08-01 DIAGNOSIS — I77.0 ARTERIOVENOUS FISTULA, ACQUIRED: Chronic | ICD-10-CM

## 2017-08-01 DIAGNOSIS — I13.2 HYPERTENSIVE HEART AND CHRONIC KIDNEY DISEASE WITH HEART FAILURE AND WITH STAGE 5 CHRONIC KIDNEY DISEASE, OR END STAGE RENAL DISEASE: ICD-10-CM

## 2017-08-01 DIAGNOSIS — J90 PLEURAL EFFUSION, NOT ELSEWHERE CLASSIFIED: ICD-10-CM

## 2017-08-01 DIAGNOSIS — I25.10 ATHEROSCLEROTIC HEART DISEASE OF NATIVE CORONARY ARTERY WITHOUT ANGINA PECTORIS: ICD-10-CM

## 2017-08-01 DIAGNOSIS — Z99.2 DEPENDENCE ON RENAL DIALYSIS: ICD-10-CM

## 2017-08-01 DIAGNOSIS — J96.90 RESPIRATORY FAILURE, UNSPECIFIED, UNSPECIFIED WHETHER WITH HYPOXIA OR HYPERCAPNIA: ICD-10-CM

## 2017-08-01 DIAGNOSIS — D64.9 ANEMIA, UNSPECIFIED: ICD-10-CM

## 2017-08-01 DIAGNOSIS — J45.909 UNSPECIFIED ASTHMA, UNCOMPLICATED: ICD-10-CM

## 2017-08-01 DIAGNOSIS — E78.5 HYPERLIPIDEMIA, UNSPECIFIED: ICD-10-CM

## 2017-08-01 DIAGNOSIS — E87.5 HYPERKALEMIA: ICD-10-CM

## 2017-08-01 DIAGNOSIS — Z29.9 ENCOUNTER FOR PROPHYLACTIC MEASURES, UNSPECIFIED: ICD-10-CM

## 2017-08-01 DIAGNOSIS — D63.1 ANEMIA IN CHRONIC KIDNEY DISEASE: ICD-10-CM

## 2017-08-01 DIAGNOSIS — N18.5 CHRONIC KIDNEY DISEASE, STAGE 5: ICD-10-CM

## 2017-08-01 DIAGNOSIS — E87.1 HYPO-OSMOLALITY AND HYPONATREMIA: ICD-10-CM

## 2017-08-01 DIAGNOSIS — I50.32 CHRONIC DIASTOLIC (CONGESTIVE) HEART FAILURE: ICD-10-CM

## 2017-08-01 DIAGNOSIS — I10 ESSENTIAL (PRIMARY) HYPERTENSION: ICD-10-CM

## 2017-08-01 DIAGNOSIS — N18.9 CHRONIC KIDNEY DISEASE, UNSPECIFIED: ICD-10-CM

## 2017-08-01 LAB
BASE EXCESS BLDV CALC-SCNC: 4.9 MMOL/L — SIGNIFICANT CHANGE UP
CA-I SERPL-SCNC: 1.12 MMOL/L — SIGNIFICANT CHANGE UP (ref 1.12–1.3)
GAS PNL BLDV: 133 MMOL/L — LOW (ref 138–146)
GAS PNL BLDV: SIGNIFICANT CHANGE UP
GAS PNL BLDV: SIGNIFICANT CHANGE UP
HCO3 BLDV-SCNC: 30 MMOL/L — HIGH (ref 20–27)
PCO2 BLDV: 47 MMHG — SIGNIFICANT CHANGE UP (ref 41–51)
PH BLDV: 7.43 — SIGNIFICANT CHANGE UP (ref 7.32–7.43)
PO2 BLDV: 35 MMHG — SIGNIFICANT CHANGE UP
POTASSIUM BLDV-SCNC: 3.8 MMOL/L — SIGNIFICANT CHANGE UP (ref 3.5–4.9)
SAO2 % BLDV: 63 % — SIGNIFICANT CHANGE UP

## 2017-08-01 PROCEDURE — 99222 1ST HOSP IP/OBS MODERATE 55: CPT | Mod: 25

## 2017-08-01 PROCEDURE — 32557 INSERT CATH PLEURA W/ IMAGE: CPT | Mod: 59

## 2017-08-01 PROCEDURE — 32550 INSERT PLEURAL CATH: CPT | Mod: 59

## 2017-08-01 PROCEDURE — 32551 INSERTION OF CHEST TUBE: CPT | Mod: 59

## 2017-08-01 PROCEDURE — 32650 THORACOSCOPY W/PLEURODESIS: CPT

## 2017-08-01 PROCEDURE — 32609 THORACOSCOPY W/BX PLEURA: CPT

## 2017-08-01 PROCEDURE — 71010: CPT | Mod: 26

## 2017-08-01 RX ORDER — HYDROMORPHONE HYDROCHLORIDE 2 MG/ML
0.5 INJECTION INTRAMUSCULAR; INTRAVENOUS; SUBCUTANEOUS ONCE
Qty: 0 | Refills: 0 | Status: DISCONTINUED | OUTPATIENT
Start: 2017-08-01 | End: 2017-08-01

## 2017-08-01 RX ORDER — ASPIRIN/CALCIUM CARB/MAGNESIUM 324 MG
81 TABLET ORAL DAILY
Qty: 0 | Refills: 0 | Status: DISCONTINUED | OUTPATIENT
Start: 2017-08-01 | End: 2017-08-04

## 2017-08-01 RX ORDER — HYDROMORPHONE HYDROCHLORIDE 2 MG/ML
2 INJECTION INTRAMUSCULAR; INTRAVENOUS; SUBCUTANEOUS EVERY 6 HOURS
Qty: 0 | Refills: 0 | Status: DISCONTINUED | OUTPATIENT
Start: 2017-08-01 | End: 2017-08-02

## 2017-08-01 RX ORDER — NIFEDIPINE 30 MG
60 TABLET, EXTENDED RELEASE 24 HR ORAL DAILY
Qty: 0 | Refills: 0 | Status: DISCONTINUED | OUTPATIENT
Start: 2017-08-01 | End: 2017-08-04

## 2017-08-01 RX ORDER — ALBUTEROL 90 UG/1
2 AEROSOL, METERED ORAL EVERY 6 HOURS
Qty: 0 | Refills: 0 | Status: DISCONTINUED | OUTPATIENT
Start: 2017-08-01 | End: 2017-08-04

## 2017-08-01 RX ORDER — SIMVASTATIN 20 MG/1
20 TABLET, FILM COATED ORAL AT BEDTIME
Qty: 0 | Refills: 0 | Status: DISCONTINUED | OUTPATIENT
Start: 2017-08-01 | End: 2017-08-04

## 2017-08-01 RX ORDER — TALC 4 G/50ML
5 POWDER INTRAPLEURAL ONCE
Qty: 0 | Refills: 0 | Status: DISCONTINUED | OUTPATIENT
Start: 2017-08-01 | End: 2017-08-04

## 2017-08-01 RX ORDER — LABETALOL HCL 100 MG
10 TABLET ORAL ONCE
Qty: 0 | Refills: 0 | Status: COMPLETED | OUTPATIENT
Start: 2017-08-01 | End: 2017-08-01

## 2017-08-01 RX ORDER — NIFEDIPINE 30 MG
90 TABLET, EXTENDED RELEASE 24 HR ORAL AT BEDTIME
Qty: 0 | Refills: 0 | Status: DISCONTINUED | OUTPATIENT
Start: 2017-08-01 | End: 2017-08-04

## 2017-08-01 RX ORDER — CARVEDILOL PHOSPHATE 80 MG/1
25 CAPSULE, EXTENDED RELEASE ORAL EVERY 12 HOURS
Qty: 0 | Refills: 0 | Status: DISCONTINUED | OUTPATIENT
Start: 2017-08-01 | End: 2017-08-04

## 2017-08-01 RX ADMIN — Medication 10 MILLIGRAM(S): at 22:19

## 2017-08-01 RX ADMIN — Medication 40 MILLIGRAM(S): at 22:41

## 2017-08-01 RX ADMIN — CARVEDILOL PHOSPHATE 25 MILLIGRAM(S): 80 CAPSULE, EXTENDED RELEASE ORAL at 18:56

## 2017-08-01 RX ADMIN — Medication 81 MILLIGRAM(S): at 18:57

## 2017-08-01 RX ADMIN — Medication 90 MILLIGRAM(S): at 22:27

## 2017-08-01 RX ADMIN — HYDROMORPHONE HYDROCHLORIDE 0.5 MILLIGRAM(S): 2 INJECTION INTRAMUSCULAR; INTRAVENOUS; SUBCUTANEOUS at 15:20

## 2017-08-01 RX ADMIN — SIMVASTATIN 20 MILLIGRAM(S): 20 TABLET, FILM COATED ORAL at 21:26

## 2017-08-01 RX ADMIN — HYDROMORPHONE HYDROCHLORIDE 2 MILLIGRAM(S): 2 INJECTION INTRAMUSCULAR; INTRAVENOUS; SUBCUTANEOUS at 21:40

## 2017-08-01 RX ADMIN — Medication 20 MILLIGRAM(S): at 16:30

## 2017-08-01 RX ADMIN — Medication 20 MILLIGRAM(S): at 18:56

## 2017-08-01 RX ADMIN — HYDROMORPHONE HYDROCHLORIDE 0.5 MILLIGRAM(S): 2 INJECTION INTRAMUSCULAR; INTRAVENOUS; SUBCUTANEOUS at 15:30

## 2017-08-01 NOTE — BRIEF OPERATIVE NOTE - OPERATION/FINDINGS
A time out was performed. Area was identified with ultrasound and marked.  Using usual sterile technique, 2% lidocaine was used to anesthesize the skin . A 2 cm incision was made above the rib and a blunt dissector was used to enter the pleural space.  A trochar was placed into the pleural space and the ESTRADA rigid pleuroscope was entered into the pleural space.  A 14 Albanian catheter was used to drain pleural effusion (~1.5 liters).  3 pleural biopsies were taken.  No bleeding noted.  Next a guide needle was used to access right pleural space using 2% lidocaine to anesthesize the track of entry.  A guide catheter was then placed into the pleural space (with visualization from PLEUROSCOPE).  A guide wire was positioned thru the catheter.  Catheter was removed with guide wire in secure place.  Distal end of the pleurx was tunnelled 5cm subcutanously from skin exit site to the chest dive site through the previously anesthesized tract.  Then, the first 12Fr dilator was entered into the pleural space with smooth entry, followed by the peel-away 16Fr introducer, the guide wire with inner cannula of 16Fr catheter was removed, subsequently the pleurx (with adjustment of length) was threaded into pleural space while peeling away the 16Fr catheter.  Vicryl 3-0 suture placed at chest dive site and a silk 2-0 suture placed to secure the pleurx at skin exit site.  1.5L      of pleural fluid was drained.  Patient tolerated the procedure well.  The catheter was secured with a dressing and tape and no significant blood loss was noted.  CXR ordered to confirm placement.

## 2017-08-01 NOTE — H&P ADULT - PROBLEM SELECTOR PLAN 2
- T/Th/Sat dialysis  - Will obtain BMP and follow K  - Patient makes urine  - Metolazone 10mg qd, Torsemide 40mg BID

## 2017-08-01 NOTE — H&P ADULT - PROBLEM SELECTOR PLAN 4
- Nifedipine 60qd, 90qd, enalapril 20mg BID, carvedilol 25mg BID  - Will monitor bp and adjust accordingly

## 2017-08-01 NOTE — H&P ADULT - NSHPREVIEWOFSYSTEMS_GEN_ALL_CORE
REVIEW OF SYSTEMS:     CONSTITUTIONAL: No weakness, fevers or chills  EYES/ENT: No visual changes  NECK: No pain or stiffness  RESPIRATORY: Denies cough, wheezing, hemoptysis; No shortness of breath at present  CARDIOVASCULAR: No chest pain or palpitations  GASTROINTESTINAL: No abdominal or epigastric pain. No nausea, vomiting, or hematemesis; No melena or hematochezia.  GENITOURINARY: No dysuria, frequency or hematuria  NEUROLOGICAL: No numbness or weakness  SKIN: No itching, burning, rashes, or lesions   All other review of systems is negative unless indicated above.

## 2017-08-01 NOTE — H&P ADULT - PROBLEM SELECTOR PLAN 7
- No IV fluids at this time  - Replete electrolytes cautiously PRN in setting of ESRD  - Diet AAT to DASH  - SCDs  - Dispo RMF

## 2017-08-01 NOTE — H&P ADULT - PROBLEM SELECTOR PLAN 1
- Pleurex catheter and 28 Slovak drainage tube placed by Ginny Salinas and Salbador in endoscopy  - Per above team, will keep suction on overnight and notify if any change in output (e.g., antonia blood or large increase in output)  - Will continue home diuretics (Torsemide 40mg BID, Metolozone 10mg qd)  - Pleural biopsies taken, will follow up as results are made available

## 2017-08-01 NOTE — H&P ADULT - ASSESSMENT
66F PMH ESRD on dialysis, dCHF (65%), CAD, anemia, HTN, chronic L pleural effusion presents for post-procedure monitoring following pleurex placement today, as well as drainage of effusion and pleurodesis with talc. Patient will be kept on suction overnight and monitored before anticipated discharge tomorrow. Patient appears medically stable following the procedure and will be admitted to [facility].

## 2017-08-01 NOTE — BRIEF OPERATIVE NOTE - PROCEDURE
Chest tube placement  08/01/2017    [facility]  Talc pleurodes[facility]  08/01/2017    [facility]  Pleuroscopy  08/01/2017    Active  [facility]

## 2017-08-01 NOTE — H&P ADULT - HISTORY OF PRESENT ILLNESS
66 year old F with past medical history significant for ESRD (on dialysis, T/Th/Sat), diastolic CHF (65%), CAD, anemia, HTN, chronic L pleural effusion with notable recent admission and discharge from [facility] 7/28 for recurrence of pleural effusion. Patient has had multiple admissions in the past for pleural effusion on L with many liters of drainage, followed by rapid re-accumulation. Patient presents for post-procedure monitoring following procedure today during which a pleurex catheter and 28 Tanzanian draining catheter were placed, 1.5L fluid drained, as well as talc was used to promote pleural scarring. History limited by patient postop sedation and recent dilaudid analgesia. Patient denies nausea, vomiting, lightheadedness, chest pain, shortness of breath, abdominal pain, and dysuria.

## 2017-08-01 NOTE — H&P ADULT - NSHPPHYSICALEXAM_GEN_ALL_CORE
PHYSICAL EXAM:    Constitutional: Lethargic examination due to recent dilaudid dosage and post procedure sedation. WDWN resting comfortably in bed; NAD  Head: NC/AT  Eyes: EOMI, anicteric sclera  ENT: no nasal discharge  Neck: supple, no JVD appreciated  Respiratory: CTA B/L; no W/R/R, no retractions. L pleurex catheter under clean bandage, no drainage around surgical site. Catheter beginning to drain serosanguinous fluid into suction container.   Cardiac: +S1/S2; no M/R/G  Gastrointestinal: soft, NT/ND; no rebound or guarding;  Extremities: WWP, no clubbing or cyanosis  Musculoskeletal: NROM x4  Vascular: 2+ radial, DP pulses B/L  Dermatologic: skin warm, dry and intact; pleurex and drainage catheter sites clean as above  Neurologic: CNII-XII grossly intact; no focal deficits appreciated  Psychiatric: affect and characteristics of appearance, verbalizations, behaviors are appropriate

## 2017-08-02 LAB
ANION GAP SERPL CALC-SCNC: 14 MMOL/L — SIGNIFICANT CHANGE UP (ref 5–17)
BUN SERPL-MCNC: 23 MG/DL — SIGNIFICANT CHANGE UP (ref 7–23)
CALCIUM SERPL-MCNC: 10 MG/DL — SIGNIFICANT CHANGE UP (ref 8.4–10.5)
CHLORIDE SERPL-SCNC: 94 MMOL/L — LOW (ref 96–108)
CO2 SERPL-SCNC: 24 MMOL/L — SIGNIFICANT CHANGE UP (ref 22–31)
CREAT SERPL-MCNC: 5.2 MG/DL — HIGH (ref 0.5–1.3)
GLUCOSE SERPL-MCNC: 96 MG/DL — SIGNIFICANT CHANGE UP (ref 70–99)
HCT VFR BLD CALC: 33.4 % — LOW (ref 34.5–45)
HGB BLD-MCNC: 11.2 G/DL — LOW (ref 11.5–15.5)
MCHC RBC-ENTMCNC: 32.4 PG — SIGNIFICANT CHANGE UP (ref 27–34)
MCHC RBC-ENTMCNC: 33.5 G/DL — SIGNIFICANT CHANGE UP (ref 32–36)
MCV RBC AUTO: 96.5 FL — SIGNIFICANT CHANGE UP (ref 80–100)
PLATELET # BLD AUTO: 282 K/UL — SIGNIFICANT CHANGE UP (ref 150–400)
POTASSIUM SERPL-MCNC: 4.2 MMOL/L — SIGNIFICANT CHANGE UP (ref 3.5–5.3)
POTASSIUM SERPL-SCNC: 4.2 MMOL/L — SIGNIFICANT CHANGE UP (ref 3.5–5.3)
RBC # BLD: 3.46 M/UL — LOW (ref 3.8–5.2)
RBC # FLD: 18 % — HIGH (ref 10.3–16.9)
SODIUM SERPL-SCNC: 132 MMOL/L — LOW (ref 135–145)
SURGICAL PATHOLOGY STUDY: SIGNIFICANT CHANGE UP
WBC # BLD: 16.8 K/UL — HIGH (ref 3.8–10.5)
WBC # FLD AUTO: 16.8 K/UL — HIGH (ref 3.8–10.5)

## 2017-08-02 PROCEDURE — 90935 HEMODIALYSIS ONE EVALUATION: CPT | Mod: GC

## 2017-08-02 PROCEDURE — 71010: CPT | Mod: 26,76

## 2017-08-02 PROCEDURE — 74000: CPT | Mod: 26

## 2017-08-02 PROCEDURE — 99232 SBSQ HOSP IP/OBS MODERATE 35: CPT | Mod: 24

## 2017-08-02 RX ORDER — HEPARIN SODIUM 5000 [USP'U]/ML
5000 INJECTION INTRAVENOUS; SUBCUTANEOUS EVERY 8 HOURS
Qty: 0 | Refills: 0 | Status: DISCONTINUED | OUTPATIENT
Start: 2017-08-02 | End: 2017-08-02

## 2017-08-02 RX ORDER — HEPARIN SODIUM 5000 [USP'U]/ML
5000 INJECTION INTRAVENOUS; SUBCUTANEOUS EVERY 12 HOURS
Qty: 0 | Refills: 0 | Status: DISCONTINUED | OUTPATIENT
Start: 2017-08-02 | End: 2017-08-04

## 2017-08-02 RX ORDER — CINACALCET 30 MG/1
60 TABLET, FILM COATED ORAL DAILY
Qty: 0 | Refills: 0 | Status: DISCONTINUED | OUTPATIENT
Start: 2017-08-02 | End: 2017-08-04

## 2017-08-02 RX ORDER — ACETAMINOPHEN 500 MG
650 TABLET ORAL ONCE
Qty: 0 | Refills: 0 | Status: COMPLETED | OUTPATIENT
Start: 2017-08-02 | End: 2017-08-02

## 2017-08-02 RX ORDER — HYDROMORPHONE HYDROCHLORIDE 2 MG/ML
0.5 INJECTION INTRAMUSCULAR; INTRAVENOUS; SUBCUTANEOUS EVERY 6 HOURS
Qty: 0 | Refills: 0 | Status: DISCONTINUED | OUTPATIENT
Start: 2017-08-02 | End: 2017-08-04

## 2017-08-02 RX ORDER — MORPHINE SULFATE 50 MG/1
2 CAPSULE, EXTENDED RELEASE ORAL ONCE
Qty: 0 | Refills: 0 | Status: DISCONTINUED | OUTPATIENT
Start: 2017-08-02 | End: 2017-08-02

## 2017-08-02 RX ADMIN — CARVEDILOL PHOSPHATE 25 MILLIGRAM(S): 80 CAPSULE, EXTENDED RELEASE ORAL at 22:28

## 2017-08-02 RX ADMIN — HYDROMORPHONE HYDROCHLORIDE 2 MILLIGRAM(S): 2 INJECTION INTRAMUSCULAR; INTRAVENOUS; SUBCUTANEOUS at 06:40

## 2017-08-02 RX ADMIN — Medication 650 MILLIGRAM(S): at 03:35

## 2017-08-02 RX ADMIN — HEPARIN SODIUM 5000 UNIT(S): 5000 INJECTION INTRAVENOUS; SUBCUTANEOUS at 22:29

## 2017-08-02 RX ADMIN — CARVEDILOL PHOSPHATE 25 MILLIGRAM(S): 80 CAPSULE, EXTENDED RELEASE ORAL at 05:04

## 2017-08-02 RX ADMIN — Medication 40 MILLIGRAM(S): at 22:29

## 2017-08-02 RX ADMIN — SIMVASTATIN 20 MILLIGRAM(S): 20 TABLET, FILM COATED ORAL at 22:28

## 2017-08-02 RX ADMIN — Medication 650 MILLIGRAM(S): at 18:06

## 2017-08-02 RX ADMIN — Medication 20 MILLIGRAM(S): at 10:06

## 2017-08-02 RX ADMIN — Medication 60 MILLIGRAM(S): at 10:06

## 2017-08-02 RX ADMIN — Medication 20 MILLIGRAM(S): at 22:28

## 2017-08-02 RX ADMIN — HYDROMORPHONE HYDROCHLORIDE 2 MILLIGRAM(S): 2 INJECTION INTRAMUSCULAR; INTRAVENOUS; SUBCUTANEOUS at 05:04

## 2017-08-02 RX ADMIN — HYDROMORPHONE HYDROCHLORIDE 0.5 MILLIGRAM(S): 2 INJECTION INTRAMUSCULAR; INTRAVENOUS; SUBCUTANEOUS at 22:17

## 2017-08-02 RX ADMIN — Medication 40 MILLIGRAM(S): at 05:06

## 2017-08-02 NOTE — PROGRESS NOTE ADULT - PROBLEM SELECTOR PLAN 1
Pt presented s/p insertion of L sided Pleurex & Tristanian 28 catheter d/t recurrent pleural effusions.  -Keep suction & follow output  -Notify pulmonary team if change in output  -Continue with current diuretic regimen (Torsemide 40 BID, Metolozone 10 qd)

## 2017-08-02 NOTE — PROGRESS NOTE ADULT - ASSESSMENT
Pt is a 65 y/o F s/p PleurX catheter insertion on 8/1/17 with PMH of ESRD on HD T/Th/Sat, diastolic CHF with a 65% EF, CAD, anemia, HTN and recurrent L pleural effusions. Pt is a 65 y/o F s/p PleurX  & Cymraes 28 catheter insertion on 8/1/17 with PMH of ESRD on HD T/Th/Sat, diastolic CHF with a 65% EF, CAD, anemia, HTN and recurrent L pleural effusions.

## 2017-08-02 NOTE — PROVIDER CONTACT NOTE (MEDICATION) - ASSESSMENT
Pt A+Ox3. Pt NAD. Pt denies pain or discomfort. Pt remained free from falls. Pt safety maintained. Chest tubes intact.

## 2017-08-02 NOTE — PROGRESS NOTE ADULT - SUBJECTIVE AND OBJECTIVE BOX
Patient was seen and evaluated on dialysis.   Patient is tolerating the procedure well.   HR: 87 (08-02-17 @ 16:09)  BP: 155/77 (08-02-17 @ 16:09)  Continue dialysis:   Dialyzer: [id number] QB: 400 QD: 500 3k bath   Goal UF 2kg over 2 Hours

## 2017-08-02 NOTE — PROGRESS NOTE ADULT - PROBLEM SELECTOR PLAN 2
Pt currently on T/Th/Sa HD regimen for ESRD. Initially patient reported not having dialysis since saturday (7/29). However, pt now reports having dialysis tuesday 8/1 and is refusing HD today. Pt will agree to HD tomorrow (thursday 8/3).    -C/w T/Th/Sat HD  -Follow plan as per nephrology team  -Continue to follow BMP & K  -Continue with current diuretic regimen (Torsemide 40 BID, Metolozone 10 qd) Pt currently on T/Th/Sa HD regimen for ESRD.    -HD today (8/2) for SOB & fluid overload per nephrology consult  -C/w T/Th/Sat HD  -Follow plan as per nephrology team  -Continue to follow BMP & K  -Continue with current diuretic regimen (Torsemide 40 BID, Metolozone 10 qd)

## 2017-08-02 NOTE — CONSULT NOTE ADULT - ASSESSMENT
This is 66 year old female with PMH stated above. Admitted after pleurodesis and chest tube placement. The renal service activated in regard of the need of HD> we are planning to do HD today

## 2017-08-02 NOTE — PROVIDER CONTACT NOTE (CRITICAL VALUE NOTIFICATION) - BACKGROUND
s/p chest tube  insertion today on the left side 2 sites and attached to wall suction.Pt admitted for pleural effusion.

## 2017-08-02 NOTE — CONSULT NOTE ADULT - SUBJECTIVE AND OBJECTIVE BOX
66 year old F w/ PMH of ESRD (T/T/Sa) on HD, dCHF (EF 65%), CAD, anemia, uncontrolled HTN. The patient came yesterday in the hospital for planned procedure - pleurodesis and placement of chest catheter for drainage after that she was admitted for further management. Drainage about 1.5 to 1.8 liters. The patient also admitted for difficult breathing yesterday - no chest pain, no fever. Today still feels short of breath, but better compared to yesterday. The renals service is activated in regard of the need of HD. Her last HD was on Saturday at her HD center [facility] - (telephone number [phone]), dry weight according to thrm 52.5 (usually 1.5 to 2 liters UF).      Patient is a 66y Female admitted for     PAST MEDICAL & SURGICAL HISTORY:  Anemia: 2/2 CKD  Asthma  MI, old  CAD (coronary artery disease)  HLD (hyperlipidemia)  CKD (chronic kidney disease): Stage 5CKD  CVA (cerebral infarction)  Hypertension  AV fistula      MEDICATIONS  (STANDING):  talc (Sterile) 5 Application(s) IntraPleural Once  aspirin enteric coated 81 milliGRAM(s) Oral daily  enalapril 20 milliGRAM(s) Oral two times a day  simvastatin 20 milliGRAM(s) Oral at bedtime  carvedilol 25 milliGRAM(s) Oral every 12 hours  NIFEdipine XL 90 milliGRAM(s) Oral at bedtime  NIFEdipine XL 60 milliGRAM(s) Oral daily  metolazone 10 milliGRAM(s) Oral daily  torsemide 40 milliGRAM(s) Oral two times a day  heparin  Injectable 5000 Unit(s) SubCutaneous every 12 hours    MEDICATIONS  (PRN):  ALBUTerol    90 MICROgram(s) HFA Inhaler 2 Puff(s) Inhalation every 6 hours PRN Wheezing  HYDROmorphone  Injectable 0.5 milliGRAM(s) IV Push every 6 hours PRN Severe Pain (7 - 10)      Allergies    No Known Allergies    Intolerances        SOCIAL HISTORY:    FAMILY HISTORY:  No pertinent family history in first degree relatives      T(C): , Max: 38.1 (08-02-17 @ 03:23)  T(F): , Max: 100.6 (08-02-17 @ 03:23)  HR: 82 (08-02-17 @ 09:50)  BP: 145/72 (08-02-17 @ 09:50)  BP(mean): --  RR: 18 (08-02-17 @ 09:50)  SpO2: 98% (08-02-17 @ 09:50)  Wt(kg): --    08-01 @ 07:01  -  08-02 @ 07:00  --------------------------------------------------------  IN: 0 mL / OUT: 280 mL / NET: -280 mL      Height (cm): 157.48 (08-01 @ 19:13)  Weight (kg): 49.2 (08-01 @ 19:13)  BMI (kg/m2): 19.8 (08-01 @ 19:13)  BSA (m2): 1.47 (08-01 @ 19:13)        Physical exam:   Alert and oriented  Mild JVD   tongue moist   Normal air entry bilaterally, decreased breath sounds on the left base, HAs a chest tube placed on the left chest   Heart: rrr, normal s1/s2, no murmurs, rubs or gallops   Abdomen soft, non tender, non -distened, normal bowel sounds   Extremities: no peripheral edema, 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 with good bruit           LABS:                        11.2   16.8  )-----------( 282      ( 02 Aug 2017 06:06 )             33.4     08-02    132<L>  |  94<L>  |  23  ----------------------------<  96< from: Xray Chest 1 View AP- PORTABLE-Urgent (08.02.17 @ 07:56) >      < end of copied text >    4.2   |  24  |  5.20<H>    Ca    10.0      02 Aug 2017 06:07                  RADIOLOGY & ADDITIONAL STUDIES:          Limited study secondary to patient rotation. Left chest tubes are   unchanged. Cardiomegaly. Hazy opacity over the left midlung zone likely   secondary to left pleural effusion. Blunting of the right costophrenic   angle consistent with a right pleural effusion.    IMPRESSION:  No significant change in bilateral pleural effusions.        I

## 2017-08-02 NOTE — PROGRESS NOTE ADULT - SUBJECTIVE AND OBJECTIVE BOX
OVERNIGHT EVENTS:    SUBJECTIVE / INTERVAL HPI: Patient seen and examined at bedside. Complaining of moderate abdominal pain.    VITAL SIGNS:  Vital Signs Last 24 Hrs  T(C): 37.3 (02 Aug 2017 04:55), Max: 38.1 (02 Aug 2017 03:23)  T(F): 99.2 (02 Aug 2017 04:55), Max: 100.6 (02 Aug 2017 03:23)  HR: 92 (02 Aug 2017 04:55) (84 - 92)  BP: 165/87 (02 Aug 2017 04:55) (160/77 - 241/124)  BP(mean): --  RR: 17 (02 Aug 2017 04:55) (17 - 20)  SpO2: 96% (02 Aug 2017 04:55) (95% - 100%)    PHYSICAL EXAM:    General: WDWN  HEENT: NC/AT; PERRL, anicteric sclera; MMM  Neck: supple  Cardiovascular: +S1/S2; RRR; no M/R/G on auscultation  Respiratory: CTA B/L; no W/R/R  Gastrointestinal: soft, NT/ND; +BSx4  Extremities: WWP; no edema, clubbing or cyanosis  Vascular: 2+ radial, DP/PT pulses B/L  Neurological: AAOx3; no focal deficits    MEDICATIONS:  MEDICATIONS  (STANDING):  talc (Sterile) 5 Application(s) IntraPleural Once  aspirin enteric coated 81 milliGRAM(s) Oral daily  enalapril 20 milliGRAM(s) Oral two times a day  simvastatin 20 milliGRAM(s) Oral at bedtime  carvedilol 25 milliGRAM(s) Oral every 12 hours  NIFEdipine XL 90 milliGRAM(s) Oral at bedtime  NIFEdipine XL 60 milliGRAM(s) Oral daily  metolazone 10 milliGRAM(s) Oral daily  torsemide 40 milliGRAM(s) Oral two times a day    MEDICATIONS  (PRN):  ALBUTerol    90 MICROgram(s) HFA Inhaler 2 Puff(s) Inhalation every 6 hours PRN Wheezing  HYDROmorphone   Tablet 2 milliGRAM(s) Oral every 6 hours PRN Severe Pain (7 - 10)      ALLERGIES:  Allergies    No Known Allergies    Intolerances        LABS:                        11.2   16.8  )-----------( 282      ( 02 Aug 2017 06:06 )             33.4     08-02    132<L>  |  94<L>  |  23  ----------------------------<  96  4.2   |  24  |  5.20<H>    Ca    10.0      02 Aug 2017 06:07          CAPILLARY BLOOD GLUCOSE          RADIOLOGY & ADDITIONAL TESTS:     CXR 8/2/17: FINDINGS:    Limited study secondary to patient rotation. Left chest tubes are   unchanged. Cardiomegaly. Hazy opacity over the left midlung zone likely   secondary to left pleural effusion. Blunting of the right costophrenic   angle consistent with a right pleural effusion.    IMPRESSION:  No significant change in bilateral pleural effusions. OVERNIGHT EVENTS:    SUBJECTIVE / INTERVAL HPI: Patient seen and examined at bedside. Chest tubes draining serosanginous fluid. Currently not complaining of pain however endorses on and off moderate abdominal pain due to ventral hernia ?    VITAL SIGNS:  Vital Signs Last 24 Hrs  T(C): 37.3 (02 Aug 2017 04:55), Max: 38.1 (02 Aug 2017 03:23)  T(F): 99.2 (02 Aug 2017 04:55), Max: 100.6 (02 Aug 2017 03:23)  HR: 92 (02 Aug 2017 04:55) (84 - 92)  BP: 165/87 (02 Aug 2017 04:55) (160/77 - 241/124)  BP(mean): --  RR: 17 (02 Aug 2017 04:55) (17 - 20)  SpO2: 96% (02 Aug 2017 04:55) (95% - 100%)    PHYSICAL EXAM:    General: WDWN  HEENT: NC/AT; PERRL, anicteric sclera; MMM  Neck: supple  Cardiovascular: +S1/S2; RRR; no M/R/G on auscultation  Respiratory: clear to auscultation b/l, diminshed breath sounds in the left mid & lower lung fields.  Gastrointestinal: soft, NT/ND; +BSx4, possible ventral hernia mass palpated below umbilicus.  Extremities: WWP; no edema, clubbing or cyanosis  Vascular: 2+ radial, DP/PT pulses B/L  Neurological: AAOx3; no focal deficits    MEDICATIONS:  MEDICATIONS  (STANDING):  talc (Sterile) 5 Application(s) IntraPleural Once  aspirin enteric coated 81 milliGRAM(s) Oral daily  enalapril 20 milliGRAM(s) Oral two times a day  simvastatin 20 milliGRAM(s) Oral at bedtime  carvedilol 25 milliGRAM(s) Oral every 12 hours  NIFEdipine XL 90 milliGRAM(s) Oral at bedtime  NIFEdipine XL 60 milliGRAM(s) Oral daily  metolazone 10 milliGRAM(s) Oral daily  torsemide 40 milliGRAM(s) Oral two times a day    MEDICATIONS  (PRN):  ALBUTerol    90 MICROgram(s) HFA Inhaler 2 Puff(s) Inhalation every 6 hours PRN Wheezing  HYDROmorphone   Tablet 2 milliGRAM(s) Oral every 6 hours PRN Severe Pain (7 - 10)      ALLERGIES:  Allergies    No Known Allergies    Intolerances        LABS:                        11.2   16.8  )-----------( 282      ( 02 Aug 2017 06:06 )             33.4     08-02    132<L>  |  94<L>  |  23  ----------------------------<  96  4.2   |  24  |  5.20<H>    Ca    10.0      02 Aug 2017 06:07          CAPILLARY BLOOD GLUCOSE          RADIOLOGY & ADDITIONAL TESTS:     CXR 8/2/17: FINDINGS:    Limited study secondary to patient rotation. Left chest tubes are   unchanged. Cardiomegaly. Hazy opacity over the left midlung zone likely   secondary to left pleural effusion. Blunting of the right costophrenic   angle consistent with a right pleural effusion.    IMPRESSION:  No significant change in bilateral pleural effusions. OVERNIGHT EVENTS: Fever of 100.6 F at 3:30 AM. Pt given one time dose of Tylenol.    SUBJECTIVE / INTERVAL HPI: Patient seen and examined at bedside. Chest tubes draining serosanginous fluid. Currently not complaining of pain however endorses on and off moderate abdominal pain due to ventral hernia ?    VITAL SIGNS:  Vital Signs Last 24 Hrs  T(C): 37.3 (02 Aug 2017 04:55), Max: 38.1 (02 Aug 2017 03:23)  T(F): 99.2 (02 Aug 2017 04:55), Max: 100.6 (02 Aug 2017 03:23)  HR: 92 (02 Aug 2017 04:55) (84 - 92)  BP: 165/87 (02 Aug 2017 04:55) (160/77 - 241/124)  BP(mean): --  RR: 17 (02 Aug 2017 04:55) (17 - 20)  SpO2: 96% (02 Aug 2017 04:55) (95% - 100%)    PHYSICAL EXAM:    General: WDWN  HEENT: NC/AT; PERRL, anicteric sclera; MMM  Neck: supple  Cardiovascular: +S1/S2; RRR; no M/R/G on auscultation  Respiratory: clear to auscultation b/l, diminshed breath sounds in the left mid & lower lung fields.  Gastrointestinal: soft, NT/ND; +BSx4, possible ventral hernia mass palpated below umbilicus.  Extremities: WWP; no edema, clubbing or cyanosis  Vascular: 2+ radial, DP/PT pulses B/L  Neurological: AAOx3; no focal deficits    MEDICATIONS:  MEDICATIONS  (STANDING):  talc (Sterile) 5 Application(s) IntraPleural Once  aspirin enteric coated 81 milliGRAM(s) Oral daily  enalapril 20 milliGRAM(s) Oral two times a day  simvastatin 20 milliGRAM(s) Oral at bedtime  carvedilol 25 milliGRAM(s) Oral every 12 hours  NIFEdipine XL 90 milliGRAM(s) Oral at bedtime  NIFEdipine XL 60 milliGRAM(s) Oral daily  metolazone 10 milliGRAM(s) Oral daily  torsemide 40 milliGRAM(s) Oral two times a day    MEDICATIONS  (PRN):  ALBUTerol    90 MICROgram(s) HFA Inhaler 2 Puff(s) Inhalation every 6 hours PRN Wheezing  HYDROmorphone   Tablet 2 milliGRAM(s) Oral every 6 hours PRN Severe Pain (7 - 10)      ALLERGIES:  Allergies    No Known Allergies    Intolerances        LABS:                        11.2   16.8  )-----------( 282      ( 02 Aug 2017 06:06 )             33.4     08-02    132<L>  |  94<L>  |  23  ----------------------------<  96  4.2   |  24  |  5.20<H>    Ca    10.0      02 Aug 2017 06:07          CAPILLARY BLOOD GLUCOSE          RADIOLOGY & ADDITIONAL TESTS:     CXR 8/2/17: FINDINGS:    Limited study secondary to patient rotation. Left chest tubes are   unchanged. Cardiomegaly. Hazy opacity over the left midlung zone likely   secondary to left pleural effusion. Blunting of the right costophrenic   angle consistent with a right pleural effusion.    IMPRESSION:  No significant change in bilateral pleural effusions. OVERNIGHT EVENTS: Fever of 100.6 F at 3:30 AM. Pt given one time dose of Tylenol.    SUBJECTIVE / INTERVAL HPI: Patient seen and examined at bedside. Chest tubes draining serosanginous fluid. Currently not complaining of pain however endorses on and off moderate abdominal pain she attributes to a known ventral hernia. Pt denies difficulty breathing but experiences pain at chest tube insertion site with movement.     VITAL SIGNS:  Vital Signs Last 24 Hrs  T(C): 37.3 (02 Aug 2017 04:55), Max: 38.1 (02 Aug 2017 03:23)  T(F): 99.2 (02 Aug 2017 04:55), Max: 100.6 (02 Aug 2017 03:23)  HR: 92 (02 Aug 2017 04:55) (84 - 92)  BP: 165/87 (02 Aug 2017 04:55) (160/77 - 241/124)  BP(mean): --  RR: 17 (02 Aug 2017 04:55) (17 - 20)  SpO2: 96% (02 Aug 2017 04:55) (95% - 100%)    PHYSICAL EXAM:    General: WDWN  HEENT: NC/AT; PERRL, anicteric sclera; MMM  Neck: supple  Cardiovascular: +S1/S2; RRR; no M/R/G on auscultation  Respiratory: clear to auscultation b/l, diminshed breath sounds in the left mid & lower lung fields.  Gastrointestinal: soft, NT/ND; +BSx4, possible ventral hernia mass palpated below umbilicus.  Extremities: WWP; no edema, clubbing or cyanosis  Vascular: 2+ radial, DP/PT pulses B/L  Neurological: AAOx3; no focal deficits    MEDICATIONS:  MEDICATIONS  (STANDING):  talc (Sterile) 5 Application(s) IntraPleural Once  aspirin enteric coated 81 milliGRAM(s) Oral daily  enalapril 20 milliGRAM(s) Oral two times a day  simvastatin 20 milliGRAM(s) Oral at bedtime  carvedilol 25 milliGRAM(s) Oral every 12 hours  NIFEdipine XL 90 milliGRAM(s) Oral at bedtime  NIFEdipine XL 60 milliGRAM(s) Oral daily  metolazone 10 milliGRAM(s) Oral daily  torsemide 40 milliGRAM(s) Oral two times a day    MEDICATIONS  (PRN):  ALBUTerol    90 MICROgram(s) HFA Inhaler 2 Puff(s) Inhalation every 6 hours PRN Wheezing  HYDROmorphone   Tablet 2 milliGRAM(s) Oral every 6 hours PRN Severe Pain (7 - 10)      ALLERGIES:  Allergies    No Known Allergies    Intolerances        LABS:                        11.2   16.8  )-----------( 282      ( 02 Aug 2017 06:06 )             33.4     08-02    132<L>  |  94<L>  |  23  ----------------------------<  96  4.2   |  24  |  5.20<H>    Ca    10.0      02 Aug 2017 06:07          CAPILLARY BLOOD GLUCOSE          RADIOLOGY & ADDITIONAL TESTS:     CXR 8/2/17: FINDINGS:    Limited study secondary to patient rotation. Left chest tubes are   unchanged. Cardiomegaly. Hazy opacity over the left midlung zone likely   secondary to left pleural effusion. Blunting of the right costophrenic   angle consistent with a right pleural effusion.    IMPRESSION:  No significant change in bilateral pleural effusions. OVERNIGHT EVENTS: Fever of 100.6 F at 3:30 AM. Pt given one time dose of Tylenol.    SUBJECTIVE / INTERVAL HPI: Patient seen and examined at bedside. Chest tubes draining serosanginous fluid. Currently not complaining of pain however endorses on and off moderate abdominal pain she attributes to a known ventral hernia. Pt has SOB and experiences pain at chest tube insertion site with movement. She denies cough, headache, loss of consciousness, changes in vision.    VITAL SIGNS:  Vital Signs Last 24 Hrs  T(C): 37.3 (02 Aug 2017 04:55), Max: 38.1 (02 Aug 2017 03:23)  T(F): 99.2 (02 Aug 2017 04:55), Max: 100.6 (02 Aug 2017 03:23)  HR: 92 (02 Aug 2017 04:55) (84 - 92)  BP: 165/87 (02 Aug 2017 04:55) (160/77 - 241/124)  BP(mean): --  RR: 17 (02 Aug 2017 04:55) (17 - 20)  SpO2: 96% (02 Aug 2017 04:55) (95% - 100%)    PHYSICAL EXAM:    General: WDWN  HEENT: NC/AT; PERRL, anicteric sclera; MMM  Neck: supple  Cardiovascular: +S1/S2; RRR; no M/R/G on auscultation  Respiratory: clear to auscultation b/l, decreased breath sounds in the left mid & lower lung fields.  Gastrointestinal: soft, NT/ND; +BSx4, possible ventral hernia mass palpated below umbilicus.  Extremities: WWP; no edema, clubbing or cyanosis  Vascular: 2+ radial, DP/PT pulses B/L  Neurological: AAOx3; no focal deficits    MEDICATIONS:  MEDICATIONS  (STANDING):  talc (Sterile) 5 Application(s) IntraPleural Once  aspirin enteric coated 81 milliGRAM(s) Oral daily  enalapril 20 milliGRAM(s) Oral two times a day  simvastatin 20 milliGRAM(s) Oral at bedtime  carvedilol 25 milliGRAM(s) Oral every 12 hours  NIFEdipine XL 90 milliGRAM(s) Oral at bedtime  NIFEdipine XL 60 milliGRAM(s) Oral daily  metolazone 10 milliGRAM(s) Oral daily  torsemide 40 milliGRAM(s) Oral two times a day    MEDICATIONS  (PRN):  ALBUTerol    90 MICROgram(s) HFA Inhaler 2 Puff(s) Inhalation every 6 hours PRN Wheezing  HYDROmorphone   Tablet 2 milliGRAM(s) Oral every 6 hours PRN Severe Pain (7 - 10)      ALLERGIES:  Allergies    No Known Allergies    Intolerances        LABS:                        11.2   16.8  )-----------( 282      ( 02 Aug 2017 06:06 )             33.4     08-02    132<L>  |  94<L>  |  23  ----------------------------<  96  4.2   |  24  |  5.20<H>    Ca    10.0      02 Aug 2017 06:07          CAPILLARY BLOOD GLUCOSE          RADIOLOGY & ADDITIONAL TESTS:     CXR 8/2/17: FINDINGS:    Limited study secondary to patient rotation. Left chest tubes are   unchanged. Cardiomegaly. Hazy opacity over the left midlung zone likely   secondary to left pleural effusion. Blunting of the right costophrenic   angle consistent with a right pleural effusion.    IMPRESSION:  No significant change in bilateral pleural effusions. OVERNIGHT EVENTS: Fever of 100.6 F at 3:30 AM. Pt given one time dose of Tylenol.    SUBJECTIVE / INTERVAL HPI: Patient seen and examined at bedside. Chest tubes draining serosanginous fluid. Currently not complaining of pain however endorses on and off moderate abdominal pain she attributes to a known ventral hernia. Pt has SOB and experiences pain at chest tube insertion site with movement. She denies cough, headache, loss of consciousness, changes in vision.    VITAL SIGNS:  Vital Signs Last 24 Hrs  T(C): 37.3 (02 Aug 2017 04:55), Max: 38.1 (02 Aug 2017 03:23)  T(F): 99.2 (02 Aug 2017 04:55), Max: 100.6 (02 Aug 2017 03:23)  HR: 92 (02 Aug 2017 04:55) (84 - 92)  BP: 165/87 (02 Aug 2017 04:55) (160/77 - 241/124)  BP(mean): --  RR: 17 (02 Aug 2017 04:55) (17 - 20)  SpO2: 96% (02 Aug 2017 04:55) (95% - 100%)    PHYSICAL EXAM:    General: WDWN  HEENT: NC/AT; PERRL, anicteric sclera; MMM  Neck: supple  Cardiovascular: +S1/S2; RRR; no M/R/G on auscultation  Respiratory: clear to auscultation b/l, decreased breath sounds in the left mid & lower lung fields.  Gastrointestinal: soft, NT/ND; +BSx4, possible ventral hernia mass palpated below umbilicus.  Extremities: WWP; no edema, clubbing or cyanosis  Vascular: 2+ radial, DP/PT pulses B/L  Neurological: AAOx3; no focal deficits    MEDICATIONS:  MEDICATIONS  (STANDING):  talc (Sterile) 5 Application(s) IntraPleural Once  aspirin enteric coated 81 milliGRAM(s) Oral daily  enalapril 20 milliGRAM(s) Oral two times a day  simvastatin 20 milliGRAM(s) Oral at bedtime  carvedilol 25 milliGRAM(s) Oral every 12 hours  NIFEdipine XL 90 milliGRAM(s) Oral at bedtime  NIFEdipine XL 60 milliGRAM(s) Oral daily  metolazone 10 milliGRAM(s) Oral daily  torsemide 40 milliGRAM(s) Oral two times a day    MEDICATIONS  (PRN):  ALBUTerol    90 MICROgram(s) HFA Inhaler 2 Puff(s) Inhalation every 6 hours PRN Wheezing  HYDROmorphone   Tablet 2 milliGRAM(s) Oral every 6 hours PRN Severe Pain (7 - 10)      ALLERGIES:  Allergies    No Known Allergies    Intolerances        LABS:                        11.2   16.8  )-----------( 282      ( 02 Aug 2017 06:06 )             33.4     08-02    132<L>  |  94<L>  |  23  ----------------------------<  96  4.2   |  24  |  5.20<H>    Ca    10.0      02 Aug 2017 06:07      RADIOLOGY & ADDITIONAL TESTS:     CXR 8/2/17: FINDINGS:    Limited study secondary to patient rotation. Left chest tubes are   unchanged. Cardiomegaly. Hazy opacity over the left midlung zone likely   secondary to left pleural effusion. Blunting of the right costophrenic   angle consistent with a right pleural effusion.    IMPRESSION:  No significant change in bilateral pleural effusions.

## 2017-08-02 NOTE — CONSULT NOTE ADULT - PROBLEM SELECTOR RECOMMENDATION 9
- the patient regularly is getting HD on TTS   - last HD done on saturday last week   - we will do HD today - 8/2/2017 she missed her regular HD and she is in fluid overlaod   - we will try at least 3 to 3.5 liters UF   - electrolytes acceptable   - phsophate - 5.1   - cinacalcet 60 mcg  - daily weight   - in and outs ( still makes urine)  - renal diet   - avoid morphine - not a good choice in ckd patient

## 2017-08-03 ENCOUNTER — TRANSCRIPTION ENCOUNTER (OUTPATIENT)
Age: 67
End: 2017-08-03

## 2017-08-03 ENCOUNTER — APPOINTMENT (OUTPATIENT)
Dept: PULMONOLOGY | Facility: CLINIC | Age: 67
End: 2017-08-03

## 2017-08-03 LAB
ANION GAP SERPL CALC-SCNC: 16 MMOL/L — SIGNIFICANT CHANGE UP (ref 5–17)
BUN SERPL-MCNC: 30 MG/DL — HIGH (ref 7–23)
BUN SERPL-MCNC: 8 MG/DL — SIGNIFICANT CHANGE UP (ref 7–23)
CALCIUM SERPL-MCNC: 10 MG/DL — SIGNIFICANT CHANGE UP (ref 8.4–10.5)
CHLORIDE SERPL-SCNC: 91 MMOL/L — LOW (ref 96–108)
CO2 SERPL-SCNC: 26 MMOL/L — SIGNIFICANT CHANGE UP (ref 22–31)
CREAT SERPL-MCNC: 5 MG/DL — HIGH (ref 0.5–1.3)
GLUCOSE SERPL-MCNC: 127 MG/DL — HIGH (ref 70–99)
HCT VFR BLD CALC: 31.5 % — LOW (ref 34.5–45)
HGB BLD-MCNC: 10.6 G/DL — LOW (ref 11.5–15.5)
MAGNESIUM SERPL-MCNC: 1.9 MG/DL — SIGNIFICANT CHANGE UP (ref 1.6–2.6)
MCHC RBC-ENTMCNC: 32.2 PG — SIGNIFICANT CHANGE UP (ref 27–34)
MCHC RBC-ENTMCNC: 33.7 G/DL — SIGNIFICANT CHANGE UP (ref 32–36)
MCV RBC AUTO: 95.7 FL — SIGNIFICANT CHANGE UP (ref 80–100)
PLATELET # BLD AUTO: 309 K/UL — SIGNIFICANT CHANGE UP (ref 150–400)
POTASSIUM SERPL-MCNC: 4.7 MMOL/L — SIGNIFICANT CHANGE UP (ref 3.5–5.3)
POTASSIUM SERPL-SCNC: 4.7 MMOL/L — SIGNIFICANT CHANGE UP (ref 3.5–5.3)
RBC # BLD: 3.29 M/UL — LOW (ref 3.8–5.2)
RBC # FLD: 18.2 % — HIGH (ref 10.3–16.9)
SODIUM SERPL-SCNC: 133 MMOL/L — LOW (ref 135–145)
WBC # BLD: 14.9 K/UL — HIGH (ref 3.8–10.5)
WBC # FLD AUTO: 14.9 K/UL — HIGH (ref 3.8–10.5)

## 2017-08-03 PROCEDURE — 90935 HEMODIALYSIS ONE EVALUATION: CPT | Mod: GC

## 2017-08-03 PROCEDURE — 71010: CPT | Mod: 26,76

## 2017-08-03 PROCEDURE — 99232 SBSQ HOSP IP/OBS MODERATE 35: CPT | Mod: 24

## 2017-08-03 RX ORDER — HYDROMORPHONE HYDROCHLORIDE 2 MG/ML
0.5 INJECTION INTRAMUSCULAR; INTRAVENOUS; SUBCUTANEOUS ONCE
Qty: 0 | Refills: 0 | Status: DISCONTINUED | OUTPATIENT
Start: 2017-08-03 | End: 2017-08-03

## 2017-08-03 RX ORDER — HYDROMORPHONE HYDROCHLORIDE 2 MG/ML
2 INJECTION INTRAMUSCULAR; INTRAVENOUS; SUBCUTANEOUS ONCE
Qty: 0 | Refills: 0 | Status: DISCONTINUED | OUTPATIENT
Start: 2017-08-03 | End: 2017-08-03

## 2017-08-03 RX ORDER — HYDROMORPHONE HYDROCHLORIDE 2 MG/ML
0.5 INJECTION INTRAMUSCULAR; INTRAVENOUS; SUBCUTANEOUS ONCE
Qty: 0 | Refills: 0 | Status: CANCELLED | OUTPATIENT
Start: 2017-08-03 | End: 2017-08-04

## 2017-08-03 RX ORDER — CINACALCET 30 MG/1
1 TABLET, FILM COATED ORAL
Qty: 0 | Refills: 0 | COMMUNITY
Start: 2017-08-03

## 2017-08-03 RX ORDER — HYDROMORPHONE HYDROCHLORIDE 2 MG/ML
1 INJECTION INTRAMUSCULAR; INTRAVENOUS; SUBCUTANEOUS ONCE
Qty: 0 | Refills: 0 | Status: DISCONTINUED | OUTPATIENT
Start: 2017-08-03 | End: 2017-08-03

## 2017-08-03 RX ADMIN — Medication 60 MILLIGRAM(S): at 13:31

## 2017-08-03 RX ADMIN — HYDROMORPHONE HYDROCHLORIDE 0.5 MILLIGRAM(S): 2 INJECTION INTRAMUSCULAR; INTRAVENOUS; SUBCUTANEOUS at 12:00

## 2017-08-03 RX ADMIN — HYDROMORPHONE HYDROCHLORIDE 0.5 MILLIGRAM(S): 2 INJECTION INTRAMUSCULAR; INTRAVENOUS; SUBCUTANEOUS at 15:00

## 2017-08-03 RX ADMIN — Medication 40 MILLIGRAM(S): at 17:51

## 2017-08-03 RX ADMIN — HYDROMORPHONE HYDROCHLORIDE 0.5 MILLIGRAM(S): 2 INJECTION INTRAMUSCULAR; INTRAVENOUS; SUBCUTANEOUS at 14:28

## 2017-08-03 RX ADMIN — Medication 90 MILLIGRAM(S): at 22:26

## 2017-08-03 RX ADMIN — Medication 20 MILLIGRAM(S): at 17:51

## 2017-08-03 RX ADMIN — HEPARIN SODIUM 5000 UNIT(S): 5000 INJECTION INTRAVENOUS; SUBCUTANEOUS at 05:33

## 2017-08-03 RX ADMIN — CARVEDILOL PHOSPHATE 25 MILLIGRAM(S): 80 CAPSULE, EXTENDED RELEASE ORAL at 22:25

## 2017-08-03 RX ADMIN — Medication 40 MILLIGRAM(S): at 05:32

## 2017-08-03 RX ADMIN — SIMVASTATIN 20 MILLIGRAM(S): 20 TABLET, FILM COATED ORAL at 22:25

## 2017-08-03 NOTE — DISCHARGE NOTE ADULT - PLAN OF CARE
To reduce the fluid levels and eliminate the SOB You came to us after insertion of a PleurX & [place] 28 catheter. The chest tubes were held to suction and drained the fluid. To relieve further shortness of breath, we gave you urgent hemodialysis as well as your scheduled thursday hemodialysis. Please follow up with your home nursing service on proper care of your PleurX catheter.    -Continue proper drainage & care of your PleurX Catheter  -Continue your diuretic regimen  -Continue you T/Th/Sa Hemodialysis To halt the progression of your kidney disease and continue symptomatic treatment You came to us after placement of a PleurX and [place] 28 catheter for recurrent pleural effusions. We continued your current regimen for your chronic kidney disease. On wednesday 8/2 you were very short of breath and we sent you for urgent hemodialysis which relieved your shortness of breath.    -Please continue with your hemodialysis regimen  -Please follow up with your outpatient nephrologist concerning your CKD treatment  -Please continue with your current home medications You came to us after placement of a PleurX and [place] 28 catheter for recurrent pleural effusions. While in the hospital, we continued your home regimen of Aspirin 81 mg and Simvastatin.    -Please continue your home regimen of Aspirin & Simvastatin  -Please follow up with your primary care physician and/or cardiologist as an outpatient You came to us after placement of a PleurX and [place] 28 catheter for recurrent pleural effusions. Your blood pressure was difficult to control despite several medications. Please follow up with your primary care physician and/or cardiologist upon discharge concerning controlling your blood pressure.    -Please follow up with an outpatient physician concerning blood pressure control  -Continue current blood pressure medication regimen  -Please adhere to a low salt diet You came to us after placement of a PleurX and [place] 28 catheter for recurrent pleural effusions. You asthma has been well controlled with your Ventolin inhaler. Please continue this regimen.    -Continue control of your asthma via your primary care physician You came to us after insertion of a PleurX & [place] 28 catheter. The chest tubes were held to suction and drained the fluid. To relieve further shortness of breath, we gave you urgent hemodialysis as well as your scheduled thursday hemodialysis. Please follow up with your home nursing service on proper care of your PleurX catheter.    -Continue proper drainage & care of your PleurX Catheter  -Drain fluid via PleurX catheter until completely dry but not exceed 1000 mL at one time  -Continue your diuretic regimen  -Continue you T/Th/Sa Hemodialysis You came to us after placement of a PleurX and French 28 catheter for recurrent pleural effusions. You complained of heartburn/soreness following swallowing. Maalox was ordered to relieve symptoms.    -Please follow up with your physician as an outpatient for management of this issue  -Continue with Maalox as needed for your symptoms You came to us after placement of a PleurX and [place] 28 catheter for recurrent pleural effusions. You complained of heartburn/soreness following swallowing. One dose of Maalox was given to relieve your symptoms.    -Please follow up with your physician as an outpatient for management of this issue

## 2017-08-03 NOTE — PROGRESS NOTE ADULT - PROBLEM SELECTOR PLAN 2
- now with better control   - n carvedilol 25 mg twice a day  - on metolazone 10 mg   - on enalapril 20 mg   - on torasemide 40 mg   on Nifedipine 60 mg + 90 mg in the evening   Please consider Hydralazine if the BP is not under control.

## 2017-08-03 NOTE — DISCHARGE NOTE ADULT - PATIENT PORTAL LINK FT
“You can access the [employer] Patient Portal, offered by [facility], by registering with the following website: [email or web address]”

## 2017-08-03 NOTE — DISCHARGE NOTE ADULT - MEDICATION SUMMARY - MEDICATIONS TO TAKE
I will START or STAY ON the medications listed below when I get home from the hospital:    aspirin 81 mg oral delayed release tablet  -- 1 tab(s) by mouth once a day  -- Indication: For CAD (coronary artery disease)    enalapril 20 mg oral tablet  -- 1 tab(s) by mouth 2 times a day  -- Indication: For Hypertension    simvastatin 20 mg oral tablet  -- 1 tab(s) by mouth once a day (at bedtime)  -- Indication: For CAD (coronary artery disease)    carvedilol 25 mg oral tablet  -- 1 tab(s) by mouth every 12 hours  -- Indication: For Hypertension    Ventolin HFA 90 mcg/inh inhalation aerosol  -- 2 puff(s) inhaled every 6 hours - for asthma  -- For inhalation only.  It is very important that you take or use this exactly as directed.  Do not skip doses or discontinue unless directed by your doctor.  Obtain medical advice before taking any non-prescription drugs as some may affect the action of this medication.  Shake well before use.    -- Indication: For Asthma    NIFEdipine 60 mg oral tablet, extended release  -- 1 tab(s) by mouth once a day in the morning  -- Indication: For Hypertension    NIFEdipine 90 mg oral tablet, extended release  -- 1 tab(s) by mouth once a day at bedtime  -- Indication: For Hypertension    metOLazone 10 mg oral tablet  -- 1 tab(s) by mouth once a day 30 minutes before PM torsemide  -- Indication: For Hypertension    torsemide 20 mg oral tablet  -- 2 tab(s) by mouth 2 times a day  -- Avoid prolonged or excessive exposure to direct and/or artificial sunlight while taking this medication.  It is very important that you take or use this exactly as directed.  Do not skip doses or discontinue unless directed by your doctor.  It may be advisable to drink a full glass orange juice or eat a banana daily while taking this medication.    -- Indication: For Hypertension    cinacalcet 60 mg oral tablet  -- 1 tab(s) by mouth once a day  -- Indication: For CKD (chronic kidney disease)

## 2017-08-03 NOTE — PROGRESS NOTE ADULT - SUBJECTIVE AND OBJECTIVE BOX
OVERNIGHT EVENTS:    SUBJECTIVE / INTERVAL HPI: Patient seen and examined at bedside.     VITAL SIGNS:  Vital Signs Last 24 Hrs  T(C): 36.9 (03 Aug 2017 04:51), Max: 38.1 (02 Aug 2017 16:09)  T(F): 98.4 (03 Aug 2017 04:51), Max: 100.6 (02 Aug 2017 16:09)  HR: 84 (03 Aug 2017 04:51) (82 - 88)  BP: 139/76 (03 Aug 2017 04:51) (139/76 - 161/81)  BP(mean): 119 (02 Aug 2017 18:30) (119 - 119)  RR: 20 (03 Aug 2017 04:51) (18 - 20)  SpO2: 96% (03 Aug 2017 04:51) (96% - 100%)    PHYSICAL EXAM:    General: WDWN  HEENT: NC/AT; PERRL, anicteric sclera; MMM  Neck: supple  Cardiovascular: +S1/S2; RRR; no M/R/G on auscultation  Respiratory: CTA B/L; no W/R/R  Gastrointestinal: soft, NT/ND; +BSx4  Extremities: WWP; no edema, clubbing or cyanosis  Vascular: 2+ radial, DP/PT pulses B/L  Neurological: AAOx3; no focal deficits    MEDICATIONS:  MEDICATIONS  (STANDING):  talc (Sterile) 5 Application(s) IntraPleural Once  aspirin enteric coated 81 milliGRAM(s) Oral daily  enalapril 20 milliGRAM(s) Oral two times a day  simvastatin 20 milliGRAM(s) Oral at bedtime  carvedilol 25 milliGRAM(s) Oral every 12 hours  NIFEdipine XL 90 milliGRAM(s) Oral at bedtime  NIFEdipine XL 60 milliGRAM(s) Oral daily  metolazone 10 milliGRAM(s) Oral daily  torsemide 40 milliGRAM(s) Oral two times a day  cinacalcet 60 milliGRAM(s) Oral daily  heparin  Injectable 5000 Unit(s) SubCutaneous every 12 hours    MEDICATIONS  (PRN):  ALBUTerol    90 MICROgram(s) HFA Inhaler 2 Puff(s) Inhalation every 6 hours PRN Wheezing  HYDROmorphone  Injectable 0.5 milliGRAM(s) IV Push every 6 hours PRN Severe Pain (7 - 10)      ALLERGIES:  Allergies    No Known Allergies    Intolerances        LABS:                        11.2   16.8  )-----------( 282      ( 02 Aug 2017 06:06 )             33.4     08-02    132<L>  |  94<L>  |  23  ----------------------------<  96  4.2   |  24  |  5.20<H>    Ca    10.0      02 Aug 2017 06:07          RADIOLOGY & ADDITIONAL TESTS:   CXR: 8/2/2017 6:10 PM:   IMPRESSION:  Bilateral pleural effusions are again seen with possible loculation of   fluid in the left lung base. Patchy opacification in the right lower lung   zone, more conspicuous than on prior studies, most likely representing   consolidation versus atelectasis. Atelectatic changes in the left lung. 66 year old F with past medical history significant for ESRD (on dialysis, T/Th/Sat), diastolic CHF (65%), CAD, anemia, HTN, chronic L pleural effusion with notable recent admission and discharge from [facility] 7/28 for recurrence of pleural effusion. Patient has had multiple admissions in the past for pleural effusion on L with many liters of drainage, followed by rapid re-accumulation. Patient presents for post-procedure monitoring following procedure on 8/1 during which a pleurex catheter and 28 [place] draining catheter were placed, 1.5L fluid drained, as well as talc was used to promote pleural scarring.    OVERNIGHT EVENTS: Pt complained of L breast pain which self-resolved.    SUBJECTIVE / INTERVAL HPI: Patient seen and examined at bedside. Reports less SOB following HD. Pt appears more comfortable, no complaints this morning.    VITAL SIGNS:  Vital Signs Last 24 Hrs  T(C): 36.9 (03 Aug 2017 04:51), Max: 38.1 (02 Aug 2017 16:09)  T(F): 98.4 (03 Aug 2017 04:51), Max: 100.6 (02 Aug 2017 16:09)  HR: 84 (03 Aug 2017 04:51) (82 - 88)  BP: 139/76 (03 Aug 2017 04:51) (139/76 - 161/81)  BP(mean): 119 (02 Aug 2017 18:30) (119 - 119)  RR: 20 (03 Aug 2017 04:51) (18 - 20)  SpO2: 96% (03 Aug 2017 04:51) (96% - 100%)    PHYSICAL EXAM:    General: WDWN  HEENT: NC/AT; PERRL, anicteric sclera; MMM  Neck: supple  Cardiovascular: +S1/S2; RRR; no M/R/G on auscultation  Respiratory: CTA B/L; no W/R/R, decreased breath sounds on L side  Gastrointestinal: soft, TTP near chest tube insertion site, ND; +BSx4  Extremities: WWP; no edema, clubbing or cyanosis  Vascular: 2+ radial, DP/PT pulses B/L  Neurological: AAOx3; no focal deficits    MEDICATIONS:  MEDICATIONS  (STANDING):  talc (Sterile) 5 Application(s) IntraPleural Once  aspirin enteric coated 81 milliGRAM(s) Oral daily  enalapril 20 milliGRAM(s) Oral two times a day  simvastatin 20 milliGRAM(s) Oral at bedtime  carvedilol 25 milliGRAM(s) Oral every 12 hours  NIFEdipine XL 90 milliGRAM(s) Oral at bedtime  NIFEdipine XL 60 milliGRAM(s) Oral daily  metolazone 10 milliGRAM(s) Oral daily  torsemide 40 milliGRAM(s) Oral two times a day  cinacalcet 60 milliGRAM(s) Oral daily  heparin  Injectable 5000 Unit(s) SubCutaneous every 12 hours    MEDICATIONS  (PRN):  ALBUTerol    90 MICROgram(s) HFA Inhaler 2 Puff(s) Inhalation every 6 hours PRN Wheezing  HYDROmorphone  Injectable 0.5 milliGRAM(s) IV Push every 6 hours PRN Severe Pain (7 - 10)      ALLERGIES:  Allergies    No Known Allergies    Intolerances        LABS:                        11.2   16.8  )-----------( 282      ( 02 Aug 2017 06:06 )             33.4     08-02    132<L>  |  94<L>  |  23  ----------------------------<  96  4.2   |  24  |  5.20<H>    Ca    10.0      02 Aug 2017 06:07          RADIOLOGY & ADDITIONAL TESTS:   CXR: 8/2/2017 6:10 PM:   IMPRESSION:  Bilateral pleural effusions are again seen with possible loculation of   fluid in the left lung base. Patchy opacification in the right lower lung   zone, more conspicuous than on prior studies, most likely representing   consolidation versus atelectasis. Atelectatic changes in the left lung.

## 2017-08-03 NOTE — PROGRESS NOTE ADULT - SUBJECTIVE AND OBJECTIVE BOX
Objective   The patient feels better, her breathing is better, not on oxygen.   She was dialyzed yesterday 2 hours for UF of 2 liters,       66 year old F w/ PMH of ESRD (T/T/Sa) on HD, dCHF (EF 65%), CAD, anemia, uncontrolled HTN. The patient came yesterday in the hospital for planned procedure - pleurodesis and placement of chest catheter for drainage after that she was admitted for further management. Drainage about 1.5 to 1.8 liters. The patient also admitted for difficult breathing yesterday - no chest pain, no fever. Today still feels short of breath, but better compared to yesterday. The [facility]als service is activated in regard of the need of HD. We are planning to do HD today also   The patient still has a chest tube on the left of her chest     Patient seen and examined at bedside.     talc (Sterile) 5 Application(s) Once  aspirin enteric coated 81 milliGRAM(s) daily  enalapril 20 milliGRAM(s) two times a day  simvastatin 20 milliGRAM(s) at bedtime  carvedilol 25 milliGRAM(s) every 12 hours  ALBUTerol    90 MICROgram(s) HFA Inhaler 2 Puff(s) every 6 hours PRN  NIFEdipine XL 90 milliGRAM(s) at bedtime  NIFEdipine XL 60 milliGRAM(s) daily  metolazone 10 milliGRAM(s) daily  torsemide 40 milliGRAM(s) two times a day  cinacalcet 60 milliGRAM(s) daily  HYDROmorphone  Injectable 0.5 milliGRAM(s) every 6 hours PRN  heparin  Injectable 5000 Unit(s) every 12 hours      Allergies    No Known Allergies    Intolerances        T(C): , Max: 38.1 (08-02-17 @ 16:09)  T(F): , Max: 100.6 (08-02-17 @ 16:09)  HR: 89 (08-03-17 @ 08:21)  BP: 152/78 (08-03-17 @ 08:21)  BP(mean): 119 (08-02-17 @ 18:30)  RR: 20 (08-03-17 @ 08:21)  SpO2: 99% (08-03-17 @ 08:21)  Wt(kg): --    08-02 @ 07:01 - 08-03 @ 07:00  --------------------------------------------------------  IN:  Total IN: 0 mL    OUT:    Chest Tube: 110 mL    Chest Tube: 50 mL    Other: 2000 mL  Total OUT: 2160 mL    Total NET: -2160 mL            Physical exam:   Alert and oriented  Mild JVD   tongue moist   Normal air entry bilaterally, decreased breath sounds on the left base, HAs a chest tube placed on the left chest   Heart: rrr, normal s1/s2, no murmurs, rubs or gallops   Abdomen soft, non tender, non -distened, normal bowel sounds   Extremities: no peripheral edema, 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 Patchy opacification in the right lower lung   zone, more conspicuous than on prior studies, most likely representing   consolidation versus atelectasis. Atelectatic changes in the left lung.    < end of copied text >

## 2017-08-03 NOTE — DISCHARGE NOTE ADULT - CARE PLAN
Principal Discharge DX:	Pleural effusion  Goal:	To reduce the fluid levels and eliminate the SOB  Instructions for follow-up, activity and diet:	You came to us after insertion of a PleurX & [place] 28 catheter. The chest tubes were held to suction and drained the fluid. To relieve further shortness of breath, we gave you urgent hemodialysis as well as your scheduled thursday hemodialysis. Please follow up with your home nursing service on proper care of your PleurX catheter.    -Continue proper drainage & care of your PleurX Catheter  -Continue your diuretic regimen  -Continue you T/Th/Sa Hemodialysis  Secondary Diagnosis:	CKD (chronic kidney disease)  Goal:	To halt the progression of your kidney disease and continue symptomatic treatment  Instructions for follow-up, activity and diet:	You came to us after placement of a PleurX and [place] 28 catheter for recurrent pleural effusions. We continued your current regimen for your chronic kidney disease. On wednesday 8/2 you were very short of breath and we sent you for urgent hemodialysis which relieved your shortness of breath.    -Please continue with your hemodialysis regimen  -Please follow up with your outpatient nephrologist concerning your CKD treatment  -Please continue with your current home medications  Secondary Diagnosis:	CAD (coronary artery disease)  Instructions for follow-up, activity and diet:	You came to us after placement of a PleurX and [place] 28 catheter for recurrent pleural effusions. While in the hospital, we continued your home regimen of Aspirin 81 mg and Simvastatin.    -Please continue your home regimen of Aspirin & Simvastatin  -Please follow up with your primary care physician and/or cardiologist as an outpatient  Secondary Diagnosis:	Hypertension  Instructions for follow-up, activity and diet:	You came to us after placement of a PleurX and [place] 28 catheter for recurrent pleural effusions. Your blood pressure was difficult to control despite several medications. Please follow up with your primary care physician and/or cardiologist upon discharge concerning controlling your blood pressure.    -Please follow up with an outpatient physician concerning blood pressure control  -Continue current blood pressure medication regimen  -Please adhere to a low salt diet  Secondary Diagnosis:	Asthma  Instructions for follow-up, activity and diet:	You came to us after placement of a PleurX and [place] 28 catheter for recurrent pleural effusions. You asthma has been well controlled with your Ventolin inhaler. Please continue this regimen.    -Continue control of your asthma via your primary care physician Principal Discharge DX:	Pleural effusion  Goal:	To reduce the fluid levels and eliminate the SOB  Instructions for follow-up, activity and diet:	You came to us after insertion of a PleurX & [place] 28 catheter. The chest tubes were held to suction and drained the fluid. To relieve further shortness of breath, we gave you urgent hemodialysis as well as your scheduled thursday hemodialysis. Please follow up with your home nursing service on proper care of your PleurX catheter.    -Continue proper drainage & care of your PleurX Catheter  -Continue your diuretic regimen  -Continue you T/Th/Sa Hemodialysis  Secondary Diagnosis:	CKD (chronic kidney disease)  Goal:	To halt the progression of your kidney disease and continue symptomatic treatment  Instructions for follow-up, activity and diet:	You came to us after placement of a PleurX and [place] 28 catheter for recurrent pleural effusions. We continued your current regimen for your chronic kidney disease. On wednesday 8/2 you were very short of breath and we sent you for urgent hemodialysis which relieved your shortness of breath.    -Please continue with your hemodialysis regimen  -Please follow up with your outpatient nephrologist concerning your CKD treatment  -Please continue with your current home medications  Secondary Diagnosis:	CAD (coronary artery disease)  Instructions for follow-up, activity and diet:	You came to us after placement of a PleurX and [place] 28 catheter for recurrent pleural effusions. While in the hospital, we continued your home regimen of Aspirin 81 mg and Simvastatin.    -Please continue your home regimen of Aspirin & Simvastatin  -Please follow up with your primary care physician and/or cardiologist as an outpatient  Secondary Diagnosis:	Hypertension  Instructions for follow-up, activity and diet:	You came to us after placement of a PleurX and [place]ese 28 catheter for recurrent pleural effusions. Your blood pressure was difficult to control despite several medications. Please follow up with your primary care physician and/or cardiologist upon discharge concerning controlling your blood pressure.    -Please follow up with an outpatient physician concerning blood pressure control  -Continue current blood pressure medication regimen  -Please adhere to a low salt diet  Secondary Diagnosis:	Asthma  Instructions for follow-up, activity and diet:	You came to us after placement of a PleurX and [place] 28 catheter for recurrent pleural effusions. You asthma has been well controlled with your Ventolin inhaler. Please continue this regimen.    -Continue control of your asthma via your primary care physician Principal Discharge DX:	Pleural effusion  Goal:	To reduce the fluid levels and eliminate the SOB  Instructions for follow-up, activity and diet:	You came to us after insertion of a PleurX & [place] 28 catheter. The chest tubes were held to suction and drained the fluid. To relieve further shortness of breath, we gave you urgent hemodialysis as well as your scheduled thursday hemodialysis. Please follow up with your home nursing service on proper care of your PleurX catheter.    -Continue proper drainage & care of your PleurX Catheter  -Continue your diuretic regimen  -Continue you T/Th/Sa Hemodialysis  Secondary Diagnosis:	CKD (chronic kidney disease)  Goal:	To halt the progression of your kidney disease and continue symptomatic treatment  Instructions for follow-up, activity and diet:	You came to us after placement of a PleurX and [place] 28 catheter for recurrent pleural effusions. We continued your current regimen for your chronic kidney disease. On wednesday 8/2 you were very short of breath and we sent you for urgent hemodialysis which relieved your shortness of breath.    -Please continue with your hemodialysis regimen  -Please follow up with your outpatient nephrologist concerning your CKD treatment  -Please continue with your current home medications  Secondary Diagnosis:	CAD (coronary artery disease)  Instructions for follow-up, activity and diet:	You came to us after placement of a PleurX and [place] 28 catheter for recurrent pleural effusions. While in the hospital, we continued your home regimen of Aspirin 81 mg and Simvastatin.    -Please continue your home regimen of Aspirin & Simvastatin  -Please follow up with your primary care physician and/or cardiologist as an outpatient  Secondary Diagnosis:	Hypertension  Instructions for follow-up, activity and diet:	You came to us after placement of a PleurX and [place] 28 catheter for recurrent pleural effusions. Your blood pressure was difficult to control despite several medications. Please follow up with your primary care physician and/or cardiologist upon discharge concerning controlling your blood pressure.    -Please follow up with an outpatient physician concerning blood pressure control  -Continue current blood pressure medication regimen  -Please adhere to a low salt diet  Secondary Diagnosis:	Asthma  Instructions for follow-up, activity and diet:	You came to us after placement of a PleurX and [place] 28 catheter for recurrent pleural effusions. You asthma has been well controlled with your Ventolin inhaler. Please continue this regimen.    -Continue control of your asthma via your primary care physician Principal Discharge DX:	Pleural effusion  Goal:	To reduce the fluid levels and eliminate the SOB  Instructions for follow-up, activity and diet:	You came to us after insertion of a PleurX & [place] 28 catheter. The chest tubes were held to suction and drained the fluid. To relieve further shortness of breath, we gave you urgent hemodialysis as well as your scheduled thursday hemodialysis. Please follow up with your home nursing service on proper care of your PleurX catheter.    -Continue proper drainage & care of your PleurX Catheter  -Continue your diuretic regimen  -Continue you T/Th/Sa Hemodialysis  Secondary Diagnosis:	CKD (chronic kidney disease)  Goal:	To halt the progression of your kidney disease and continue symptomatic treatment  Instructions for follow-up, activity and diet:	You came to us after placement of a PleurX and [place] 28 catheter for recurrent pleural effusions. We continued your current regimen for your chronic kidney disease. On wednesday 8/2 you were very short of breath and we sent you for urgent hemodialysis which relieved your shortness of breath.    -Please continue with your hemodialysis regimen  -Please follow up with your outpatient nephrologist concerning your CKD treatment  -Please continue with your current home medications  Secondary Diagnosis:	CAD (coronary artery disease)  Instructions for follow-up, activity and diet:	You came to us after placement of a PleurX and [place] 28 catheter for recurrent pleural effusions. While in the hospital, we continued your home regimen of Aspirin 81 mg and Simvastatin.    -Please continue your home regimen of Aspirin & Simvastatin  -Please follow up with your primary care physician and/or cardiologist as an outpatient  Secondary Diagnosis:	Hypertension  Instructions for follow-up, activity and diet:	You came to us after placement of a PleurX and [place] 28 catheter for recurrent pleural effusions. Your blood pressure was difficult to control despite several medications. Please follow up with your primary care physician and/or cardiologist upon discharge concerning controlling your blood pressure.    -Please follow up with an outpatient physician concerning blood pressure control  -Continue current blood pressure medication regimen  -Please adhere to a low salt diet  Secondary Diagnosis:	Asthma  Instructions for follow-up, activity and diet:	You came to us after placement of a PleurX and [place] 28 catheter for recurrent pleural effusions. You asthma has been well controlled with your Ventolin inhaler. Please continue this regimen.    -Continue control of your asthma via your primary care physician Principal Discharge DX:	Pleural effusion  Goal:	To reduce the fluid levels and eliminate the SOB  Instructions for follow-up, activity and diet:	You came to us after insertion of a PleurX & [place] 28 catheter. The chest tubes were held to suction and drained the fluid. To relieve further shortness of breath, we gave you urgent hemodialysis as well as your scheduled thursday hemodialysis. Please follow up with your home nursing service on proper care of your PleurX catheter.    -Continue proper drainage & care of your PleurX Catheter  -Drain fluid via PleurX catheter until completely dry but not exceed 1000 mL at one time  -Continue your diuretic regimen  -Continue you T/Th/Sa Hemodialysis  Secondary Diagnosis:	CKD (chronic kidney disease)  Goal:	To halt the progression of your kidney disease and continue symptomatic treatment  Instructions for follow-up, activity and diet:	You came to us after placement of a PleurX and [place] 28 catheter for recurrent pleural effusions. We continued your current regimen for your chronic kidney disease. On wednesday 8/2 you were very short of breath and we sent you for urgent hemodialysis which relieved your shortness of breath.    -Please continue with your hemodialysis regimen  -Please follow up with your outpatient nephrologist concerning your CKD treatment  -Please continue with your current home medications  Secondary Diagnosis:	CAD (coronary artery disease)  Instructions for follow-up, activity and diet:	You came to us after placement of a PleurX and [place] 28 catheter for recurrent pleural effusions. While in the hospital, we continued your home regimen of Aspirin 81 mg and Simvastatin.    -Please continue your home regimen of Aspirin & Simvastatin  -Please follow up with your primary care physician and/or cardiologist as an outpatient  Secondary Diagnosis:	Hypertension  Instructions for follow-up, activity and diet:	You came to us after placement of a PleurX and [place] 28 catheter for recurrent pleural effusions. Your blood pressure was difficult to control despite several medications. Please follow up with your primary care physician and/or cardiologist upon discharge concerning controlling your blood pressure.    -Please follow up with an outpatient physician concerning blood pressure control  -Continue current blood pressure medication regimen  -Please adhere to a low salt diet  Secondary Diagnosis:	Asthma  Instructions for follow-up, activity and diet:	You came to us after placement of a PleurX and [place] 28 catheter for recurrent pleural effusions. You asthma has been well controlled with your Ventolin inhaler. Please continue this regimen.    -Continue control of your asthma via your primary care physician Principal Discharge DX:	Pleural effusion  Goal:	To reduce the fluid levels and eliminate the SOB  Instructions for follow-up, activity and diet:	You came to us after insertion of a PleurX & [place] of [place] 28 catheter. The chest tubes were held to suction and drained the fluid. To relieve further shortness of breath, we gave you urgent hemodialysis as well as your scheduled thursday hemodialysis. Please follow up with your home nursing service on proper care of your PleurX catheter.    -Continue proper drainage & care of your PleurX Catheter  -Drain fluid via PleurX catheter until completely dry but not exceed 1000 mL at one time  -Continue your diuretic regimen  -Continue you T/Th/Sa Hemodialysis  Secondary Diagnosis:	CKD (chronic kidney disease)  Goal:	To halt the progression of your kidney disease and continue symptomatic treatment  Instructions for follow-up, activity and diet:	You came to us after placement of a PleurX and Citizen of [place] 28 catheter for recurrent pleural effusions. We continued your current regimen for your chronic kidney disease. On wednesday 8/2 you were very short of breath and we sent you for urgent hemodialysis which relieved your shortness of breath.    -Please continue with your hemodialysis regimen  -Please follow up with your outpatient nephrologist concerning your CKD treatment  -Please continue with your current home medications  Secondary Diagnosis:	CAD (coronary artery disease)  Instructions for follow-up, activity and diet:	You came to us after placement of a PleurX and Citizen of [place] 28 catheter for recurrent pleural effusions. While in the hospital, we continued your home regimen of Aspirin 81 mg and Simvastatin.    -Please continue your home regimen of Aspirin & Simvastatin  -Please follow up with your primary care physician and/or cardiologist as an outpatient  Secondary Diagnosis:	Hypertension  Instructions for follow-up, activity and diet:	You came to us after placement of a PleurX and Citizen of [place] 28 catheter for recurrent pleural effusions. Your blood pressure was difficult to control despite several medications. Please follow up with your primary care physician and/or cardiologist upon discharge concerning controlling your blood pressure.    -Please follow up with an outpatient physician concerning blood pressure control  -Continue current blood pressure medication regimen  -Please adhere to a low salt diet  Secondary Diagnosis:	Asthma  Instructions for follow-up, activity and diet:	You came to us after placement of a PleurX and Citizen of [place] 28 catheter for recurrent pleural effusions. You asthma has been well controlled with your Ventolin inhaler. Please continue this regimen.    -Continue control of your asthma via your primary care physician Principal Discharge DX:	Pleural effusion  Goal:	To reduce the fluid levels and eliminate the SOB  Instructions for follow-up, activity and diet:	You came to us after insertion of a PleurX & [place] 28 catheter. The chest tubes were held to suction and drained the fluid. To relieve further shortness of breath, we gave you urgent hemodialysis as well as your scheduled thursday hemodialysis. Please follow up with your home nursing service on proper care of your PleurX catheter.    -Continue proper drainage & care of your PleurX Catheter  -Drain fluid via PleurX catheter until completely dry but not exceed 1000 mL at one time  -Continue your diuretic regimen  -Continue you T/Th/Sa Hemodialysis  Secondary Diagnosis:	CKD (chronic kidney disease)  Goal:	To halt the progression of your kidney disease and continue symptomatic treatment  Instructions for follow-up, activity and diet:	You came to us after placement of a PleurX and [place] 28 catheter for recurrent pleural effusions. We continued your current regimen for your chronic kidney disease. On wednesday 8/2 you were very short of breath and we sent you for urgent hemodialysis which relieved your shortness of breath.    -Please continue with your hemodialysis regimen  -Please follow up with your outpatient nephrologist concerning your CKD treatment  -Please continue with your current home medications  Secondary Diagnosis:	CAD (coronary artery disease)  Instructions for follow-up, activity and diet:	You came to us after placement of a PleurX and French 28 catheter for recurrent pleural effusions. While in the hospital, we continued your home regimen of Aspirin 81 mg and Simvastatin.    -Please continue your home regimen of Aspirin & Simvastatin  -Please follow up with your primary care physician and/or cardiologist as an outpatient  Secondary Diagnosis:	Hypertension  Instructions for follow-up, activity and diet:	You came to us after placement of a PleurX and French 28 catheter for recurrent pleural effusions. Your blood pressure was difficult to control despite several medications. Please follow up with your primary care physician and/or cardiologist upon discharge concerning controlling your blood pressure.    -Please follow up with an outpatient physician concerning blood pressure control  -Continue current blood pressure medication regimen  -Please adhere to a low salt diet  Secondary Diagnosis:	Asthma  Instructions for follow-up, activity and diet:	You came to us after placement of a PleurX and French 28 catheter for recurrent pleural effusions. You asthma has been well controlled with your Ventolin inhaler. Please continue this regimen.    -Continue control of your asthma via your primary care physician  Secondary Diagnosis:	Heartburn  Instructions for follow-up, activity and diet:	You came to us after placement of a PleurX and [place] 28 catheter for recurrent pleural effusions. You complained of heartburn/soreness following swallowing. Maalox was ordered to relieve symptoms.    -Please follow up with your physician as an outpatient for management of this issue  -Continue with Maalox as needed for your symptoms Principal Discharge DX:	Pleural effusion  Goal:	To reduce the fluid levels and eliminate the SOB  Instructions for follow-up, activity and diet:	You came to us after insertion of a PleurX & [place] 28 catheter. The chest tubes were held to suction and drained the fluid. To relieve further shortness of breath, we gave you urgent hemodialysis as well as your scheduled thursday hemodialysis. Please follow up with your home nursing service on proper care of your PleurX catheter.    -Continue proper drainage & care of your PleurX Catheter  -Drain fluid via PleurX catheter until completely dry but not exceed 1000 mL at one time  -Continue your diuretic regimen  -Continue you T/Th/Sa Hemodialysis  Secondary Diagnosis:	CKD (chronic kidney disease)  Goal:	To halt the progression of your kidney disease and continue symptomatic treatment  Instructions for follow-up, activity and diet:	You came to us after placement of a PleurX and [place] 28 catheter for recurrent pleural effusions. We continued your current regimen for your chronic kidney disease. On wednesday 8/2 you were very short of breath and we sent you for urgent hemodialysis which relieved your shortness of breath.    -Please continue with your hemodialysis regimen  -Please follow up with your outpatient nephrologist concerning your CKD treatment  -Please continue with your current home medications  Secondary Diagnosis:	CAD (coronary artery disease)  Instructions for follow-up, activity and diet:	You came to us after placement of a PleurX and [place] 28 catheter for recurrent pleural effusions. While in the hospital, we continued your home regimen of Aspirin 81 mg and Simvastatin.    -Please continue your home regimen of Aspirin & Simvastatin  -Please follow up with your primary care physician and/or cardiologist as an outpatient  Secondary Diagnosis:	Hypertension  Instructions for follow-up, activity and diet:	You came to us after placement of a PleurX and [place] 28 catheter for recurrent pleural effusions. Your blood pressure was difficult to control despite several medications. Please follow up with your primary care physician and/or cardiologist upon discharge concerning controlling your blood pressure.    -Please follow up with an outpatient physician concerning blood pressure control  -Continue current blood pressure medication regimen  -Please adhere to a low salt diet  Secondary Diagnosis:	Asthma  Instructions for follow-up, activity and diet:	You came to us after placement of a PleurX and [place] 28 catheter for recurrent pleural effusions. You asthma has been well controlled with your Ventolin inhaler. Please continue this regimen.    -Continue control of your asthma via your primary care physician  Secondary Diagnosis:	Heartburn  Instructions for follow-up, activity and diet:	You came to us after placement of a PleurX and [place] 28 catheter for recurrent pleural effusions. You complained of heartburn/soreness following swallowing. One dose of Maalox was given to relieve your symptoms.    -Please follow up with your physician as an outpatient for management of this issue Principal Discharge DX:	Pleural effusion  Goal:	To reduce the fluid levels and eliminate the SOB  Instructions for follow-up, activity and diet:	You came to us after insertion of a PleurX & [place] 28 catheter. The chest tubes were held to suction and drained the fluid. To relieve further shortness of breath, we gave you urgent hemodialysis as well as your scheduled thursday hemodialysis. Please follow up with your home nursing service on proper care of your PleurX catheter.    -Continue proper drainage & care of your PleurX Catheter  -Drain fluid via PleurX catheter until completely dry but not exceed 1000 mL at one time  -Continue your diuretic regimen  -Continue you T/Th/Sa Hemodialysis  Secondary Diagnosis:	CKD (chronic kidney disease)  Goal:	To halt the progression of your kidney disease and continue symptomatic treatment  Instructions for follow-up, activity and diet:	You came to us after placement of a PleurX and [place] 28 catheter for recurrent pleural effusions. We continued your current regimen for your chronic kidney disease. On wednesday 8/2 you were very short of breath and we sent you for urgent hemodialysis which relieved your shortness of breath.    -Please continue with your hemodialysis regimen  -Please follow up with your outpatient nephrologist concerning your CKD treatment  -Please continue with your current home medications  Secondary Diagnosis:	CAD (coronary artery disease)  Instructions for follow-up, activity and diet:	You came to us after placement of a PleurX and [place] 28 catheter for recurrent pleural effusions. While in the hospital, we continued your home regimen of Aspirin 81 mg and Simvastatin.    -Please continue your home regimen of Aspirin & Simvastatin  -Please follow up with your primary care physician and/or cardiologist as an outpatient  Secondary Diagnosis:	Hypertension  Instructions for follow-up, activity and diet:	You came to us after placement of a PleurX and [place] 28 catheter for recurrent pleural effusions. Your blood pressure was difficult to control despite several medications. Please follow up with your primary care physician and/or cardiologist upon discharge concerning controlling your blood pressure.    -Please follow up with an outpatient physician concerning blood pressure control  -Continue current blood pressure medication regimen  -Please adhere to a low salt diet  Secondary Diagnosis:	Asthma  Instructions for follow-up, activity and diet:	You came to us after placement of a PleurX and [place] 28 catheter for recurrent pleural effusions. You asthma has been well controlled with your Ventolin inhaler. Please continue this regimen.    -Continue control of your asthma via your primary care physician  Secondary Diagnosis:	Heartburn  Instructions for follow-up, activity and diet:	You came to us after placement of a PleurX and [place] 28 catheter for recurrent pleural effusions. You complained of heartburn/soreness following swallowing. One dose of Maalox was given to relieve your symptoms.    -Please follow up with your physician as an outpatient for management of this issue Principal Discharge DX:	Pleural effusion  Goal:	To reduce the fluid levels and eliminate the SOB  Instructions for follow-up, activity and diet:	You came to us after insertion of a PleurX & [place] 28 catheter. The chest tubes were held to suction and drained the fluid. To relieve further shortness of breath, we gave you urgent hemodialysis as well as your scheduled thursday hemodialysis. Please follow up with your home nursing service on proper care of your PleurX catheter.    -Continue proper drainage & care of your PleurX Catheter  -Drain fluid via PleurX catheter until completely dry but not exceed 1000 mL at one time  -Continue your diuretic regimen  -Continue you T/Th/Sa Hemodialysis  Secondary Diagnosis:	CKD (chronic kidney disease)  Goal:	To halt the progression of your kidney disease and continue symptomatic treatment  Instructions for follow-up, activity and diet:	You came to us after placement of a PleurX and [place] 28 catheter for recurrent pleural effusions. We continued your current regimen for your chronic kidney disease. On wednesday 8/2 you were very short of breath and we sent you for urgent hemodialysis which relieved your shortness of breath.    -Please continue with your hemodialysis regimen  -Please follow up with your outpatient nephrologist concerning your CKD treatment  -Please continue with your current home medications  Secondary Diagnosis:	CAD (coronary artery disease)  Instructions for follow-up, activity and diet:	You came to us after placement of a PleurX and [place] 28 catheter for recurrent pleural effusions. While in the hospital, we continued your home regimen of Aspirin 81 mg and Simvastatin.    -Please continue your home regimen of Aspirin & Simvastatin  -Please follow up with your primary care physician and/or cardiologist as an outpatient  Secondary Diagnosis:	Hypertension  Instructions for follow-up, activity and diet:	You came to us after placement of a PleurX and [place] 28 catheter for recurrent pleural effusions. Your blood pressure was difficult to control despite several medications. Please follow up with your primary care physician and/or cardiologist upon discharge concerning controlling your blood pressure.    -Please follow up with an outpatient physician concerning blood pressure control  -Continue current blood pressure medication regimen  -Please adhere to a low salt diet  Secondary Diagnosis:	Asthma  Instructions for follow-up, activity and diet:	You came to us after placement of a PleurX and [place] 28 catheter for recurrent pleural effusions. You asthma has been well controlled with your Ventolin inhaler. Please continue this regimen.    -Continue control of your asthma via your primary care physician  Secondary Diagnosis:	Heartburn  Instructions for follow-up, activity and diet:	You came to us after placement of a PleurX and [place] 28 catheter for recurrent pleural effusions. You complained of heartburn/soreness following swallowing. One dose of Maalox was given to relieve your symptoms.    -Please follow up with your physician as an outpatient for management of this issue Principal Discharge DX:	Pleural effusion  Goal:	To reduce the fluid levels and eliminate the SOB  Instructions for follow-up, activity and diet:	You came to us after insertion of a PleurX & [place] 28 catheter. The chest tubes were held to suction and drained the fluid. To relieve further shortness of breath, we gave you urgent hemodialysis as well as your scheduled thursday hemodialysis. Please follow up with your home nursing service on proper care of your PleurX catheter.    -Continue proper drainage & care of your PleurX Catheter  -Drain fluid via PleurX catheter until completely dry but not exceed 1000 mL at one time  -Continue your diuretic regimen  -Continue you T/Th/Sa Hemodialysis  Secondary Diagnosis:	CKD (chronic kidney disease)  Goal:	To halt the progression of your kidney disease and continue symptomatic treatment  Instructions for follow-up, activity and diet:	You came to us after placement of a PleurX and [place] 28 catheter for recurrent pleural effusions. We continued your current regimen for your chronic kidney disease. On wednesday 8/2 you were very short of breath and we sent you for urgent hemodialysis which relieved your shortness of breath.    -Please continue with your hemodialysis regimen  -Please follow up with your outpatient nephrologist concerning your CKD treatment  -Please continue with your current home medications  Secondary Diagnosis:	CAD (coronary artery disease)  Instructions for follow-up, activity and diet:	You came to us after placement of a PleurX and [place] 28 catheter for recurrent pleural effusions. While in the hospital, we continued your home regimen of Aspirin 81 mg and Simvastatin.    -Please continue your home regimen of Aspirin & Simvastatin  -Please follow up with your primary care physician and/or cardiologist as an outpatient  Secondary Diagnosis:	Hypertension  Instructions for follow-up, activity and diet:	You came to us after placement of a PleurX and [place] 28 catheter for recurrent pleural effusions. Your blood pressure was difficult to control despite several medications. Please follow up with your primary care physician and/or cardiologist upon discharge concerning controlling your blood pressure.    -Please follow up with an outpatient physician concerning blood pressure control  -Continue current blood pressure medication regimen  -Please adhere to a low salt diet  Secondary Diagnosis:	Asthma  Instructions for follow-up, activity and diet:	You came to us after placement of a PleurX and [place] 28 catheter for recurrent pleural effusions. You asthma has been well controlled with your Ventolin inhaler. Please continue this regimen.    -Continue control of your asthma via your primary care physician  Secondary Diagnosis:	Heartburn  Instructions for follow-up, activity and diet:	You came to us after placement of a PleurX and [place] 28 catheter for recurrent pleural effusions. You complained of heartburn/soreness following swallowing. One dose of Maalox was given to relieve your symptoms.    -Please follow up with your physician as an outpatient for management of this issue

## 2017-08-03 NOTE — PROGRESS NOTE ADULT - ASSESSMENT
This is 66 year old female with PMH stated above. Admitted after pleurodesis and chest tube placement. The renal service activated in regard of the need of HD. She was dialyzed on 8/2 - 2 hours with UF of 2 liters. we are planning today also HD

## 2017-08-03 NOTE — DISCHARGE NOTE ADULT - HOSPITAL COURSE
Pt presented to inpatient medical unit s/p insertion of pleurX catheter & talc pleurodesis. Chest tube was held to suction and draining serosanginous fluid. Pt had a fever 1x on 8/2/17 of 100.6 F. Pt was evaluated and fever work up was initiated including blood cultures, CXR, U/A, urine culture. Pt was given urgent HD on 8/2/17 PM to reduce fluid overload and severe SOB. Pt received scheduled HD on 8/3/17 AM. Pt reports feeling better and has greatly diminished SOB. Chest tubes were pulled by pulmonary team on 8/3/17 and visiting nursing services arranged to educate the patient on proper care of PleurX catheter. Pt presented to inpatient medical unit s/p insertion of pleurX catheter & talc pleurodesis. Chest tube was held to suction and draining serosanginous fluid. Pt had a fever 1x on 8/2/17 of 100.6 F. Pt was evaluated and fever work up was initiated including blood cultures, CXR, U/A, urine culture. CXR revealed Bilateral pleural effusions with possible loculation of   fluid in the left lung base and patchy opacification in the right lower lung. However, fever resolved after one time dose of tylenol and pt has not had any complaints. Pt was given urgent HD on 8/2/17 PM to reduce fluid overload and severe SOB. Pt received scheduled HD on 8/3/17 AM. Pt reports feeling better and has greatly diminished SOB. Chest tubes were pulled by pulmonary team on 8/3/17 and skilled nursing care arranged to educate the patient on proper care of PleurX catheter.

## 2017-08-03 NOTE — DISCHARGE NOTE ADULT - CARE PROVIDER_API CALL
Josh Salinas), Internal Medicine; Pulmonary Disease  [address] [place] [address]  Phone: [phone]  Fax: [phone]

## 2017-08-03 NOTE — PROGRESS NOTE ADULT - SUBJECTIVE AND OBJECTIVE BOX
Patient was seen and evaluated on dialysis.   Patient is tolerating the procedure well.   HR: 89 (08-03-17 @ 08:21)  BP: 152/78 (08-03-17 @ 08:21) pusle 65, /67  Continue dialysis:   Dialyzer:  180        QB:  400      QD: 500  Goal UF 3 liters  over 3 Hours

## 2017-08-04 VITALS — WEIGHT: 108.47 LBS

## 2017-08-04 DIAGNOSIS — M89.9 DISORDER OF BONE, UNSPECIFIED: ICD-10-CM

## 2017-08-04 PROCEDURE — 99232 SBSQ HOSP IP/OBS MODERATE 35: CPT | Mod: 24

## 2017-08-04 PROCEDURE — 99232 SBSQ HOSP IP/OBS MODERATE 35: CPT | Mod: GC

## 2017-08-04 RX ORDER — FAMOTIDINE 10 MG/ML
20 INJECTION INTRAVENOUS ONCE
Qty: 0 | Refills: 0 | Status: COMPLETED | OUTPATIENT
Start: 2017-08-04 | End: 2017-08-04

## 2017-08-04 RX ADMIN — Medication 20 MILLIGRAM(S): at 05:47

## 2017-08-04 RX ADMIN — Medication 40 MILLIGRAM(S): at 05:47

## 2017-08-04 RX ADMIN — FAMOTIDINE 20 MILLIGRAM(S): 10 INJECTION INTRAVENOUS at 09:47

## 2017-08-04 RX ADMIN — CINACALCET 60 MILLIGRAM(S): 30 TABLET, FILM COATED ORAL at 12:04

## 2017-08-04 RX ADMIN — Medication 60 MILLIGRAM(S): at 05:46

## 2017-08-04 RX ADMIN — Medication 30 MILLILITER(S): at 08:59

## 2017-08-04 RX ADMIN — CARVEDILOL PHOSPHATE 25 MILLIGRAM(S): 80 CAPSULE, EXTENDED RELEASE ORAL at 09:47

## 2017-08-04 RX ADMIN — HEPARIN SODIUM 5000 UNIT(S): 5000 INJECTION INTRAVENOUS; SUBCUTANEOUS at 05:45

## 2017-08-04 NOTE — DIETITIAN INITIAL EVALUATION ADULT. - ENERGY NEEDS
Ht: 62" IBW: 50kg, %IBW: 98%, BMI: 19.8  ABW used for calculations as pt between % of IBW.   Needs estimated for HD. Fluid as calculated or per MD discretion

## 2017-08-04 NOTE — PROGRESS NOTE ADULT - ATTENDING COMMENTS
less SOB  hemodynamically stable
seen and evaluated while on dialysis  tolerating the procedure well.  Continue full treatment as prescribed
godd UF with dialysis 8/2 and 8/3- volume status and respiratory status much better  pleuridex in place.  Defer dialysis today- next treatment 8/5
feeling much better this AM after dialysis last night.  For repeat treatment today --will target additional fluid removal

## 2017-08-04 NOTE — DIETITIAN INITIAL EVALUATION ADULT. - OTHER INFO
65y/o woman now w/ pleurex cath for L pleural effusion. Hx of ESRD on HD. Pt states she follows w/ renal RD at dialysis center. At present pt denies N/V/D/C or pain. Pt consuming 25-50% of trays. Lower intake when she doesn't like the food. RD reviewed compliant menu options w/ pt. Pt denies difficulties chewing/swallowing. NKFA.  Skin intact w/ pleurex cath. noted.

## 2017-08-04 NOTE — CHART NOTE - NSCHARTNOTEFT_GEN_A_CORE
PULMONARY FELLOW Procedure note    Pleurx catheter drained about 15cc of serosanguinous fluid (drained to dry).  New cap placed and dressing replaced.  Should continue to drain daily and will see Dr. Salinas in the office in 1 week. PULMONARY FELLOW Procedure note    Pleurx catheter drained about 15cc of serosanguinous fluid (drained to dry).  New cap placed and dressing replaced.  Should continue to drain daily and will see Dr. Sailnas in the office in 1 week. Tolerated well.

## 2017-08-04 NOTE — PROGRESS NOTE ADULT - SUBJECTIVE AND OBJECTIVE BOX
Objective   The patient feels better, her breathing is better, not on oxygen. Yesterday dialyzed with 3 liters UF, chest tube removed. No chest pain or shortness of breath this morning - not on oxygeb       66 year old F w/ PMH of ESRD (T/T/Sa) on HD, dCHF (EF 65%), CAD, anemia, uncontrolled HTN. The patient came yesterday in the hospital for planned procedure - pleurodesis and placement of chest catheter for drainage after that she was admitted for further management. Drainage about 1.5 to 1.8 liters. The patient also admitted for difficult breathing yesterday - no chest pain, no fever. Today still feels short of breath, but better compared to yesterday. The renals service is activated in regard of the need of HD. On 8/3 - 3 liters off, nO need of HD today - 8/4      Patient seen and examined at bedside.     talc (Sterile) 5 Application(s) Once  aspirin enteric coated 81 milliGRAM(s) daily  enalapril 20 milliGRAM(s) two times a day  simvastatin 20 milliGRAM(s) at bedtime  carvedilol 25 milliGRAM(s) every 12 hours  ALBUTerol    90 MICROgram(s) HFA Inhaler 2 Puff(s) every 6 hours PRN  NIFEdipine XL 90 milliGRAM(s) at bedtime  NIFEdipine XL 60 milliGRAM(s) daily  metolazone 10 milliGRAM(s) daily  torsemide 40 milliGRAM(s) two times a day  cinacalcet 60 milliGRAM(s) daily  HYDROmorphone  Injectable 0.5 milliGRAM(s) every 6 hours PRN  heparin  Injectable 5000 Unit(s) every 12 hours  aluminum hydroxide/magnesium hydroxide/simethicone Suspension 30 milliLiter(s) every 4 hours PRN      Allergies    No Known Allergies    Intolerances        T(C): , Max: 37.3 (08-03-17 @ 22:30)  T(F): , Max: 99.2 (08-03-17 @ 22:30)  HR: 81 (08-04-17 @ 05:18)  BP: 138/76 (08-04-17 @ 05:18)  BP(mean): --  RR: 18 (08-04-17 @ 05:18)  SpO2: 93% (08-04-17 @ 05:18)  Wt(kg): --    08-03 @ 07:01  -  08-04 @ 07:00  --------------------------------------------------------  IN:  Total IN: 0 mL    OUT:    Other: 3000 mL  Total OUT: 3000 mL    Total NET: -3000 mL          Physical exam:   Alert and oriented  Mild JVD   tongue moist   Normal air entry bilaterally, decreased breath sounds on the left base, s/p chest tube removal   Heart: rrr, normal s1/s2, no murmurs, rubs or gallops   Abdomen soft, non tender, non -distened, normal bowel sounds   Extremities: no peripheral edema, 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 with good bruit         LABS:                        10.6   14.9  )-----------( 309      ( 03 Aug 2017 13:28 )             31.5     08-03    133<L>  |  91<L>  |  30<H>  ----------------------------<  127<H>  4.7   |  26  |  5.00<H>    Ca    10.0      03 Aug 2017 13:28  Mg     1.9     08-03                  RADIOLOGY & ADDITIONAL STUDIES:    < from: Xray Chest 1 View AP- PORTABLE-Urgent (08.03.17 @ 08:44) >  IMPRESSION:  Increasing bilateral patchy and hazy opacities as described above.    < end of copied text > Objective   The patient feels better, her breathing is better, not on oxygen. Yesterday dialyzed with 3 liters UF, chest tube removed. No chest pain or shortness of breath this morning - not on oxygen      66 year old F w/ PMH of ESRD (T/T/Sa) on HD, dCHF (EF 65%), CAD, anemia, uncontrolled HTN. The patient came yesterday in the hospital for planned procedure - pleurodesis and placement of chest catheter for drainage after that she was admitted for further management. Drainage about 1.5 to 1.8 liters. The patient also admitted for difficult breathing yesterday - no chest pain, no fever. Today still feels short of breath, but better compared to yesterday. The renals service is activated in regard of the need of HD. On 8/3 - 3 liters off, no need of HD today - 8/4      Patient seen and examined at bedside.     talc (Sterile) 5 Application(s) Once  aspirin enteric coated 81 milliGRAM(s) daily  enalapril 20 milliGRAM(s) two times a day  simvastatin 20 milliGRAM(s) at bedtime  carvedilol 25 milliGRAM(s) every 12 hours  ALBUTerol    90 MICROgram(s) HFA Inhaler 2 Puff(s) every 6 hours PRN  NIFEdipine XL 90 milliGRAM(s) at bedtime  NIFEdipine XL 60 milliGRAM(s) daily  metolazone 10 milliGRAM(s) daily  torsemide 40 milliGRAM(s) two times a day  cinacalcet 60 milliGRAM(s) daily  HYDROmorphone  Injectable 0.5 milliGRAM(s) every 6 hours PRN  heparin  Injectable 5000 Unit(s) every 12 hours  aluminum hydroxide/magnesium hydroxide/simethicone Suspension 30 milliLiter(s) every 4 hours PRN      Allergies    No Known Allergies    Intolerances        T(C): , Max: 37.3 (08-03-17 @ 22:30)  T(F): , Max: 99.2 (08-03-17 @ 22:30)  HR: 81 (08-04-17 @ 05:18)  BP: 138/76 (08-04-17 @ 05:18)  BP(mean): --  RR: 18 (08-04-17 @ 05:18)  SpO2: 93% (08-04-17 @ 05:18)  Wt(kg): --    08-03 @ 07:01  -  08-04 @ 07:00  --------------------------------------------------------  IN:  Total IN: 0 mL    OUT:    Other: 3000 mL  Total OUT: 3000 mL    Total NET: -3000 mL          Physical exam:   Alert and oriented  Mild JVD   tongue moist   Normal air entry bilaterally, decreased breath sounds on the left base, s/p chest tube removal   Heart: rrr, normal s1/s2, no murmurs, rubs or gallops   Abdomen soft, non tender, non -distened, normal bowel sounds   Extremities: no peripheral edema, 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 with good bruit         LABS:                        10.6   14.9  )-----------( 309      ( 03 Aug 2017 13:28 )             31.5     08-03    133<L>  |  91<L>  |  30<H>  ----------------------------<  127<H>  4.7   |  26  |  5.00<H>    Ca    10.0      03 Aug 2017 13:28  Mg     1.9     08-03                  RADIOLOGY & ADDITIONAL STUDIES:    < from: Xray Chest 1 View AP- PORTABLE-Urgent (08.03.17 @ 08:44) >  IMPRESSION:  Increasing bilateral patchy and hazy opacities as described above.    < end of copied text >

## 2017-08-04 NOTE — PROGRESS NOTE ADULT - ASSESSMENT
This is 66 year old female with PMH stated above. Admitted after pleurodesis and chest tube placement. The renal service activated in regard of the need of HD. She was dialyzed on 8/2 - 2 hours with UF of 2 liters. we are planning today also HD This is 66 year old female with PMH stated above. Admitted after pleurodesis and chest tube placement. The renal service activated in regard of the need of HD. She was dialyzed on 8/2 - 2 hours with UF of 2 liters, 3 liters removed 8/3-   defer dialysis today. Next HD 8/5

## 2017-08-04 NOTE — PROGRESS NOTE ADULT - PROBLEM SELECTOR PLAN 3
- XR reduced pleural effusion
- XR in the evening before HD - not much improvement   - still has a chest tube   - consider repeating the XR in the afternoon after the HD
Pt was admitted with PMH of HTN. HTN is currently stable at 145/72.    -C/w current antihypertensives (Nifedipine 60 qd, Nifedipine 90 qhs, enalapril 20 BID, carvedilol 25 BID)  -Will continue to monitor BP & adjust accordingly

## 2017-08-04 NOTE — PROGRESS NOTE ADULT - PROBLEM SELECTOR PLAN 1
- last HD done on 8/3 - 3 hours with 3 liters off   -  electrolytes acceptable   - phsophate - 5.1   - cinacalcet 60 mcg  - daily weight   - in and outs ( still makes urine)  - renal diet   - avoid morphine - not a good choice in ckd patient - last HD done on 8/3 - 3 hours with 3 liters off   -  electrolytes acceptable   - Phosphate - 5.1   - cinacalcet 60 mcg  - daily weight   - in and outs ( still makes urine)  - renal diet   - avoid morphine - not a good choice in ckd patient

## 2017-08-05 LAB
CULTURE RESULTS: SIGNIFICANT CHANGE UP
SPECIMEN SOURCE: SIGNIFICANT CHANGE UP

## 2017-08-06 ENCOUNTER — EMERGENCY (EMERGENCY)
Facility: HOSPITAL | Age: 67
LOS: 1 days | Discharge: HOME | End: 2017-08-06
Attending: EMERGENCY MEDICINE | Admitting: EMERGENCY MEDICINE
Payer: MEDICARE

## 2017-08-06 VITALS
RESPIRATION RATE: 18 BRPM | DIASTOLIC BLOOD PRESSURE: 91 MMHG | HEIGHT: 62 IN | HEART RATE: 84 BPM | SYSTOLIC BLOOD PRESSURE: 176 MMHG | WEIGHT: 110.89 LBS | TEMPERATURE: 98 F

## 2017-08-06 VITALS
OXYGEN SATURATION: 98 % | RESPIRATION RATE: 18 BRPM | HEART RATE: 78 BPM | SYSTOLIC BLOOD PRESSURE: 155 MMHG | TEMPERATURE: 99 F | DIASTOLIC BLOOD PRESSURE: 79 MMHG

## 2017-08-06 DIAGNOSIS — I77.0 ARTERIOVENOUS FISTULA, ACQUIRED: Chronic | ICD-10-CM

## 2017-08-06 DIAGNOSIS — J45.909 UNSPECIFIED ASTHMA, UNCOMPLICATED: ICD-10-CM

## 2017-08-06 DIAGNOSIS — J90 PLEURAL EFFUSION, NOT ELSEWHERE CLASSIFIED: ICD-10-CM

## 2017-08-06 DIAGNOSIS — E78.5 HYPERLIPIDEMIA, UNSPECIFIED: ICD-10-CM

## 2017-08-06 DIAGNOSIS — I12.9 HYPERTENSIVE CHRONIC KIDNEY DISEASE WITH STAGE 1 THROUGH STAGE 4 CHRONIC KIDNEY DISEASE, OR UNSPECIFIED CHRONIC KIDNEY DISEASE: ICD-10-CM

## 2017-08-06 DIAGNOSIS — Z79.899 OTHER LONG TERM (CURRENT) DRUG THERAPY: ICD-10-CM

## 2017-08-06 DIAGNOSIS — Z79.82 LONG TERM (CURRENT) USE OF ASPIRIN: ICD-10-CM

## 2017-08-06 DIAGNOSIS — N18.9 CHRONIC KIDNEY DISEASE, UNSPECIFIED: ICD-10-CM

## 2017-08-06 DIAGNOSIS — K11.21 ACUTE SIALOADENITIS: ICD-10-CM

## 2017-08-06 DIAGNOSIS — R22.0 LOCALIZED SWELLING, MASS AND LUMP, HEAD: ICD-10-CM

## 2017-08-06 PROCEDURE — 94010 BREATHING CAPACITY TEST: CPT | Mod: 26

## 2017-08-06 PROCEDURE — 32556 INSERT CATH PLEURA W/O IMAGE: CPT

## 2017-08-06 PROCEDURE — 99284 EMERGENCY DEPT VISIT MOD MDM: CPT | Mod: 25

## 2017-08-06 PROCEDURE — 99284 EMERGENCY DEPT VISIT MOD MDM: CPT

## 2017-08-06 NOTE — ED ADULT TRIAGE NOTE - OTHER COMPLAINTS
pt c.o R facial swelling since last night. pt c.o difficulty swallowing. pt denies sob. speaking in full sentences, no tongue swelling. dialysis patient TU/TH/SAT

## 2017-08-06 NOTE — ED ADULT NURSE NOTE - OBJECTIVE STATEMENT
Patient reports, "My face is swollen."  Points to right side, no apparent visible swelling at this time.  Provider to evaluate.

## 2017-08-06 NOTE — ED PROVIDER NOTE - MEDICAL DECISION MAKING DETAILS
acute non-painful nonerythematous nonfluctuant R parotid swelling for 1 day w/no systemic sx, counseled to use sialogogues and f/u with PMD. Seen by pulm for indwelling L pleural drain, drained 15mL, no evidence of line infection or skin necrosis, will f/u with Dr. Salinas in the office this week. Given anticipatory guidance and return precautions.

## 2017-08-06 NOTE — CONSULT NOTE ADULT - SUBJECTIVE AND OBJECTIVE BOX
65 yo F with ESRD, chronic left sided pleural effusion presented to the ED with CC of facial swelling. Of note, pt discharged 2 days ago s/p pleurx catheter placement for recurrent left sided effusion and was supposed to have VNS drainage of catheter daily. Pt states no one came to drain the catheter and a nurse is supposed to come today; however, she is here in the ER. Denies SOB, CP, pleuritic pain, orthopnea, or CIFUENTES. Pt does not have an appointment set up to see Dr. Salinas this week as she was discharged Friday. States she'll call Monday morning to set up appt. ROS neg for fever/chills, nausea/vomiting, diarrhea, constipation, LE swelling. Had hemodialysis yesterday and is set up for Tues/Thurs/Sat    Vital Signs Last 24 Hrs  T(C): 37.1 (06 Aug 2017 12:57), Max: 37.1 (06 Aug 2017 12:57)  T(F): 98.7 (06 Aug 2017 12:57), Max: 98.7 (06 Aug 2017 12:57)  HR: 78 (06 Aug 2017 12:57) (78 - 84)  BP: 155/79 (06 Aug 2017 12:57) (155/79 - 176/91)  BP(mean): --  RR: 18 (06 Aug 2017 12:57) (18 - 18)  SpO2: 98% (06 Aug 2017 12:57) (98% - 98%)    Physical exam  Gen: NAD, laying flat sleeping comfortably  HEENT: MMM  Neck: No JVD  CV: 2/6 pansystolic ejection murmur  Resp: Dec BS at LLL, right CTA  GI: Soft, nondistended, nontender  Ext: no C/C/E  Skin: WWP  Vasc: + bruit L AVF  Neuro: AAOx3

## 2017-08-06 NOTE — ED PROVIDER NOTE - OBJECTIVE STATEMENT
66 year old F w/ PMH of ESRD (T/T/Sa) on HD, dCHF (EF 65%), CAD, anemia, uncontrolled HTN, recent admission to [facility] for L sided pleural effusion/resp distress now w/indwelling catheter draining chronic pleural effusion, with home visiting nurse to drain catheter. Pt concerned because no nurse showed up today to drain catheter. However, pt denies resp complaints -- no CP/SOB/palpitaitions or orthopnea. Endorses positional discomfort at catheter site in L axilla though denies fever/skin changes/discharge. Dressing last changed 24hrs ago. Pt also endorsing R facial swelling over cheek/mandible w/out pain, trauma, fever or recent instrumentation. Swelling began yesterday and has neither worsened nor improved since yesterday. No cough, rhinorrhea, sore throat.

## 2017-08-06 NOTE — CONSULT NOTE ADULT - PROBLEM SELECTOR RECOMMENDATION 9
-Chronic L pleural effusion s/p pleurX that was not drained yesterday, though pt in no acute respiratory distress and able to lay comfortably, had HD yesterday  - PleurX catheter sterilely drained at bedside with ~10cc pleural fluid noted. Pt tolerated procedure well, new dressing applied.  - Pt to call Dr. Salinas's office tomorrow morning to set up F/U of pleurX catheter. Nurse to resume drainage at home.

## 2017-08-06 NOTE — ED PROVIDER NOTE - PHYSICAL EXAMINATION
VITAL SIGNS: I have reviewed nursing notes and confirm.  CONSTITUTIONAL: Well-developed; well-nourished elderly female comfortable in stretcher moving all ext speaking in complete sentences w/approp affect, in no acute distress.  SKIN: Agree with RN documentation regarding decubitus evaluation. Remainder of skin exam is warm and dry, no acute rash.  HEAD: Normocephalic; atraumatic.  EYES: PERRL, EOM intact; conjunctiva and sclera clear.  ENT: No nasal discharge; airway clear, + R sided swelling over parotid gland w/out fluctuance or TTP, no erythema bleeding or gingival involvement, no significant dental caries  NECK: Supple; non tender.  CARD: S1, S2 normal; no murmurs, gallops, or rubs. Regular rate and rhythm.  RESP: No wheezes, rales or rhonchi. CTA b/l, + pleural drain in L axilla covered w/3-sided dry and clean dressing w/no skin changes or discharge surrounding drain  ABD: Normal bowel sounds; soft; non-distended; non-tender; no hepatosplenomegaly.  EXT: Normal ROM. No clubbing, cyanosis or edema.  LYMPH: No acute cervical adenopathy.  NEURO: Alert, oriented. Grossly unremarkable.  PSYCH: Cooperative, appropriate.

## 2017-08-06 NOTE — CONSULT NOTE ADULT - ASSESSMENT
65 yo F with ESRD on HD T/Th/Sa presents for facial swelling and left pleurX catheter that was not drained yesterday

## 2017-08-07 LAB
CULTURE RESULTS: SIGNIFICANT CHANGE UP
SPECIMEN SOURCE: SIGNIFICANT CHANGE UP

## 2017-08-08 DIAGNOSIS — I13.2 HYPERTENSIVE HEART AND CHRONIC KIDNEY DISEASE WITH HEART FAILURE AND WITH STAGE 5 CHRONIC KIDNEY DISEASE, OR END STAGE RENAL DISEASE: ICD-10-CM

## 2017-08-08 DIAGNOSIS — I25.2 OLD MYOCARDIAL INFARCTION: ICD-10-CM

## 2017-08-08 DIAGNOSIS — N18.9 CHRONIC KIDNEY DISEASE, UNSPECIFIED: ICD-10-CM

## 2017-08-08 DIAGNOSIS — J90 PLEURAL EFFUSION, NOT ELSEWHERE CLASSIFIED: ICD-10-CM

## 2017-08-08 DIAGNOSIS — Z99.2 DEPENDENCE ON RENAL DIALYSIS: ICD-10-CM

## 2017-08-08 DIAGNOSIS — I50.30 UNSPECIFIED DIASTOLIC (CONGESTIVE) HEART FAILURE: ICD-10-CM

## 2017-08-08 DIAGNOSIS — R07.9 CHEST PAIN, UNSPECIFIED: ICD-10-CM

## 2017-08-08 DIAGNOSIS — Z86.73 PERSONAL HISTORY OF TRANSIENT ISCHEMIC ATTACK (TIA), AND CEREBRAL INFARCTION WITHOUT RESIDUAL DEFICITS: ICD-10-CM

## 2017-08-08 DIAGNOSIS — N18.6 END STAGE RENAL DISEASE: ICD-10-CM

## 2017-08-08 DIAGNOSIS — J45.909 UNSPECIFIED ASTHMA, UNCOMPLICATED: ICD-10-CM

## 2017-08-08 DIAGNOSIS — D63.1 ANEMIA IN CHRONIC KIDNEY DISEASE: ICD-10-CM

## 2017-08-10 ENCOUNTER — APPOINTMENT (OUTPATIENT)
Dept: PULMONOLOGY | Facility: CLINIC | Age: 67
End: 2017-08-10
Payer: MEDICARE

## 2017-08-10 VITALS
DIASTOLIC BLOOD PRESSURE: 74 MMHG | WEIGHT: 109 LBS | BODY MASS INDEX: 20.61 KG/M2 | HEART RATE: 75 BPM | SYSTOLIC BLOOD PRESSURE: 148 MMHG | OXYGEN SATURATION: 98 %

## 2017-08-10 PROCEDURE — 99024 POSTOP FOLLOW-UP VISIT: CPT

## 2017-08-10 PROCEDURE — 32552 REMOVE LUNG CATHETER: CPT

## 2017-08-10 PROCEDURE — 99215 OFFICE O/P EST HI 40 MIN: CPT | Mod: 25

## 2017-08-20 ENCOUNTER — FORM ENCOUNTER (OUTPATIENT)
Age: 67
End: 2017-08-20

## 2017-08-21 ENCOUNTER — OUTPATIENT (OUTPATIENT)
Dept: OUTPATIENT SERVICES | Facility: HOSPITAL | Age: 67
LOS: 1 days | End: 2017-08-21
Payer: MEDICARE

## 2017-08-21 DIAGNOSIS — I77.0 ARTERIOVENOUS FISTULA, ACQUIRED: Chronic | ICD-10-CM

## 2017-08-21 DIAGNOSIS — J45.909 UNSPECIFIED ASTHMA, UNCOMPLICATED: ICD-10-CM

## 2017-08-21 PROCEDURE — 71020: CPT | Mod: 26

## 2017-08-23 ENCOUNTER — INPATIENT (INPATIENT)
Facility: HOSPITAL | Age: 67
LOS: 0 days | Discharge: HOME HEALTH | DRG: 291 | End: 2017-08-24
Attending: INTERNAL MEDICINE | Admitting: INTERNAL MEDICINE
Payer: MEDICARE

## 2017-08-23 VITALS
TEMPERATURE: 98 F | RESPIRATION RATE: 22 BRPM | SYSTOLIC BLOOD PRESSURE: 199 MMHG | OXYGEN SATURATION: 91 % | WEIGHT: 115.08 LBS | DIASTOLIC BLOOD PRESSURE: 92 MMHG | HEART RATE: 94 BPM

## 2017-08-23 DIAGNOSIS — R63.8 OTHER SYMPTOMS AND SIGNS CONCERNING FOOD AND FLUID INTAKE: ICD-10-CM

## 2017-08-23 DIAGNOSIS — Z29.9 ENCOUNTER FOR PROPHYLACTIC MEASURES, UNSPECIFIED: ICD-10-CM

## 2017-08-23 DIAGNOSIS — N18.6 END STAGE RENAL DISEASE: ICD-10-CM

## 2017-08-23 DIAGNOSIS — I77.0 ARTERIOVENOUS FISTULA, ACQUIRED: Chronic | ICD-10-CM

## 2017-08-23 DIAGNOSIS — I10 ESSENTIAL (PRIMARY) HYPERTENSION: ICD-10-CM

## 2017-08-23 DIAGNOSIS — J90 PLEURAL EFFUSION, NOT ELSEWHERE CLASSIFIED: ICD-10-CM

## 2017-08-23 DIAGNOSIS — R06.00 DYSPNEA, UNSPECIFIED: ICD-10-CM

## 2017-08-23 DIAGNOSIS — J96.00 ACUTE RESPIRATORY FAILURE, UNSPECIFIED WHETHER WITH HYPOXIA OR HYPERCAPNIA: ICD-10-CM

## 2017-08-23 DIAGNOSIS — D64.9 ANEMIA, UNSPECIFIED: ICD-10-CM

## 2017-08-23 LAB
BUN SERPL-MCNC: 10 MG/DL — SIGNIFICANT CHANGE UP (ref 7–23)
CK MB CFR SERPL CALC: 1 NG/ML — SIGNIFICANT CHANGE UP (ref 0–6.7)
CK SERPL-CCNC: 36 U/L — SIGNIFICANT CHANGE UP (ref 25–170)
HBV SURFACE AG SER-ACNC: REACTIVE
NT-PROBNP SERPL-SCNC: HIGH PG/ML (ref 0–300)
TROPONIN T SERPL-MCNC: 0.04 NG/ML — HIGH (ref 0–0.01)

## 2017-08-23 PROCEDURE — 99284 EMERGENCY DEPT VISIT MOD MDM: CPT | Mod: 25,GC

## 2017-08-23 PROCEDURE — 71010: CPT | Mod: 26

## 2017-08-23 PROCEDURE — 93971 EXTREMITY STUDY: CPT | Mod: 26,LT

## 2017-08-23 PROCEDURE — 90935 HEMODIALYSIS ONE EVALUATION: CPT | Mod: GC

## 2017-08-23 PROCEDURE — 93010 ELECTROCARDIOGRAM REPORT: CPT

## 2017-08-23 PROCEDURE — 99223 1ST HOSP IP/OBS HIGH 75: CPT | Mod: AI

## 2017-08-23 PROCEDURE — 99232 SBSQ HOSP IP/OBS MODERATE 35: CPT | Mod: 24

## 2017-08-23 PROCEDURE — 93306 TTE W/DOPPLER COMPLETE: CPT | Mod: 26

## 2017-08-23 RX ORDER — ASPIRIN/CALCIUM CARB/MAGNESIUM 324 MG
81 TABLET ORAL DAILY
Qty: 0 | Refills: 0 | Status: DISCONTINUED | OUTPATIENT
Start: 2017-08-23 | End: 2017-08-24

## 2017-08-23 RX ORDER — CARVEDILOL PHOSPHATE 80 MG/1
25 CAPSULE, EXTENDED RELEASE ORAL EVERY 12 HOURS
Qty: 0 | Refills: 0 | Status: DISCONTINUED | OUTPATIENT
Start: 2017-08-23 | End: 2017-08-24

## 2017-08-23 RX ORDER — HEPARIN SODIUM 5000 [USP'U]/ML
5000 INJECTION INTRAVENOUS; SUBCUTANEOUS EVERY 8 HOURS
Qty: 0 | Refills: 0 | Status: DISCONTINUED | OUTPATIENT
Start: 2017-08-23 | End: 2017-08-24

## 2017-08-23 RX ORDER — NIFEDIPINE 30 MG
90 TABLET, EXTENDED RELEASE 24 HR ORAL DAILY
Qty: 0 | Refills: 0 | Status: DISCONTINUED | OUTPATIENT
Start: 2017-08-23 | End: 2017-08-24

## 2017-08-23 RX ORDER — IPRATROPIUM/ALBUTEROL SULFATE 18-103MCG
3 AEROSOL WITH ADAPTER (GRAM) INHALATION EVERY 6 HOURS
Qty: 0 | Refills: 0 | Status: DISCONTINUED | OUTPATIENT
Start: 2017-08-23 | End: 2017-08-24

## 2017-08-23 RX ORDER — CINACALCET 30 MG/1
60 TABLET, FILM COATED ORAL DAILY
Qty: 0 | Refills: 0 | Status: DISCONTINUED | OUTPATIENT
Start: 2017-08-23 | End: 2017-08-24

## 2017-08-23 RX ORDER — CARVEDILOL PHOSPHATE 80 MG/1
25 CAPSULE, EXTENDED RELEASE ORAL EVERY 12 HOURS
Qty: 0 | Refills: 0 | Status: DISCONTINUED | OUTPATIENT
Start: 2017-08-23 | End: 2017-08-23

## 2017-08-23 RX ORDER — SIMVASTATIN 20 MG/1
20 TABLET, FILM COATED ORAL AT BEDTIME
Qty: 0 | Refills: 0 | Status: DISCONTINUED | OUTPATIENT
Start: 2017-08-23 | End: 2017-08-24

## 2017-08-23 RX ORDER — FUROSEMIDE 40 MG
40 TABLET ORAL ONCE
Qty: 0 | Refills: 0 | Status: COMPLETED | OUTPATIENT
Start: 2017-08-23 | End: 2017-08-23

## 2017-08-23 RX ORDER — FUROSEMIDE 40 MG
40 TABLET ORAL EVERY 12 HOURS
Qty: 0 | Refills: 0 | Status: DISCONTINUED | OUTPATIENT
Start: 2017-08-23 | End: 2017-08-23

## 2017-08-23 RX ADMIN — Medication 81 MILLIGRAM(S): at 17:31

## 2017-08-23 RX ADMIN — Medication 90 MILLIGRAM(S): at 12:12

## 2017-08-23 RX ADMIN — Medication 40 MILLIGRAM(S): at 07:58

## 2017-08-23 RX ADMIN — Medication 3 MILLILITER(S): at 22:23

## 2017-08-23 RX ADMIN — Medication 20 MILLIGRAM(S): at 17:32

## 2017-08-23 RX ADMIN — CARVEDILOL PHOSPHATE 25 MILLIGRAM(S): 80 CAPSULE, EXTENDED RELEASE ORAL at 17:31

## 2017-08-23 RX ADMIN — HEPARIN SODIUM 5000 UNIT(S): 5000 INJECTION INTRAVENOUS; SUBCUTANEOUS at 22:24

## 2017-08-23 RX ADMIN — Medication 20 MILLIGRAM(S): at 05:32

## 2017-08-23 RX ADMIN — CINACALCET 60 MILLIGRAM(S): 30 TABLET, FILM COATED ORAL at 17:32

## 2017-08-23 NOTE — CONSULT NOTE ADULT - PROBLEM SELECTOR RECOMMENDATION 2
- has bilateral pleural effusions - more on the right side  - doing UF today   - follow up with XR '  - consider pulmonary follow up

## 2017-08-23 NOTE — H&P ADULT - HISTORY OF PRESENT ILLNESS
Sabiha Wilder is a 66 year old female with a PMHx of dCHF, ESRD (T/Th/Sa, last session 8/22, -1.5L removed), HTN, anemia and CAD who presented with worsening SOB from baseline and abdominal fullness for several days. The patient states it started this weekend, she thought she could manage it on her own. It is worse with lying down and she can breath better by leaning forward. After Dialysis on Tuesday when 1L was removed and it was getting progressively worse preventing her from sleeping so she presented to [facility].     She had a L pleural effusion last month, now s/p removal of PleurX on Thursday with Dr Salinas. She also states she has had an abdominal hernia for a long time now. Unclear whether she is able to reduce it on her own but states it goes in and out. She has been following with a surgeon who is delaying surgery until she is medically optimized.     She currently affirms SOB, edema, orthopnea requiring her to sit up to breath, PND, cough. She denies fever, chills, wheezing, chest tightness, nausea, vomiting, change in urinary or bowel habits. Patient has not had any recent illness or sick contacts at home. She has not noticed any weight changes.     In the ED Vitals were T98.3, HR94, /92, RR22 O2Sat 91% on RA and 97% on 2L NC. She was given 20mg Enalapril in the ED.

## 2017-08-23 NOTE — H&P ADULT - PROBLEM SELECTOR PLAN 2
Patient has HTN Urgency on admission with /92. Could be 2/2 to fluid overload..  -Restart home medications of Enalapril, Coreg  -Give IV Lasix 40mg   -Consider Dialysis today to remove more fluid. Patient has HTN Urgency on admission with /92. Could be 2/2 to fluid overload..  -Restart home medications of Enalapril  -Give IV Lasix 40mg   -Dialysis today to remove more fluid

## 2017-08-23 NOTE — PROGRESS NOTE ADULT - ASSESSMENT
66 year old woman with history of hypertension, ESRD, heart failure, CAD who presents with respiratory failure secondary to likely cardiogenic pulmonary edema.

## 2017-08-23 NOTE — H&P ADULT - PROBLEM SELECTOR PLAN 4
Patient anemic on admission. Currently on baseline. Based on normal MCV most likely 2/2 to chronic kidney disease. Could be multifactorial but RDW is rather narrow. -Continue to monitor CBC  -Renal Consult, any need for epogen at this time

## 2017-08-23 NOTE — ED ADULT TRIAGE NOTE - CHIEF COMPLAINT QUOTE
shortness of breath since several weeks ago- worst tonight; had a chest  tube placed last week and site has been hurting (left chest)

## 2017-08-23 NOTE — H&P ADULT - PROBLEM SELECTOR PLAN 3
Patient Dialysis Tue, Thurs, Sat. Removed 1.0-1.5L yesterday.   -Consult Renal regarding possibility of dialysis today to remove more fluid.

## 2017-08-23 NOTE — H&P ADULT - NSHPLABSRESULTS_GEN_ALL_CORE
LABS                        9.2    9.3   )-----------( 404      ( 23 Aug 2017 05:13 )             29.1     08-23    134<L>  |  93<L>  |  37<H>  ----------------------------<  114<H>  4.8   |  26  |  4.00<H>    Ca    10.9<H>      23 Aug 2017 05:14    TPro  8.0  /  Alb  3.4  /  TBili  0.4  /  DBili  x   /  AST  17  /  ALT  10  /  AlkPhos  153<H>  08-23    PT/INR - ( 23 Aug 2017 05:13 )   PT: 11.5 sec;   INR: 1.04          PTT - ( 23 Aug 2017 05:13 )  PTT:32.9 sec    CARDIAC MARKERS ( 23 Aug 2017 05:14 )  x     / 0.04 ng/mL / 36 U/L / x     / 1.0 ng/mL    Serum Pro-Brain Natriuretic Peptide (08.23.17 @ 05:14)    Serum Pro-Brain Natriuretic Peptide: [id number]:

## 2017-08-23 NOTE — PROGRESS NOTE ADULT - SUBJECTIVE AND OBJECTIVE BOX
Interval Events:  Patient seen and examined at bedside.  Known to Dr. Salinas, we performed a rapid pleurodesis with talc poudrage via medical pleuroscopy on 8/1/2017 for a rapidly reaccumulating transudative effusion likely related to heart failure vs CKD (or combination of the two).  Rapid pleurodesis was deemed successful after she had minimal to no drainage via her pleurx for 1 week and so the pleurx catheter was removed.  The patient now returns to the hospital with 5 day history of worsening dyspnea, orthopnea (now having to sit up to sleep), and increased leg swelling.  No fevers, chills, chest pain, headaches, abdominal pain.  Patient currently on BIPAP for increased work of breathing which she feels is helping her.        Vital Signs Last 24 Hrs  T(C): 36.8 (23 Aug 2017 09:05), Max: 36.8 (23 Aug 2017 04:29)  T(F): 98.2 (23 Aug 2017 09:05), Max: 98.3 (23 Aug 2017 04:29)  HR: 91 (23 Aug 2017 09:05) (91 - 97)  BP: 209/98 (23 Aug 2017 09:05) (190/95 - 209/98)  RR: 20 (23 Aug 2017 09:05) (20 - 22)  SpO2: 100% on 40% FiO2.  10 IPAP, 5 EPAP        PHYSICAL EXAM:    General: No acute distress currently.  Speaking in full sentences while on BIPAP.  Neck: Supple; non tender; no masses  Respiratory: +rales bilaterally.  Decreased breath sounds at the bases  Cardiovascular: Regular rate and rhythm  Gastrointestinal: Soft non-tender non-distended; Normal bowel sounds  Extremities: +1 pitting edema bilaterally  Neurological: Alert and oriented x3  Skin: Warm and dry. No obvious rash    LABS:      CBC Full  -  ( 23 Aug 2017 05:13 )  WBC Count : 9.3 K/uL  Hemoglobin : 9.2 g/dL  Hematocrit : 29.1 %  Platelet Count - Automated : 404 K/uL  Mean Cell Volume : 99.0 fL  Mean Cell Hemoglobin : 31.3 pg  Mean Cell Hemoglobin Concentration : 31.6 g/dL  Auto Neutrophil # : x  Auto Lymphocyte # : x  Auto Monocyte # : x  Auto Eosinophil # : x  Auto Basophil # : x  Auto Neutrophil % : 82.3 %  Auto Lymphocyte % : 7.6 %  Auto Monocyte % : 8.3 %  Auto Eosinophil % : 1.5 %  Auto Basophil % : 0.3 %    08-23    134<L>  |  93<L>  |  37<H>  ----------------------------<  114<H>  4.8   |  26  |  4.00<H>    Ca    10.9<H>      23 Aug 2017 05:14    TPro  8.0  /  Alb  3.4  /  TBili  0.4  /  DBili  x   /  AST  17  /  ALT  10  /  AlkPhos  153<H>  08-23    PT/INR - ( 23 Aug 2017 05:13 )   PT: 11.5 sec;   INR: 1.04          PTT - ( 23 Aug 2017 05:13 )  PTT:32.9 sec                  RADIOLOGY & ADDITIONAL STUDIES (The following images were personally reviewed):  CXR: Pulmonary edema.  new small right pleural effusion.  left base opacity, unchanged.    Ultrasound chest: small to moderate right pleural effusion.  very small left pleural effusion.  Many B lines anteriorly.

## 2017-08-23 NOTE — CONSULT NOTE ADULT - PROBLEM SELECTOR RECOMMENDATION 9
- her usual scheduled for HD is TTS (last HD done yeaterday with 1liter off)  - today we will do HD for fluid overload - bilateral pleural effusions   - we will try to do 3 to 4 liters UF   - electrolytes acceptable   - bicarbonate - 26 acceptable   - she is on Cinacalcet 60 mg daily   - renal diet   - daily weights  - follow up in and outs

## 2017-08-23 NOTE — ED PROVIDER NOTE - ATTENDING CONTRIBUTION TO CARE
65yo female ESRD presenting with worsening SOB. Pt recently admitted for pleural effusion that was drained with tube thoracostomy that was d/c-ed. Pt received HD yesterday however  increasingly SOB. 02 sat 91% on room air in ED with R sided pleural effusion on bedside US. Pt comfortable, will place on NC, admit to regional medicine.

## 2017-08-23 NOTE — ED PROVIDER NOTE - OBJECTIVE STATEMENT
Patient is a 66 year old female with a PMHx of dCHF, ESRD (T/Th/Sa, last session 8/22, -1.5L removed), HTN and CAD who presented with worsening SOB from baseline and abdominal fullness for several days. She says her SOB worsens when lying flat and prevented her from sleeping last night, which prompted her to come to the ED. Patient has baseline SOB but is not on home O2. She had a L pleural effusion last month, now s/p removal of PleurX on Thursday with Dr Salinas. Patient also notes abdominal distension for "a long time" but denies n/v/d/c. No cough, fevers or chills Patient is a 66 year old female with a PMHx of dCHF, ESRD (T/Th/Sa, last session 8/22, -1.5L removed), HTN, anemia and CAD who presented with worsening SOB from baseline and abdominal fullness for several days. She says her SOB worsens when lying flat and prevented her from sleeping last night, which prompted her to come to the ED. Patient has baseline SOB but is not on home O2. She had a L pleural effusion last month, now s/p removal of PleurX on Thursday with Dr Salinas. Patient also notes abdominal distension for "a long time" but denies n/v/d/c. No cough, fevers or chills

## 2017-08-23 NOTE — PROGRESS NOTE ADULT - PROBLEM SELECTOR PLAN 1
This is likely secondary to cardiogenic pulmonary edema based on history, exam, and imaging.  Pro BNP is >30,000 which higher than when she left on last admission  by about 10,000.  On chest ultrasound exam she has many b lines consistent with pulmonary edema.  There is no reaccumulation of pleural fluid on the left where she had rapid pleurodesis.  -Recommend dialysis with fluid removal.  Echocardiogram to evaluate heart function.  Cardiology consult to manage heart failure.  -Can check lower extremity dopplers to evaluate for DVT.  -After dialysis would wean off BIPAP.

## 2017-08-23 NOTE — CONSULT NOTE ADULT - PROBLEM SELECTOR RECOMMENDATION 3
- uncontrolled   - restart her medications   - carvedilol 25 mg twice a day, enalapril 20 mg, furosemide 40 mg daily, nifedipine 90 mg daily   - we will do UF also today

## 2017-08-23 NOTE — H&P ADULT - NSHPREVIEWOFSYSTEMS_GEN_ALL_CORE
REVIEW OF SYSTEMS:    CONSTITUTIONAL: No weakness, fevers or chills  EYES/ENT: No visual changes;  No vertigo or throat pain   NECK: No pain or stiffness  RESPIRATORY: +cough, No wheezing, hemoptysis; +shortness of breath  CARDIOVASCULAR: +chest pain located at site of chest tube or palpitations  GASTROINTESTINAL: +abdominal No epigastric pain. No nausea, vomiting, or hematemesis; No diarrhea or constipation. No melena or hematochezia.  GENITOURINARY: No dysuria, frequency or hematuria  NEUROLOGICAL: No numbness or weakness  SKIN: No itching, burning, rashes, or lesions   All other review of systems is negative unless indicated above.

## 2017-08-23 NOTE — H&P ADULT - ATTENDING COMMENTS
Patient was seen and examined by me at bedside at ~9.15am. I agree with resident's note, subjective, objective physical exam, assessment and plan with following modifications/additions.     1) Acute hypoxemic respiratory failure 2/2 fluid overload  2) Pulmonary vascular congestion likely 2/2 fluid overload from suboptimal HD.  3) ESRD on HD.  4) Anemia chronic kidney disease.  5) Transudative pleural effusion, now resolved.    Plan: Emergency HD. On bipap. Pulm recs appreciated. No intervention since no re-accumulation of pleural fluid. Many b-lines on lung US suggesting pulmonary vascular congestion. Will get ECHO once euvolemic after HD.

## 2017-08-23 NOTE — H&P ADULT - PROBLEM SELECTOR PLAN 1
Patient presented with SOB for 4-5days getting progressively worse. She affirms to PND, orthopnea, and lower extremity swelling.  Physical exam is positive for decreased breath sounds bilaterally, III/VI systolic ejection murmur, lower extremity swelling L>R. Labs show elevated BNP, no Cardiac enzymes, no dynamic ECG changes. Most likely etiology for dyspnea is due systolic heart failure. It could also be due to pulmonary effusion; however, etiology of effusion is unclear. Right effusion is rather small. Could be due to fluid overload; however, patient had 1-1.5L of fluid removed at dialysis yesterday.  -Ordered for Echocardiogram to assess for reduced ejection fraction and evaluate new systolic murmur heard on exam  -Patient restarted on home enalapril and coreg - patient stable on home meds will continue at this time. May removed Coreg in setting of worsening heart failure  -Give Lasix IV 40mg - patient still producing urine, will attempt to diuresis excess fluid.  -Consult Renal/Dialysis regarding possibility of fluid overload and dialysis treatment today  -Consult Pulmonology regarding past Pleural Effusion with PleurX removal and need for drainage this admission. Patient presented with SOB for 4-5days getting progressively worse. She affirms to PND, orthopnea, and lower extremity swelling.  Physical exam is positive for decreased breath sounds bilaterally, III/VI systolic ejection murmur, lower extremity swelling L>R. Labs show elevated BNP, no Cardiac enzymes, no dynamic ECG changes. In the past, patient has presented in similar fashion and has responded well to dialysis. Differential includes decompensated heart failure, fluid overload 2/2 to ESRD, PNA, PE.  -Ordered for Echocardiogram to assess for reduced ejection fraction and evaluate new systolic murmur heard on exam  -Patient restarted on home enalapril; holding Coreg at this time.   -Give Lasix IV 40mg - patient still producing urine, will attempt to diuresis excess fluid.  -Consult Renal/Dialysis: Patient will receive dialysis this morning  -Pulmonology: Unlikely Dyspnea due to pleural effusion. Left side s/p pleurodesis and stable. Right side small. B-lines seen on U/S.  -CXR showed right sided effusion, could be focal infiltrate 2/2 to PNA but patient denies fever and productive cough. Following dialysis will get CBC to assess leukocytosis.   -Concern for PE low right now, will get lower extremity dopplers

## 2017-08-23 NOTE — H&P ADULT - NSHPPHYSICALEXAM_GEN_ALL_CORE
VITALS  Vital Signs Last 24 Hrs  T(C): 36.6 (23 Aug 2017 06:51), Max: 36.8 (23 Aug 2017 04:29)  T(F): 97.8 (23 Aug 2017 06:51), Max: 98.3 (23 Aug 2017 04:29)  HR: 94 (23 Aug 2017 06:51) (94 - 97)  BP: 197/90 (23 Aug 2017 06:51) (190/95 - 199/92)  BP(mean): --  RR: 20 (23 Aug 2017 06:51) (20 - 22)  SpO2: 94% (23 Aug 2017 06:51) (91% - 97%)    I&O's Summary      CAPILLARY BLOOD GLUCOSE    PHYSICAL EXAM  General: A&Ox3; NAD  Head: NC/AT; PERRL; EOMI; anicteric sclera  Neck: Supple; no JVD  Respiratory: Diminished breath sounds with poor air movement bilaterally.   Cardiovascular: Regular rhythm/rate; S1/S2; Grade III/VI Systolic ejection murmur.   Gastrointestinal: Normoactive Bowel Sounds, Patient would not allowed palpation due to pain.   Extremities: WWP; trace edema bilaterally   Neurological:  CNII-XII grossly intact; no obvious focal deficits VITALS  Vital Signs Last 24 Hrs  T(C): 36.6 (23 Aug 2017 06:51), Max: 36.8 (23 Aug 2017 04:29)  T(F): 97.8 (23 Aug 2017 06:51), Max: 98.3 (23 Aug 2017 04:29)  HR: 94 (23 Aug 2017 06:51) (94 - 97)  BP: 197/90 (23 Aug 2017 06:51) (190/95 - 199/92)  BP(mean): --  RR: 20 (23 Aug 2017 06:51) (20 - 22)  SpO2: 94% (23 Aug 2017 06:51) (91% - 97%)    I&O's Summary      CAPILLARY BLOOD GLUCOSE    PHYSICAL EXAM  General: A&Ox3; NAD  Head: NC/AT; PERRL; EOMI; anicteric sclera  Neck: Supple; no JVD  Respiratory: Diminished breath sounds with faint crackles at left lung base   Cardiovascular: Regular rhythm/rate; S1/S2; Grade III/VI Systolic ejection murmur.   Gastrointestinal: Normoactive Bowel Sounds, Patient would not allowed palpation due to pain.   Extremities: WWP; trace edema bilaterally   Neurological:  CNII-XII grossly intact; no obvious focal deficits

## 2017-08-23 NOTE — CONSULT NOTE ADULT - ASSESSMENT
This is 66 year old female with past medical history stated above . Came in the Ed for shortness of breath found to have uncontrolled /100 and bilateral pleural effusions. We will do HD today - 3 hours with UF of 3 to 4 liters

## 2017-08-23 NOTE — PROGRESS NOTE ADULT - SUBJECTIVE AND OBJECTIVE BOX
Patient was seen and evaluated on dialysis.   Patient is tolerating the procedure well.   HR: 92 (08-23-17 @ 16:35)  BP: 173/83 (08-23-17 @ 16:14) pusle 65, /67  Continue dialysis:   Dialyzer:  180        QB: 400        QD: 500   Goal UF 4 liters over 3 Hours

## 2017-08-23 NOTE — H&P ADULT - ASSESSMENT
66F PMH of dCHF, ESRD (T/Th/Sa, last session 8/22, -1.5L removed), HTN, anemia and CAD who presented with worsening SOB for several days.

## 2017-08-23 NOTE — ED PROVIDER NOTE - MEDICAL DECISION MAKING DETAILS
Bedside U/S/CXR suggestive of R pleural effusion in setting of hypoxia, likely contributing to her SOB. Will add on BNP, cardiac enzymes given hx of dCHF. Will most likely need additional dialysis today. Gave home dose lisinopril for HTN

## 2017-08-23 NOTE — CONSULT NOTE ADULT - SUBJECTIVE AND OBJECTIVE BOX
66 year old F w/ PMH of ESRD (T/T/Sa) on HD, dCHF (EF 65%), CAD, anemia, uncontrolled HTN. The patient came in the ED last night because of progressive shortness of breath since this weekend. No fever or cough. She was dialyzed yesterday in her HD center 3 hours with 1 liter UF but her symptoms has not improved after the HD. The patient is known to our service she was hospitalized this month again for shortness of breath - found to have pleural effusion on the left side and drainage was placed and was removed 2 weeks ago. The patient is getting HD at [facility] - [phone] - we will call them to take more information in regard of her HD.     Patient seen and examined at bedside.     NIFEdipine XL 90 milliGRAM(s) daily  ALBUTerol/ipratropium for Nebulization 3 milliLiter(s) every 6 hours PRN  aspirin enteric coated 81 milliGRAM(s) daily  enalapril 20 milliGRAM(s) two times a day  simvastatin 20 milliGRAM(s) at bedtime  cinacalcet 60 milliGRAM(s) daily  heparin  Injectable 5000 Unit(s) every 8 hours      Allergies    No Known Allergies    Intolerances        T(C): , Max: 36.8 (08-23-17 @ 04:29)  T(F): , Max: 98.3 (08-23-17 @ 04:29)  HR: 91 (08-23-17 @ 09:05)  BP: 209/98 (08-23-17 @ 09:05)  BP(mean): --  RR: 20 (08-23-17 @ 09:05)  SpO2: 96% (08-23-17 @ 09:05)  Wt(kg): --    Height (cm): 157.48 (08-23 @ 06:51)  Weight (kg): 53.7 (08-23 @ 06:51)  BMI (kg/m2): 21.7 (08-23 @ 06:51)  BSA (m2): 1.53 (08-23 @ 06:51)      Physical exam:   Alert and oriented   No JVD  Normal air entry in the lungs, no wheezing, decreased breath sounds bilaterally, crackles   RRR, normal s1/s2, no murmurs, rubs or gallops  Abdomen - soft, non-tender, non-distended, normal bowel sounds   Extremities mild peripheral edema   Has an AV fistula on the left     LABS:                        9.2    9.3   )-----------( 404      ( 23 Aug 2017 05:13 )             29.1     08-23    134<L>  |  93<L>  |  37<H>  ----------------------------<  114<H>  4.8   |  26  |  4.00<H>    Ca    10.9<H>      23 Aug 2017 05:14    TPro  8.0  /  Alb  3.4  /  TBili  0.4  /  DBili  x   /  AST  17  /  ALT  10  /  AlkPhos  153<H>  08-23      PT/INR - ( 23 Aug 2017 05:13 )   PT: 11.5 sec;   INR: 1.04          PTT - ( 23 Aug 2017 05:13 )  PTT:32.9 sec          RADIOLOGY & ADDITIONAL STUDIES:  < from: Xray Chest 1 View AP- PORTABLE-Urgent (08.03.17 @ 20:08) >    PRIOR STUDIES: 8/3/2017.    FINDINGS: Heart size is enlarged and unchanged. Interval removal of a   left-sided chest tube. There is been some improvement in probable   loculated left-sided pleural effusion. No definite pneumothorax is   identified. Persistent hazy opacities are noted over the lower lung   zones. There is a persistent more inferiorly and coiled left-sided   radiopaque catheter possibly representing a left lower lung zone chest   tube unchanged. Catheter repositioning may be of value.    IMPRESSION:  Interval removal of one of possibly 2 left-sided chest tubes.  Improving hazy as well as pleural/parenchymal opacities especially on the   left side as above.    < end of copied text >

## 2017-08-24 ENCOUNTER — TRANSCRIPTION ENCOUNTER (OUTPATIENT)
Age: 67
End: 2017-08-24

## 2017-08-24 VITALS
HEART RATE: 85 BPM | RESPIRATION RATE: 18 BRPM | DIASTOLIC BLOOD PRESSURE: 78 MMHG | SYSTOLIC BLOOD PRESSURE: 180 MMHG | TEMPERATURE: 98 F | OXYGEN SATURATION: 96 %

## 2017-08-24 LAB
HBV CORE AB SER-ACNC: SIGNIFICANT CHANGE UP
HBV SURFACE AB SER-ACNC: SIGNIFICANT CHANGE UP

## 2017-08-24 PROCEDURE — 90935 HEMODIALYSIS ONE EVALUATION: CPT | Mod: GC

## 2017-08-24 PROCEDURE — 99239 HOSP IP/OBS DSCHRG MGMT >30: CPT

## 2017-08-24 RX ORDER — HEPARIN SODIUM 5000 [USP'U]/ML
500 INJECTION INTRAVENOUS; SUBCUTANEOUS ONCE
Qty: 0 | Refills: 0 | Status: COMPLETED | OUTPATIENT
Start: 2017-08-24 | End: 2017-08-24

## 2017-08-24 RX ORDER — NIFEDIPINE 30 MG
90 TABLET, EXTENDED RELEASE 24 HR ORAL
Qty: 0 | Refills: 0 | Status: DISCONTINUED | OUTPATIENT
Start: 2017-08-24 | End: 2017-08-24

## 2017-08-24 RX ORDER — HEPARIN SODIUM 5000 [USP'U]/ML
INJECTION INTRAVENOUS; SUBCUTANEOUS
Qty: 0 | Refills: 0 | Status: COMPLETED | OUTPATIENT
Start: 2017-08-24 | End: 2017-08-24

## 2017-08-24 RX ORDER — HEPARIN SODIUM 5000 [USP'U]/ML
1000 INJECTION INTRAVENOUS; SUBCUTANEOUS ONCE
Qty: 0 | Refills: 0 | Status: COMPLETED | OUTPATIENT
Start: 2017-08-24 | End: 2017-08-24

## 2017-08-24 RX ORDER — FUROSEMIDE 40 MG
2 TABLET ORAL
Qty: 60 | Refills: 0 | OUTPATIENT
Start: 2017-08-24 | End: 2017-09-23

## 2017-08-24 RX ORDER — ASPIRIN/CALCIUM CARB/MAGNESIUM 324 MG
1 TABLET ORAL
Qty: 30 | Refills: 0 | OUTPATIENT
Start: 2017-08-24 | End: 2017-09-23

## 2017-08-24 RX ORDER — FUROSEMIDE 40 MG
40 TABLET ORAL DAILY
Qty: 0 | Refills: 0 | Status: DISCONTINUED | OUTPATIENT
Start: 2017-08-24 | End: 2017-08-24

## 2017-08-24 RX ORDER — NIFEDIPINE 30 MG
1 TABLET, EXTENDED RELEASE 24 HR ORAL
Qty: 30 | Refills: 0 | OUTPATIENT
Start: 2017-08-24 | End: 2017-09-23

## 2017-08-24 RX ORDER — HEPARIN SODIUM 5000 [USP'U]/ML
5000 INJECTION INTRAVENOUS; SUBCUTANEOUS ONCE
Qty: 0 | Refills: 0 | Status: DISCONTINUED | OUTPATIENT
Start: 2017-08-24 | End: 2017-08-24

## 2017-08-24 RX ORDER — FUROSEMIDE 40 MG
0 TABLET ORAL
Qty: 0 | Refills: 0 | COMMUNITY

## 2017-08-24 RX ORDER — FUROSEMIDE 40 MG
2 TABLET ORAL
Qty: 0 | Refills: 0 | COMMUNITY

## 2017-08-24 RX ADMIN — CARVEDILOL PHOSPHATE 25 MILLIGRAM(S): 80 CAPSULE, EXTENDED RELEASE ORAL at 06:34

## 2017-08-24 RX ADMIN — Medication 40 MILLIGRAM(S): at 08:59

## 2017-08-24 RX ADMIN — CARVEDILOL PHOSPHATE 25 MILLIGRAM(S): 80 CAPSULE, EXTENDED RELEASE ORAL at 18:04

## 2017-08-24 RX ADMIN — Medication 81 MILLIGRAM(S): at 11:35

## 2017-08-24 RX ADMIN — Medication 90 MILLIGRAM(S): at 19:54

## 2017-08-24 RX ADMIN — Medication 20 MILLIGRAM(S): at 06:33

## 2017-08-24 RX ADMIN — Medication 90 MILLIGRAM(S): at 06:32

## 2017-08-24 RX ADMIN — Medication 20 MILLIGRAM(S): at 18:04

## 2017-08-24 RX ADMIN — HEPARIN SODIUM 5000 UNIT(S): 5000 INJECTION INTRAVENOUS; SUBCUTANEOUS at 06:33

## 2017-08-24 RX ADMIN — HEPARIN SODIUM 500 UNIT(S): 5000 INJECTION INTRAVENOUS; SUBCUTANEOUS at 15:26

## 2017-08-24 RX ADMIN — CINACALCET 60 MILLIGRAM(S): 30 TABLET, FILM COATED ORAL at 11:35

## 2017-08-24 RX ADMIN — HEPARIN SODIUM 1000 UNIT(S): 5000 INJECTION INTRAVENOUS; SUBCUTANEOUS at 14:20

## 2017-08-24 NOTE — PROGRESS NOTE ADULT - PROBLEM SELECTOR PLAN 1
Patient presented with SOB for 4-5days getting progressively worse. She affirms to PND, orthopnea, and lower extremity swelling. Labs show elevated BNP, no Cardiac enzymes, no dynamic ECG changes. In the past, patient has presented in similar fashion and has responded well to dialysis. Differential includes fluid overload 2/2 ESRD vs. decompensated heart failure. Echocardiogram showed EF 60-65%, LA dilation, AV thickening, moderate mitral stenosis, and pulm SP 60 mm Hg. LLE Duplex negative for DVT.   -C/w Coreg 25mg q12  -C/w Enalapril 20mg BID  -Lasix 40mg PO daily  -F/u renal recs  -F/u pulm recs Patient presented with SOB for 4-5days getting progressively worse. She affirms to PND, orthopnea, and lower extremity swelling. Labs show elevated BNP, no Cardiac enzymes, no dynamic ECG changes. In the past, patient has presented in similar fashion and has responded well to dialysis. Differential includes fluid overload 2/2 ESRD vs. decompensated heart failure. Echocardiogram showed EF 60-65%, LA dilation, AV thickening, moderate mitral stenosis, and pulm SP 60 mm Hg. LLE Duplex negative for DVT. Dyspnea much improved s/p dialysis, diuresis, and BiPAP.  -C/w Coreg 25mg q12  -C/w Enalapril 20mg BID  -Lasix 40mg PO daily  -F/u renal recs  -F/u pulm recs

## 2017-08-24 NOTE — DISCHARGE NOTE ADULT - MEDICATION SUMMARY - MEDICATIONS TO STOP TAKING
I will STOP taking the medications listed below when I get home from the hospital:  None I will STOP taking the medications listed below when I get home from the hospital:    NIFEdipine 60 mg oral tablet, extended release  -- 1 tab(s) by mouth once a day in the morning    NIFEdipine 90 mg oral tablet, extended release  -- 1 tab(s) by mouth once a day at bedtime    torsemide 20 mg oral tablet  -- 2 tab(s) by mouth 2 times a day  -- Avoid prolonged or excessive exposure to direct and/or artificial sunlight while taking this medication.  It is very important that you take or use this exactly as directed.  Do not skip doses or discontinue unless directed by your doctor.  It may be advisable to drink a full glass orange juice or eat a banana daily while taking this medication.    furosemide 80 mg oral tablet  --  by mouth 2 times a day

## 2017-08-24 NOTE — DISCHARGE NOTE ADULT - PATIENT PORTAL LINK FT
“You can access the [facility] Patient Portal, offered by [facility], by registering with the following website: [email or web address]”

## 2017-08-24 NOTE — PROGRESS NOTE ADULT - ASSESSMENT
66F PMH of dCHF, ESRD (T/Th/Sa, last session 8/22, -1.5L removed), HTN, anemia and CAD who presented with worsening SOB for several days. Patient was likely fluid overloaded in setting of ESRD, now significantly improved and breathing well w/ clear lungs on room air s/p 4.1 L HD session yesterday, s/p 40 mg IV Lasix and 40 mg PO Lasix, and s/p BiPAP overnight. BP now well controlled w/ stable VS. 66F PMH of dCHF, ESRD (T/Th/Sa, last session 8/22, -1.5L removed), HTN, anemia and CAD who presented with worsening SOB for several days. Patient was likely fluid overloaded in setting of ESRD. Had pleurodesis on 8/1/2017, currently without reaccumulation per pulmonology. Now significantly improved and breathing well w/ clear lungs on room air s/p 4.1 L HD session yesterday, s/p 40 mg IV Lasix and 40 mg PO Lasix, and s/p BiPAP overnight. BP now well controlled w/ stable VS. Will receive dialysis today per normal schedule of Tues, Thurs, Sat.

## 2017-08-24 NOTE — PROGRESS NOTE ADULT - PROBLEM SELECTOR PLAN 6
P: DVT HSQ 5000U q8h  C: FULL CODE  D: Admit to [facility] for management of acute CHF exacerbation under Service. P: DVT HSQ 5000U q8h  C: FULL CODE  D: 7 [address]ris

## 2017-08-24 NOTE — PROGRESS NOTE ADULT - PROBLEM SELECTOR PLAN 2
Patient has HTN Urgency on admission with /92. Could be 2/2 to fluid overload..  -Restart home medications of Enalapril  -Give IV Lasix 40mg   -Dialysis today to remove more fluid Patient had HTN Urgency on admission with /92. Could be 2/2 to fluid overload. Now improved w/ /81 this AM s/p HD and diuresis.  -C/w Coreg 25mg q12  -C/w Enalapril 20mg BIDl  -C/w Nifedipine XL 90mg daily

## 2017-08-24 NOTE — PROGRESS NOTE ADULT - ATTENDING COMMENTS
seen and evaluated while on dialysis  breathing a bit better  continue with full tolerating UF and clearances adequately  Continue full HD treatment as prescribed
seen and evaluated while on dialysis  tolerating the procedure ell.  Continue full treatment as prescribed
much less SOB   but weight still above prior discharge weight.  Repeat dialysis today to reach new DW of 49
Patient was seen and examined by me at bedside. I agree with resident's note, subjective, objective physical exam, assessment and plan with following modifications/additions.     1) Acute hypoxemic respiratory failure 2/2 fluid overload  2) Pulmonary vascular congestion likely 2/2 fluid overload from suboptimal HD.  3) ESRD on HD.  4) Anemia chronic kidney disease.  5) Transudative pleural effusion, now resolved.    Plan: No euvolemic. breathing comfortably on RA. s/p Emergency HD yesterday (4100 cc removed) and regular HD today. Will DC home with outpatient HD. Will reach outpatient renal MD.

## 2017-08-24 NOTE — PROGRESS NOTE ADULT - PROBLEM SELECTOR PROBLEM 1
Dyspnea
ESRD (end stage renal disease) on dialysis
Acute respiratory failure, unspecified whether with hypoxia or hypercapnia

## 2017-08-24 NOTE — DISCHARGE NOTE ADULT - ADDITIONAL INSTRUCTIONS
Please follow up as an outpatient with Dr. Heather Lopez (Cardiology) on September 8 at 10 AM. You may confirm at .    Please follow up as an outpatient with Dr. Josh Salinas (Internal Medicine/Pulmonology) on Please follow up as an outpatient with Dr. Heather Lopez (Cardiology) on September 8 at 10 AM. You may confirm at .    Please follow up as an outpatient with Dr. Josh Salinas (Internal Medicine/Pulmonology) on August 31 at 2:30 PM. You may confirm at [phone].

## 2017-08-24 NOTE — DISCHARGE NOTE ADULT - MEDICATION SUMMARY - MEDICATIONS TO TAKE
I will START or STAY ON the medications listed below when I get home from the hospital:    aspirin 81 mg oral delayed release tablet  -- 1 tab(s) by mouth once a day  -- Indication: For Coronary artery disease    enalapril 20 mg oral tablet  -- 1 tab(s) by mouth 2 times a day  -- Indication: For Hypertension    simvastatin 20 mg oral tablet  -- 1 tab(s) by mouth once a day (at bedtime)  -- Indication: For Coronary Artery disease    carvedilol 25 mg oral tablet  -- 1 tab(s) by mouth every 12 hours  -- Indication: For Diastolic CHF    Ventolin HFA 90 mcg/inh inhalation aerosol  -- 2 puff(s) inhaled every 6 hours - for asthma  -- For inhalation only.  It is very important that you take or use this exactly as directed.  Do not skip doses or discontinue unless directed by your doctor.  Obtain medical advice before taking any non-prescription drugs as some may affect the action of this medication.  Shake well before use.    -- Indication: For Asthma    cinacalcet 60 mg oral tablet  -- 1 tab(s) by mouth once a day  -- Indication: For ESRD (end stage renal disease) on dialysis    NIFEdipine 90 mg oral tablet, extended release  -- 1 tab(s) by mouth once a day at bedtime  -- Indication: For Hypertension    furosemide 80 mg oral tablet  --  by mouth 2 times a day  -- Indication: For Diastolic CHF    metOLazone 10 mg oral tablet  -- 1 tab(s) by mouth once a day 30 minutes before PM torsemide  -- Indication: For Diastolic CHF I will START or STAY ON the medications listed below when I get home from the hospital:    aspirin 81 mg oral delayed release tablet  -- 1 tab(s) by mouth once a day  -- Indication: For CAD    enalapril 20 mg oral tablet  -- 1 tab(s) by mouth 2 times a day  -- Indication: For Hypertension    simvastatin 20 mg oral tablet  -- 1 tab(s) by mouth once a day (at bedtime)  -- Indication: For Coronary Artery disease    carvedilol 25 mg oral tablet  -- 1 tab(s) by mouth every 12 hours  -- Indication: For Diastolic CHF    Ventolin HFA 90 mcg/inh inhalation aerosol  -- 2 puff(s) inhaled every 6 hours - for asthma  -- For inhalation only.  It is very important that you take or use this exactly as directed.  Do not skip doses or discontinue unless directed by your doctor.  Obtain medical advice before taking any non-prescription drugs as some may affect the action of this medication.  Shake well before use.    -- Indication: For Asthma    cinacalcet 60 mg oral tablet  -- 1 tab(s) by mouth once a day  -- Indication: For ESRD (end stage renal disease) on dialysis    NIFEdipine 90 mg oral tablet, extended release  -- 1 tab(s) by mouth 2 times a day  -- Indication: For Hypertension    metOLazone 10 mg oral tablet  -- 1 tab(s) by mouth once a day 30 minutes before PM torsemide  -- Indication: For Diastolic CHF    furosemide 80 mg oral tablet  -- 2 tab(s) by mouth once a day (in the morning)  -- Indication: For ESRD (end stage renal disease) on dialysis    Velphoro 2500 mg (500 mg elemental iron) oral tablet, chewable  -- 2 tab(s) by mouth 3 times a day (with meals)  -- Indication: For ESRD (end stage renal disease) on dialysis

## 2017-08-24 NOTE — PROGRESS NOTE ADULT - SUBJECTIVE AND OBJECTIVE BOX
SUBJECTIVE: Patient seen and examined at bedside. Patient feels significantly improved after medications and BiPAP overnight. Breathing well now.    ROS:  CV: Denies chest pain  Resp: Denies SOB  GI: Denies abdominal pain, diarrhea, nausea, vomiting  ID: Denies fevers, chills    OBJECTIVE:    VITALS  Vital Signs Last 24 Hrs  T(C): 36.7 (24 Aug 2017 08:24), Max: 37 (23 Aug 2017 21:40)  T(F): 98 (24 Aug 2017 08:24), Max: 98.6 (23 Aug 2017 21:40)  HR: 76 (24 Aug 2017 08:24) (72 - 92)  BP: 158/81 (24 Aug 2017 08:24) (158/81 - 185/99)  BP(mean): --  RR: 17 (24 Aug 2017 08:24) (17 - 20)  SpO2: 93% (24 Aug 2017 08:24) (92% - 100%)    I&O's Summary    23 Aug 2017 07:01  -  24 Aug 2017 07:00  --------------------------------------------------------  IN: 0 mL / OUT: 4100 mL / NET: -4100 mL    CAPILLARY BLOOD GLUCOSE    PHYSICAL EXAM  General: A&Ox 3; NAD  Eyes: PERRL; EOM grossly intact  ENMT: MMM, no pharyngeal erythema/exudate  Neck: Supple; no LAD  Respiratory: CTAB; no W/R/R auscultated; good air movement  Cardiovascular: RRR; S1/S2  Gastrointestinal: Soft; NTND without guarding; bowel sounds present  Extremities: WWP; no edema; no calf tenderness; radial/pedal pulses palpable  Neurological:  CNII-XII grossly intact    MEDICATIONS  (STANDING):  NIFEdipine XL 90 milliGRAM(s) Oral daily  aspirin enteric coated 81 milliGRAM(s) Oral daily  enalapril 20 milliGRAM(s) Oral two times a day  simvastatin 20 milliGRAM(s) Oral at bedtime  cinacalcet 60 milliGRAM(s) Oral daily  heparin  Injectable 5000 Unit(s) SubCutaneous every 8 hours  carvedilol 25 milliGRAM(s) Oral every 12 hours  furosemide    Tablet 40 milliGRAM(s) Oral daily  heparin  Injectable      heparin  Injectable 1000 Unit(s) IV Push once  heparin  Injectable 500 Unit(s) IV Push once    MEDICATIONS  (PRN):  ALBUTerol/ipratropium for Nebulization 3 milliLiter(s) Nebulizer every 6 hours PRN Shortness of Breath and/or Wheezing    LABS                        9.2    9.3   )-----------( 404      ( 23 Aug 2017 05:13 )             29.1     08-23    x   |  x   |  10  ----------------------------<  x   x    |  x   |  x     Ca    10.9<H>      23 Aug 2017 05:14    TPro  8.0  /  Alb  3.4  /  TBili  0.4  /  DBili  x   /  AST  17  /  ALT  10  /  AlkPhos  153<H>  08-23    LIVER FUNCTIONS - ( 23 Aug 2017 05:14 )  Alb: 3.4 g/dL / Pro: 8.0 g/dL / ALK PHOS: 153 U/L / ALT: 10 U/L / AST: 17 U/L / GGT: x           PT/INR - ( 23 Aug 2017 05:13 )   PT: 11.5 sec;   INR: 1.04        PTT - ( 23 Aug 2017 05:13 )  PTT:32.9 sec    CARDIAC MARKERS ( 23 Aug 2017 05:14 )  x     / 0.04 ng/mL / 36 U/L / x     / 1.0 ng/mL    ALLERGIES:  No Known Allergies    RADIOLOGY and Additional Tests reviewed.

## 2017-08-24 NOTE — DISCHARGE NOTE ADULT - SECONDARY DIAGNOSIS.
ESRD (end stage renal disease) on dialysis Essential hypertension Anemia in chronic kidney disease, on chronic dialysis

## 2017-08-24 NOTE — PROGRESS NOTE ADULT - ASSESSMENT
This is 66 year old female with past medical history stated above . Came in the Ed for shortness of breath found to have uncontrolled /100 and bilateral pleural effusions. She was dialyzed yesterday - 4.1 liters off. We will do HD today - 2 hours to dry weight 49 kg

## 2017-08-24 NOTE — DISCHARGE NOTE ADULT - HOSPITAL COURSE
66F PMH of dCHF, ESRD (T/Th/Sa, last session 8/22, -1.5L removed), HTN, anemia and CAD who presented with worsening SOB from baseline and abdominal fullness for several days. The patient states it started this weekend, she thought she could manage it on her own. It is worse with lying down and she can breath better by leaning forward. After Dialysis on Tuesday when 1L was removed and it was getting progressively worse preventing her from sleeping so she presented to [facility].     She had a L pleural effusion last month, now s/p removal of PleurX on Thursday with Dr Salinas. She also states she has had an abdominal hernia for a long time now. Unclear whether she is able to reduce it on her own but states it goes in and out. She has been following with a surgeon who is delaying surgery until she is medically optimized.     She currently affirms SOB, edema, orthopnea requiring her to sit up to breath, PND, cough. She denies fever, chills, wheezing, chest tightness, nausea, vomiting, change in urinary or bowel habits. Patient has not had any recent illness or sick contacts at home. She has not noticed any weight changes.     In the ED Vitals were T98.3, HR94, /92, RR22 O2Sat 91% on RA and 97% on 2L NC. She was given 20mg Enalapril in the ED. 66F PMH dCHF, ESRD (T/Th/Sa HD), HTN, anemia and CAD presented with worsening SOB from baseline and abdominal fullness for several days. After Dialysis on Tuesday when 1L was removed and it was getting progressively worse preventing her from sleeping so she presented to [facility]. She had a L pleural effusion last month s/p removal of PleurX on Thursday with Dr Salinas. She currently affirms SOB, edema, orthopnea requiring her to sit up to breath, PND, cough. In the ED Vitals were T98.3, HR94, /92, RR22 O2Sat 91% on RA and 97% on 2L NC. She was given 20mg Enalapril in the ED. Patient received dialysis with 4.1 L removed, was diuresed w/ 40mg Lasix IV, and was placed on BiPAP during night of admission. Next morning patient was symptomatically improved with resolved respiratory symptoms and without signs of fluid overload. She received her scheduled dialysis and was medically optimized for discharge. 66F PMH dCHF, ESRD (T/Th/Sa HD), HTN, anemia and CAD presented with worsening SOB from baseline and abdominal fullness for several days. After dialysis on Tuesday when 1L was removed and it was getting progressively worse preventing her from sleeping so she presented to [facility] ED. She had a L pleural effusion last month s/p removal of PleurX on Thursday with Dr Salinas. She currently affirms SOB, edema, orthopnea requiring her to sit up to breath, PND, cough. In the ED Vitals were T98.3, HR94, /92, RR22 O2Sat 91% on RA and 97% on 2L NC. She was given 20mg Enalapril in the ED. Patient received dialysis with 4.1 L removed, was diuresed w/ 40mg Lasix IV, and was placed on BiPAP during night of admission. Next morning patient was symptomatically improved with resolved respiratory symptoms and without signs of fluid overload. She received her scheduled dialysis and was medically optimized for discharge. Etiology for shortness of breath on admission was likely due to fluid overload in the setting of suboptimal removal of fluid during outpatient dialysis, complicated by history of dCHF. [facility] () was called to inform them to diurese more fluid in future sessions as tolerated - nephrologist is Dr. Jay Sepulveda. 66F PMH dCHF, ESRD (T/Th/Sa HD), HTN, anemia and CAD presented with worsening SOB from baseline and abdominal fullness for several days. After dialysis on Tuesday when 1L was removed and it was getting progressively worse preventing her from sleeping so she presented to [facility] ED. She had a L pleural effusion last month s/p removal of PleurX on Thursday with Dr Salinas. She currently affirms SOB, edema, orthopnea requiring her to sit up to breath, PND, cough. In the ED Vitals were T98.3, HR94, /92, RR22 O2Sat 91% on RA and 97% on 2L NC. She was given 20mg Enalapril in the ED. Patient received dialysis with 4.1 L removed, was diuresed w/ 40mg Lasix IV, and was placed on BiPAP during night of admission. Next morning patient was symptomatically improved with resolved respiratory symptoms and without signs of fluid overload. She received her scheduled dialysis and was medically optimized for discharge. Etiology for shortness of breath on admission was likely due to fluid overload in the setting of suboptimal removal of fluid during outpatient dialysis, complicated by history of dCHF. [facility] () was called to inform them to diurese more fluid in future sessions as tolerated - nephrologist is Dr. Jay Sepulveda. Discussed with Dr. Marc Sepulveda over the phone regarding final medication reconciliation.

## 2017-08-24 NOTE — DISCHARGE NOTE ADULT - CARE PROVIDERS DIRECT ADDRESSES
,[email or web address],DirectAddress_Unknown

## 2017-08-24 NOTE — PROGRESS NOTE ADULT - SUBJECTIVE AND OBJECTIVE BOX
Patient was seen and evaluated on dialysis.   Patient is tolerating the procedure well.   HR: 78 (08-24-17 @ 14:20)  BP: 163/83 (08-24-17 @ 14:20) pusle 65, /67  Continue dialysis:   Dialyzer:  180        QB:  400      QD: 500  Goal UF 1.7 liters  over 3 Hours

## 2017-08-24 NOTE — DISCHARGE NOTE ADULT - PROVIDER TOKENS
TOKEN:'[id number]',TOKEN:'[id number]' TOKEN:'[id number]',TOKEN:'[id number]',FREE:[LAST:[El Derick],FIRST:[Jay RECINOS)],PHONE:[phone]],FAX:[(   )    -],ADDRESS:[Nephrologist  [facility] [place]]]

## 2017-08-24 NOTE — PROGRESS NOTE ADULT - PROBLEM SELECTOR PLAN 3
Patient Dialysis Tue, Thurs, Sat. Removed 1.0-1.5L yesterday.   -Consult Renal regarding possibility of dialysis today to remove more fluid. Patient Dialysis Tue, Thurs, Sat. Removed 4.1L yesterday.   -Dialysis today per normal schedule  -F/u renal recs

## 2017-08-24 NOTE — PROGRESS NOTE ADULT - SUBJECTIVE AND OBJECTIVE BOX
Objective and subjective   The patient feels betetr today, off oxygen. No shortness of breath, no chest pain, no fever. She was dialyzed yesterday 4.1 liters off      66 year old F w/ PMH of ESRD (T/T/Sa) on HD, dCHF (EF 65%), CAD, anemia, uncontrolled HTN. The patient came in the ED last night because of progressive shortness of breath since this weekend. No fever or cough. She was dialyzed yesterday in her HD center 3 hours with 1 liter UF but her symptoms has not improved after the HD. The patient is known to our service she was hospitalized this month again for shortness of breath - found to have pleural effusion on the left side and drainage was placed and was removed 2 weeks ago. The patient is getting HD at [facility] - [phone] - we will call them to take more information in regard of her HD.       Patient seen and examined at bedside.     NIFEdipine XL 90 milliGRAM(s) daily  ALBUTerol/ipratropium for Nebulization 3 milliLiter(s) every 6 hours PRN  aspirin enteric coated 81 milliGRAM(s) daily  enalapril 20 milliGRAM(s) two times a day  simvastatin 20 milliGRAM(s) at bedtime  cinacalcet 60 milliGRAM(s) daily  heparin  Injectable 5000 Unit(s) every 8 hours  carvedilol 25 milliGRAM(s) every 12 hours  furosemide    Tablet 40 milliGRAM(s) daily  heparin  Injectable     heparin  Injectable 1000 Unit(s) once  heparin  Injectable 500 Unit(s) once      Allergies    No Known Allergies    Intolerances        T(C): , Max: 37 (08-23-17 @ 21:40)  T(F): , Max: 98.6 (08-23-17 @ 21:40)  HR: 78 (08-24-17 @ 12:41)  BP: 158/81 (08-24-17 @ 08:24)  BP(mean): --  RR: 17 (08-24-17 @ 12:41)  SpO2: 94% (08-24-17 @ 12:41)  Wt(kg): --    08-23 @ 07:01  -  08-24 @ 07:00  --------------------------------------------------------  IN:  Total IN: 0 mL    OUT:    Other: 4100 mL  Total OUT: 4100 mL    Total NET: -4100 mL      Physical exam:   Alert and oriented   No JVD  Normal air entry in the lungs, no wheezing, decreased breath sounds bilaterally, crackles   RRR, normal s1/s2, no murmurs, rubs or gallops  Abdomen - soft, non-tender, non-distended, normal bowel sounds   Extremities mild peripheral edema   Has an AV fistula on the left         LABS:                        9.2    9.3   )-----------( 404      ( 23 Aug 2017 05:13 )             29.1     08-23    x   |  x   |  10  ----------------------------<  x   x    |  x   |  x     Ca    10.9<H>      23 Aug 2017 05:14    TPro  8.0  /  Alb  3.4  /  TBili  0.4  /  DBili  x   /  AST  17  /  ALT  10  /  AlkPhos  153<H>  08-23      PT/INR - ( 23 Aug 2017 05:13 )   PT: 11.5 sec;   INR: 1.04          PTT - ( 23 Aug 2017 05:13 )  PTT:32.9 sec          RADIOLOGY & ADDITIONAL STUDIES:    < from: Xray Chest 1 View AP/PA (08.23.17 @ 05:39) >      INTERPRETATION:    XR CHEST-FRONTAL dated 8/23/2017 5:39 AM    CLINICAL INFORMATION: Female, 66 years old.  SOB.    PRIOR STUDIES: 8/21/2017.    TECHNIQUE:Portable chest.    FINDINGS: Bibasilar pleural effusions are identified increased on the   right side. There is some developing pulmonary venous congestion as well   as cardiomegaly. Parenchymal scarring is again noted over the lateral   aspect the left midlung zone.    IMPRESSION:  New right-sided pleural effusion.  Pleural parenchymal scarring over the left mid and lower lung zones   unchanged.        < end of copied text >

## 2017-08-24 NOTE — PROGRESS NOTE ADULT - PROBLEM SELECTOR PLAN 4
Patient anemic on admission. Currently on baseline. Based on normal MCV most likely 2/2 to chronic kidney disease. Could be multifactorial but RDW is rather narrow. -Continue to monitor CBC  -Renal Consult, any need for epogen at this time Patient anemic on admission. Currently on baseline. Based on normal MCV most likely 2/2 to chronic kidney disease. Could be multifactorial but RDW is rather narrow. -Monitor CBC  -F/u renal recs

## 2017-08-24 NOTE — PROGRESS NOTE ADULT - PROBLEM SELECTOR PLAN 1
- last HD on 8/23 - 4.1 liters off  - next today - 2 hours to dry weight of 49 kg    - electrolytes acceptable   - bicarbonate - 26 acceptable   - she is on Cinacalcet 60 mg daily   - renal diet   - daily weights  - follow up in and outs.

## 2017-08-24 NOTE — DISCHARGE NOTE ADULT - CARE PROVIDER_API CALL
Josh Salinas), Internal Medicine; Pulmonary Disease  [address] [address]  Phone: [phone]  Fax: [phone]    Heather Lopez), Cardiology; Internal Medicine  [address] [address]  Phone: [phone]  Fax: [phone] Josh Salinas), Internal Medicine; Pulmonary Disease  [address] [address]  Phone: [phone]  Fax: [phone]    Heather Lopez), Cardiology; Internal Medicine  [address] [address]  Phone: [phone]  Fax: [phone]    Jay Owens)  Nephrologist  [facility] [place]  Phone: [phone]  Fax: (   )    -

## 2017-08-24 NOTE — DISCHARGE NOTE ADULT - CARE PLAN
Principal Discharge DX:	Dyspnea, unspecified type  Goal:	Treat difficulty breathing  Instructions for follow-up, activity and diet:	You were seen and treated for difficulty breathing. It is likely that this episode was caused by failing kidneys, which contributed to the accumulation of fluid in your body, including your lungs. In the hospital we gave you dialysis and gave medications to help eliminate the excess fluid from your lungs. Upon leaving the hospital, please resume your medications as instructed in this document. In addition, please be sure to continue your scheduled dialysis sessions, during which Dr. Jay Sepulveda will see you. Please follow up as an outpatient with Dr. Josh Salinas  Secondary Diagnosis:	ESRD (end stage renal disease) on dialysis  Instructions for follow-up, activity and diet:	Please continue medications and follow up as above.  Secondary Diagnosis:	Essential hypertension  Instructions for follow-up, activity and diet:	While you were in the hospital your blood pressure became very high, but it was managed well with medications. Please continue to take your home medications as instructed in this document. It is also likely that your heart function has contributed to  Secondary Diagnosis:	Anemia in chronic kidney disease, on chronic dialysis Principal Discharge DX:	Dyspnea, unspecified type  Goal:	Treat difficulty breathing  Instructions for follow-up, activity and diet:	You were seen and treated for difficulty breathing. It is likely that this episode was caused by failing kidneys, which contributed to the accumulation of fluid in your body, including your lungs. In the hospital we gave you dialysis and gave medications to help eliminate the excess fluid from your lungs. Upon leaving the hospital, please resume your medications as instructed in this document. In addition, please be sure to continue your scheduled dialysis sessions, during which Dr. Jay Sepulveda will see you.    Please follow up as an outpatient with Dr. Josh Salinas (Internal Medicine/Pulmonology) on August 31 at 2:30 PM. You may confirm at [phone].  Secondary Diagnosis:	ESRD (end stage renal disease) on dialysis  Instructions for follow-up, activity and diet:	Please continue medications and follow up as above.  Secondary Diagnosis:	Essential hypertension  Instructions for follow-up, activity and diet:	While you were in the hospital your blood pressure became very high, but it was managed well with medications. Please continue to take your home medications as instructed in this document. It is also likely that your heart function has contributed to  Secondary Diagnosis:	Anemia in chronic kidney disease, on chronic dialysis Principal Discharge DX:	Dyspnea, unspecified type  Goal:	Treat difficulty breathing  Instructions for follow-up, activity and diet:	You were seen and treated for difficulty breathing. It is likely that this episode was caused by failing kidneys, which contributed to the accumulation of fluid in your body, including your lungs. In the hospital we gave you dialysis and gave medications to help eliminate the excess fluid from your lungs. Upon leaving the hospital, please resume your medications as instructed in this document. In addition, please be sure to continue your scheduled dialysis sessions, during which Dr. Jay Sepulveda will see you.    Please follow up as an outpatient with Dr. Josh Salinas (Internal Medicine/Pulmonology) on August 31 at 2:30 PM. You may confirm at [phone]. Office is located at [address] [place] [address], [place] [address].  Secondary Diagnosis:	ESRD (end stage renal disease) on dialysis  Instructions for follow-up, activity and diet:	Please continue medications and follow up as above.  Secondary Diagnosis:	Essential hypertension  Instructions for follow-up, activity and diet:	While you were in the hospital your blood pressure became very high, but it was managed well with medications. Please continue to take your home medications as instructed in this document. It is also possible that your heart function has been contributing to the breathing difficulty. Please follow up as an outpatient with Cardiologist Dr. Heather Lopez on September 8 at 10 AM to discuss the results of your inpatient Echocardiogram. You may confirm this appointment at . Office location is [facility], [address] [place] [address]  Secondary Diagnosis:	Anemia in chronic kidney disease, on chronic dialysis  Instructions for follow-up, activity and diet:	You have low blood counts (Anemia), likely due to chronic kidney disease. Please discuss this with your primary care provider by following up as an outpatient regarding whether you may need to start any supplemental medications. Principal Discharge DX:	Dyspnea, unspecified type  Goal:	Treat difficulty breathing  Instructions for follow-up, activity and diet:	You were seen and treated for difficulty breathing. It is likely that this episode was caused by failing kidneys, which contributed to the accumulation of fluid in your body, including your lungs. In the hospital we gave you dialysis and gave medications to help eliminate the excess fluid from your lungs. Upon leaving the hospital, please resume your medications as instructed in this document. In addition, please be sure to continue your scheduled dialysis sessions, during which Dr. Jay Sepulveda will see you.    Please follow up as an outpatient with Dr. Josh Salinas (Internal Medicine/Pulmonology) on August 31 at 2:30 PM. You may confirm at [phone]. Office is located at [address] [place] [address], [place] [address].  Secondary Diagnosis:	ESRD (end stage renal disease) on dialysis  Instructions for follow-up, activity and diet:	Please continue medications and follow up as above.  Secondary Diagnosis:	Essential hypertension  Instructions for follow-up, activity and diet:	While you were in the hospital your blood pressure became very high, but it was managed well with medications. Please continue to take your home medications as instructed in this document. It is also possible that your heart function has been contributing to the breathing difficulty. Please follow up as an outpatient with Cardiologist Dr. Heather Lopez on September 8 at 10 AM to discuss the results of your inpatient Echocardiogram. You may confirm this appointment at . Office location is [facility], [address] [place] [address]  Secondary Diagnosis:	Anemia in chronic kidney disease, on chronic dialysis  Instructions for follow-up, activity and diet:	You have low blood counts (Anemia), likely due to chronic kidney disease. Please discuss this with your primary care provider by following up as an outpatient regarding whether you may need to start any supplemental medications. Principal Discharge DX:	Dyspnea, unspecified type  Goal:	Treat difficulty breathing  Instructions for follow-up, activity and diet:	You were seen and treated for difficulty breathing. It is likely that this episode was caused by failing kidneys, which contributed to the accumulation of fluid in your body, including your lungs. In the hospital we gave you dialysis and gave medications to help eliminate the excess fluid from your lungs. Upon leaving the hospital, please resume your medications as instructed in this document. In addition, please be sure to continue your scheduled dialysis sessions, during which Dr. Jay Sepulveda will see you.    Please follow up as an outpatient with Dr. Josh Salinas (Internal Medicine/Pulmonology) on August 31 at 2:30 PM. You may confirm at [phone]. Office is located at [address] [place] [address], [place] [address].  Secondary Diagnosis:	ESRD (end stage renal disease) on dialysis  Instructions for follow-up, activity and diet:	Please continue medications and follow up as above.  Secondary Diagnosis:	Essential hypertension  Instructions for follow-up, activity and diet:	While you were in the hospital your blood pressure became very high, but it was managed well with medications. Please continue to take your home medications as instructed in this document. It is also possible that your heart function has been contributing to the breathing difficulty. Please follow up as an outpatient with Cardiologist Dr. Heather Lopez on September 8 at 10 AM to discuss the results of your inpatient Echocardiogram. You may confirm this appointment at . Office location is [facility], [address] [place] [address]  Secondary Diagnosis:	Anemia in chronic kidney disease, on chronic dialysis  Instructions for follow-up, activity and diet:	You have low blood counts (Anemia), likely due to chronic kidney disease. Please discuss this with your primary care provider by following up as an outpatient regarding whether you may need to start any supplemental medications. Principal Discharge DX:	Dyspnea, unspecified type  Goal:	Discharge home. Treat difficulty breathing.  Instructions for follow-up, activity and diet:	You were seen and treated for difficulty breathing. It is likely that this episode was caused by failing kidneys, which contributed to the accumulation of fluid in your body, including your lungs. In the hospital we gave you dialysis and gave medications to help eliminate the excess fluid from your lungs. Upon leaving the hospital, please resume your medications as instructed in this document. In addition, please be sure to continue your scheduled dialysis sessions, during which Dr. Jay Sepulveda will see you.    Please follow up as an outpatient with Dr. Josh Salinas (Internal Medicine/Pulmonology) on August 31 at 2:30 PM. You may confirm at [phone]. Office is located at [address] [place] [address], [place] [address].  Secondary Diagnosis:	ESRD (end stage renal disease) on dialysis  Instructions for follow-up, activity and diet:	Please continue medications and follow up as above.  Secondary Diagnosis:	Essential hypertension  Instructions for follow-up, activity and diet:	While you were in the hospital your blood pressure became very high, but it was managed well with medications. Please continue to take your home medications as instructed in this document. It is also possible that your heart function has been contributing to the breathing difficulty. Please follow up as an outpatient with Cardiologist Dr. Heather Lopez on September 8 at 10 AM to discuss the results of your inpatient Echocardiogram. You may confirm this appointment at . Office location is [facility], [address] [place] [address]  Secondary Diagnosis:	Anemia in chronic kidney disease, on chronic dialysis  Instructions for follow-up, activity and diet:	You have low blood counts (Anemia), likely due to chronic kidney disease. Please discuss this with your primary care provider by following up as an outpatient regarding whether you may need to start any supplemental medications. Principal Discharge DX:	Dyspnea, unspecified type  Goal:	Discharge home. Treat difficulty breathing.  Instructions for follow-up, activity and diet:	You were seen and treated for difficulty breathing. It is likely that this episode was caused by failing kidneys, which contributed to the accumulation of fluid in your body, including your lungs. In the hospital we gave you dialysis and gave medications to help eliminate the excess fluid from your lungs. Upon leaving the hospital, please resume your medications as instructed in this document. In addition, please be sure to continue your scheduled dialysis sessions, during which Dr. Jay Sepulveda will see you.    Please follow up as an outpatient with Dr. Josh Salinas (Internal Medicine/Pulmonology) on August 31 at 2:30 PM. You may confirm at [phone]. Office is located at [address] [place] [address], [place] [address].  Secondary Diagnosis:	ESRD (end stage renal disease) on dialysis  Instructions for follow-up, activity and diet:	Please continue medications and follow up as above.  Secondary Diagnosis:	Essential hypertension  Instructions for follow-up, activity and diet:	While you were in the hospital your blood pressure became very high, but it was managed well with medications. Please continue to take your home medications as instructed in this document. It is also possible that your heart function has been contributing to the breathing difficulty. Please follow up as an outpatient with Cardiologist Dr. Heather Lopez on September 8 at 10 AM to discuss the results of your inpatient Echocardiogram. You may confirm this appointment at . Office location is [facility], [address] [place] [address]  Secondary Diagnosis:	Anemia in chronic kidney disease, on chronic dialysis  Instructions for follow-up, activity and diet:	You have low blood counts (Anemia), likely due to chronic kidney disease. Please discuss this with your primary care provider by following up as an outpatient regarding whether you may need to start any supplemental medications. Principal Discharge DX:	Dyspnea, unspecified type  Goal:	Discharge home. Treat difficulty breathing.  Instructions for follow-up, activity and diet:	You were seen and treated for difficulty breathing. It is likely that this episode was caused by failing kidneys, which contributed to the accumulation of fluid in your body, including your lungs. In the hospital we gave you dialysis and gave medications to help eliminate the excess fluid from your lungs. Upon leaving the hospital, please resume your medications as instructed in this document. In addition, please be sure to continue your scheduled dialysis sessions, during which Dr. Jay Sepulveda will see you.    Please follow up as an outpatient with Dr. Josh Salinas (Internal Medicine/Pulmonology) on August 31 at 2:30 PM. You may confirm at [phone]. Office is located at [address] [place] [address], [place] [address].  Secondary Diagnosis:	ESRD (end stage renal disease) on dialysis  Instructions for follow-up, activity and diet:	Please continue medications and follow up as above.  Secondary Diagnosis:	Essential hypertension  Instructions for follow-up, activity and diet:	While you were in the hospital your blood pressure became very high, but it was managed well with medications. Please continue to take your home medications as instructed in this document. It is also possible that your heart function has been contributing to the breathing difficulty. Please follow up as an outpatient with Cardiologist Dr. Heather Lopez on September 8 at 10 AM to discuss the results of your inpatient Echocardiogram. You may confirm this appointment at . Office location is [facility], [address] [place] [address]  Secondary Diagnosis:	Anemia in chronic kidney disease, on chronic dialysis  Instructions for follow-up, activity and diet:	You have low blood counts (Anemia), likely due to chronic kidney disease. Please discuss this with your primary care provider by following up as an outpatient regarding whether you may need to start any supplemental medications. Principal Discharge DX:	Dyspnea, unspecified type  Goal:	Discharge home. Treat difficulty breathing.  Instructions for follow-up, activity and diet:	You were seen and treated for difficulty breathing. It is likely that this episode was caused by failing kidneys, which contributed to the accumulation of fluid in your body, including your lungs. In the hospital we gave you dialysis and gave medications to help eliminate the excess fluid from your lungs. Upon leaving the hospital, please resume your medications as instructed in this document. In addition, please be sure to continue your scheduled dialysis sessions, during which Dr. Jay Sepulveda will see you.    Please follow up as an outpatient with Dr. Josh Salinas (Internal Medicine/Pulmonology) on August 31 at 2:30 PM. You may confirm at [phone]. Office is located at [address] [place] [address], [place] [address].  Secondary Diagnosis:	ESRD (end stage renal disease) on dialysis  Instructions for follow-up, activity and diet:	Please continue medications and follow up as above.  Secondary Diagnosis:	Essential hypertension  Instructions for follow-up, activity and diet:	While you were in the hospital your blood pressure became very high, but it was managed well with medications. Please continue to take your home medications as instructed in this document. It is also possible that your heart function has been contributing to the breathing difficulty. Please follow up as an outpatient with Cardiologist Dr. Heather Lopez on September 8 at 10 AM to discuss the results of your inpatient Echocardiogram. You may confirm this appointment at . Office location is [facility], [address] [place] [address]  Secondary Diagnosis:	Anemia in chronic kidney disease, on chronic dialysis  Instructions for follow-up, activity and diet:	You have low blood counts (Anemia), likely due to chronic kidney disease. Please discuss this with your primary care provider by following up as an outpatient regarding whether you may need to start any supplemental medications. Principal Discharge DX:	Dyspnea, unspecified type  Goal:	Discharge home. Treat difficulty breathing.  Instructions for follow-up, activity and diet:	You were seen and treated for difficulty breathing. It is likely that this episode was caused by failing kidneys, which contributed to the accumulation of fluid in your body, including your lungs. In the hospital we gave you dialysis and gave medications to help eliminate the excess fluid from your lungs. Upon leaving the hospital, please resume your medications as instructed in this document. In addition, please be sure to continue your scheduled dialysis sessions, during which Dr. Jay Sepulveda will see you.    Please follow up as an outpatient with Dr. Josh Salinas (Internal Medicine/Pulmonology) on August 31 at 2:30 PM. You may confirm at [phone]. Office is located at [address] [place] [address], [place] [address].  Secondary Diagnosis:	ESRD (end stage renal disease) on dialysis  Instructions for follow-up, activity and diet:	Please continue medications and follow up as above.  Secondary Diagnosis:	Essential hypertension  Instructions for follow-up, activity and diet:	While you were in the hospital your blood pressure became very high, but it was managed well with medications. Please continue to take your home medications as instructed in this document. It is also possible that your heart function has been contributing to the breathing difficulty. Please follow up as an outpatient with Cardiologist Dr. Heather Lopez on September 8 at 10 AM to discuss the results of your inpatient Echocardiogram. You may confirm this appointment at . Office location is [facility], [address] [place] [address]  Secondary Diagnosis:	Anemia in chronic kidney disease, on chronic dialysis  Instructions for follow-up, activity and diet:	You have low blood counts (Anemia), likely due to chronic kidney disease. Please discuss this with your primary care provider by following up as an outpatient regarding whether you may need to start any supplemental medications.

## 2017-08-24 NOTE — DISCHARGE NOTE ADULT - PLAN OF CARE
You were seen and treated for difficulty breathing. It is likely that this episode was caused by failing kidneys, which contributed to the accumulation of fluid in your body, including your lungs. In the hospital we gave you dialysis and gave medications to help eliminate the excess fluid from your lungs. Upon leaving the hospital, please resume your medications as instructed in this document. In addition, please be sure to continue your scheduled dialysis sessions, during which Dr. Jay Sepulveda will see you. Please follow up as an outpatient with Dr. Josh Salinas Treat difficulty breathing Please continue medications and follow up as above. While you were in the hospital your blood pressure became very high, but it was managed well with medications. Please continue to take your home medications as instructed in this document. It is also likely that your heart function has contributed to You were seen and treated for difficulty breathing. It is likely that this episode was caused by failing kidneys, which contributed to the accumulation of fluid in your body, including your lungs. In the hospital we gave you dialysis and gave medications to help eliminate the excess fluid from your lungs. Upon leaving the hospital, please resume your medications as instructed in this document. In addition, please be sure to continue your scheduled dialysis sessions, during which Dr. Jay Sepulveda will see you.    Please follow up as an outpatient with Dr. Josh Salinas (Internal Medicine/Pulmonology) on August 31 at 2:30 PM. You may confirm at [phone]. You have low blood counts (Anemia), likely due to chronic kidney disease. Please discuss this with your primary care provider by following up as an outpatient regarding whether you may need to start any supplemental medications. While you were in the hospital your blood pressure became very high, but it was managed well with medications. Please continue to take your home medications as instructed in this document. It is also possible that your heart function has been contributing to the breathing difficulty. Please follow up as an outpatient with Cardiologist Dr. Heather Lopez on September 8 at 10 AM to discuss the results of your inpatient Echocardiogram. You may confirm this appointment at . Office location is [facility], [address] [place] [id number] You were seen and treated for difficulty breathing. It is likely that this episode was caused by failing kidneys, which contributed to the accumulation of fluid in your body, including your lungs. In the hospital we gave you dialysis and gave medications to help eliminate the excess fluid from your lungs. Upon leaving the hospital, please resume your medications as instructed in this document. In addition, please be sure to continue your scheduled dialysis sessions, during which Dr. Jay Sepulveda will see you.    Please follow up as an outpatient with Dr. Josh Salinas (Internal Medicine/Pulmonology) on August 31 at 2:30 PM. You may confirm at [phone]. Office is located at [facility] [address] [place] [address], [place] [address]. Discharge home. Treat difficulty breathing.

## 2017-08-25 ENCOUNTER — APPOINTMENT (OUTPATIENT)
Dept: SURGERY | Facility: HOSPITAL | Age: 67
End: 2017-08-25

## 2017-08-26 LAB
CULTURE RESULTS: SIGNIFICANT CHANGE UP
CULTURE RESULTS: SIGNIFICANT CHANGE UP
SPECIMEN SOURCE: SIGNIFICANT CHANGE UP
SPECIMEN SOURCE: SIGNIFICANT CHANGE UP

## 2017-08-28 ENCOUNTER — EMERGENCY (EMERGENCY)
Facility: HOSPITAL | Age: 67
LOS: 1 days | Discharge: HOME | End: 2017-08-28
Attending: EMERGENCY MEDICINE | Admitting: INTERNAL MEDICINE
Payer: MEDICARE

## 2017-08-28 VITALS
DIASTOLIC BLOOD PRESSURE: 94 MMHG | RESPIRATION RATE: 18 BRPM | SYSTOLIC BLOOD PRESSURE: 183 MMHG | OXYGEN SATURATION: 97 % | HEART RATE: 82 BPM | WEIGHT: 114.64 LBS | TEMPERATURE: 98 F

## 2017-08-28 DIAGNOSIS — Z79.82 LONG TERM (CURRENT) USE OF ASPIRIN: ICD-10-CM

## 2017-08-28 DIAGNOSIS — R06.02 SHORTNESS OF BREATH: ICD-10-CM

## 2017-08-28 DIAGNOSIS — E78.5 HYPERLIPIDEMIA, UNSPECIFIED: ICD-10-CM

## 2017-08-28 DIAGNOSIS — I25.2 OLD MYOCARDIAL INFARCTION: ICD-10-CM

## 2017-08-28 DIAGNOSIS — Z79.899 OTHER LONG TERM (CURRENT) DRUG THERAPY: ICD-10-CM

## 2017-08-28 DIAGNOSIS — Z29.9 ENCOUNTER FOR PROPHYLACTIC MEASURES, UNSPECIFIED: ICD-10-CM

## 2017-08-28 DIAGNOSIS — I25.10 ATHEROSCLEROTIC HEART DISEASE OF NATIVE CORONARY ARTERY WITHOUT ANGINA PECTORIS: ICD-10-CM

## 2017-08-28 DIAGNOSIS — I10 ESSENTIAL (PRIMARY) HYPERTENSION: ICD-10-CM

## 2017-08-28 DIAGNOSIS — N18.9 CHRONIC KIDNEY DISEASE, UNSPECIFIED: ICD-10-CM

## 2017-08-28 DIAGNOSIS — D64.9 ANEMIA, UNSPECIFIED: ICD-10-CM

## 2017-08-28 DIAGNOSIS — I77.0 ARTERIOVENOUS FISTULA, ACQUIRED: Chronic | ICD-10-CM

## 2017-08-28 DIAGNOSIS — J45.909 UNSPECIFIED ASTHMA, UNCOMPLICATED: ICD-10-CM

## 2017-08-28 DIAGNOSIS — I50.9 HEART FAILURE, UNSPECIFIED: ICD-10-CM

## 2017-08-28 DIAGNOSIS — I12.0 HYPERTENSIVE CHRONIC KIDNEY DISEASE WITH STAGE 5 CHRONIC KIDNEY DISEASE OR END STAGE RENAL DISEASE: ICD-10-CM

## 2017-08-28 DIAGNOSIS — J90 PLEURAL EFFUSION, NOT ELSEWHERE CLASSIFIED: ICD-10-CM

## 2017-08-28 DIAGNOSIS — N18.6 END STAGE RENAL DISEASE: ICD-10-CM

## 2017-08-28 DIAGNOSIS — N18.5 CHRONIC KIDNEY DISEASE, STAGE 5: ICD-10-CM

## 2017-08-28 LAB
ALBUMIN SERPL ELPH-MCNC: 3.4 G/DL — SIGNIFICANT CHANGE UP (ref 3.3–5)
ALP SERPL-CCNC: 176 U/L — HIGH (ref 40–120)
ALT FLD-CCNC: 10 U/L — SIGNIFICANT CHANGE UP (ref 10–45)
ANION GAP SERPL CALC-SCNC: 17 MMOL/L — SIGNIFICANT CHANGE UP (ref 5–17)
APTT BLD: 29.9 SEC — SIGNIFICANT CHANGE UP (ref 27.5–37.4)
AST SERPL-CCNC: 19 U/L — SIGNIFICANT CHANGE UP (ref 10–40)
BASOPHILS NFR BLD AUTO: 0.7 % — SIGNIFICANT CHANGE UP (ref 0–2)
BILIRUB SERPL-MCNC: 0.4 MG/DL — SIGNIFICANT CHANGE UP (ref 0.2–1.2)
BUN SERPL-MCNC: 48 MG/DL — HIGH (ref 7–23)
CALCIUM SERPL-MCNC: 10.4 MG/DL — SIGNIFICANT CHANGE UP (ref 8.4–10.5)
CHLORIDE SERPL-SCNC: 93 MMOL/L — LOW (ref 96–108)
CK SERPL-CCNC: 45 U/L — SIGNIFICANT CHANGE UP (ref 25–170)
CO2 SERPL-SCNC: 24 MMOL/L — SIGNIFICANT CHANGE UP (ref 22–31)
CREAT SERPL-MCNC: 6.2 MG/DL — HIGH (ref 0.5–1.3)
EOSINOPHIL NFR BLD AUTO: 3.6 % — SIGNIFICANT CHANGE UP (ref 0–6)
GAS PNL BLDV: SIGNIFICANT CHANGE UP
GLUCOSE SERPL-MCNC: 97 MG/DL — SIGNIFICANT CHANGE UP (ref 70–99)
HCT VFR BLD CALC: 27.6 % — LOW (ref 34.5–45)
HGB BLD-MCNC: 9.3 G/DL — LOW (ref 11.5–15.5)
INR BLD: 0.98 — SIGNIFICANT CHANGE UP (ref 0.88–1.16)
LIDOCAIN IGE QN: 60 U/L — SIGNIFICANT CHANGE UP (ref 7–60)
LYMPHOCYTES # BLD AUTO: 17 % — SIGNIFICANT CHANGE UP (ref 13–44)
MCHC RBC-ENTMCNC: 32.2 PG — SIGNIFICANT CHANGE UP (ref 27–34)
MCHC RBC-ENTMCNC: 33.7 G/DL — SIGNIFICANT CHANGE UP (ref 32–36)
MCV RBC AUTO: 95.5 FL — SIGNIFICANT CHANGE UP (ref 80–100)
MONOCYTES NFR BLD AUTO: 10.8 % — SIGNIFICANT CHANGE UP (ref 2–14)
NEUTROPHILS NFR BLD AUTO: 67.9 % — SIGNIFICANT CHANGE UP (ref 43–77)
NT-PROBNP SERPL-SCNC: HIGH PG/ML (ref 0–300)
PLATELET # BLD AUTO: 392 K/UL — SIGNIFICANT CHANGE UP (ref 150–400)
POTASSIUM SERPL-MCNC: 5.2 MMOL/L — SIGNIFICANT CHANGE UP (ref 3.5–5.3)
POTASSIUM SERPL-SCNC: 5.2 MMOL/L — SIGNIFICANT CHANGE UP (ref 3.5–5.3)
PROT SERPL-MCNC: 8.1 G/DL — SIGNIFICANT CHANGE UP (ref 6–8.3)
PROTHROM AB SERPL-ACNC: 10.9 SEC — SIGNIFICANT CHANGE UP (ref 9.8–12.7)
RBC # BLD: 2.89 M/UL — LOW (ref 3.8–5.2)
RBC # FLD: 16 % — SIGNIFICANT CHANGE UP (ref 10.3–16.9)
SODIUM SERPL-SCNC: 134 MMOL/L — LOW (ref 135–145)
TROPONIN T SERPL-MCNC: 0.03 NG/ML — HIGH (ref 0–0.01)
WBC # BLD: 5.8 K/UL — SIGNIFICANT CHANGE UP (ref 3.8–10.5)
WBC # FLD AUTO: 5.8 K/UL — SIGNIFICANT CHANGE UP (ref 3.8–10.5)

## 2017-08-28 PROCEDURE — 99285 EMERGENCY DEPT VISIT HI MDM: CPT | Mod: 25

## 2017-08-28 PROCEDURE — 71010: CPT | Mod: 26

## 2017-08-28 PROCEDURE — 99223 1ST HOSP IP/OBS HIGH 75: CPT | Mod: GC

## 2017-08-28 PROCEDURE — 93010 ELECTROCARDIOGRAM REPORT: CPT

## 2017-08-28 RX ORDER — CARVEDILOL PHOSPHATE 80 MG/1
25 CAPSULE, EXTENDED RELEASE ORAL EVERY 12 HOURS
Qty: 0 | Refills: 0 | Status: DISCONTINUED | OUTPATIENT
Start: 2017-08-28 | End: 2017-08-29

## 2017-08-28 RX ORDER — SEVELAMER CARBONATE 2400 MG/1
400 POWDER, FOR SUSPENSION ORAL
Qty: 0 | Refills: 0 | Status: DISCONTINUED | OUTPATIENT
Start: 2017-08-28 | End: 2017-08-29

## 2017-08-28 RX ORDER — CINACALCET 30 MG/1
60 TABLET, FILM COATED ORAL DAILY
Qty: 0 | Refills: 0 | Status: DISCONTINUED | OUTPATIENT
Start: 2017-08-28 | End: 2017-08-29

## 2017-08-28 RX ORDER — ASPIRIN/CALCIUM CARB/MAGNESIUM 324 MG
81 TABLET ORAL DAILY
Qty: 0 | Refills: 0 | Status: DISCONTINUED | OUTPATIENT
Start: 2017-08-28 | End: 2017-08-29

## 2017-08-28 RX ORDER — SODIUM CHLORIDE 9 MG/ML
3 INJECTION INTRAMUSCULAR; INTRAVENOUS; SUBCUTANEOUS ONCE
Qty: 0 | Refills: 0 | Status: COMPLETED | OUTPATIENT
Start: 2017-08-28 | End: 2017-08-28

## 2017-08-28 RX ORDER — NIFEDIPINE 30 MG
90 TABLET, EXTENDED RELEASE 24 HR ORAL
Qty: 0 | Refills: 0 | Status: DISCONTINUED | OUTPATIENT
Start: 2017-08-28 | End: 2017-08-29

## 2017-08-28 RX ORDER — HEPARIN SODIUM 5000 [USP'U]/ML
5000 INJECTION INTRAVENOUS; SUBCUTANEOUS EVERY 8 HOURS
Qty: 0 | Refills: 0 | Status: DISCONTINUED | OUTPATIENT
Start: 2017-08-28 | End: 2017-08-29

## 2017-08-28 RX ORDER — SIMVASTATIN 20 MG/1
20 TABLET, FILM COATED ORAL AT BEDTIME
Qty: 0 | Refills: 0 | Status: DISCONTINUED | OUTPATIENT
Start: 2017-08-28 | End: 2017-08-29

## 2017-08-28 RX ADMIN — SODIUM CHLORIDE 3 MILLILITER(S): 9 INJECTION INTRAMUSCULAR; INTRAVENOUS; SUBCUTANEOUS at 18:42

## 2017-08-28 NOTE — ED PROVIDER NOTE - CONSTITUTIONAL, MLM
normal... generally speaking, comfortable appearing, thin appearing, speaking full sentences when sitting up

## 2017-08-28 NOTE — ED PROVIDER NOTE - PROGRESS NOTE DETAILS
as per dr. chin, pt to go for dialysis. Will admit to medicine. spoke to dr. salas pulvivienne fellow, will evaluate tomorrow in AM for discussion re: size of pleural effusion, ? need of replacement of pleur-x cath. appreciate pulm input. as per dr. chin, pt to go for dialysis. Will admit to medicine. appreciate dr. chin consulting on pt.

## 2017-08-28 NOTE — CONSULT NOTE ADULT - PROBLEM SELECTOR RECOMMENDATION 3
Blood pressure elevated likely volume related  P - continue with carvedilol 25 mg q12h   continue enalapril 20 mg qdaily   continue furosemide 40 mg qdaily   continue nifedipine 90 mg qdaily   will need additional UF tomorrow

## 2017-08-28 NOTE — H&P ADULT - HISTORY OF PRESENT ILLNESS
66F PMH dCHF, ESRD (T/Th/Sa HD), HTN, anemia and CAD presented with worsening SOB from baseline and abdominal fullness for several days. After dialysis on Tuesday when 1L was removed and it was getting progressively worse preventing her from sleeping so she presented to [facility] ED. She had a L pleural effusion last month s/p removal of PleurX on Thursday with Dr Salinas. 66F PMH dCHF, ESRD (T/Th/Sa HD), HTN, anemia, recurrent pleural effusions s/p insertion of pleurX catheter & talc pleurodesis  and CAD who presents with acute SOB with orthopnea since yesterday. Patient has been admitted multiple times in the past 2 months for SOB. Pulm hd been following at the time and patient had a L pleural effusion last month s/p removal of PleurX on Thursday with Dr Salinas.    It is worse with lying down and she can breath better by leaning forward. Patient last had dialysis this past saturday.     vitals in the ED were T(F): , Max: 98.5 (28 Aug 2017 19:51)HR:  (82 - 82) BP: (168/81 - 183/94) RR:17 - 18 SpO2:  96% - 97%      CXR obtained in the ED shows left pleural effusion and mild right sided pleural effusion unchanged from last month. Nephrology ws consulted 66F PMH dCHF, ESRD (T/Th/Sa HD), HTN, anemia, recurrent pleural effusions s/p insertion/removal of pleurX catheter & talc pleurodesis (august 2017)  and CAD who presents with acute SOB with orthopnea since yesterday. Patient has been admitted multiple times in the past 2 months for SOB. Pulm hd been following at the time and patient had a L pleural effusion this month s/p removal of PleurX on august 4th .  Patient endorses she has been doing fine since discharge until this past Saturday when she noticed she had become more bloated. She attended dialysis and asked for more water to be removed however she reports only 1 liter was removed. PAtient then yesterday began noticing her breathing become more labored especially at rest. Patient at baseline is able to sleep with 2 pillows at night but yesterday that was not enough and she had been up all night. Patient reports orthopnea and PND.  Patient reports compliance with all medications and with dialysis sessions since discharge. Of note ramy reports her dry weight is 49 kg.     ROS is positive for a chronic cough that is unproductive, night sweats and a 20 pound weight loss since all her medical issues began this past summer. Otherwise ROS is  negative for headache, chills, CP, abdominal pain, N/V/D/C   vitals in the ED were T(F): , Max: 98.5 (28 Aug 2017 19:51)HR:  (82 - 82) BP: (168/81 - 183/94) RR:17 - 18 SpO2:  96% - 97%      CXR obtained in the ED shows left pleural effusion and mild right sided pleural effusion unchanged from last month. Nephrology was consulted and reccomended a trial of HD to help remove excess fluid. 66F PMH dCHF, ESRD (T/Th/Sa HD), HTN, anemia, recurrent pleural effusions s/p insertion/removal of pleurX catheter & talc pleurodesis (august 2017)  and CAD who presents with acute SOB with orthopnea since yesterday. Patient has been admitted multiple times in the past 2 months for SOB. Pulm hd been following at the time and patient had a L pleural effusion this month s/p multiple thoracentisis and insertion/removal of  PleurX on august 4th .  Patient endorses she has been doing fine since discharge until this past Saturday when she noticed she had become more bloated. She attended dialysis and asked for more water to be removed however she reports only 1 liter was removed. Pttient then yesterday began noticing her breathing become more labored especially at rest. Patient at baseline is able to sleep with 2 pillows at night but yesterday that was not enough and she had been up all night. Patient reports orthopnea and PND.  Patient reports compliance with all medications and with dialysis sessions since discharge. Of note ramy reports her dry weight is 49 kg.     ROS is positive for a chronic cough that is unproductive, night sweats and a 20 pound weight loss since all her medical issues began this past summer. Otherwise ROS is  negative for headache, chills, CP, abdominal pain, N/V/D/C   vitals in the ED were T(F): , Max: 98.5 (28 Aug 2017 19:51)HR:  (82 - 82) BP: (168/81 - 183/94) RR:17 - 18 SpO2:  96% - 97%      CXR obtained in the ED shows left pleural effusion and mild right sided pleural effusion unchanged from last month. Nephrology was consulted and reccomended a trial of HD to help remove excess fluid. Patient is a 66F PMH dCHF, ESRD (T/Th/Sa HD), HTN, anemia, recurrent pleural effusions s/p insertion/removal of pleurX catheter & talc pleurodesis (august 2017)  and CAD who presents with acute SOB with orthopnea since yesterday. Patient has been admitted multiple times in the past 2 months for SOB. Pulm hd been following at the time and patient had a L pleural effusion this month s/p multiple thoracentisis and insertion/removal of  PleurX on august 4th .  Patient endorses she has been doing fine since discharge until this past Saturday when she noticed she had become more bloated. She attended dialysis and asked for more water to be removed however she reports only 1 liter was removed. Pttient then yesterday began noticing her breathing become more labored especially at rest. Patient at baseline is able to sleep with 2 pillows at night but yesterday that was not enough and she had been up all night. Patient reports orthopnea and PND.  Patient reports compliance with all medications and with dialysis sessions since discharge. Of note ramy reports her dry weight is 49 kg.     ROS is positive for a chronic cough that is unproductive, night sweats and a 20 pound weight loss since all her medical issues began this past summer. Otherwise ROS is  negative for headache, chills, CP, abdominal pain, N/V/D/C   vitals in the ED were T(F): , Max: 98.5 (28 Aug 2017 19:51)HR:  (82 - 82) BP: (168/81 - 183/94) RR:17 - 18 SpO2:  96% - 97%      CXR obtained in the ED shows left pleural effusion and mild right sided pleural effusion unchanged from last month. Nephrology was consulted and reccomended a trial of HD to help remove excess fluid. Patient is a 66F PMH dCHF, ESRD (T/Th/Sa HD), HTN, anemia, recurrent pleural effusions s/p insertion/removal of pleurX catheter & talc pleurodesis (august 2017)  and CAD who presents with acute SOB with orthopnea since yesterday. Patient has been admitted multiple times in the past 2 months for SOB. Pulm had been following at the time and patient had a L pleural effusion this month s/p multiple thoracentisis and insertion/removal of  PleurX on august 4th .  Patient endorses she has been doing fine since discharge until this past Saturday when she noticed she had become more bloated. She attended dialysis and asked for more water to be removed however she reports only 1 liter was removed. Pt. then yesterday began noticing her breathing become more labored especially at rest. Patient at baseline is able to sleep with 2 pillows at night but yesterday that was not enough and she had been up all night. Patient reports orthopnea and PND.  Patient reports compliance with all medications and with dialysis sessions since discharge. Of note ramy reports her dry weight is 49 kg.     ROS is positive for a chronic cough that is unproductive, night sweats and a 20 pound weight loss since all her medical issues began this past summer. Otherwise ROS is  negative for headache, chills, CP, abdominal pain, N/V/D/C   vitals in the ED were T(F): , Max: 98.5 (28 Aug 2017 19:51)HR:  (82 - 82) BP: (168/81 - 183/94) RR:17 - 18 SpO2:  96% - 97%      CXR obtained in the ED shows left pleural effusion and mild right sided pleural effusion unchanged from last month. Nephrology was consulted and reccomended a trial of HD to help remove excess fluid.

## 2017-08-28 NOTE — CONSULT NOTE ADULT - ASSESSMENT
Patient is a 66y Female with a history of ESRD on HD T/Th/Sat via left AVF, heart failure with preserved ejection fraction, coronary artery disease, anemia, and hypertension.

## 2017-08-28 NOTE — H&P ADULT - PROBLEM SELECTOR PLAN 6
-- c/w Simivastatin 20 mg QD patient with anemia of chronic kidney dz on last admission with hgb currentyl at baseline  -- consider epogen with HD  -- continue to monitor

## 2017-08-28 NOTE — H&P ADULT - ATTENDING COMMENTS
Pt seen and examined at bedside on 8/28 @ 2100    Agree with HPI, ROS as above.    VS, Labs, FH, SH, allergies, medications, imaging reviewed. Agree with physical exam as above    A/P: 66F PMH dCHF, ESRD (T/Th/Sa HD), HTN, anemia, recurrent pleural effusions s/p insertion/removal of pleurX catheter & talc pleurodesis (august 2017)  and CAD who presents with acute SOB with orthopnea since yesterday.    **SOB  -Chronic, recurring - likely 2/2 need for ESRD    Plan otherwise as outlined above.... Pt seen and examined at bedside on 8/28 @ 2100    Agree with HPI, ROS as above. Patient reports significant improvement after HD session. Currently denies fevers, chills, CP, n/v/d, abdominal pain, urinary signs/symptoms.     VS, Labs, FH, SH, allergies, medications, imaging reviewed. Agree with physical exam as above    A/P: 66F PMH dCHF, ESRD (T/Th/Sa HD), HTN, anemia, recurrent pleural effusions s/p insertion/removal of pleurX catheter & talc pleurodesis (august 2017)  and CAD who presents with acute SOB with orthopnea since yesterday.    **SOB  -Chronic, recurring - likely 2/2 need for ESRD as significant improvement after HD  -As per renal recommendations will need to have further d/w patient's HD center to establish optimal dry weight/amount to be dialyzed as may improve patient's respiratory status and decrease recurrent hospitalizations  -C/w home diuretics  -Pulm consulted in ED to see patient in AM    Plan otherwise as outlined above....

## 2017-08-28 NOTE — H&P ADULT - PROBLEM SELECTOR PLAN 4
patient asymptomatic not complaining of CP with no new ST changes seen on ECG  --c/w ASA 81 mg  -- c/w Simivastatin 20 mg QD Echo on 8/23 showed left ventricular ejection fraction is estimated to be 60-65%The left atrium is severely dilated.pulmonary artery systolic pressure is estimated to  be 60 mmHg. Patient seems euvolemic on exam with no lower extremity edema however JVD noted up to mandible  -- continue with lasix 80 mg BID  -- c/w carvedilol 25 mg BID  -- c/w enalapril 20 mg BID Echo on 8/23 showed left ventricular ejection fraction is estimated to be 60-65%The left atrium is severely dilated. Pulmonary artery systolic pressure is estimated to  be 60 mmHg. Patient seems euvolemic on exam with no lower extremity edema however JVD noted up to mandible  -- continue with lasix 80 mg BID  -- c/w carvedilol 25 mg BID  -- c/w enalapril 20 mg BID Echo on 8/23 showed left ventricular ejection fraction is estimated to be 60-65%The left atrium is severely dilated. Pulmonary artery systolic pressure is estimated to  be 60 mmHg. Patient seems euvolemic on exam with no lower extremity edema however JVD noted up to mandible; BNP less than prior admission;   -- continue with lasix 80 mg BID  -- c/w carvedilol 25 mg BID  -- c/w enalapril 20 mg BID Echo on 8/23 showed left ventricular ejection fraction is estimated to be 60-65%The left atrium is severely dilated. Pulmonary artery systolic pressure is estimated to  be 60 mmHg. Patient seems euvolemic on exam with no lower extremity edema however JVD noted up to mandible; BNP less than prior admission; Paitent with diastolic heart failure with NYHA class of 2  -- continue with lasix 80 mg BID  -- c/w carvedilol 25 mg BID  -- c/w enalapril 20 mg BID

## 2017-08-28 NOTE — H&P ADULT - PROBLEM SELECTOR PLAN 8
FENA: DASH diet with water restriction; replete electrolytes PRN; no fluids needed    Prophylaxis: Heparin SUBQ    DISPO: Admit to F BP: (168/81 - 183/94) with no signs of new end organ damage reported by patient or noted on labs  -- c/w enlapril 20 mg BID  -- c/w nifedipine 90 mg ER BID ( changed on last admission)  -- BP: (168/81 - 183/94) with no signs of new end organ damage reported by patient or noted on labs  -- c/w enlapril 20 mg BID  -- c/w nifedipine 90 mg ER BID ( changed on last admission)  -- c/w carvedilol BP: (168/81 - 183/94) with no signs of new end organ damage reported by patient or noted on labs  -- c/w enalapril 20 mg BID  -- c/w nifedipine 90 mg ER BID ( changed on last admission)  -- c/w carvedilol

## 2017-08-28 NOTE — ED ADULT TRIAGE NOTE - CHIEF COMPLAINT QUOTE
worsening orthopnea since yesterday.  Denies chest pain, fever, chills.  Hx: ESRD (left av fistula  - T, Th, S) Pt went to HD yesterday, asthma, cva (no residual), MI, htn.  Followed by Dr. Gold Camacho (pulmonologist)  Patient speaking clearly in full sentences.

## 2017-08-28 NOTE — CONSULT NOTE ADULT - RS GEN PE MLT RESP DETAILS PC
respiratory distress but saturating well on nasal cannula, decreased breath sounds bilaterally, b/l rales/airway patent/normal/breath sounds equal

## 2017-08-28 NOTE — ED PROVIDER NOTE - MEDICAL DECISION MAKING DETAILS
66F with ESRD, CHF, hx of pleural effusion, recent removal of Pleur-X catheter presenting with shortness of breath. Vital signs notable for very mild tachypnea as well as htn. Ddx includes: doubt ACS, no cp, no ekg changes, trop mild elevation likely with renal dz. Can consider worsening pleural effusion vs worsening CHF but most likely pt is underdialyzed, will admit for dialysis, discussed with renal fellow Dr. Zarco. Discussed with hospitalist. Will be evaluated by pulm in AM.

## 2017-08-28 NOTE — H&P ADULT - PROBLEM SELECTOR PLAN 5
patient with anemia of chronic kidney dz on last admission with hgb currentyl at baseline  -- consider epogen with HD  -- continue to monitor patient asymptomatic not complaining of CP with no new ST changes seen on ECG  --c/w ASA 81 mg  -- c/w Simivastatin 20 mg QD patient asymptomatic not complaining of CP with no new ST changes seen on ECG  --c/w ASA 81 mg  -- c/w Simvastatin 20 mg QD

## 2017-08-28 NOTE — ED ADULT NURSE NOTE - OBJECTIVE STATEMENT
Patient arrived to ED via walk-in stating, "I have been short of breat, they say I have fluid on my lungs.  I had a tube on the side for it."  Patient is A&Ox3, in NAD, speaking in full sentences, complaining of SOB.  Patient denies chest pain, dizziness, N/V/D, fevers, chills or cough.  PMHx of ERSD, MI, HTN.  Patient presents with LUE AV fistula with +bruit/thrill.  PIV placed, labs drawn and sent, EKG done.  Will continue with plan of care.

## 2017-08-28 NOTE — H&P ADULT - PROBLEM SELECTOR PLAN 9
FENA: DASH diet with water restriction; replete electrolytes PRN; no fluids needed    Prophylaxis: Heparin SUBQ    DISPO: Admit to F

## 2017-08-28 NOTE — H&P ADULT - NSHPPHYSICALEXAM_GEN_ALL_CORE
.  VITAL SIGNS:  T(C): 37.1 (08-28-17 @ 20:30), Max: 37.1 (08-28-17 @ 20:30)  T(F): 98.7 (08-28-17 @ 20:30), Max: 98.7 (08-28-17 @ 20:30)  HR: 85 (08-28-17 @ 20:30) (82 - 85)  BP: 187/103 (08-28-17 @ 20:30) (168/81 - 187/103)  BP(mean): --  RR: 21 (08-28-17 @ 20:30) (17 - 21)  SpO2: 96% (08-28-17 @ 20:30) (96% - 97%)  Wt(kg): --    PHYSICAL EXAM:    Constitutional: WDWN resting comfortably in bed in dialysis suite; mildly tachypneic  Head: NC/AT  Eyes: PERRL, EOMI, clear conjunctiva, muddy sclera  ENT: no nasal discharge; uvula midline, no oropharyngeal erythema or exudates; MMM  Neck: + JVD noted on the right side; supple;   Respiratory:faint crackles appreciated on the left lower base with decreased BS at the bases; no retractions  Cardiac: 2/6 systolic murmur appreciated heard best at the LSB; RRR; no R/G;   Gastrointestinal: soft, NT; distended with tympanic precussion appreciated; ventral hernia appreciated that is reducible and mildly tender to deep palpation no rebound or guarding; hypoactive BS   Back: spine midline, no bony tenderness or step-offs; no CVAT B/L  Extremities: WWP, no clubbing or cyanosis; no peripheral edema  Musculoskeletal: NROM x4; no joint swelling, tenderness or erythema  Vascular: 2+ radial pulses B/L  Dermatologic: Carroll nails noted; skin warm, dry and intact; no rashes, wounds, or scars  Lymphatic: no submandibular or cervical LAD  Neurologic: AAOx3; CNII-XII grossly intact; no focal deficits  Psychiatric: affect and characteristics of appearance, verbalizations, behaviors are appropriate .  VITAL SIGNS:  T(C): 37.1 (08-28-17 @ 20:30), Max: 37.1 (08-28-17 @ 20:30)  T(F): 98.7 (08-28-17 @ 20:30), Max: 98.7 (08-28-17 @ 20:30)  HR: 85 (08-28-17 @ 20:30) (82 - 85)  BP: 187/103 (08-28-17 @ 20:30) (168/81 - 187/103)  BP(mean): --  RR: 21 (08-28-17 @ 20:30) (17 - 21)  SpO2: 96% (08-28-17 @ 20:30) (96% - 97%)  Wt(kg): --    PHYSICAL EXAM:    Constitutional: WDWN resting comfortably in bed in dialysis suite; mildly tachypneic  Head: NC/AT  Eyes: PERRL, EOMI, clear conjunctiva, muddy sclera  ENT: no nasal discharge; uvula midline, no oropharyngeal erythema or exudates; MMM  Neck: + JVD noted on the right side; supple;   Respiratory: faint crackles appreciated on the left lower base with decreased BS at the bases; no retractions  Cardiac: 2/6 systolic murmur appreciated heard best at the LSB; RRR; no R/G;   Gastrointestinal: soft, NT; distended with tympanic percussion appreciated; ventral hernia appreciated that is reducible and mildly tender to deep palpation no rebound or guarding; hypoactive BS   Back: spine midline, no bony tenderness or step-offs; no CVAT B/L  Extremities: WWP, no clubbing or cyanosis; no peripheral edema  Musculoskeletal: NROM x4; no joint swelling, tenderness or erythema  Vascular: 2+ radial pulses B/L  Dermatologic: Carroll nails noted; skin warm, dry and intact; no rashes, wounds, or scars  Lymphatic: no submandibular or cervical LAD  Neurologic: AAOx3; CNII-XII grossly intact; no focal deficits  Psychiatric: affect and characteristics of appearance, verbalizations, behaviors are appropriate

## 2017-08-28 NOTE — CONSULT NOTE ADULT - PROBLEM SELECTOR RECOMMENDATION 9
Patient is a 66 year old female with ESRD on HD T/Th/Sat. Patient presented with fluid overload likely from being underdialyzed at her outpatient dialysis unit. Patient's outpatient unit was informed that patient's dry weight is 49 kg, however, patient's post dialysis weight on Saturday was > 50 kg.     P - dialysis today for UF and clearance   Optiflux 180, ,  2K bath   goal UF 2kg over 2 hours   patient will require additional dialysis tomorrow   Please monitor strict I/Os   weight pre and post dialysis   will need to contact patient's dialysis unit in AM

## 2017-08-28 NOTE — H&P ADULT - ASSESSMENT
Patient is a 66F PMH dCHF, ESRD (T/Th/Sa HD), HTN, anemia, recurrent pleural effusions s/p insertion/removal of pleurX catheter & talc pleurodesis (august 2017)  and CAD who presents with acute SOB with orthopnea since yesterday.

## 2017-08-28 NOTE — H&P ADULT - PROBLEM SELECTOR PLAN 1
patient with new onset SOB that is worsened with exertion and with lying flat; Patient mildly tachypneic on RA which is improved with 2 liter of supplemental O2; etiology likely multifactorial and may include a combination of a persistent left pleural effusion however CXR not much changed from prior in combination with severe pulmonary HTN  with echo a week ago showing a pulmonary systolic pressure of 60 mm hg likely WHO class 2 from left sided diastolic heart failure; ACS unlikely despite troponin elevation with no ECG changes noted from prior and no report of angina; valvular disease mild to moderate on most recent ECHO;    -- Pulm consulted in the ED; follow reccs  -- Nephrology consulted in ED; will dialyze tonight   -- f/u echo in the am   -- patient with new onset SOB that is worsened with exertion and with lying flat; Patient mildly tachypneic on RA which is improved with 2 liter of supplemental O2; etiology likely multifactorial and may include a combination of a persistent left pleural effusion however CXR not much changed from prior in combination with severe pulmonary HTN  with echo a week ago showing a pulmonary systolic pressure of 60 mm hg likely WHO class 2 from left sided diastolic heart failure; ACS unlikely despite troponin elevation with no ECG changes noted from prior and no report of angina; valvular disease mild to moderate on most recent ECHO;  PE unlikely as p  -- Pulm consulted in the ED; follow reccs  -- Nephrology consulted in ED; will dialyze tonight   -- f/u echo in the am   --c/w lasix 80 mg BID Patient with new onset SOB that is worsened with exertion and with lying flat; Patient mildly tachypneic on RA which is improved with 2 liter of supplemental O2; etiology likely multifactorial and may include a combination of a persistent left pleural effusion vs. fluid overload from ESRD however CXR not much changed from prior; patient with severe pulmonary HTN  with echo a week ago showing a pulmonary systolic pressure of 60 mm hg likely WHO class 2 from left sided diastolic heart failure; ACS unlikely despite troponin elevation with no ECG changes noted from prior and no report of angina; valvular disease mild to moderate on most recent ECHO;  PE unlikely as patient with wells score of 0;   -- Pulm consulted in the ED; follow reccs  -- Nephrology consulted in ED; will dialyze tonight   --c/w lasix 80 mg BID  --O2 supplementation Patient with new onset SOB that is worsened with exertion and with lying flat; Patient mildly tachypneic on RA which is improved with 2 liter of supplemental O2; etiology likely multifactorial and may include a combination of a persistent left pleural effusion vs. fluid overload from ESRD however CXR not much changed from prior; patient with severe pulmonary HTN  with echo a week ago showing a pulmonary systolic pressure of 60 mm hg likely WHO class 2 from left sided diastolic heart failure; ACS unlikely despite troponin elevation with no ECG changes noted from prior and no report of angina; valvular disease mild to moderate on most recent ECHO;  PE unlikely as patient with wells score of 0; hgb currently at baseline   -- Pulm consulted in the ED; follow reccs  -- Nephrology consulted in ED; will dialyze tonight   --c/w lasix 80 mg BID  --O2 supplementation

## 2017-08-28 NOTE — PROGRESS NOTE ADULT - SUBJECTIVE AND OBJECTIVE BOX
Patient was seen and evaluated on dialysis.   Patient is tolerating the procedure well.   HR: 85 (08-28-17 @ 20:30)  BP: 187/103 (08-28-17 @ 20:30)  Continue dialysis:   Optiflux 180, ,  2K bath   goal UF 2kg over 2 hours

## 2017-08-28 NOTE — H&P ADULT - NSHPLABSRESULTS_GEN_ALL_CORE
.  LABS:                         9.3    5.8   )-----------( 392      ( 28 Aug 2017 18:36 )             27.6     08-28    134<L>  |  93<L>  |  48<H>  ----------------------------<  97  5.2   |  24  |  6.20<H>    Ca    10.4      28 Aug 2017 18:36    TPro  8.1  /  Alb  3.4  /  TBili  0.4  /  DBili  x   /  AST  19  /  ALT  10  /  AlkPhos  176<H>  08-28    PT/INR - ( 28 Aug 2017 18:36 )   PT: 10.9 sec;   INR: 0.98          PTT - ( 28 Aug 2017 18:36 )  PTT:29.9 sec    CARDIAC MARKERS ( 28 Aug 2017 18:36 )  x     / 0.03 ng/mL / 45 U/L / x     / x          Serum Pro-Brain Natriuretic Peptide: 82566 pg/mL (08-28 @ 18:36)        RADIOLOGY, EKG & ADDITIONAL TESTS: CXR shows B/L ppleural effusion L>R with improvement on right side from prviosu CXR taken 8/23

## 2017-08-28 NOTE — H&P ADULT - PROBLEM SELECTOR PLAN 3
Echo on 8/23 showed left ventricular ejection fraction is estimated to be 60-65%The left atrium is severely dilated.pulmonary artery systolic pressure is estimated to  be 60 mmHg. Patient seems euvolemic on exam with no lower extremity edema however JVD noted up to mandible  -- continue with lasix 80 mg BID  -- c/w carvedilol 25 mg BID  -- c/w enalapril 20 mg BID Patient receives HD T,TH,SAT  -- f/u nephro reccs  -- continue with cincalet 60 mg QD  -- Patient receives HD T,TH,SAT;   -- f/u nephro reccs  -- continue with cincalet 60 mg QD  -- Patient receives HD T,TH,SAT;   -- f/u nephro reccs  -- continue with cincalet 60 mg QD  -- patient takes velphro at home 2500 mg 2 tabs TID (not carried by pharmacy); will dispense sevelamer 400 mg TID with meals

## 2017-08-28 NOTE — H&P ADULT - PROBLEM SELECTOR PLAN 7
BP: (168/81 - 183/94) with no signs of new end organ damage reported by patient or noted on labs  -- c/w enlapril 20 mg BID  -- c/w nifedipine 90 mg ER BID ( changed on last admission)  -- -- c/w Simivastatin 20 mg QD -- c/w Simvastatin 20 mg QD

## 2017-08-28 NOTE — CONSULT NOTE ADULT - SUBJECTIVE AND OBJECTIVE BOX
Patient is a 66y Female with a history of ESRD on HD T/Th/Sat via left AVF, heart failure with preserved ejection fraction, coronary artery disease, anemia, and hypertension. Patient is well known to the nephrology team and has had several admissions for progressive shortness of breath. Patient states she went to her dialysis unit on Saturday. She states that the dialysis unit was reluctant to take more then 1.5 kg of UF, and her post dialysis weight was > 50 kg even though her dry weight should be 49 kg. Patient states since Saturday she has been having gradual worsening in shortness of breath and "bloating". Patient receives dialysis at [facility] [phone].    PAST MEDICAL & SURGICAL HISTORY:  Anemia: 2/2 CKD  Asthma  MI, old  CAD (coronary artery disease)  HLD (hyperlipidemia)  CKD (chronic kidney disease): Stage 5CKD  CVA (cerebral infarction)  Hypertension  AV fistula    MEDICATIONS  (STANDING):  aspirin enteric coated 81 milliGRAM(s) Oral daily  enalapril 20 milliGRAM(s) Oral two times a day  simvastatin 20 milliGRAM(s) Oral at bedtime  carvedilol 25 milliGRAM(s) Oral every 12 hours  cinacalcet 60 milliGRAM(s) Oral daily  NIFEdipine XL 90 milliGRAM(s) Oral two times a day  metolazone 10 milliGRAM(s) Oral daily  heparin  Injectable 5000 Unit(s) SubCutaneous every 8 hours    MEDICATIONS  (PRN):    Allergies    No Known Allergies    Intolerances    FAMILY HISTORY:  No pertinent family history in first degree relatives    T(C): , Max: 37.1 (08-28-17 @ 20:30)  T(F): , Max: 98.7 (08-28-17 @ 20:30)  HR: 85 (08-28-17 @ 20:30)  BP: 187/103 (08-28-17 @ 20:30)  RR: 21 (08-28-17 @ 20:30)  SpO2: 96% (08-28-17 @ 20:30)    Weight (kg): 52 (08-28 @ 17:30)      LABS:                        9.3    5.8   )-----------( 392      ( 28 Aug 2017 18:36 )             27.6     08-28    134<L>  |  93<L>  |  48<H>  ----------------------------<  97  5.2   |  24  |  6.20<H>    Ca    10.4      28 Aug 2017 18:36    TPro  8.1  /  Alb  3.4  /  TBili  0.4  /  DBili  x   /  AST  19  /  ALT  10  /  AlkPhos  176<H>  08-28    Creatine Kinase, Serum: 45 U/L [25 - 170] (08-28 @ 18:36)    PT/INR - ( 28 Aug 2017 18:36 )   PT: 10.9 sec;   INR: 0.98        PTT - ( 28 Aug 2017 18:36 )  PTT:29.9 sec

## 2017-08-28 NOTE — ED PROVIDER NOTE - OBJECTIVE STATEMENT
65 yo F w/ extensive medical hx including ESRD and left sided pleural effusion, c/o SOB associated w/ orthopnea. Pt had a Pleurx catheter removed 2 weeks ago. Pt was last dialyzed on Saturday. Denies any chest pain, lower extremity swelling, leg swelling, fever, cough , or PE/DVT risk factors.

## 2017-08-28 NOTE — H&P ADULT - PROBLEM SELECTOR PLAN 2
Patient receives HD T,TH,SAT  -- f/u nephro reccs  -- continue with cincalet 60 mg QD  -- Patient s/p thoracentesis x3. Last one on 7/28/17 and 2L fluid removed. PleuralX cath with pleurodesis performed on august 1st and catheter removed on 8/17  - Pleural fluid studies from 1st tap were transudative but from the second tap were exudative as they met modified Light's criteria (Cholesterol >45) Patient s/p thoracentesis x3. Last one on 7/28/17 and 2L fluid removed. PleuralX cath with pleurodesis performed on august 1st and catheter removed on 8/17  -- Pleural fluid studies from 1st tap were transudative but from the second tap were exudative as they met modified Light's criteria (Cholesterol >45)  --f/u pulm reccs Patient s/p thoracentesis x3. Last one on 7/28/17 and 2L fluid removed. PleuralX cath with pleurodesis performed on august 1st and catheter removed on 8/17  -- Pleural fluid studies from 1st and 3rd tap were transudative but from the second tap were exudative as they met modified Light's criteria (Cholesterol >45)  --f/u pulm reccs

## 2017-08-29 ENCOUNTER — TRANSCRIPTION ENCOUNTER (OUTPATIENT)
Age: 67
End: 2017-08-29

## 2017-08-29 VITALS
DIASTOLIC BLOOD PRESSURE: 93 MMHG | TEMPERATURE: 98 F | RESPIRATION RATE: 17 BRPM | SYSTOLIC BLOOD PRESSURE: 194 MMHG | HEART RATE: 81 BPM | OXYGEN SATURATION: 95 %

## 2017-08-29 DIAGNOSIS — I13.2 HYPERTENSIVE HEART AND CHRONIC KIDNEY DISEASE WITH HEART FAILURE AND WITH STAGE 5 CHRONIC KIDNEY DISEASE, OR END STAGE RENAL DISEASE: ICD-10-CM

## 2017-08-29 DIAGNOSIS — I25.10 ATHEROSCLEROTIC HEART DISEASE OF NATIVE CORONARY ARTERY WITHOUT ANGINA PECTORIS: ICD-10-CM

## 2017-08-29 DIAGNOSIS — N18.6 END STAGE RENAL DISEASE: ICD-10-CM

## 2017-08-29 DIAGNOSIS — I50.33 ACUTE ON CHRONIC DIASTOLIC (CONGESTIVE) HEART FAILURE: ICD-10-CM

## 2017-08-29 DIAGNOSIS — J96.01 ACUTE RESPIRATORY FAILURE WITH HYPOXIA: ICD-10-CM

## 2017-08-29 DIAGNOSIS — E78.5 HYPERLIPIDEMIA, UNSPECIFIED: ICD-10-CM

## 2017-08-29 DIAGNOSIS — J45.909 UNSPECIFIED ASTHMA, UNCOMPLICATED: ICD-10-CM

## 2017-08-29 DIAGNOSIS — K46.9 UNSPECIFIED ABDOMINAL HERNIA WITHOUT OBSTRUCTION OR GANGRENE: ICD-10-CM

## 2017-08-29 DIAGNOSIS — Z86.73 PERSONAL HISTORY OF TRANSIENT ISCHEMIC ATTACK (TIA), AND CEREBRAL INFARCTION WITHOUT RESIDUAL DEFICITS: ICD-10-CM

## 2017-08-29 DIAGNOSIS — I25.2 OLD MYOCARDIAL INFARCTION: ICD-10-CM

## 2017-08-29 DIAGNOSIS — E87.79 OTHER FLUID OVERLOAD: ICD-10-CM

## 2017-08-29 DIAGNOSIS — J81.0 ACUTE PULMONARY EDEMA: ICD-10-CM

## 2017-08-29 DIAGNOSIS — D63.1 ANEMIA IN CHRONIC KIDNEY DISEASE: ICD-10-CM

## 2017-08-29 DIAGNOSIS — I16.0 HYPERTENSIVE URGENCY: ICD-10-CM

## 2017-08-29 DIAGNOSIS — Z99.2 DEPENDENCE ON RENAL DIALYSIS: ICD-10-CM

## 2017-08-29 LAB
ALBUMIN SERPL ELPH-MCNC: 2.9 G/DL — LOW (ref 3.3–5)
ALP SERPL-CCNC: 148 U/L — HIGH (ref 40–120)
ALT FLD-CCNC: 7 U/L — LOW (ref 10–45)
ANION GAP SERPL CALC-SCNC: 14 MMOL/L — SIGNIFICANT CHANGE UP (ref 5–17)
AST SERPL-CCNC: 13 U/L — SIGNIFICANT CHANGE UP (ref 10–40)
BILIRUB SERPL-MCNC: 0.3 MG/DL — SIGNIFICANT CHANGE UP (ref 0.2–1.2)
BUN SERPL-MCNC: 25 MG/DL — HIGH (ref 7–23)
CALCIUM SERPL-MCNC: 10 MG/DL — SIGNIFICANT CHANGE UP (ref 8.4–10.5)
CHLORIDE SERPL-SCNC: 96 MMOL/L — SIGNIFICANT CHANGE UP (ref 96–108)
CO2 SERPL-SCNC: 26 MMOL/L — SIGNIFICANT CHANGE UP (ref 22–31)
CREAT SERPL-MCNC: 4.3 MG/DL — HIGH (ref 0.5–1.3)
GLUCOSE SERPL-MCNC: 90 MG/DL — SIGNIFICANT CHANGE UP (ref 70–99)
HCT VFR BLD CALC: 25.4 % — LOW (ref 34.5–45)
HGB BLD-MCNC: 8.1 G/DL — LOW (ref 11.5–15.5)
MAGNESIUM SERPL-MCNC: 2.1 MG/DL — SIGNIFICANT CHANGE UP (ref 1.6–2.6)
MCHC RBC-ENTMCNC: 31.5 PG — SIGNIFICANT CHANGE UP (ref 27–34)
MCHC RBC-ENTMCNC: 31.9 G/DL — LOW (ref 32–36)
MCV RBC AUTO: 98.8 FL — SIGNIFICANT CHANGE UP (ref 80–100)
PHOSPHATE SERPL-MCNC: 4.3 MG/DL — SIGNIFICANT CHANGE UP (ref 2.5–4.5)
PLATELET # BLD AUTO: 306 K/UL — SIGNIFICANT CHANGE UP (ref 150–400)
POTASSIUM SERPL-MCNC: 4.1 MMOL/L — SIGNIFICANT CHANGE UP (ref 3.5–5.3)
POTASSIUM SERPL-SCNC: 4.1 MMOL/L — SIGNIFICANT CHANGE UP (ref 3.5–5.3)
PROT SERPL-MCNC: 6.8 G/DL — SIGNIFICANT CHANGE UP (ref 6–8.3)
RBC # BLD: 2.57 M/UL — LOW (ref 3.8–5.2)
RBC # FLD: 15 % — SIGNIFICANT CHANGE UP (ref 10.3–16.9)
SODIUM SERPL-SCNC: 136 MMOL/L — SIGNIFICANT CHANGE UP (ref 135–145)
WBC # BLD: 4.7 K/UL — SIGNIFICANT CHANGE UP (ref 3.8–10.5)
WBC # FLD AUTO: 4.7 K/UL — SIGNIFICANT CHANGE UP (ref 3.8–10.5)

## 2017-08-29 PROCEDURE — 84295 ASSAY OF SERUM SODIUM: CPT

## 2017-08-29 PROCEDURE — 85025 COMPLETE CBC W/AUTO DIFF WBC: CPT

## 2017-08-29 PROCEDURE — 83690 ASSAY OF LIPASE: CPT

## 2017-08-29 PROCEDURE — 99232 SBSQ HOSP IP/OBS MODERATE 35: CPT | Mod: 24

## 2017-08-29 PROCEDURE — 84132 ASSAY OF SERUM POTASSIUM: CPT

## 2017-08-29 PROCEDURE — 36415 COLL VENOUS BLD VENIPUNCTURE: CPT

## 2017-08-29 PROCEDURE — 85610 PROTHROMBIN TIME: CPT

## 2017-08-29 PROCEDURE — 80053 COMPREHEN METABOLIC PANEL: CPT

## 2017-08-29 PROCEDURE — 83880 ASSAY OF NATRIURETIC PEPTIDE: CPT

## 2017-08-29 PROCEDURE — 84100 ASSAY OF PHOSPHORUS: CPT

## 2017-08-29 PROCEDURE — 99285 EMERGENCY DEPT VISIT HI MDM: CPT | Mod: 25

## 2017-08-29 PROCEDURE — 83735 ASSAY OF MAGNESIUM: CPT

## 2017-08-29 PROCEDURE — 85027 COMPLETE CBC AUTOMATED: CPT

## 2017-08-29 PROCEDURE — 85730 THROMBOPLASTIN TIME PARTIAL: CPT

## 2017-08-29 PROCEDURE — 84484 ASSAY OF TROPONIN QUANT: CPT

## 2017-08-29 PROCEDURE — 82803 BLOOD GASES ANY COMBINATION: CPT

## 2017-08-29 PROCEDURE — 82330 ASSAY OF CALCIUM: CPT

## 2017-08-29 PROCEDURE — 93005 ELECTROCARDIOGRAM TRACING: CPT

## 2017-08-29 PROCEDURE — 90935 HEMODIALYSIS ONE EVALUATION: CPT

## 2017-08-29 PROCEDURE — 82550 ASSAY OF CK (CPK): CPT

## 2017-08-29 PROCEDURE — 71045 X-RAY EXAM CHEST 1 VIEW: CPT

## 2017-08-29 PROCEDURE — 99239 HOSP IP/OBS DSCHRG MGMT >30: CPT

## 2017-08-29 RX ORDER — IPRATROPIUM/ALBUTEROL SULFATE 18-103MCG
3 AEROSOL WITH ADAPTER (GRAM) INHALATION EVERY 6 HOURS
Qty: 0 | Refills: 0 | Status: DISCONTINUED | OUTPATIENT
Start: 2017-08-29 | End: 2017-08-29

## 2017-08-29 RX ORDER — CARVEDILOL PHOSPHATE 80 MG/1
12.5 CAPSULE, EXTENDED RELEASE ORAL EVERY 12 HOURS
Qty: 0 | Refills: 0 | Status: DISCONTINUED | OUTPATIENT
Start: 2017-08-29 | End: 2017-08-29

## 2017-08-29 RX ORDER — FUROSEMIDE 40 MG
160 TABLET ORAL EVERY 24 HOURS
Qty: 0 | Refills: 0 | Status: DISCONTINUED | OUTPATIENT
Start: 2017-08-29 | End: 2017-08-29

## 2017-08-29 RX ORDER — CARVEDILOL PHOSPHATE 80 MG/1
25 CAPSULE, EXTENDED RELEASE ORAL EVERY 12 HOURS
Qty: 0 | Refills: 0 | Status: DISCONTINUED | OUTPATIENT
Start: 2017-08-29 | End: 2017-08-29

## 2017-08-29 RX ORDER — FUROSEMIDE 40 MG
80 TABLET ORAL EVERY 12 HOURS
Qty: 0 | Refills: 0 | Status: DISCONTINUED | OUTPATIENT
Start: 2017-08-29 | End: 2017-08-29

## 2017-08-29 RX ORDER — NIFEDIPINE 30 MG
90 TABLET, EXTENDED RELEASE 24 HR ORAL EVERY 12 HOURS
Qty: 0 | Refills: 0 | Status: DISCONTINUED | OUTPATIENT
Start: 2017-08-29 | End: 2017-08-29

## 2017-08-29 RX ADMIN — SIMVASTATIN 20 MILLIGRAM(S): 20 TABLET, FILM COATED ORAL at 00:39

## 2017-08-29 RX ADMIN — CARVEDILOL PHOSPHATE 12.5 MILLIGRAM(S): 80 CAPSULE, EXTENDED RELEASE ORAL at 00:39

## 2017-08-29 RX ADMIN — SEVELAMER CARBONATE 400 MILLIGRAM(S): 2400 POWDER, FOR SUSPENSION ORAL at 09:19

## 2017-08-29 RX ADMIN — Medication 90 MILLIGRAM(S): at 13:35

## 2017-08-29 RX ADMIN — Medication 20 MILLIGRAM(S): at 05:48

## 2017-08-29 NOTE — DISCHARGE NOTE ADULT - HOSPITAL COURSE
66F PMH dCHF, ESRD (T/Th/Sa HD), HTN, anemia and CAD presented with worsening SOB from baseline and prior discharge. After dialysis on Saturday when 1L was removed and it was getting progressively worse preventing her from sleeping so she presented to [facility] ED. She had a L pleural effusion last month s/p removal of PleurX on Thursday with Dr Salinas. She currently affirms SOB, edema, orthopnea requiring her to sit up to breath, PND, cough. In the ED Vitals were Max: 98.5 (28 Aug 2017 19:51)HR:  (82 - 82) BP: (168/81 - 183/94) RR:17 - 18 SpO2:  96% - 97%. Patient received dialysis on night of admission. Next morning patient was symptomatically mildly improved with minimal respiratory symptoms and without signs of fluid overload. She received her scheduled dialysis on Tuesday and was medically optimized for discharge. Etiology for shortness of breath on admission was likely due to fluid overload in the setting of suboptimal removal of fluid during outpatient dialysis, complicated by history of dCHF. 66F PMH dCHF, ESRD (T/Th/Sa HD), HTN, anemia and CAD presented with worsening SOB from baseline and prior discharge. After dialysis on Saturday when 1L was removed and it was getting progressively worse preventing her from sleeping so she presented to [facility] ED. She had a L pleural effusion last month s/p removal of PleurX on Thursday with Dr Salinas. She currently affirms SOB, edema, orthopnea requiring her to sit up to breath, PND, cough. In the ED Vitals were Max: 98.5 (28 Aug 2017 19:51)HR:  (82 - 82) BP: (168/81 - 183/94) RR:17 - 18 SpO2:  96% - 97%. Patient received dialysis on night of admission. Next morning patient was symptomatically mildly improved with minimal respiratory symptoms and without signs of fluid overload. She received her scheduled dialysis on Tuesday and was medically optimized for discharge. Etiology for shortness of breath on admission was likely due to fluid overload in the setting of suboptimal removal of fluid during outpatient dialysis, complicated by history of dCHF. On the last admission, [facility] () was called to inform them to diurese more fluid in future sessions as tolerated - nephrologist is Dr. Jay Sepulveda. Discussed with Dr. Marc Sepulveda over the phone regarding final medication reconciliation. On this admission, conversation 66F PMH dCHF, ESRD (T/Th/Sa HD), HTN, anemia and CAD presented with worsening SOB from baseline and prior discharge. After dialysis on Saturday when 1L was removed and it was getting progressively worse preventing her from sleeping so she presented to [facility] ED. She had a L pleural effusion last month s/p removal of PleurX on Thursday with Dr Salinas. She currently affirms SOB, edema, orthopnea requiring her to sit up to breath, PND, cough. In the ED Vitals were Max: 98.5 (28 Aug 2017 19:51)HR:  (82 - 82) BP: (168/81 - 183/94) RR:17 - 18 SpO2:  96% - 97%. Patient received dialysis on night of admission. Next morning patient was symptomatically mildly improved with minimal respiratory symptoms and without signs of fluid overload. She received her scheduled dialysis on Tuesday and was medically optimized for discharge. Etiology for shortness of breath on admission was likely due to fluid overload in the setting of suboptimal removal of fluid during outpatient dialysis, complicated by history of dCHF. On her last admission 1 week ago, [facility] () was called to inform them to diurese more fluid in future sessions as tolerated - nephrologist is Dr. Jay Sepulveda. Discussed with Dr. Marc Sepulveda over the phone regarding final medication reconciliation and recommendation to take off more fluid. On this admission, renal fellow spoke directly to Dr. Marc Zhang prior to discharge to discuss specific plans for dialysis in attempt to prevent further admissions. Patient returned from dialysis and stable for discharge home.

## 2017-08-29 NOTE — DISCHARGE NOTE ADULT - CARE PROVIDERS DIRECT ADDRESSES
[email or web address]

## 2017-08-29 NOTE — DISCHARGE NOTE ADULT - INSTRUCTIONS
Low salt diet. Activity as tolerated. Call your doctor for follow up appointment.     Monitor your weight daily. Call your doctor for a weight gain of 2-3 lbs. in 24 hrs or 3 lbs. or more in 1 week, increased shortness of breath, tiredness or weakness, or increased swelling of your feet and legs.     Discharge weight 108 lb lbs 49.3 kg Date: 08/28/2017.

## 2017-08-29 NOTE — PROGRESS NOTE ADULT - SUBJECTIVE AND OBJECTIVE BOX
Interval Events:  Patient seen and examined at bedside.  Pt very well known to the service, s/p successful L pleurodesis with talc poudrage, recurrent admissions for respirtaory failure 2/2 CKD/CHF.  Now back with SOB that started on Saturday after she felt that her HD was not sufficient (only 2L removed, felt like she needed more, still felt "full" after her session).  Today feels better after HD last night.      MEDICATIONS:  Pulmonary:  ALBUTerol/ipratropium for Nebulization 3 milliLiter(s) Nebulizer every 6 hours PRN    Antimicrobials:    Anticoagulants:  aspirin enteric coated 81 milliGRAM(s) Oral daily  heparin  Injectable 5000 Unit(s) SubCutaneous every 8 hours    Cardiac:  enalapril 20 milliGRAM(s) Oral two times a day  metolazone 10 milliGRAM(s) Oral daily  NIFEdipine XL 90 milliGRAM(s) Oral every 12 hours  furosemide    Tablet 160 milliGRAM(s) Oral every 24 hours  carvedilol 25 milliGRAM(s) Oral every 12 hours      Allergies    No Known Allergies    Intolerances        Vital Signs Last 24 Hrs  T(C): 36.8 (29 Aug 2017 08:36), Max: 37.1 (28 Aug 2017 20:30)  T(F): 98.3 (29 Aug 2017 08:36), Max: 98.7 (28 Aug 2017 20:30)  HR: 64 (29 Aug 2017 08:36) (64 - 85)  BP: 189/81 (29 Aug 2017 08:36) (168/81 - 189/81)  BP(mean): --  RR: 17 (29 Aug 2017 08:36) (17 - 21)  SpO2: 97% (29 Aug 2017 08:36) (96% - 100%)        PHYSICAL EXAM:  General: Well developed; in no acute distress  HEENT: PERRL, EOMI, non icteric  Neck: Supple;   Respiratory: b/l cackles up to 1/3 of the lungs, no wheezing, decreased BS at the bases.  Cardiovascular: Regular rate and rhythm. S1 and S2 Normal; No murmurs  Gastrointestinal: Soft non-tender non-distended;   Extremities: WWP, no edema, no cyanosis  Neurological: Alert and oriented x3  Skin: Warm and dry. No obvious rash    LABS:      CBC Full  -  ( 29 Aug 2017 07:16 )  WBC Count : 4.7 K/uL  Hemoglobin : 8.1 g/dL  Hematocrit : 25.4 %  Platelet Count - Automated : 306 K/uL  Mean Cell Volume : 98.8 fL  Mean Cell Hemoglobin : 31.5 pg  Mean Cell Hemoglobin Concentration : 31.9 g/dL  Auto Neutrophil # : x  Auto Lymphocyte # : x  Auto Monocyte # : x  Auto Eosinophil # : x  Auto Basophil # : x  Auto Neutrophil % : x  Auto Lymphocyte % : x  Auto Monocyte % : x  Auto Eosinophil % : x  Auto Basophil % : x    08-29    136  |  96  |  25<H>  ----------------------------<  90  4.1   |  26  |  4.30<H>    Ca    10.0      29 Aug 2017 07:16  Phos  4.3     08-29  Mg     2.1     08-29    TPro  6.8  /  Alb  2.9<L>  /  TBili  0.3  /  DBili  x   /  AST  13  /  ALT  7<L>  /  AlkPhos  148<H>  08-29    PT/INR - ( 28 Aug 2017 18:36 )   PT: 10.9 sec;   INR: 0.98          PTT - ( 28 Aug 2017 18:36 )  PTT:29.9 sec                  RADIOLOGY imaging reviewed

## 2017-08-29 NOTE — DISCHARGE NOTE ADULT - PATIENT PORTAL LINK FT
“You can access the Follow[employer] Patient Portal, offered by [facility], by registering with the following website: [email or web address]”

## 2017-08-29 NOTE — DISCHARGE NOTE ADULT - CARE PROVIDER_API CALL
Josh Salinas), Internal Medicine; Pulmonary Disease  [address] [address]  Phone: [phone]  Fax: [phone]    Heather Lopez), Cardiology; Internal Medicine  [address] [address]  Phone: [phone]  Fax: [phone]

## 2017-08-29 NOTE — DISCHARGE NOTE ADULT - SECONDARY DIAGNOSIS.
ESRD (end stage renal disease) Anemia CAD (coronary artery disease) CHF, left ventricular Hypertension HLD (hyperlipidemia)

## 2017-08-29 NOTE — DISCHARGE NOTE ADULT - PLAN OF CARE
Discharge home. Stable. You were seen and treated for difficulty breathing. It is likely that this episode was caused by failing kidneys, which contributed to the accumulation of fluid in your body, including your lungs. In the hospital we gave you dialysis and gave medications to help eliminate the excess fluid from your lungs. Upon leaving the hospital, please resume your medications as instructed in this document. In addition, please be sure to continue your scheduled dialysis sessions, during which Dr. Jay Sepulveda will see you.    Please follow up as an outpatient with Dr. Josh Salinas (Internal Medicine/Pulmonology) on August 31 at 2:30 PM. You may confirm at [phone]. Office is located at [facility] [address] [place] [address], [place] [address]. Please continue medications and follow up as above. Continue with dialysis Tuesday, Thursday, and Saturday. While you were in the hospital your blood pressure became very high, but it was managed well with medications. Please continue to take your home medications as instructed in this document. It is also possible that your heart function has been contributing to the breathing difficulty. Please follow up as an outpatient with Cardiologist Dr. Heather Lopez on September 8 at 10 AM to discuss the results of your inpatient Echocardiogram. You may confirm this appointment at . Office location is [facility], [address] [place] [id number] Stable. You have low blood counts (Anemia), likely due to chronic kidney disease. Please discuss this with your primary care provider by following up as an outpatient regarding whether you may need to start any supplemental medications.. Please continue with your home medication of simvastatin. You presented with increased shortness of breath and some orthopnea. Your echocardiogram showed some abnormal results on an increased pulmonary pressure. Please follow up as an outpatient with Cardiologist Dr. Heather Lopez on September 8 at 10 AM to discuss the results of your inpatient Echocardiogram Continue with your home medications and follow up with your cardiologist.

## 2017-08-29 NOTE — PROGRESS NOTE ADULT - ATTENDING COMMENTS
seen and evaluated while on dialysis  tolerating the procedure well.  Pre-HD weight 49.7- will try for 2 kg UF (47.7-48) as tolerated by BP  Left hospital last week with weight at 49 Kg
for dialysis today  to reduce DW again
S/p L talc poudrage for recurrent symptomatic pleural effusion. Readmitted with fluid overload, pulmonary edema and hypertension. Improved breathlessness with more HD. To follow up on thursday in the office

## 2017-08-29 NOTE — PROGRESS NOTE ADULT - PROBLEM SELECTOR PLAN 2
resolved post pleurodesis with talc poudrage.
- uncontrolled   - restart her medications   - carvedilol 25 mg twice a day, enalapril 20 mg, furosemide 160 mg daily, nifedipine 90 mg daily metalozone 10 mg daily   - we will do UF also today.

## 2017-08-29 NOTE — PROGRESS NOTE ADULT - SUBJECTIVE AND OBJECTIVE BOX
Objective and subjective   The patient is sitting in her bed, still on oxygen. She was dialyzed last night - 2 liters removed. Still feels in the morning short of breath.     Patient is a 66y Female with a history of ESRD on HD T/Th/Sat via left AVF, heart failure with preserved ejection fraction, coronary artery disease, anemia, and hypertension. She will be dialyzed today     Patient seen and examined at bedside.     aspirin enteric coated 81 milliGRAM(s) daily  enalapril 20 milliGRAM(s) two times a day  simvastatin 20 milliGRAM(s) at bedtime  cinacalcet 60 milliGRAM(s) daily  metolazone 10 milliGRAM(s) daily  heparin  Injectable 5000 Unit(s) every 8 hours  sevelamer hydrochloride 400 milliGRAM(s) three times a day with meals  NIFEdipine XL 90 milliGRAM(s) every 12 hours  furosemide    Tablet 160 milliGRAM(s) every 24 hours  ALBUTerol/ipratropium for Nebulization 3 milliLiter(s) every 6 hours PRN  carvedilol 25 milliGRAM(s) every 12 hours      Allergies    No Known Allergies    Intolerances        T(C): , Max: 37.1 (08-28-17 @ 20:30)  T(F): , Max: 98.7 (08-28-17 @ 20:30)  HR: 81 (08-29-17 @ 14:06)  BP: 194/93 (08-29-17 @ 14:06)  BP(mean): --  RR: 17 (08-29-17 @ 14:06)  SpO2: 95% (08-29-17 @ 14:06)  Wt(kg): --    08-29 @ 07:01  -  08-29 @ 14:42  --------------------------------------------------------  IN:  Total IN: 0 mL    OUT:    Other: 1700 mL  Total OUT: 1700 mL    Total NET: -1700 mL    Physical exam:   Alert and oriented   No JVD  Normal air entry in the lungs, no wheezing, decreased breath sounds bilaterally, crackles   RRR, normal s1/s2, no murmurs, rubs or gallops  Abdomen - soft, non-tender, non-distended, normal bowel sounds   Extremities mild peripheral edema   Has an AV fistula on the left       Height (cm): 157.48 (08-28 @ 23:33)  Weight (kg): 49.3 (08-28 @ 23:33)  BMI (kg/m2): 19.9 (08-28 @ 23:33)  BSA (m2): 1.48 (08-28 @ 23:33)      LABS:                        8.1    4.7   )-----------( 306      ( 29 Aug 2017 07:16 )             25.4     08-29    136  |  96  |  25<H>  ----------------------------<  90  4.1   |  26  |  4.30<H>    Ca    10.0      29 Aug 2017 07:16  Phos  4.3     08-29  Mg     2.1     08-29    TPro  6.8  /  Alb  2.9<L>  /  TBili  0.3  /  DBili  x   /  AST  13  /  ALT  7<L>  /  AlkPhos  148<H>  08-29      PT/INR - ( 28 Aug 2017 18:36 )   PT: 10.9 sec;   INR: 0.98          PTT - ( 28 Aug 2017 18:36 )  PTT:29.9 sec          RADIOLOGY & ADDITIONAL STUDIES:      < from: Xray Chest 1 View AP/PA (08.28.17 @ 18:39) >    IMPRESSION:    1. Improving aeration of the right lower lung zone which may represent a   resolving right lower lung zone pneumonia/atelectasis.  2. Persistent pleural-parenchymal opacities over the left mid and lower   lung zones unchanged.          < end of copied text >

## 2017-08-29 NOTE — DISCHARGE NOTE ADULT - CARE PLAN
Principal Discharge DX:	Shortness of breath  Goal:	Discharge home. Stable.  Instructions for follow-up, activity and diet:	You were seen and treated for difficulty breathing. It is likely that this episode was caused by failing kidneys, which contributed to the accumulation of fluid in your body, including your lungs. In the hospital we gave you dialysis and gave medications to help eliminate the excess fluid from your lungs. Upon leaving the hospital, please resume your medications as instructed in this document. In addition, please be sure to continue your scheduled dialysis sessions, during which Dr. Jay Sepulveda will see you.    Please follow up as an outpatient with Dr. Josh Salinas (Internal Medicine/Pulmonology) on August 31 at 2:30 PM. You may confirm at [phone]. Office is located at [address] [place] [address], [place] [address].  Secondary Diagnosis:	ESRD (end stage renal disease)  Goal:	Continue with dialysis Tuesday, Thursday, and Saturday.  Instructions for follow-up, activity and diet:	Please continue medications and follow up as above.  Secondary Diagnosis:	Anemia  Goal:	Stable.  Instructions for follow-up, activity and diet:	You have low blood counts (Anemia), likely due to chronic kidney disease. Please discuss this with your primary care provider by following up as an outpatient regarding whether you may need to start any supplemental medications..  Secondary Diagnosis:	CAD (coronary artery disease)  Goal:	Stable.  Instructions for follow-up, activity and diet:	Continue with your home medications and follow up with your cardiologist.  Secondary Diagnosis:	CHF, left ventricular  Goal:	Stable.  Instructions for follow-up, activity and diet:	You presented with increased shortness of breath and some orthopnea. Your echocardiogram showed some abnormal results on an increased pulmonary pressure. Please follow up as an outpatient with Cardiologist Dr. Heather Lopez on September 8 at 10 AM to discuss the results of your inpatient Echocardiogram  Secondary Diagnosis:	Hypertension  Goal:	Stable.  Instructions for follow-up, activity and diet:	While you were in the hospital your blood pressure became very high, but it was managed well with medications. Please continue to take your home medications as instructed in this document. It is also possible that your heart function has been contributing to the breathing difficulty. Please follow up as an outpatient with Cardiologist Dr. Heather Lopez on September 8 at 10 AM to discuss the results of your inpatient Echocardiogram. You may confirm this appointment at . Office location is [facility], [address] [place] [address]  Secondary Diagnosis:	HLD (hyperlipidemia)  Goal:	Stable.  Instructions for follow-up, activity and diet:	Please continue with your home medication of simvastatin. Principal Discharge DX:	Shortness of breath  Goal:	Discharge home. Stable.  Instructions for follow-up, activity and diet:	You were seen and treated for difficulty breathing. It is likely that this episode was caused by failing kidneys, which contributed to the accumulation of fluid in your body, including your lungs. In the hospital we gave you dialysis and gave medications to help eliminate the excess fluid from your lungs. Upon leaving the hospital, please resume your medications as instructed in this document. In addition, please be sure to continue your scheduled dialysis sessions, during which Dr. Jay Sepulveda will see you.    Please follow up as an outpatient with Dr. Josh Salinas (Internal Medicine/Pulmonology) on August 31 at 2:30 PM. You may confirm at [phone]. Office is located at [address] [place] [address], [place] [address].  Secondary Diagnosis:	ESRD (end stage renal disease)  Goal:	Continue with dialysis Tuesday, Thursday, and Saturday.  Instructions for follow-up, activity and diet:	Please continue medications and follow up as above.  Secondary Diagnosis:	Anemia  Goal:	Stable.  Instructions for follow-up, activity and diet:	You have low blood counts (Anemia), likely due to chronic kidney disease. Please discuss this with your primary care provider by following up as an outpatient regarding whether you may need to start any supplemental medications..  Secondary Diagnosis:	CAD (coronary artery disease)  Goal:	Stable.  Instructions for follow-up, activity and diet:	Continue with your home medications and follow up with your cardiologist.  Secondary Diagnosis:	CHF, left ventricular  Goal:	Stable.  Instructions for follow-up, activity and diet:	You presented with increased shortness of breath and some orthopnea. Your echocardiogram showed some abnormal results on an increased pulmonary pressure. Please follow up as an outpatient with Cardiologist Dr. Heather Lopez on September 8 at 10 AM to discuss the results of your inpatient Echocardiogram  Secondary Diagnosis:	Hypertension  Goal:	Stable.  Instructions for follow-up, activity and diet:	While you were in the hospital your blood pressure became very high, but it was managed well with medications. Please continue to take your home medications as instructed in this document. It is also possible that your heart function has been contributing to the breathing difficulty. Please follow up as an outpatient with Cardiologist Dr. Heather Lopez on September 8 at 10 AM to discuss the results of your inpatient Echocardiogram. You may confirm this appointment at . Office location is [facility], [address] [place] [address]  Secondary Diagnosis:	HLD (hyperlipidemia)  Goal:	Stable.  Instructions for follow-up, activity and diet:	Please continue with your home medication of simvastatin. Principal Discharge DX:	Shortness of breath  Goal:	Discharge home. Stable.  Instructions for follow-up, activity and diet:	You were seen and treated for difficulty breathing. It is likely that this episode was caused by failing kidneys, which contributed to the accumulation of fluid in your body, including your lungs. In the hospital we gave you dialysis and gave medications to help eliminate the excess fluid from your lungs. Upon leaving the hospital, please resume your medications as instructed in this document. In addition, please be sure to continue your scheduled dialysis sessions, during which Dr. Jay Sepulveda will see you.    Please follow up as an outpatient with Dr. Josh Salinas (Internal Medicine/Pulmonology) on August 31 at 2:30 PM. You may confirm at [phone]. Office is located at [address] [place] [address], [place] [address].  Secondary Diagnosis:	ESRD (end stage renal disease)  Goal:	Continue with dialysis Tuesday, Thursday, and Saturday.  Instructions for follow-up, activity and diet:	Please continue medications and follow up as above.  Secondary Diagnosis:	Anemia  Goal:	Stable.  Instructions for follow-up, activity and diet:	You have low blood counts (Anemia), likely due to chronic kidney disease. Please discuss this with your primary care provider by following up as an outpatient regarding whether you may need to start any supplemental medications..  Secondary Diagnosis:	CAD (coronary artery disease)  Goal:	Stable.  Instructions for follow-up, activity and diet:	Continue with your home medications and follow up with your cardiologist.  Secondary Diagnosis:	CHF, left ventricular  Goal:	Stable.  Instructions for follow-up, activity and diet:	You presented with increased shortness of breath and some orthopnea. Your echocardiogram showed some abnormal results on an increased pulmonary pressure. Please follow up as an outpatient with Cardiologist Dr. Heather Lopez on September 8 at 10 AM to discuss the results of your inpatient Echocardiogram  Secondary Diagnosis:	Hypertension  Goal:	Stable.  Instructions for follow-up, activity and diet:	While you were in the hospital your blood pressure became very high, but it was managed well with medications. Please continue to take your home medications as instructed in this document. It is also possible that your heart function has been contributing to the breathing difficulty. Please follow up as an outpatient with Cardiologist Dr. Heather Lopez on September 8 at 10 AM to discuss the results of your inpatient Echocardiogram. You may confirm this appointment at . Office location is [facility], [address] [place] [address]  Secondary Diagnosis:	HLD (hyperlipidemia)  Goal:	Stable.  Instructions for follow-up, activity and diet:	Please continue with your home medication of simvastatin. Principal Discharge DX:	Shortness of breath  Goal:	Discharge home. Stable.  Instructions for follow-up, activity and diet:	You were seen and treated for difficulty breathing. It is likely that this episode was caused by failing kidneys, which contributed to the accumulation of fluid in your body, including your lungs. In the hospital we gave you dialysis and gave medications to help eliminate the excess fluid from your lungs. Upon leaving the hospital, please resume your medications as instructed in this document. In addition, please be sure to continue your scheduled dialysis sessions, during which Dr. Jay Sepulveda will see you.    Please follow up as an outpatient with Dr. Josh Salinas (Internal Medicine/Pulmonology) on August 31 at 2:30 PM. You may confirm at [phone]. Office is located at [address] [place] [address], [place] [address].  Secondary Diagnosis:	ESRD (end stage renal disease)  Goal:	Continue with dialysis Tuesday, Thursday, and Saturday.  Instructions for follow-up, activity and diet:	Please continue medications and follow up as above.  Secondary Diagnosis:	Anemia  Goal:	Stable.  Instructions for follow-up, activity and diet:	You have low blood counts (Anemia), likely due to chronic kidney disease. Please discuss this with your primary care provider by following up as an outpatient regarding whether you may need to start any supplemental medications..  Secondary Diagnosis:	CAD (coronary artery disease)  Goal:	Stable.  Instructions for follow-up, activity and diet:	Continue with your home medications and follow up with your cardiologist.  Secondary Diagnosis:	CHF, left ventricular  Goal:	Stable.  Instructions for follow-up, activity and diet:	You presented with increased shortness of breath and some orthopnea. Your echocardiogram showed some abnormal results on an increased pulmonary pressure. Please follow up as an outpatient with Cardiologist Dr. Heather Lopez on September 8 at 10 AM to discuss the results of your inpatient Echocardiogram  Secondary Diagnosis:	Hypertension  Goal:	Stable.  Instructions for follow-up, activity and diet:	While you were in the hospital your blood pressure became very high, but it was managed well with medications. Please continue to take your home medications as instructed in this document. It is also possible that your heart function has been contributing to the breathing difficulty. Please follow up as an outpatient with Cardiologist Dr. Heather Lopez on September 8 at 10 AM to discuss the results of your inpatient Echocardiogram. You may confirm this appointment at . [place] [facility] [place] [address] [place] [address]  Secondary Diagnosis:	HLD (hyperlipidemia)  Goal:	Stable.  Instructions for follow-up, activity and diet:	Please continue with your home medication of simvastatin. Principal Discharge DX:	Shortness of breath  Goal:	Discharge home. Stable.  Instructions for follow-up, activity and diet:	You were seen and treated for difficulty breathing. It is likely that this episode was caused by failing kidneys, which contributed to the accumulation of fluid in your body, including your lungs. In the hospital we gave you dialysis and gave medications to help eliminate the excess fluid from your lungs. Upon leaving the hospital, please resume your medications as instructed in this document. In addition, please be sure to continue your scheduled dialysis sessions, during which Dr. Jay Sepulveda will see you.    Please follow up as an outpatient with Dr. Josh Salinas (Internal Medicine/Pulmonology) on August 31 at 2:30 PM. You may confirm at [phone]. Office is located at [address] [place] [address], [place] [address].  Secondary Diagnosis:	ESRD (end stage renal disease)  Goal:	Continue with dialysis Tuesday, Thursday, and Saturday.  Instructions for follow-up, activity and diet:	Please continue medications and follow up as above.  Secondary Diagnosis:	Anemia  Goal:	Stable.  Instructions for follow-up, activity and diet:	You have low blood counts (Anemia), likely due to chronic kidney disease. Please discuss this with your primary care provider by following up as an outpatient regarding whether you may need to start any supplemental medications..  Secondary Diagnosis:	CAD (coronary artery disease)  Goal:	Stable.  Instructions for follow-up, activity and diet:	Continue with your home medications and follow up with your cardiologist.  Secondary Diagnosis:	CHF, left ventricular  Goal:	Stable.  Instructions for follow-up, activity and diet:	You presented with increased shortness of breath and some orthopnea. Your echocardiogram showed some abnormal results on an increased pulmonary pressure. Please follow up as an outpatient with Cardiologist Dr. Heather Lopez on September 8 at 10 AM to discuss the results of your inpatient Echocardiogram  Secondary Diagnosis:	Hypertension  Goal:	Stable.  Instructions for follow-up, activity and diet:	While you were in the hospital your blood pressure became very high, but it was managed well with medications. Please continue to take your home medications as instructed in this document. It is also possible that your heart function has been contributing to the breathing difficulty. Please follow up as an outpatient with Cardiologist Dr. Heather Lopez on September 8 at 10 AM to discuss the results of your inpatient Echocardiogram. You may confirm this appointment at . Office location is [facility], [address] [place] [address]  Secondary Diagnosis:	HLD (hyperlipidemia)  Goal:	Stable.  Instructions for follow-up, activity and diet:	Please continue with your home medication of simvastatin.

## 2017-08-29 NOTE — PROGRESS NOTE ADULT - ASSESSMENT
Patient is a 66y Female with a history of ESRD on HD T/Th/Sat via left AVF, heart failure with preserved ejection fraction, coronary artery disease, anemia, and hypertension. She was dialyzed yesterday - we will do HD today also - to dry weight to 48 kg

## 2017-08-29 NOTE — PROGRESS NOTE ADULT - PROBLEM SELECTOR PLAN 1
Likely cause of her current sx - probably 2/2 to CKD with insufficient HD and CHF. BNP elevated, crackles on exam b/l.  Bedside US performed by me with minimal R pleural effusion, no pleural effusion on the L. Numerous b-lines b/l ("commit sign").  - HD and diuersis as per renal
- we will do HD today   - we will lower her dry weight today to 48 kg or lower   - bicarbonate - 26 acceptable   - she is on Cinacalcet 60 mg daily   - obn sevelamer 400 mg three times per day   - renal diet   - daily weights  - follow up in and outs.

## 2017-08-29 NOTE — PROGRESS NOTE ADULT - ASSESSMENT
67 yo F, PMH of ESRD/HD, CHF, s/p L pleurodesis for recurrent transudative sx effusion, now with dyspnea.

## 2017-08-29 NOTE — PROGRESS NOTE ADULT - SUBJECTIVE AND OBJECTIVE BOX
Patient was seen and evaluated on dialysis.   Patient is tolerating the procedure well.   HR: 78 (08-29-17 @ 10:20)  BP: 193/96 (08-29-17 @ 10:20) pusle 65, /67  Continue dialysis:   Dialyzer:  180     QB:    400    QD: 500  Goal UF 1.7 over 3 Hours    We will do UF to dry weight to 48 kg today

## 2017-08-29 NOTE — DISCHARGE NOTE ADULT - ADDITIONAL INSTRUCTIONS
Please follow up as an outpatient with Dr. Josh Salinas (Internal Medicine/Pulmonology) on August 31 at 2:30 PM.   Please follow up as an outpatient with Cardiologist Dr. Heather Lopez on September 8 at 10 AM.

## 2017-08-29 NOTE — DISCHARGE NOTE ADULT - MEDICATION SUMMARY - MEDICATIONS TO TAKE
I will START or STAY ON the medications listed below when I get home from the hospital:    aspirin 81 mg oral delayed release tablet  -- 1 tab(s) by mouth once a day  -- Indication: For CAD (coronary artery disease)    enalapril 20 mg oral tablet  -- 1 tab(s) by mouth 2 times a day  -- Indication: For Hypertension    simvastatin 20 mg oral tablet  -- 1 tab(s) by mouth once a day (at bedtime)  -- Indication: For HLD (hyperlipidemia)    carvedilol 25 mg oral tablet  -- 1 tab(s) by mouth every 12 hours  -- Indication: For CHF, left ventricular    Ventolin HFA 90 mcg/inh inhalation aerosol  -- 2 puff(s) inhaled every 6 hours - for asthma  -- For inhalation only.  It is very important that you take or use this exactly as directed.  Do not skip doses or discontinue unless directed by your doctor.  Obtain medical advice before taking any non-prescription drugs as some may affect the action of this medication.  Shake well before use.    -- Indication: For Shortness of breath    cinacalcet 60 mg oral tablet  -- 1 tab(s) by mouth once a day  -- Indication: For ESRD (end stage renal disease)    NIFEdipine 90 mg oral tablet, extended release  -- 1 tab(s) by mouth 2 times a day  -- Indication: For ESRD (end stage renal disease)    metOLazone 10 mg oral tablet  -- 1 tab(s) by mouth once a day 30 minutes before PM torsemide  -- Indication: For CHF, left ventricular    furosemide 80 mg oral tablet  -- 2 tab(s) by mouth once a day (in the morning)  -- Indication: For ESRD (end stage renal disease)    Velphoro 2500 mg (500 mg elemental iron) oral tablet, chewable  -- 2 tab(s) by mouth 3 times a day (with meals)  -- Indication: For ESRD (end stage renal disease)

## 2017-08-31 ENCOUNTER — APPOINTMENT (OUTPATIENT)
Dept: PULMONOLOGY | Facility: CLINIC | Age: 67
End: 2017-08-31
Payer: MEDICARE

## 2017-08-31 VITALS — OXYGEN SATURATION: 98 % | SYSTOLIC BLOOD PRESSURE: 152 MMHG | DIASTOLIC BLOOD PRESSURE: 86 MMHG | HEART RATE: 72 BPM

## 2017-08-31 PROCEDURE — 99215 OFFICE O/P EST HI 40 MIN: CPT | Mod: 24

## 2017-09-06 ENCOUNTER — APPOINTMENT (OUTPATIENT)
Dept: HEART AND VASCULAR | Facility: CLINIC | Age: 67
End: 2017-09-06

## 2017-09-08 ENCOUNTER — APPOINTMENT (OUTPATIENT)
Dept: HEART AND VASCULAR | Facility: CLINIC | Age: 67
End: 2017-09-08
Payer: MEDICARE

## 2017-09-08 ENCOUNTER — OUTPATIENT (OUTPATIENT)
Dept: OUTPATIENT SERVICES | Facility: HOSPITAL | Age: 67
LOS: 1 days | End: 2017-09-08
Payer: MEDICARE

## 2017-09-08 VITALS
DIASTOLIC BLOOD PRESSURE: 80 MMHG | TEMPERATURE: 97.4 F | HEART RATE: 72 BPM | BODY MASS INDEX: 21.6 KG/M2 | SYSTOLIC BLOOD PRESSURE: 170 MMHG | OXYGEN SATURATION: 97 % | WEIGHT: 114.42 LBS | HEIGHT: 60.98 IN

## 2017-09-08 DIAGNOSIS — Z87.448 PERSONAL HISTORY OF OTHER DISEASES OF URINARY SYSTEM: ICD-10-CM

## 2017-09-08 DIAGNOSIS — R06.2 WHEEZING: ICD-10-CM

## 2017-09-08 DIAGNOSIS — Z86.79 PERSONAL HISTORY OF OTHER DISEASES OF THE CIRCULATORY SYSTEM: ICD-10-CM

## 2017-09-08 DIAGNOSIS — I77.0 ARTERIOVENOUS FISTULA, ACQUIRED: Chronic | ICD-10-CM

## 2017-09-08 PROCEDURE — 94010 BREATHING CAPACITY TEST: CPT | Mod: 26

## 2017-09-08 PROCEDURE — 94726 PLETHYSMOGRAPHY LUNG VOLUMES: CPT | Mod: 26

## 2017-09-08 PROCEDURE — 93000 ELECTROCARDIOGRAM COMPLETE: CPT

## 2017-09-08 PROCEDURE — 94729 DIFFUSING CAPACITY: CPT | Mod: 26

## 2017-09-08 PROCEDURE — 99214 OFFICE O/P EST MOD 30 MIN: CPT | Mod: 25

## 2017-09-11 ENCOUNTER — EMERGENCY (EMERGENCY)
Facility: HOSPITAL | Age: 67
LOS: 1 days | Discharge: HOME | End: 2017-09-11
Attending: EMERGENCY MEDICINE | Admitting: INTERNAL MEDICINE
Payer: MEDICARE

## 2017-09-11 VITALS
SYSTOLIC BLOOD PRESSURE: 193 MMHG | OXYGEN SATURATION: 98 % | DIASTOLIC BLOOD PRESSURE: 91 MMHG | TEMPERATURE: 98 F | RESPIRATION RATE: 18 BRPM | WEIGHT: 114.64 LBS | HEART RATE: 72 BPM | HEIGHT: 62 IN

## 2017-09-11 DIAGNOSIS — I12.0 HYPERTENSIVE CHRONIC KIDNEY DISEASE WITH STAGE 5 CHRONIC KIDNEY DISEASE OR END STAGE RENAL DISEASE: ICD-10-CM

## 2017-09-11 DIAGNOSIS — N18.6 END STAGE RENAL DISEASE: ICD-10-CM

## 2017-09-11 DIAGNOSIS — I25.10 ATHEROSCLEROTIC HEART DISEASE OF NATIVE CORONARY ARTERY WITHOUT ANGINA PECTORIS: ICD-10-CM

## 2017-09-11 DIAGNOSIS — I77.0 ARTERIOVENOUS FISTULA, ACQUIRED: Chronic | ICD-10-CM

## 2017-09-11 DIAGNOSIS — Z79.899 OTHER LONG TERM (CURRENT) DRUG THERAPY: ICD-10-CM

## 2017-09-11 DIAGNOSIS — R06.02 SHORTNESS OF BREATH: ICD-10-CM

## 2017-09-11 DIAGNOSIS — E87.5 HYPERKALEMIA: ICD-10-CM

## 2017-09-11 DIAGNOSIS — J45.909 UNSPECIFIED ASTHMA, UNCOMPLICATED: ICD-10-CM

## 2017-09-11 DIAGNOSIS — E78.5 HYPERLIPIDEMIA, UNSPECIFIED: ICD-10-CM

## 2017-09-11 DIAGNOSIS — I10 ESSENTIAL (PRIMARY) HYPERTENSION: ICD-10-CM

## 2017-09-11 DIAGNOSIS — Z79.82 LONG TERM (CURRENT) USE OF ASPIRIN: ICD-10-CM

## 2017-09-11 DIAGNOSIS — Z99.2 DEPENDENCE ON RENAL DIALYSIS: ICD-10-CM

## 2017-09-11 LAB
ALBUMIN SERPL ELPH-MCNC: 3.5 G/DL — SIGNIFICANT CHANGE UP (ref 3.3–5)
ALP SERPL-CCNC: 172 U/L — HIGH (ref 40–120)
ALT FLD-CCNC: 9 U/L — LOW (ref 10–45)
ANION GAP SERPL CALC-SCNC: 14 MMOL/L — SIGNIFICANT CHANGE UP (ref 5–17)
APTT BLD: 33.3 SEC — SIGNIFICANT CHANGE UP (ref 27.5–37.4)
AST SERPL-CCNC: 18 U/L — SIGNIFICANT CHANGE UP (ref 10–40)
BASOPHILS NFR BLD AUTO: 0.6 % — SIGNIFICANT CHANGE UP (ref 0–2)
BILIRUB SERPL-MCNC: 0.3 MG/DL — SIGNIFICANT CHANGE UP (ref 0.2–1.2)
BUN SERPL-MCNC: 62 MG/DL — HIGH (ref 7–23)
CALCIUM SERPL-MCNC: 11 MG/DL — HIGH (ref 8.4–10.5)
CHLORIDE SERPL-SCNC: 91 MMOL/L — LOW (ref 96–108)
CO2 SERPL-SCNC: 25 MMOL/L — SIGNIFICANT CHANGE UP (ref 22–31)
CREAT SERPL-MCNC: 6 MG/DL — HIGH (ref 0.5–1.3)
EOSINOPHIL NFR BLD AUTO: 0.6 % — SIGNIFICANT CHANGE UP (ref 0–6)
GLUCOSE SERPL-MCNC: 94 MG/DL — SIGNIFICANT CHANGE UP (ref 70–99)
HBV SURFACE AG SER-ACNC: SIGNIFICANT CHANGE UP
HCT VFR BLD CALC: 27.4 % — LOW (ref 34.5–45)
HGB BLD-MCNC: 9.1 G/DL — LOW (ref 11.5–15.5)
INR BLD: 0.96 — SIGNIFICANT CHANGE UP (ref 0.88–1.16)
LYMPHOCYTES # BLD AUTO: 11.9 % — LOW (ref 13–44)
MCHC RBC-ENTMCNC: 33 PG — SIGNIFICANT CHANGE UP (ref 27–34)
MCHC RBC-ENTMCNC: 33.2 G/DL — SIGNIFICANT CHANGE UP (ref 32–36)
MCV RBC AUTO: 99.3 FL — SIGNIFICANT CHANGE UP (ref 80–100)
MONOCYTES NFR BLD AUTO: 5.9 % — SIGNIFICANT CHANGE UP (ref 2–14)
NEUTROPHILS NFR BLD AUTO: 81 % — HIGH (ref 43–77)
NT-PROBNP SERPL-SCNC: HIGH PG/ML (ref 0–300)
PLATELET # BLD AUTO: 331 K/UL — SIGNIFICANT CHANGE UP (ref 150–400)
POTASSIUM SERPL-MCNC: 6.1 MMOL/L — HIGH (ref 3.5–5.3)
POTASSIUM SERPL-SCNC: 6.1 MMOL/L — HIGH (ref 3.5–5.3)
PROT SERPL-MCNC: 7.7 G/DL — SIGNIFICANT CHANGE UP (ref 6–8.3)
PROTHROM AB SERPL-ACNC: 10.7 SEC — SIGNIFICANT CHANGE UP (ref 9.8–12.7)
RBC # BLD: 2.76 M/UL — LOW (ref 3.8–5.2)
RBC # FLD: 16.7 % — SIGNIFICANT CHANGE UP (ref 10.3–16.9)
SODIUM SERPL-SCNC: 130 MMOL/L — LOW (ref 135–145)
WBC # BLD: 7.9 K/UL — SIGNIFICANT CHANGE UP (ref 3.8–10.5)
WBC # FLD AUTO: 7.9 K/UL — SIGNIFICANT CHANGE UP (ref 3.8–10.5)

## 2017-09-11 PROCEDURE — 99285 EMERGENCY DEPT VISIT HI MDM: CPT | Mod: 25

## 2017-09-11 PROCEDURE — 99223 1ST HOSP IP/OBS HIGH 75: CPT

## 2017-09-11 PROCEDURE — 93010 ELECTROCARDIOGRAM REPORT: CPT

## 2017-09-11 PROCEDURE — 99222 1ST HOSP IP/OBS MODERATE 55: CPT | Mod: AI

## 2017-09-11 PROCEDURE — 71020: CPT | Mod: 26

## 2017-09-11 RX ORDER — HEPARIN SODIUM 5000 [USP'U]/ML
5000 INJECTION INTRAVENOUS; SUBCUTANEOUS EVERY 8 HOURS
Qty: 0 | Refills: 0 | Status: DISCONTINUED | OUTPATIENT
Start: 2017-09-11 | End: 2017-09-12

## 2017-09-11 RX ORDER — SEVELAMER CARBONATE 2400 MG/1
800 POWDER, FOR SUSPENSION ORAL
Qty: 0 | Refills: 0 | Status: DISCONTINUED | OUTPATIENT
Start: 2017-09-11 | End: 2017-09-12

## 2017-09-11 RX ORDER — SIMVASTATIN 20 MG/1
20 TABLET, FILM COATED ORAL AT BEDTIME
Qty: 0 | Refills: 0 | Status: DISCONTINUED | OUTPATIENT
Start: 2017-09-11 | End: 2017-09-12

## 2017-09-11 RX ORDER — FUROSEMIDE 40 MG
160 TABLET ORAL DAILY
Qty: 0 | Refills: 0 | Status: DISCONTINUED | OUTPATIENT
Start: 2017-09-12 | End: 2017-09-12

## 2017-09-11 RX ORDER — FUROSEMIDE 40 MG
160 TABLET ORAL DAILY
Qty: 0 | Refills: 0 | Status: DISCONTINUED | OUTPATIENT
Start: 2017-09-11 | End: 2017-09-11

## 2017-09-11 RX ORDER — ASPIRIN/CALCIUM CARB/MAGNESIUM 324 MG
81 TABLET ORAL DAILY
Qty: 0 | Refills: 0 | Status: DISCONTINUED | OUTPATIENT
Start: 2017-09-11 | End: 2017-09-12

## 2017-09-11 RX ORDER — NIFEDIPINE 30 MG
90 TABLET, EXTENDED RELEASE 24 HR ORAL
Qty: 0 | Refills: 0 | Status: DISCONTINUED | OUTPATIENT
Start: 2017-09-11 | End: 2017-09-12

## 2017-09-11 RX ORDER — CARVEDILOL PHOSPHATE 80 MG/1
25 CAPSULE, EXTENDED RELEASE ORAL EVERY 12 HOURS
Qty: 0 | Refills: 0 | Status: DISCONTINUED | OUTPATIENT
Start: 2017-09-11 | End: 2017-09-12

## 2017-09-11 RX ORDER — CINACALCET 30 MG/1
60 TABLET, FILM COATED ORAL DAILY
Qty: 0 | Refills: 0 | Status: DISCONTINUED | OUTPATIENT
Start: 2017-09-11 | End: 2017-09-12

## 2017-09-11 RX ORDER — IPRATROPIUM/ALBUTEROL SULFATE 18-103MCG
3 AEROSOL WITH ADAPTER (GRAM) INHALATION EVERY 6 HOURS
Qty: 0 | Refills: 0 | Status: DISCONTINUED | OUTPATIENT
Start: 2017-09-11 | End: 2017-09-12

## 2017-09-11 RX ADMIN — Medication 20 MILLIGRAM(S): at 23:19

## 2017-09-11 RX ADMIN — SIMVASTATIN 20 MILLIGRAM(S): 20 TABLET, FILM COATED ORAL at 23:20

## 2017-09-11 RX ADMIN — CARVEDILOL PHOSPHATE 25 MILLIGRAM(S): 80 CAPSULE, EXTENDED RELEASE ORAL at 23:18

## 2017-09-11 NOTE — H&P ADULT - ATTENDING COMMENTS
Patient examined, fellow's hx and PE reviewed and confirmed. I find hyperkalemia, fluid overload. A/P reviewed and confirmed. HD today, UF on HD. See full note Pt seen and examined by myself, VS reviewed- BP better, exam as above, labs reviewed.  Agree with assessment and plan as above.  3L off today and will get HD again tomorrow.  Pt admits to drinking too much fluids.  Will d/w renal team re: measures to prevent readmission for fluid overload so frequently.

## 2017-09-11 NOTE — ED PROVIDER NOTE - CARE PLAN
Principal Discharge DX:	Hyperkalemia  Secondary Diagnosis:	CKD (chronic kidney disease)  Secondary Diagnosis:	Shortness of breath

## 2017-09-11 NOTE — H&P ADULT - ASSESSMENT
A/p: Pt is a 66 yr old AAF PMH dCHF last ef august 60-65%, ESRD (T/Th/Sa HD), HTN, anemia, recurrent pleural effusions s/p insertion/removal of pleurX catheter and CAD , frequent admissions for sob and HD, presents with SOB.       #SOB - Patient with new onset SOB that is worse with laying down and even present when seated. On exam she is not tachpenic and speaking full sentences while on supplemental o2.       ed with exertion and with lying flat; Patient mildly tachypneic on RA which is improved with 2 liter of supplemental O2; etiology likely multifactorial and may include a combination of a persistent left pleural effusion vs. fluid overload from ESRD however CXR not much changed from prior; patient with severe pulmonary HTN  with echo a week ago showing a pulmonary systolic pressure of 60 mm hg likely WHO class 2 from left sided diastolic heart failure; ACS unlikely despite troponin elevation with no ECG changes noted from prior and no report of angina; valvular disease mild to moderate on most recent ECHO;  PE unlikely as patient with wells score of 0; hgb currently at baseline   -- Pulm consulted in the ED; follow reccs  -- Nephrology consulted in ED; will dialyze tonight   --c/w lasix 80 mg BID  --O2 supplementation.      Problem/Plan - 2:  ·  Problem: Pleural effusion.  Plan: Patient s/p thoracentesis x3. Last one on 7/28/17 and 2L fluid removed. PleuralX cath with pleurodesis performed on august 1st and catheter removed on 8/17  -- Pleural fluid studies from 1st and 3rd tap were transudative but from the second tap were exudative as they met modified Light's criteria (Cholesterol >45)  --f/u pulm reccs.      Problem/Plan - 3:  ·  Problem: CKD (chronic kidney disease).  Plan: Patient receives HD T,TH,SAT;   -- f/u nephro reccs  -- continue with cincalet 60 mg QD  -- patient takes velphro at home 2500 mg 2 tabs TID (not carried by pharmacy); will dispense sevelamer 400 mg TID with meals.      Problem/Plan - 4:  ·  Problem: CHF, left ventricular.  Plan: Echo on 8/23 showed left ventricular ejection fraction is estimated to be 60-65%The left atrium is severely dilated. Pulmonary artery systolic pressure is estimated to  be 60 mmHg. Patient seems euvolemic on exam with no lower extremity edema however JVD noted up to mandible; BNP less than prior admission; Paitent with diastolic heart failure with NYHA class of 2  -- continue with lasix 80 mg BID  -- c/w carvedilol 25 mg BID  -- c/w enalapril 20 mg BID.      Problem/Plan - 5:  ·  Problem: CAD (coronary artery disease).  Plan: patient asymptomatic not complaining of CP with no new ST changes seen on ECG  --c/w ASA 81 mg  -- c/w Simvastatin 20 mg QD.      Problem/Plan - 6:  Problem: Anemia. Plan: patient with anemia of chronic kidney dz on last admission with hgb currentyl at baseline  -- consider epogen with HD  -- continue to monitor.     Problem/Plan - 7:  ·  Problem: HLD (hyperlipidemia).  Plan: -- c/w Simvastatin 20 mg QD.      Problem/Plan - 8:  ·  Problem: Hypertension.  Plan: BP: (168/81 - 183/94) with no signs of new end organ damage reported by patient or noted on labs  -- c/w enalapril 20 mg BID  -- c/w nifedipine 90 mg ER BID ( changed on last admission)  -- c/w carvedilol.      Problem/Plan - 9:  ·  Problem: Prophylactic measure.  Plan: FENA: DASH diet with water restriction; replete electrolytes PRN; no fluids needed    Prophylaxis: Heparin SUBQ    DISPO: Admit to [facility]. A/p: Pt is a 66 yr old AAF PMH dCHF last ef august 60-65%, ESRD (T/Th/Sa HD), HTN, anemia, recurrent pleural effusions s/p insertion/removal of pleurX catheter and CAD , frequent admissions for sob and HD, presents with SOB.       #SOB - Patient with new onset SOB that is worse with laying down and even present when seated. On exam she is not tachpenic and speaking full sentences while on supplemental o2. SPO2 100%. Etiology of SOB/PND/Orthopnea may include persistent left effusion (CXR not much changed since prior) vs fluid overload (pt had HD sat with 3 liters removed) vs chf exacerbation (less likely as no jvd or LE edema; BNP elevated chronically likely from CKD).   Pt likely does not have ACS despite trop 0.02 as no cp and ekg WNL.     - pt seen by renal in ED, will go for HD  - would call pulm ([facility])  - c/w Lasix 160 mg daily and f/u renal recs  - supplemental o2 to goal spo2 > 93%     #hyperkalemia - likely from CKD. EKG WNL. 6.1 on admission.   - recheck post HD      # Pleural effusion - unchanged as per CXR. s/p Pleurx and removal in Aug  - f/u pulm recs    # CKD stage V on HD : receives HD T,TH,SAT; last HD Sat s/p 3 liters removed  - f/u nephro recs  - continue with cincalet 60 mg QD  - sevelamer 400 mg TID with meals.     #asthma - unlikely exacerbation, spo2 wnl and no wheezing.   - duoneb prn     # chronic diastolic CHF: unlikely acute exacerbation. Echo 8/23 showed EF 60-65%. Patient seems euvolemic on exam with no JVD or LE edema. crackles on exam could be 2/2 effusion vs general fluid overload from lack of HD. BNP is chronically elevated likely from CKD.   - c/w Lasix 160 mg QD  -- c/w carvedilol 25 mg BID  -- c/w enalapril 20 mg BID.     #hyponatremia - 130 on admission. pt euvolemic.   - urine lytes/osm/serum osm  - renal recs    #Hypertension: uncontrolled. BP 190s on admission. pt states she is compliant with meds but always has very high BPs" . currently BPs 160s.     - pt to be dialyzed  - c/w enalapril 20 mg BID  - c/w nifedipine 90 mg ER BID   - c/w carvedilol.     # CAD: patient w/o symptoms - no CP or EKG changes  - c/w ASA 81 mg  - c/w Simvastatin 20 mg QD.       # Anemia: Hb > 9. stable. likely ACD. VSS.   - continue to monitor.    # HLD :  c/w Simvastatin 20 mg QD       FENGI: DASH/renal diet    HSQ    RMF.     d/w Dr. Hewitt A/p: Pt is a 66 yr old AAF PMH dCHF last ef august 60-65%, ESRD (T/Th/Sa HD), HTN, anemia, recurrent pleural effusions s/p insertion/removal of pleurX catheter, asthma,    and CAD , frequent admissions for sob and HD, presents with SOB.       #SOB - Patient with new onset SOB that is worse with laying down and even present when seated. On exam she is not tachpenic and speaking full sentences while on supplemental o2. SPO2 100%. Etiology of SOB/PND/Orthopnea may include persistent left effusion (CXR not much changed since prior) vs fluid overload (pt had HD sat with 3 liters removed) vs chf exacerbation (less likely as no jvd or LE edema; BNP elevated chronically likely from CKD).   Pt likely does not have ACS as no cp and ekg WNL.     - pt seen by renal in ED, will go for HD  - would call pulm ([facility])  - c/w Lasix 160 mg daily and f/u renal recs  - supplemental o2 to goal spo2 > 93%     #hyperkalemia - likely from CKD. EKG WNL. 6.1 on admission.   - recheck post HD      # Pleural effusion - unchanged as per CXR. s/p Pleurx and removal in Aug  - f/u pulm recs    # CKD stage V on HD : receives HD T,TH,SAT; last HD Sat s/p 3 liters removed  - f/u nephro recs  - continue with cincalet 60 mg QD  - sevelamer 400 mg TID with meals.     #asthma - unlikely exacerbation, spo2 wnl and no wheezing.   - duoneb prn     # chronic diastolic CHF: unlikely acute exacerbation. Echo 8/23 showed EF 60-65%. Patient seems euvolemic on exam with no JVD or LE edema. crackles on exam could be 2/2 effusion vs general fluid overload from lack of HD. BNP is chronically elevated likely from CKD.   - c/w Lasix 160 mg QD  -- c/w carvedilol 25 mg BID  -- c/w enalapril 20 mg BID.     #hyponatremia - 130 on admission. pt euvolemic.   - urine lytes/osm/serum osm  - renal recs    #Hypertension: uncontrolled. BP 190s on admission. pt states she is compliant with meds but always has very high BPs" . currently BPs 160s.     - pt to be dialyzed  - c/w enalapril 20 mg BID  - c/w nifedipine 90 mg ER BID   - c/w carvedilol.     # CAD: patient w/o symptoms - no CP or EKG changes  - c/w ASA 81 mg  - c/w Simvastatin 20 mg QD.       # Anemia: Hb > 9. stable. likely ACD. VSS.   - continue to monitor.    # HLD :  c/w Simvastatin 20 mg QD       FENGI: DASH/renal diet    HSQ    RMF.     d/w Dr. Hewitt A/p: Pt is a 66 yr old AAF PMH dCHF last ef august 60-65%, ESRD (T/Th/Sa HD), HTN, anemia, recurrent pleural effusions s/p insertion/removal of pleurX catheter, asthma,    and CAD , frequent admissions for sob and HD, presents with SOB.       #SOB - Patient with new onset SOB that is worse with laying down and even present when seated. On exam she is not tachpenic and speaking full sentences while on supplemental o2. SPO2 100%. Etiology of SOB/PND/Orthopnea may include persistent left effusion (CXR not much changed since prior) vs fluid overload (pt had HD sat with 3 liters removed) vs chf exacerbation (less likely as no jvd or LE edema; BNP elevated chronically likely from CKD).   Pt likely does not have ACS as no cp and ekg WNL.     - pt seen by renal in ED, will go for HD  - perhaps pt could benefit from more aggressive HD as pt comes in with subjective sob frequently vs an opioid to reduce respiratory drive (pt never hypoxic)  - would call pulm ([facility])  - c/w Lasix 160 mg daily (starting tmrw)  and f/u renal recs  - supplemental o2 as needed to goal spo2 > 93%     #hyperkalemia - likely from CKD. EKG WNL. 6.1 on admission.   - recheck post HD      # Pleural effusion - unchanged as per CXR. s/p Pleurx and removal in Aug. Studies show transudative (likely from ckd/chf).   - f/u pulm recs    # CKD stage V on HD : receives HD T,TH,SAT; last HD Sat s/p 3 liters removed  - f/u nephro recs  - continue with cincalet 60 mg QD  - sevelamer 400 mg TID with meals.     #asthma - unlikely exacerbation, spo2 wnl and no wheezing.   - duoneb prn     # chronic diastolic CHF: unlikely acute exacerbation. Echo 8/23 showed EF 60-65%. Patient seems euvolemic on exam with no JVD or LE edema. crackles on exam could be 2/2 effusion vs general fluid overload from lack of HD. BNP is chronically elevated likely from CKD.   - c/w Lasix 160 mg QD  -- c/w carvedilol 25 mg BID  -- c/w enalapril 20 mg BID.     #hyponatremia - 130 on admission. pt euvolemic.   - urine lytes/osm/serum osm  - renal recs  - would hold Metolazone    #Hypertension: uncontrolled. BP 190s on admission. pt states "she is compliant with meds but always has very high BPs" . currently BPs 160s.     - pt to be dialyzed  - c/w enalapril 20 mg BID  - c/w nifedipine 90 mg ER BID   - c/w carvedilol.     # CAD: patient w/o symptoms - no CP or EKG changes  - c/w ASA 81 mg  - c/w Simvastatin 20 mg QD.       # Anemia: Hb > 9. stable. likely ACD. VSS.   - continue to monitor.    # HLD :  c/w Simvastatin 20 mg QD       VIKI: DASH/renal diet    HSQ    RMF.     d/w Dr. Hewitt

## 2017-09-11 NOTE — CONSULT NOTE ADULT - PROBLEM SELECTOR RECOMMENDATION 3
- uncontrolled   - restart her medications   - carvedilol 25 mg twice a day, enalapril 20 mg, furosemide 160 mg daily, nifedipine 90 mg daily metalozone 10 mg daily   - we will do UF also today.

## 2017-09-11 NOTE — CONSULT NOTE ADULT - ASSESSMENT
This is 66 year old female with PMH stated above. Renal service is activated for the need of HD. She will be dialyzed today for pulmonary congestion

## 2017-09-11 NOTE — ED ADULT NURSE NOTE - OBJECTIVE STATEMENT
Pt with PMH of CKD, on HD Tue/Thur/Satur , HTN, CHF, MI, CVA presents to ED c/o SOB.  States " I have fluids in my lungs". No s/s of acute resp distress at this time, pt speaking in full sentences and RR are unlabored , she was placed on telemetry, NSR noted, EKG obtained in triage. Pt has AVF to her L forearm, + thrill and bruit, last dialyzed on Saturday. Pt also noted to be hypertensive, HOMER Zuñiga aware, pt admits to taking her meds.

## 2017-09-11 NOTE — H&P ADULT - NSHPREVIEWOFSYSTEMS_GEN_ALL_CORE
REVIEW OF SYSTEMS:    CONSTITUTIONAL: No weakness, fevers or chills  EYES/ENT: No visual changes;  No vertigo or throat pain   NECK: No pain or stiffness  RESPIRATORY: No cough, wheezing, hemoptysis; No shortness of breath  CARDIOVASCULAR: No chest pain or palpitations  GASTROINTESTINAL: No abdominal or epigastric pain. No nausea, vomiting, or hematemesis; No diarrhea or constipation. No melena or hematochezia.  GENITOURINARY: No dysuria, frequency or hematuria  NEUROLOGICAL: No numbness or weakness  SKIN: No itching, burning, rashes, or lesions   All other review of systems is negative unless indicated above. REVIEW OF SYSTEMS:    CONSTITUTIONAL: No weakness, fevers or chills  EYES/ENT: No visual changes;  No vertigo or throat pain   NECK: No pain or stiffness  RESPIRATORY: No cough, wheezing, hemoptysis; + sob, pnd, orthopnea.   CARDIOVASCULAR: No chest pain or palpitations  GASTROINTESTINAL: No abdominal or epigastric pain. No nausea, vomiting, or hematemesis; No diarrhea or constipation. No melena or hematochezia.  GENITOURINARY: No dysuria, frequency or hematuria  NEUROLOGICAL: No numbness or weakness  SKIN: No itching, burning, rashes, or lesions   All other review of systems is negative unless indicated above.

## 2017-09-11 NOTE — H&P ADULT - NSHPPHYSICALEXAM_GEN_ALL_CORE
.  VITAL SIGNS:  T(C): 37.3 (09-11-17 @ 14:37), Max: 37.3 (09-11-17 @ 14:37)  T(F): 99.1 (09-11-17 @ 14:37), Max: 99.1 (09-11-17 @ 14:37)  HR: 83 (09-11-17 @ 14:37) (70 - 83)  BP: 169/72 (09-11-17 @ 14:37) (169/72 - 193/91)  BP(mean): --  RR: 18 (09-11-17 @ 14:37) (17 - 18)  SpO2: 97% (09-11-17 @ 14:37) (97% - 98%)  Wt(kg): --    PHYSICAL EXAM:    Constitutional: WDWN resting comfortably in bed; NAD  Head: NC/AT  Eyes: PERRL, EOMI, anicteric sclera  ENT: no nasal discharge; uvula midline, no oropharyngeal erythema or exudates; MMM  Neck: supple; no JVD or thyromegaly  Respiratory: CTA B/L; no W/R/R, no retractions  Cardiac: +S1/S2; RRR; no M/R/G; PMI non-displaced  Gastrointestinal: soft, NT/ND; no rebound or guarding; +BSx4  Genitourinary: normal external genitalia  Back: spine midline, no bony tenderness or step-offs; no CVAT B/L  Extremities: WWP, no clubbing or cyanosis; no peripheral edema  Musculoskeletal: NROM x4; no joint swelling, tenderness or erythema  Vascular: 2+ radial, femoral, DP/PT pulses B/L  Dermatologic: skin warm, dry and intact; no rashes, wounds, or scars  Lymphatic: no submandibular or cervical LAD  Neurologic: AAOx3; CNII-XII grossly intact; no focal deficits  Psychiatric: affect and characteristics of appearance, verbalizations, behaviors are appropriate VITAL SIGNS:  T(C): 37.3 (09-11-17 @ 14:37), Max: 37.3 (09-11-17 @ 14:37)  T(F): 99.1 (09-11-17 @ 14:37), Max: 99.1 (09-11-17 @ 14:37)  HR: 83 (09-11-17 @ 14:37) (70 - 83)  BP: 169/72 (09-11-17 @ 14:37) (169/72 - 193/91)  BP(mean): --  RR: 18 (09-11-17 @ 14:37) (17 - 18)  SpO2: 97% (09-11-17 @ 14:37) (97% - 98%)  Wt(kg): --    PHYSICAL EXAM:    Constitutional: WDWN resting comfortably in bed; NAD  Head: NC/AT  Eyes: PERRL, EOMI, anicteric sclera  ENT: no nasal discharge; uvula midline, no oropharyngeal erythema or exudates; MMM  Neck: supple; no JVD or thyromegaly  Respiratory: scant crackles b/l bases  Cardiac: +S1/S2; RRR; no M/R/G; PMI non-displaced  Gastrointestinal: soft, NT/ND; no rebound or guarding; +BSx4  Genitourinary: normal external genitalia  Back: spine midline, no bony tenderness or step-offs; no CVAT B/L  Extremities: WWP, no clubbing or cyanosis; no peripheral edema. + AV fistula LUE   Musculoskeletal: NROM x4; no joint swelling, tenderness or erythema  Vascular: 2+ radial, femoral, DP/PT pulses B/L  Dermatologic: skin warm, dry and intact; no rashes, wounds, or scars  Lymphatic: no submandibular or cervical LAD  Neurologic: AAOx3; CNII-XII grossly intact; no focal deficits  Psychiatric: affect and characteristics of appearance, verbalizations, behaviors are appropriate

## 2017-09-11 NOTE — ED PROVIDER NOTE - PHYSICAL EXAMINATION
CONSTITUTIONAL: Well-appearing; well-nourished; in no apparent distress.   HEAD: Normocephalic; atraumatic.   EYES: PERRL; EOM intact; conjunctiva and sclera clear  ENT: normal nose; no rhinorrhea; normal pharynx with no erythema or lesions.   NECK: Supple; non-tender;   CARDIOVASCULAR: Normal S1, S2; no murmurs, rubs, or gallops. Regular rate and rhythm.   RESPIRATORY: Breathing easily; +bibasilar crackles, no wheezing   GI: Soft; non-distended; non-tender; no palpable organomegaly.   MSK: FROM at all extremities, normal tone   EXT: No cyanosis or edema; N/V intact  SKIN: Normal for age and race; warm; dry; good turgor; no apparent lesions or rash.   NEURO: A & O x 3; face symmetric; grossly unremarkable.   PSYCHOLOGICAL: The patient’s mood and manner are appropriate.

## 2017-09-11 NOTE — H&P ADULT - NSHPLABSRESULTS_GEN_ALL_CORE
.  LABS:                         9.1    7.9   )-----------( 331      ( 11 Sep 2017 12:51 )             27.4     09-11    130<L>  |  91<L>  |  62<H>  ----------------------------<  94  6.1<H>   |  25  |  6.00<H>    Ca    11.0<H>      11 Sep 2017 12:45    TPro  7.7  /  Alb  3.5  /  TBili  0.3  /  DBili  x   /  AST  18  /  ALT  9<L>  /  AlkPhos  172<H>  09-11    PT/INR - ( 11 Sep 2017 12:47 )   PT: 10.7 sec;   INR: 0.96          PTT - ( 11 Sep 2017 12:47 )  PTT:33.3 sec    Serum Pro-Brain Natriuretic Peptide: 54576 pg/mL (09-11 @ 12:45)        RADIOLOGY, EKG & ADDITIONAL TESTS: Reviewed. .  LABS:                         9.1    7.9   )-----------( 331      ( 11 Sep 2017 12:51 )             27.4     09-11    130<L>  |  91<L>  |  62<H>  ----------------------------<  94  6.1<H>   |  25  |  6.00<H>    Ca    11.0<H>      11 Sep 2017 12:45    TPro  7.7  /  Alb  3.5  /  TBili  0.3  /  DBili  x   /  AST  18  /  ALT  9<L>  /  AlkPhos  172<H>  09-11    PT/INR - ( 11 Sep 2017 12:47 )   PT: 10.7 sec;   INR: 0.96          PTT - ( 11 Sep 2017 12:47 )  PTT:33.3 sec    Serum Pro-Brain Natriuretic Peptide: 42834 pg/mL (09-11 @ 12:45)      RADIOLOGY, EKG & ADDITIONAL TESTS: Reviewed.

## 2017-09-11 NOTE — H&P ADULT - HISTORY OF PRESENT ILLNESS
CC: SOB, hyper kalemia    HPI: Pt is a 65 yo F PMH dCHF, ESRD (T/Th/Sa HD) with numerous admissions for HD 2/2 symptoms (last admission late Aug), HTN, pleural effusion s/p Pleurx with Dr Salinas, anemia and CAD,  who presented with worsening SOB from baseline CC: SOB, hyper kalemia    HPI: Pt is a 65 yo F PMH dCHF, ESRD (T/Th/Sa HD) with numerous admissions for HD 2/2 symptoms (last admission late Aug), HTN, asthma, pleural effusion s/p Pleurx with Dr Salinas, anemia and CAD,  who presented with worsening SOB from baseline. Pt states her SOB came on last night and gradually worsened. She could not sleep all night due to SOB. Normally she cannot sleep flat due to PND and orthoponea but last night she was SOB even sitting on the chair. She last had HD Sat and they removed 3 L IVF. She says her diet has been suspect recently. She admits mild soreness in the area of where her Pleuryx was. Otherwise, no fever chills cp n/v/d. No le edema. No recent sick contacts. States she is compliant with meds.     IN ED VS stable except HTN to 190s. Improved to 160s.     Renal c/s in ED and recommended urgent HD.

## 2017-09-11 NOTE — ED PROVIDER NOTE - ATTENDING CONTRIBUTION TO CARE
Agree with documentation by PA -- ESRD on diaylsis symptomatic SOB from fluid overload w/hyperkalemia, requiring admission for urgent HD. Nephro on board.

## 2017-09-11 NOTE — ED PROVIDER NOTE - MEDICAL DECISION MAKING DETAILS
65 yo F with pmh of HTN, HLD, CAD, ESRD on HD (T,Th,Sat) last dialyzed sat, asthma, L pleural effusion end of August s/p pleurx c/o sob since last night. CXR improved from prior. K 6.0. No EKG changes. 65 yo F with pmh of HTN, HLD, CAD, ESRD on HD (T,Th,Sat) last dialyzed sat, asthma, L pleural effusion end of August s/p pleurx c/o sob since last night. CXR improved from prior. K 6.0. No EKG changes. Admitting for hyperkalemia and symptomatic fluid overload, requiring urgent dialysis.

## 2017-09-11 NOTE — ED PROVIDER NOTE - OBJECTIVE STATEMENT
65 yo F with pmh of HTN, HLD, CAD, ESRD on HD (T,Th,Sat) last dialyzed sat, asthma, L pleural effusion end of August s/p pleurx c/o sob since last night. Pt reports PND where she has to sit up at night to catch her breath. Denies fever, chills, cough, cp, LE swelling. Pt  last admitted 8/28 for sob and dialysis.

## 2017-09-11 NOTE — ED PROVIDER NOTE - PROGRESS NOTE DETAILS
spoke with Dialysis resident, pt will go for dialysis in next 30 min. Will not given hyperkalemia meds

## 2017-09-11 NOTE — CONSULT NOTE ADULT - PROBLEM SELECTOR RECOMMENDATION 9
we will do HD today   - her usual scheduled is TTS   - dry weight 47 to 48 kg   - acceptable - 6.1   - bicarbonate - 25 acceptable   - she is on Cinacalcet 60 mg daily   - obn sevelamer 400 mg three times per day   - renal diet   - daily weights  - follow up in and outs.

## 2017-09-12 ENCOUNTER — TRANSCRIPTION ENCOUNTER (OUTPATIENT)
Age: 67
End: 2017-09-12

## 2017-09-12 VITALS
SYSTOLIC BLOOD PRESSURE: 171 MMHG | HEART RATE: 78 BPM | OXYGEN SATURATION: 100 % | TEMPERATURE: 99 F | DIASTOLIC BLOOD PRESSURE: 81 MMHG | RESPIRATION RATE: 18 BRPM

## 2017-09-12 DIAGNOSIS — D64.9 ANEMIA, UNSPECIFIED: ICD-10-CM

## 2017-09-12 DIAGNOSIS — R06.02 SHORTNESS OF BREATH: ICD-10-CM

## 2017-09-12 DIAGNOSIS — R63.8 OTHER SYMPTOMS AND SIGNS CONCERNING FOOD AND FLUID INTAKE: ICD-10-CM

## 2017-09-12 DIAGNOSIS — E87.8 OTHER DISORDERS OF ELECTROLYTE AND FLUID BALANCE, NOT ELSEWHERE CLASSIFIED: ICD-10-CM

## 2017-09-12 DIAGNOSIS — I25.10 ATHEROSCLEROTIC HEART DISEASE OF NATIVE CORONARY ARTERY WITHOUT ANGINA PECTORIS: ICD-10-CM

## 2017-09-12 DIAGNOSIS — Z29.9 ENCOUNTER FOR PROPHYLACTIC MEASURES, UNSPECIFIED: ICD-10-CM

## 2017-09-12 DIAGNOSIS — I50.30 UNSPECIFIED DIASTOLIC (CONGESTIVE) HEART FAILURE: ICD-10-CM

## 2017-09-12 DIAGNOSIS — J45.909 UNSPECIFIED ASTHMA, UNCOMPLICATED: ICD-10-CM

## 2017-09-12 LAB
ANION GAP SERPL CALC-SCNC: 11 MMOL/L — SIGNIFICANT CHANGE UP (ref 5–17)
ANION GAP SERPL CALC-SCNC: 13 MMOL/L — SIGNIFICANT CHANGE UP (ref 5–17)
BUN SERPL-MCNC: 25 MG/DL — HIGH (ref 7–23)
BUN SERPL-MCNC: 29 MG/DL — HIGH (ref 7–23)
CALCIUM SERPL-MCNC: 10.4 MG/DL — SIGNIFICANT CHANGE UP (ref 8.4–10.5)
CALCIUM SERPL-MCNC: 10.6 MG/DL — HIGH (ref 8.4–10.5)
CHLORIDE SERPL-SCNC: 92 MMOL/L — LOW (ref 96–108)
CHLORIDE SERPL-SCNC: 93 MMOL/L — LOW (ref 96–108)
CO2 SERPL-SCNC: 28 MMOL/L — SIGNIFICANT CHANGE UP (ref 22–31)
CO2 SERPL-SCNC: 30 MMOL/L — SIGNIFICANT CHANGE UP (ref 22–31)
CREAT SERPL-MCNC: 3.9 MG/DL — HIGH (ref 0.5–1.3)
CREAT SERPL-MCNC: 4.1 MG/DL — HIGH (ref 0.5–1.3)
GLUCOSE SERPL-MCNC: 122 MG/DL — HIGH (ref 70–99)
GLUCOSE SERPL-MCNC: 85 MG/DL — SIGNIFICANT CHANGE UP (ref 70–99)
HCT VFR BLD CALC: 26.5 % — LOW (ref 34.5–45)
HGB BLD-MCNC: 8.4 G/DL — LOW (ref 11.5–15.5)
MAGNESIUM SERPL-MCNC: 2.2 MG/DL — SIGNIFICANT CHANGE UP (ref 1.6–2.6)
MCHC RBC-ENTMCNC: 31.7 G/DL — LOW (ref 32–36)
MCHC RBC-ENTMCNC: 31.8 PG — SIGNIFICANT CHANGE UP (ref 27–34)
MCV RBC AUTO: 100.4 FL — HIGH (ref 80–100)
PHOSPHATE SERPL-MCNC: 4.4 MG/DL — SIGNIFICANT CHANGE UP (ref 2.5–4.5)
PLATELET # BLD AUTO: 284 K/UL — SIGNIFICANT CHANGE UP (ref 150–400)
POTASSIUM SERPL-MCNC: 4.4 MMOL/L — SIGNIFICANT CHANGE UP (ref 3.5–5.3)
POTASSIUM SERPL-MCNC: 4.4 MMOL/L — SIGNIFICANT CHANGE UP (ref 3.5–5.3)
POTASSIUM SERPL-SCNC: 4.4 MMOL/L — SIGNIFICANT CHANGE UP (ref 3.5–5.3)
POTASSIUM SERPL-SCNC: 4.4 MMOL/L — SIGNIFICANT CHANGE UP (ref 3.5–5.3)
RBC # BLD: 2.64 M/UL — LOW (ref 3.8–5.2)
RBC # FLD: 16.8 % — SIGNIFICANT CHANGE UP (ref 10.3–16.9)
SODIUM SERPL-SCNC: 133 MMOL/L — LOW (ref 135–145)
SODIUM SERPL-SCNC: 134 MMOL/L — LOW (ref 135–145)
WBC # BLD: 4.9 K/UL — SIGNIFICANT CHANGE UP (ref 3.8–10.5)
WBC # FLD AUTO: 4.9 K/UL — SIGNIFICANT CHANGE UP (ref 3.8–10.5)

## 2017-09-12 PROCEDURE — 94726 PLETHYSMOGRAPHY LUNG VOLUMES: CPT

## 2017-09-12 PROCEDURE — 85025 COMPLETE CBC W/AUTO DIFF WBC: CPT

## 2017-09-12 PROCEDURE — 83735 ASSAY OF MAGNESIUM: CPT

## 2017-09-12 PROCEDURE — 83880 ASSAY OF NATRIURETIC PEPTIDE: CPT

## 2017-09-12 PROCEDURE — 85610 PROTHROMBIN TIME: CPT

## 2017-09-12 PROCEDURE — 99285 EMERGENCY DEPT VISIT HI MDM: CPT | Mod: 25

## 2017-09-12 PROCEDURE — 87340 HEPATITIS B SURFACE AG IA: CPT

## 2017-09-12 PROCEDURE — 94060 EVALUATION OF WHEEZING: CPT

## 2017-09-12 PROCEDURE — 80048 BASIC METABOLIC PNL TOTAL CA: CPT

## 2017-09-12 PROCEDURE — 71046 X-RAY EXAM CHEST 2 VIEWS: CPT

## 2017-09-12 PROCEDURE — 85730 THROMBOPLASTIN TIME PARTIAL: CPT

## 2017-09-12 PROCEDURE — 93005 ELECTROCARDIOGRAM TRACING: CPT

## 2017-09-12 PROCEDURE — 99239 HOSP IP/OBS DSCHRG MGMT >30: CPT

## 2017-09-12 PROCEDURE — 80053 COMPREHEN METABOLIC PANEL: CPT

## 2017-09-12 PROCEDURE — 94760 N-INVAS EAR/PLS OXIMETRY 1: CPT

## 2017-09-12 PROCEDURE — 85027 COMPLETE CBC AUTOMATED: CPT

## 2017-09-12 PROCEDURE — 90935 HEMODIALYSIS ONE EVALUATION: CPT

## 2017-09-12 PROCEDURE — 84100 ASSAY OF PHOSPHORUS: CPT

## 2017-09-12 PROCEDURE — 36415 COLL VENOUS BLD VENIPUNCTURE: CPT

## 2017-09-12 PROCEDURE — 94729 DIFFUSING CAPACITY: CPT

## 2017-09-12 RX ORDER — SEVELAMER CARBONATE 2400 MG/1
400 POWDER, FOR SUSPENSION ORAL
Qty: 0 | Refills: 0 | Status: DISCONTINUED | OUTPATIENT
Start: 2017-09-12 | End: 2017-09-12

## 2017-09-12 RX ORDER — SEVELAMER CARBONATE 2400 MG/1
1 POWDER, FOR SUSPENSION ORAL
Qty: 0 | Refills: 0 | COMMUNITY
Start: 2017-09-12

## 2017-09-12 RX ADMIN — Medication 20 MILLIGRAM(S): at 17:40

## 2017-09-12 RX ADMIN — Medication 90 MILLIGRAM(S): at 17:41

## 2017-09-12 RX ADMIN — Medication 81 MILLIGRAM(S): at 15:03

## 2017-09-12 RX ADMIN — Medication 160 MILLIGRAM(S): at 06:22

## 2017-09-12 RX ADMIN — CARVEDILOL PHOSPHATE 25 MILLIGRAM(S): 80 CAPSULE, EXTENDED RELEASE ORAL at 17:40

## 2017-09-12 RX ADMIN — Medication 90 MILLIGRAM(S): at 01:30

## 2017-09-12 NOTE — PROGRESS NOTE ADULT - ATTENDING COMMENTS
Patient examined, fellow's hx and PE reviewed and confirmed. I find stable on dialysis.  A/P reviewed and confirmed. Continue HD. UF on HD. See full note

## 2017-09-12 NOTE — PROGRESS NOTE ADULT - ASSESSMENT
This is 66 year old female with PMH stated above. Admitted to the Medicine for fluid overload. She was dialyzed yesterday with good tolerance. We will do HD today - her regular session - we will try to lower her dry weight to 46.5 kg

## 2017-09-12 NOTE — DISCHARGE NOTE ADULT - MEDICATION SUMMARY - MEDICATIONS TO TAKE
I will START or STAY ON the medications listed below when I get home from the hospital:    aspirin 81 mg oral delayed release tablet  -- 1 tab(s) by mouth once a day  -- Indication: For Prophylactic measure    enalapril 20 mg oral tablet  -- 1 tab(s) by mouth 2 times a day  -- Indication: For Hypertension    simvastatin 20 mg oral tablet  -- 1 tab(s) by mouth once a day (at bedtime)  -- Indication: For Hyperlipidemia    carvedilol 25 mg oral tablet  -- 1 tab(s) by mouth every 12 hours  -- Indication: For Chf    Ventolin HFA 90 mcg/inh inhalation aerosol  -- 2 puff(s) inhaled every 6 hours - for asthma  -- For inhalation only.  It is very important that you take or use this exactly as directed.  Do not skip doses or discontinue unless directed by your doctor.  Obtain medical advice before taking any non-prescription drugs as some may affect the action of this medication.  Shake well before use.    -- Indication: For Asthma    cinacalcet 60 mg oral tablet  -- 1 tab(s) by mouth once a day  -- Indication: For CKD    NIFEdipine 90 mg oral tablet, extended release  -- 1 tab(s) by mouth 2 times a day  -- Indication: For Hypertension    metOLazone 10 mg oral tablet  -- 1 tab(s) by mouth once a day 30 minutes before PM torsemide  -- Indication: For Chf    furosemide 80 mg oral tablet  -- 2 tab(s) by mouth once a day (in the morning)  -- Indication: For Chf    Velphoro 2500 mg (500 mg elemental iron) oral tablet, chewable  -- 2 tab(s) by mouth 3 times a day (with meals)  -- Indication: For CKD    sevelamer hydrochloride 400 mg oral tablet  -- 1 tab(s) by mouth 3 times a day (with meals)  -- Indication: For CKD

## 2017-09-12 NOTE — PROGRESS NOTE ADULT - PROBLEM SELECTOR PLAN 1
- last HD done on 9/11 for fluid overload   - we will do an extra session of HD today - we will try to lower her dry weight to 46.5 kg   - her usual scheduled is TTS   - acceptable - potassium 4,4   - bicarbonate - 30 acceptable   - she is on Cinacalcet 60 mg daily   - on sevelamer 400 mg three times per day - the order is for 800 mg three times for day (please consdier 400 mg three times a day)  - renal diet   - daily weights  - follow up in and outs.  - phosphate - 4.4

## 2017-09-12 NOTE — PROGRESS NOTE ADULT - PROBLEM SELECTOR PLAN 1
Patient with new onset SOB that is worse with laying down and even present when seated. On exam she is not tachpenic and speaking full sentences while on supplemental o2. SPO2 100%. Etiology of SOB/PND/Orthopnea may include persistent left effusion (CXR not much changed since prior) vs fluid overload (pt had HD sat with 3 liters removed) vs chf exacerbation (less likely as no jvd or LE edema; BNP elevated chronically likely from CKD).   Pt likely does not have ACS as no cp and ekg WNL.  - pt seen by renal in ED, will go for HD  - perhaps pt could benefit from more aggressive HD as pt comes in with subjective sob frequently vs an opioid to reduce respiratory drive (pt never hypoxic)  - would call pulm (Rita)  - c/w Lasix 160 mg daily (starting tmrw)  and f/u renal recs  - supplemental o2 as needed to goal spo2 > 93%    # Pleural effusion - unchanged as per CXR. s/p Pleurx and removal in Aug. Studies show transudative (likely from ckd/chf).   - f/u pulm recs Patient with new onset SOB that is worse with laying down and even present when seated. On exam she is not tachpenic and speaking full sentences while on supplemental o2. SPO2 100%. Etiology of SOB/PND/Orthopnea may include persistent left effusion (CXR not much changed since prior) vs fluid overload (pt had HD sat with 3 liters removed) vs chf exacerbation (less likely as no jvd or LE edema; BNP elevated chronically likely from CKD).   Pt likely does not have ACS as no cp and ekg WNL.  - pt seen by renal in ED, went for emergent HD, patient has improved SOB post HD and is back to her baseline.  - perhaps pt could benefit from more aggressive HD as pt comes in with subjective sob frequently vs an opioid to reduce respiratory drive (pt never hypoxic)  - would call pulm (Rita)  - c/w Lasix 160 mg daily (starting tmrw)  and f/u renal recs  - supplemental o2 as needed to goal spo2 > 93%    # Pleural effusion - unchanged as per CXR. s/p Pleurx and removal in Aug. Studies show transudative (likely from ckd/chf).   - stable

## 2017-09-12 NOTE — DISCHARGE NOTE ADULT - PATIENT PORTAL LINK FT
“You can access the FollowHealth Patient Portal, offered by [facility], by registering with the following website: [email or web address]”

## 2017-09-12 NOTE — PROGRESS NOTE ADULT - PROBLEM SELECTOR PLAN 2
#hyperkalemia - likely from CKD. EKG WNL. 6.1 on admission.   - recheck post HD    #hyponatremia - 130 on admission. pt euvolemic.   - urine lytes/osm/serum osm  - renal recs  - would hold Metolazone #hyperkalemia - likely from CKD. EKG WNL. 6.1 on admission.   - recheck post HD 4.4 wnl    #hyponatremia - 130 on admission. pt euvolemic.   - urine lytes/osm/serum osm  - renal recs  - would hold Metolazone

## 2017-09-12 NOTE — DISCHARGE NOTE ADULT - PLAN OF CARE
resolution You received emergent hemodialysis the night you came to the hospital and your shortness of breath improved back to baseline. You should follow up with your PMD within a few days and continue to get dialysis as an outpatient. follow-up continue to take your home medications. You should follow up with your PMD within a few days and continue to get dialysis as an outpatient. you were found to have high levels of potassium when you got to the ER. Your potassium levels were within normal limits after HD. continue to take your home medications. You should follow up with your PMD within a few days

## 2017-09-12 NOTE — DIETITIAN INITIAL EVALUATION ADULT. - ENERGY NEEDS
Height 5'2 , weight  115# ,# , BMI  21 , 105% IBW  fluid - 500cc plus urine output , current weight used for calculations

## 2017-09-12 NOTE — PROGRESS NOTE ADULT - PROBLEM SELECTOR PLAN 3
receives HD T,TH,SAT; last HD Sat s/p 3 liters removed  - f/u nephro recs  - continue with cincalet 60 mg QD  - sevelamer 400 mg TID with meals. receives HD T,TH,SAT; last HD Sat s/p 3 liters removed  - f/u nephro recs  - continue with cincalet 60 mg QD  - sevelamer 400 mg TID with meals.  -s/p emergent HD session monday 9/11 night

## 2017-09-12 NOTE — PROGRESS NOTE ADULT - PROBLEM SELECTOR PLAN 8
Hb > 9. stable. likely ACD. VSS.   - continue to monitor. Hb 8.4. stable. likely ACD. VSS.   - continue to monitor.

## 2017-09-12 NOTE — PROGRESS NOTE ADULT - SUBJECTIVE AND OBJECTIVE BOX
Patient was seen and evaluated on dialysis.   Patient is tolerating the procedure well.   HR: 72 (09-12-17 @ 12:50)  BP: 192/93 (09-12-17 @ 12:50) pusle 65, /67  Continue dialysis:   Dialyzer:  180        QB: 400       QD: 500   Goal UF 2.8 over 3 Hours       Tolerated well HD
INTERVAL HPI/OVERNIGHT EVENTS: The patient was dialyzed overnight. Her SOB has no improved and she has no acute symptoms.                                                                                                                                                                                                                                                                                                                                                                                                                                                                                                                                                                     VITAL SIGNS:  T(F): 98.2 (09-12-17 @ 04:41)  HR: 68 (09-12-17 @ 04:41)  BP: 169/73 (09-12-17 @ 04:41)  RR: 17 (09-12-17 @ 04:41)  SpO2: 99% (09-12-17 @ 04:41)  Wt(kg): --    PHYSICAL EXAM:    Constitutional: Well developed, well nourished  General: laying comfortably  Eyes: PERRL  ENMT: moist mucous membranes, no mucosal pallor, clear throat, uvula midline  Neck: supple  Respiratory: CTABL, no rales, no crackles, no wheezing  Cardiovascular: +S1, +S2, no murmurs, rubs or gallops, regular rate and rhythm  Gastrointestinal: abdomen soft, non distended, non tender, +BS, abdominal wall hernia  Extremities: no edema, no calf pain to palpation, R forearm AV fistula  Vascular: cap refill <3s in all extremities, radial and DP pulses 2+  Neurological: AAO x3  Skin: no rashes    MEDICATIONS  (STANDING):  aspirin enteric coated 81 milliGRAM(s) Oral daily  enalapril 20 milliGRAM(s) Oral two times a day  simvastatin 20 milliGRAM(s) Oral at bedtime  carvedilol 25 milliGRAM(s) Oral every 12 hours  NIFEdipine XL 90 milliGRAM(s) Oral two times a day  cinacalcet 60 milliGRAM(s) Oral daily  sevelamer hydrochloride 800 milliGRAM(s) Oral three times a day with meals  heparin  Injectable 5000 Unit(s) SubCutaneous every 8 hours  furosemide    Tablet 160 milliGRAM(s) Oral daily    MEDICATIONS  (PRN):  ALBUTerol/ipratropium for Nebulization 3 milliLiter(s) Nebulizer every 6 hours PRN Shortness of Breath and/or Wheezing      Allergies    No Known Allergies    Intolerances        LABS:                        9.1    7.9   )-----------( 331      ( 11 Sep 2017 12:51 )             27.4     09-11    130<L>  |  91<L>  |  62<H>  ----------------------------<  94  6.1<H>   |  25  |  6.00<H>    Ca    11.0<H>      11 Sep 2017 12:45    TPro  7.7  /  Alb  3.5  /  TBili  0.3  /  DBili  x   /  AST  18  /  ALT  9<L>  /  AlkPhos  172<H>  09-11    PT/INR - ( 11 Sep 2017 12:47 )   PT: 10.7 sec;   INR: 0.96          PTT - ( 11 Sep 2017 12:47 )  PTT:33.3 sec      RADIOLOGY & ADDITIONAL TESTS: reviewed
Objective and Subjective   The patient was dialyzed yesterday for fluid over load - tolerated the session well. She is off oxygen now, breathing on room air. Mild shortness of breath, no chest pain     Patient is a 66y Female with a history of ESRD on HD T/Th/Sat via left AVF, heart failure with preserved ejection fraction, coronary artery disease, anemia, and hypertension.  She came in the ED because of progressive shortness of breath since yesterday, no fever, no chest pain. IN the ED found to have pulmonary congestion. The renal service is activated for the need of HD. She was dialyzed yesterday - 3 liters off well tolerated it.       Patient seen and examined at bedside.     aspirin enteric coated 81 milliGRAM(s) daily  enalapril 20 milliGRAM(s) two times a day  simvastatin 20 milliGRAM(s) at bedtime  carvedilol 25 milliGRAM(s) every 12 hours  NIFEdipine XL 90 milliGRAM(s) two times a day  cinacalcet 60 milliGRAM(s) daily  sevelamer hydrochloride 800 milliGRAM(s) three times a day with meals  heparin  Injectable 5000 Unit(s) every 8 hours  furosemide    Tablet 160 milliGRAM(s) daily  ALBUTerol/ipratropium for Nebulization 3 milliLiter(s) every 6 hours PRN      Allergies    No Known Allergies    Intolerances        T(C): , Max: 37.3 (09-11-17 @ 14:37)  T(F): , Max: 99.1 (09-11-17 @ 14:37)  HR: 73 (09-12-17 @ 09:21)  BP: 162/87 (09-12-17 @ 09:21)  BP(mean): --  RR: 17 (09-12-17 @ 09:21)  SpO2: 97% (09-12-17 @ 09:21)  Wt(kg): --    09-11 @ 07:01  -  09-12 @ 07:00  --------------------------------------------------------  IN:  Total IN: 0 mL    OUT:    Other: 3000 mL  Total OUT: 3000 mL    Total NET: -3000 mL      09-12 @ 07:01  -  09-12 @ 10:04  --------------------------------------------------------  IN:    Oral Fluid: 440 mL  Total IN: 440 mL    OUT:    Voided: 1 mL  Total OUT: 1 mL    Total NET: 439 mL        Height (cm): 157.48 (09-11 @ 11:29)  Weight (kg): 52 (09-11 @ 11:29)  BMI (kg/m2): 21 (09-11 @ 11:29)  BSA (m2): 1.51 (09-11 @ 11:29)    Physical exam:   Alert and oriented   No JVD  Normal air entry in the lungs, no wheezing, diffuse crackles   RRR, normal s1/s2, no murmurs, rubs or gallops  Abdomen - soft, non-tender, non-distended, normal bowel sounds   Extremities mild peripheral edema   Has an AV fistula on the left       LABS:                        8.4    4.9   )-----------( 284      ( 12 Sep 2017 07:58 )             26.5     09-12    134<L>  |  93<L>  |  25<H>  ----------------------------<  85  4.4   |  30  |  3.90<H>    Ca    10.4      12 Sep 2017 07:58  Phos  4.4     09-12  Mg     2.2     09-12    TPro  7.7  /  Alb  3.5  /  TBili  0.3  /  DBili  x   /  AST  18  /  ALT  9<L>  /  AlkPhos  172<H>  09-11      PT/INR - ( 11 Sep 2017 12:47 )   PT: 10.7 sec;   INR: 0.96          PTT - ( 11 Sep 2017 12:47 )  PTT:33.3 sec          RADIOLOGY & ADDITIONAL STUDIES:  < from: Xray Chest 2 Views PA/Lat (09.11.17 @ 12:54) >  FINDINGS: Heart size is enlarged. Pleural parenchymal opacities again   noted over the left mid and lower lung zones. There are pulmonary venous   congestive changes identified. No pneumothorax. Soft tissues and osseous   structures are unchanged.    IMPRESSION:  Essentially no significant interval change when compared to 8/28/2017.   Persistent pleural-parenchymal opacities noted over the left mid and   lower lung zones probably unchanged allowing for technical differences.  Pulmonary venous congestion.    < end of copied text >
Patient was seen and evaluated on dialysis.   Patient is tolerating the procedure well.   HR: 81 (09-11-17 @ 17:05)  BP: 193/102 (09-11-17 @ 17:05) pusle 65, /67  Continue dialysis:   Dialyzer:  180        QB:    400    QD: 500  Goal UF 3 liters  over 3 Hours

## 2017-09-12 NOTE — PROGRESS NOTE ADULT - PROBLEM SELECTOR PLAN 2
restart her medications   - carvedilol 25 mg twice a day, enalapril 20 mg, furosemide 160 mg daily, nifedipine 90 mg    - we will do UF also today.  - with better control

## 2017-09-12 NOTE — PROGRESS NOTE ADULT - PROBLEM SELECTOR PLAN 6
uncontrolled. BP 190s on admission. pt states "she is compliant with meds but always has very high BPs" . currently BPs 160s.     - pt to be dialyzed  - c/w enalapril 20 mg BID  - c/w nifedipine 90 mg ER BID   - c/w carvedilol.

## 2017-09-12 NOTE — PROGRESS NOTE ADULT - PROBLEM SELECTOR PLAN 4
- unlikely exacerbation, spo2 wnl and no wheezing.   - duoneb prn - unlikely exacerbation, spo2 wnl and no wheezing.   -SOB improved today  - duoneb prn

## 2017-09-12 NOTE — DISCHARGE NOTE ADULT - HOSPITAL COURSE
Pt is a 66 yr old AAF PMH dCHF last ef august 60-65%, ESRD (T/Th/Sa HD), HTN, anemia, recurrent pleural effusions s/p insertion/removal of pleurX catheter, asthma,    and CAD , frequent admissions for sob and HD, presents with SOB and was found to have hyperkalemia. The patient is now back to her baseline after emergent HD session the night of 9/11 and is normokalemic. Pt is a 66 yr old AAF PMH dCHF last ef august 60-65%, ESRD (T/Th/Sa HD), HTN, anemia, recurrent pleural effusions s/p insertion/removal of pleurX catheter, asthma,    and CAD , frequent admissions for sob and HD, presents with SOB and was found to have hyperkalemia. The patient is now back to her baseline after emergent HD session the night of 9/11 and is normokalemic. She also received dialysis before leaving Tuesday 9/12/17.    The patient is stable and ready for discharge.

## 2017-09-12 NOTE — PROGRESS NOTE ADULT - PROBLEM SELECTOR PLAN 7
unlikely acute exacerbation. Echo 8/23 showed EF 60-65%. Patient seems euvolemic on exam with no JVD or LE edema. crackles on exam could be 2/2 effusion vs general fluid overload from lack of HD. BNP is chronically elevated likely from CKD.   - c/w Lasix 160 mg QD  -- c/w carvedilol 25 mg BID  -- c/w enalapril 20 mg BID. unlikely acute exacerbation. Echo 8/23 showed EF 60-65%. Patient seems euvolemic on exam with no JVD or LE edema. crackles on exam could be 2/2 effusion vs general fluid overload from lack of HD. BNP is chronically elevated likely from CKD.   - c/w Lasix 160 mg QD  -- c/w carvedilol 25 mg BID  -- c/w enalapril 20 mg BID.  -SOB improved after HD

## 2017-09-12 NOTE — DISCHARGE NOTE ADULT - CARE PLAN
Principal Discharge DX:	Shortness of breath  Secondary Diagnosis:	CKD (chronic kidney disease)  Secondary Diagnosis:	Electrolyte abnormality  Secondary Diagnosis:	HLD (hyperlipidemia)  Secondary Diagnosis:	Hypertension  Secondary Diagnosis:	Diastolic congestive heart failure, unspecified congestive heart failure chronicity  Secondary Diagnosis:	CAD (coronary artery disease) Principal Discharge DX:	Shortness of breath  Goal:	resolution  Instructions for follow-up, activity and diet:	You received emergent hemodialysis the night you came to the hospital and your shortness of breath improved back to baseline. You should follow up with your PMD within a few days and continue to get dialysis as an outpatient.  Secondary Diagnosis:	CKD (chronic kidney disease)  Goal:	follow-up  Instructions for follow-up, activity and diet:	continue to take your home medications. You should follow up with your PMD within a few days and continue to get dialysis as an outpatient.  Secondary Diagnosis:	Electrolyte abnormality  Goal:	resolution  Instructions for follow-up, activity and diet:	you were found to have high levels of potassium when you got to the ER. Your potassium levels were within normal limits after HD.  Secondary Diagnosis:	HLD (hyperlipidemia)  Goal:	follow-up  Instructions for follow-up, activity and diet:	continue to take your home medications. You should follow up with your PMD within a few days  Secondary Diagnosis:	Hypertension  Goal:	follow-up  Instructions for follow-up, activity and diet:	continue to take your home medications. You should follow up with your PMD within a few days  Secondary Diagnosis:	Diastolic congestive heart failure, unspecified congestive heart failure chronicity  Goal:	follow-up  Instructions for follow-up, activity and diet:	continue to take your home medications. You should follow up with your PMD within a few days  Secondary Diagnosis:	CAD (coronary artery disease)  Goal:	follow-up  Instructions for follow-up, activity and diet:	continue to take your home medications. You should follow up with your PMD within a few days

## 2017-09-12 NOTE — PROGRESS NOTE ADULT - ASSESSMENT
A/p: Pt is a 66 yr old AAF PMH dCHF last ef august 60-65%, ESRD (T/Th/Sa HD), HTN, anemia, recurrent pleural effusions s/p insertion/removal of pleurX catheter, asthma,    and CAD , frequent admissions for sob and HD, presents with SOB. The patient is now back to her baseline after emergent HD session the night of 9/11.

## 2017-09-12 NOTE — DISCHARGE NOTE ADULT - CARE PROVIDER_API CALL
Josh Salinas), Internal Medicine; Pulmonary Disease  [address] [place] [address]  Phone: [phone]  Fax: [phone]

## 2017-09-12 NOTE — PROGRESS NOTE ADULT - PROBLEM SELECTOR PLAN 5
patient w/o symptoms - no CP or EKG changes  - c/w ASA 81 mg  - c/w Simvastatin 20 mg QD.     #HLD: c/w Simvastatin 20 mg QD

## 2017-09-12 NOTE — DISCHARGE NOTE ADULT - SECONDARY DIAGNOSIS.
CKD (chronic kidney disease) Electrolyte abnormality HLD (hyperlipidemia) Hypertension Diastolic congestive heart failure, unspecified congestive heart failure chronicity CAD (coronary artery disease)

## 2017-09-16 LAB
CULTURE RESULTS: SIGNIFICANT CHANGE UP
SPECIMEN SOURCE: SIGNIFICANT CHANGE UP

## 2017-09-18 ENCOUNTER — INPATIENT (INPATIENT)
Facility: HOSPITAL | Age: 67
LOS: 0 days | Discharge: HOME | DRG: 640 | End: 2017-09-19
Attending: STUDENT IN AN ORGANIZED HEALTH CARE EDUCATION/TRAINING PROGRAM | Admitting: STUDENT IN AN ORGANIZED HEALTH CARE EDUCATION/TRAINING PROGRAM
Payer: COMMERCIAL

## 2017-09-18 ENCOUNTER — TRANSCRIPTION ENCOUNTER (OUTPATIENT)
Age: 67
End: 2017-09-18

## 2017-09-18 VITALS
TEMPERATURE: 98 F | RESPIRATION RATE: 30 BRPM | DIASTOLIC BLOOD PRESSURE: 102 MMHG | SYSTOLIC BLOOD PRESSURE: 217 MMHG | OXYGEN SATURATION: 84 % | HEART RATE: 99 BPM

## 2017-09-18 DIAGNOSIS — J96.01 ACUTE RESPIRATORY FAILURE WITH HYPOXIA: ICD-10-CM

## 2017-09-18 DIAGNOSIS — N18.6 END STAGE RENAL DISEASE: ICD-10-CM

## 2017-09-18 DIAGNOSIS — Z99.2 DEPENDENCE ON RENAL DIALYSIS: ICD-10-CM

## 2017-09-18 DIAGNOSIS — I12.0 HYPERTENSIVE CHRONIC KIDNEY DISEASE WITH STAGE 5 CHRONIC KIDNEY DISEASE OR END STAGE RENAL DISEASE: ICD-10-CM

## 2017-09-18 DIAGNOSIS — E87.2 ACIDOSIS: ICD-10-CM

## 2017-09-18 DIAGNOSIS — D64.9 ANEMIA, UNSPECIFIED: ICD-10-CM

## 2017-09-18 DIAGNOSIS — I16.1 HYPERTENSIVE EMERGENCY: ICD-10-CM

## 2017-09-18 DIAGNOSIS — I77.0 ARTERIOVENOUS FISTULA, ACQUIRED: Chronic | ICD-10-CM

## 2017-09-18 DIAGNOSIS — Z29.9 ENCOUNTER FOR PROPHYLACTIC MEASURES, UNSPECIFIED: ICD-10-CM

## 2017-09-18 DIAGNOSIS — E87.5 HYPERKALEMIA: ICD-10-CM

## 2017-09-18 DIAGNOSIS — I13.2 HYPERTENSIVE HEART AND CHRONIC KIDNEY DISEASE WITH HEART FAILURE AND WITH STAGE 5 CHRONIC KIDNEY DISEASE, OR END STAGE RENAL DISEASE: ICD-10-CM

## 2017-09-18 DIAGNOSIS — E78.5 HYPERLIPIDEMIA, UNSPECIFIED: ICD-10-CM

## 2017-09-18 DIAGNOSIS — E83.52 HYPERCALCEMIA: ICD-10-CM

## 2017-09-18 DIAGNOSIS — J96.91 RESPIRATORY FAILURE, UNSPECIFIED WITH HYPOXIA: ICD-10-CM

## 2017-09-18 DIAGNOSIS — D63.1 ANEMIA IN CHRONIC KIDNEY DISEASE: ICD-10-CM

## 2017-09-18 DIAGNOSIS — I27.2 OTHER SECONDARY PULMONARY HYPERTENSION: ICD-10-CM

## 2017-09-18 DIAGNOSIS — R63.8 OTHER SYMPTOMS AND SIGNS CONCERNING FOOD AND FLUID INTAKE: ICD-10-CM

## 2017-09-18 DIAGNOSIS — Z86.73 PERSONAL HISTORY OF TRANSIENT ISCHEMIC ATTACK (TIA), AND CEREBRAL INFARCTION WITHOUT RESIDUAL DEFICITS: ICD-10-CM

## 2017-09-18 DIAGNOSIS — I25.2 OLD MYOCARDIAL INFARCTION: ICD-10-CM

## 2017-09-18 DIAGNOSIS — I25.10 ATHEROSCLEROTIC HEART DISEASE OF NATIVE CORONARY ARTERY WITHOUT ANGINA PECTORIS: ICD-10-CM

## 2017-09-18 DIAGNOSIS — I05.0 RHEUMATIC MITRAL STENOSIS: ICD-10-CM

## 2017-09-18 DIAGNOSIS — I10 ESSENTIAL (PRIMARY) HYPERTENSION: ICD-10-CM

## 2017-09-18 DIAGNOSIS — I16.0 HYPERTENSIVE URGENCY: ICD-10-CM

## 2017-09-18 DIAGNOSIS — E87.79 OTHER FLUID OVERLOAD: ICD-10-CM

## 2017-09-18 DIAGNOSIS — I50.9 HEART FAILURE, UNSPECIFIED: ICD-10-CM

## 2017-09-18 DIAGNOSIS — E87.1 HYPO-OSMOLALITY AND HYPONATREMIA: ICD-10-CM

## 2017-09-18 DIAGNOSIS — I50.30 UNSPECIFIED DIASTOLIC (CONGESTIVE) HEART FAILURE: ICD-10-CM

## 2017-09-18 DIAGNOSIS — E87.8 OTHER DISORDERS OF ELECTROLYTE AND FLUID BALANCE, NOT ELSEWHERE CLASSIFIED: ICD-10-CM

## 2017-09-18 DIAGNOSIS — Z79.82 LONG TERM (CURRENT) USE OF ASPIRIN: ICD-10-CM

## 2017-09-18 LAB
ALBUMIN SERPL ELPH-MCNC: 4.3 G/DL — SIGNIFICANT CHANGE UP (ref 3.3–5)
ALP SERPL-CCNC: 200 U/L — HIGH (ref 40–120)
ALT FLD-CCNC: 12 U/L — SIGNIFICANT CHANGE UP (ref 10–45)
ANION GAP SERPL CALC-SCNC: 16 MMOL/L — SIGNIFICANT CHANGE UP (ref 5–17)
ANION GAP SERPL CALC-SCNC: 19 MMOL/L — HIGH (ref 5–17)
ANISOCYTOSIS BLD QL: SLIGHT — SIGNIFICANT CHANGE UP
APTT BLD: 34.5 SEC — SIGNIFICANT CHANGE UP (ref 27.5–37.4)
AST SERPL-CCNC: 22 U/L — SIGNIFICANT CHANGE UP (ref 10–40)
BASE EXCESS BLDV CALC-SCNC: 0.2 MMOL/L — SIGNIFICANT CHANGE UP
BASOPHILS NFR BLD AUTO: 0.2 % — SIGNIFICANT CHANGE UP (ref 0–2)
BASOPHILS NFR BLD AUTO: 0.6 % — SIGNIFICANT CHANGE UP (ref 0–2)
BASOPHILS NFR BLD AUTO: 2 % — SIGNIFICANT CHANGE UP (ref 0–2)
BILIRUB SERPL-MCNC: 0.3 MG/DL — SIGNIFICANT CHANGE UP (ref 0.2–1.2)
BUN SERPL-MCNC: 57 MG/DL — HIGH (ref 7–23)
BUN SERPL-MCNC: 65 MG/DL — HIGH (ref 7–23)
CALCIUM SERPL-MCNC: 11.1 MG/DL — HIGH (ref 8.4–10.5)
CALCIUM SERPL-MCNC: 11.1 MG/DL — HIGH (ref 8.4–10.5)
CALCIUM SERPL-MCNC: 11.7 MG/DL — HIGH (ref 8.4–10.5)
CHLORIDE SERPL-SCNC: 92 MMOL/L — LOW (ref 96–108)
CHLORIDE SERPL-SCNC: 94 MMOL/L — LOW (ref 96–108)
CK MB CFR SERPL CALC: 1.2 NG/ML — SIGNIFICANT CHANGE UP (ref 0–6.7)
CK SERPL-CCNC: 46 U/L — SIGNIFICANT CHANGE UP (ref 25–170)
CO2 SERPL-SCNC: 22 MMOL/L — SIGNIFICANT CHANGE UP (ref 22–31)
CO2 SERPL-SCNC: 23 MMOL/L — SIGNIFICANT CHANGE UP (ref 22–31)
CREAT SERPL-MCNC: 5.23 MG/DL — HIGH (ref 0.5–1.3)
CREAT SERPL-MCNC: 6.02 MG/DL — HIGH (ref 0.5–1.3)
EOSINOPHIL NFR BLD AUTO: 0 % — SIGNIFICANT CHANGE UP (ref 0–6)
EOSINOPHIL NFR BLD AUTO: 1 % — SIGNIFICANT CHANGE UP (ref 0–6)
EOSINOPHIL NFR BLD AUTO: 1.1 % — SIGNIFICANT CHANGE UP (ref 0–6)
GAS PNL BLDV: SIGNIFICANT CHANGE UP
GLUCOSE SERPL-MCNC: 132 MG/DL — HIGH (ref 70–99)
GLUCOSE SERPL-MCNC: 140 MG/DL — HIGH (ref 70–99)
HCO3 BLDV-SCNC: 24 MMOL/L — SIGNIFICANT CHANGE UP (ref 20–27)
HCT VFR BLD CALC: 27.4 % — LOW (ref 34.5–45)
HCT VFR BLD CALC: 33.2 % — LOW (ref 34.5–45)
HGB BLD-MCNC: 10.3 G/DL — LOW (ref 11.5–15.5)
HGB BLD-MCNC: 8.9 G/DL — LOW (ref 11.5–15.5)
INR BLD: 1.02 — SIGNIFICANT CHANGE UP (ref 0.88–1.16)
LACTATE SERPL-SCNC: 0.9 MMOL/L — SIGNIFICANT CHANGE UP (ref 0.5–2)
LYMPHOCYTES # BLD AUTO: 11 % — LOW (ref 13–44)
LYMPHOCYTES # BLD AUTO: 12 % — LOW (ref 13–44)
LYMPHOCYTES # BLD AUTO: 14 % — SIGNIFICANT CHANGE UP (ref 13–44)
MACROCYTES BLD QL: SLIGHT — SIGNIFICANT CHANGE UP
MAGNESIUM SERPL-MCNC: 2.5 MG/DL — SIGNIFICANT CHANGE UP (ref 1.6–2.6)
MANUAL DIF COMMENT BLD-IMP: SIGNIFICANT CHANGE UP
MANUAL SMEAR VERIFICATION: SIGNIFICANT CHANGE UP
MCHC RBC-ENTMCNC: 31 G/DL — LOW (ref 32–36)
MCHC RBC-ENTMCNC: 31.6 PG — SIGNIFICANT CHANGE UP (ref 27–34)
MCHC RBC-ENTMCNC: 32.4 PG — SIGNIFICANT CHANGE UP (ref 27–34)
MCHC RBC-ENTMCNC: 32.5 G/DL — SIGNIFICANT CHANGE UP (ref 32–36)
MCV RBC AUTO: 101.8 FL — HIGH (ref 80–100)
MCV RBC AUTO: 99.6 FL — SIGNIFICANT CHANGE UP (ref 80–100)
MONOCYTES NFR BLD AUTO: 2.1 % — SIGNIFICANT CHANGE UP (ref 2–14)
MONOCYTES NFR BLD AUTO: 7.4 % — SIGNIFICANT CHANGE UP (ref 2–14)
MONOCYTES NFR BLD AUTO: 8 % — SIGNIFICANT CHANGE UP (ref 2–14)
NEUTROPHILS NFR BLD AUTO: 75 % — SIGNIFICANT CHANGE UP (ref 43–77)
NEUTROPHILS NFR BLD AUTO: 76.9 % — SIGNIFICANT CHANGE UP (ref 43–77)
NEUTROPHILS NFR BLD AUTO: 86.7 % — HIGH (ref 43–77)
NEUTS BAND # BLD: 2 % — SIGNIFICANT CHANGE UP
NRBC # BLD: 1 /100 WBCS — HIGH
NT-PROBNP SERPL-SCNC: HIGH PG/ML (ref 0–300)
PCO2 BLDV: 37 MMHG — LOW (ref 41–51)
PH BLDV: 7.43 — SIGNIFICANT CHANGE UP (ref 7.32–7.43)
PLAT MORPH BLD: (no result)
PLATELET # BLD AUTO: 346 K/UL — SIGNIFICANT CHANGE UP (ref 150–400)
PLATELET # BLD AUTO: 387 K/UL — SIGNIFICANT CHANGE UP (ref 150–400)
PLATELET CLUMP BLD QL SMEAR: PRESENT
PO2 BLDV: 51 MMHG — SIGNIFICANT CHANGE UP
POLYCHROMASIA BLD QL SMEAR: SLIGHT — SIGNIFICANT CHANGE UP
POTASSIUM SERPL-MCNC: 5.6 MMOL/L — HIGH (ref 3.5–5.3)
POTASSIUM SERPL-MCNC: 6.4 MMOL/L — CRITICAL HIGH (ref 3.5–5.3)
POTASSIUM SERPL-SCNC: 5.6 MMOL/L — HIGH (ref 3.5–5.3)
POTASSIUM SERPL-SCNC: 6.4 MMOL/L — CRITICAL HIGH (ref 3.5–5.3)
PROT SERPL-MCNC: 8.7 G/DL — HIGH (ref 6–8.3)
PROTHROM AB SERPL-ACNC: 11.3 SEC — SIGNIFICANT CHANGE UP (ref 9.8–12.7)
PTH-INTACT FLD-MCNC: 717 PG/ML — HIGH (ref 15–65)
RBC # BLD: 2.75 M/UL — LOW (ref 3.8–5.2)
RBC # BLD: 3.26 M/UL — LOW (ref 3.8–5.2)
RBC # FLD: 16.6 % — SIGNIFICANT CHANGE UP (ref 10.3–16.9)
RBC # FLD: 16.9 % — SIGNIFICANT CHANGE UP (ref 10.3–16.9)
RBC BLD AUTO: (no result)
SAO2 % BLDV: 87 % — SIGNIFICANT CHANGE UP
SCHISTOCYTES BLD QL AUTO: SIGNIFICANT CHANGE UP
SODIUM SERPL-SCNC: 132 MMOL/L — LOW (ref 135–145)
SODIUM SERPL-SCNC: 134 MMOL/L — LOW (ref 135–145)
TROPONIN T SERPL-MCNC: 0.02 NG/ML — HIGH (ref 0–0.01)
WBC # BLD: 13.2 K/UL — HIGH (ref 3.8–10.5)
WBC # BLD: 6.2 K/UL — SIGNIFICANT CHANGE UP (ref 3.8–10.5)
WBC # FLD AUTO: 13.2 K/UL — HIGH (ref 3.8–10.5)
WBC # FLD AUTO: 6.2 K/UL — SIGNIFICANT CHANGE UP (ref 3.8–10.5)

## 2017-09-18 PROCEDURE — 93010 ELECTROCARDIOGRAM REPORT: CPT

## 2017-09-18 PROCEDURE — 71010: CPT | Mod: 26

## 2017-09-18 PROCEDURE — 99222 1ST HOSP IP/OBS MODERATE 55: CPT

## 2017-09-18 PROCEDURE — 99291 CRITICAL CARE FIRST HOUR: CPT | Mod: 25

## 2017-09-18 PROCEDURE — 99223 1ST HOSP IP/OBS HIGH 75: CPT | Mod: GC

## 2017-09-18 RX ORDER — SEVELAMER CARBONATE 2400 MG/1
400 POWDER, FOR SUSPENSION ORAL
Qty: 0 | Refills: 0 | Status: DISCONTINUED | OUTPATIENT
Start: 2017-09-18 | End: 2017-09-19

## 2017-09-18 RX ORDER — PIPERACILLIN AND TAZOBACTAM 4; .5 G/20ML; G/20ML
3.38 INJECTION, POWDER, LYOPHILIZED, FOR SOLUTION INTRAVENOUS ONCE
Qty: 0 | Refills: 0 | Status: COMPLETED | OUTPATIENT
Start: 2017-09-18 | End: 2017-09-18

## 2017-09-18 RX ORDER — CARVEDILOL PHOSPHATE 80 MG/1
25 CAPSULE, EXTENDED RELEASE ORAL EVERY 12 HOURS
Qty: 0 | Refills: 0 | Status: DISCONTINUED | OUTPATIENT
Start: 2017-09-18 | End: 2017-09-19

## 2017-09-18 RX ORDER — NIFEDIPINE 30 MG
90 TABLET, EXTENDED RELEASE 24 HR ORAL
Qty: 0 | Refills: 0 | Status: DISCONTINUED | OUTPATIENT
Start: 2017-09-18 | End: 2017-09-19

## 2017-09-18 RX ORDER — NITROGLYCERIN 6.5 MG
0.4 CAPSULE, EXTENDED RELEASE ORAL
Qty: 0 | Refills: 0 | Status: DISCONTINUED | OUTPATIENT
Start: 2017-09-18 | End: 2017-09-18

## 2017-09-18 RX ORDER — HYDRALAZINE HCL 50 MG
10 TABLET ORAL ONCE
Qty: 0 | Refills: 0 | Status: COMPLETED | OUTPATIENT
Start: 2017-09-18 | End: 2017-09-18

## 2017-09-18 RX ORDER — IPRATROPIUM/ALBUTEROL SULFATE 18-103MCG
3 AEROSOL WITH ADAPTER (GRAM) INHALATION ONCE
Qty: 0 | Refills: 0 | Status: COMPLETED | OUTPATIENT
Start: 2017-09-18 | End: 2017-09-18

## 2017-09-18 RX ORDER — CINACALCET 30 MG/1
60 TABLET, FILM COATED ORAL DAILY
Qty: 0 | Refills: 0 | Status: DISCONTINUED | OUTPATIENT
Start: 2017-09-18 | End: 2017-09-19

## 2017-09-18 RX ORDER — SIMVASTATIN 20 MG/1
20 TABLET, FILM COATED ORAL AT BEDTIME
Qty: 0 | Refills: 0 | Status: DISCONTINUED | OUTPATIENT
Start: 2017-09-18 | End: 2017-09-19

## 2017-09-18 RX ORDER — NITROGLYCERIN 6.5 MG
10 CAPSULE, EXTENDED RELEASE ORAL
Qty: 50 | Refills: 0 | Status: DISCONTINUED | OUTPATIENT
Start: 2017-09-18 | End: 2017-09-18

## 2017-09-18 RX ORDER — ASPIRIN/CALCIUM CARB/MAGNESIUM 324 MG
81 TABLET ORAL DAILY
Qty: 0 | Refills: 0 | Status: DISCONTINUED | OUTPATIENT
Start: 2017-09-18 | End: 2017-09-19

## 2017-09-18 RX ORDER — FUROSEMIDE 40 MG
40 TABLET ORAL ONCE
Qty: 0 | Refills: 0 | Status: COMPLETED | OUTPATIENT
Start: 2017-09-18 | End: 2017-09-18

## 2017-09-18 RX ORDER — HEPARIN SODIUM 5000 [USP'U]/ML
5000 INJECTION INTRAVENOUS; SUBCUTANEOUS EVERY 8 HOURS
Qty: 0 | Refills: 0 | Status: DISCONTINUED | OUTPATIENT
Start: 2017-09-18 | End: 2017-09-19

## 2017-09-18 RX ORDER — VANCOMYCIN HCL 1 G
1000 VIAL (EA) INTRAVENOUS ONCE
Qty: 0 | Refills: 0 | Status: COMPLETED | OUTPATIENT
Start: 2017-09-18 | End: 2017-09-18

## 2017-09-18 RX ORDER — IPRATROPIUM/ALBUTEROL SULFATE 18-103MCG
3 AEROSOL WITH ADAPTER (GRAM) INHALATION EVERY 4 HOURS
Qty: 0 | Refills: 0 | Status: DISCONTINUED | OUTPATIENT
Start: 2017-09-18 | End: 2017-09-19

## 2017-09-18 RX ORDER — INFLUENZA VIRUS VACCINE 15; 15; 15; 15 UG/.5ML; UG/.5ML; UG/.5ML; UG/.5ML
0.5 SUSPENSION INTRAMUSCULAR ONCE
Qty: 0 | Refills: 0 | Status: DISCONTINUED | OUTPATIENT
Start: 2017-09-18 | End: 2017-09-19

## 2017-09-18 RX ORDER — NITROGLYCERIN 6.5 MG
0.4 CAPSULE, EXTENDED RELEASE ORAL ONCE
Qty: 0 | Refills: 0 | Status: COMPLETED | OUTPATIENT
Start: 2017-09-18 | End: 2017-09-18

## 2017-09-18 RX ORDER — FUROSEMIDE 40 MG
80 TABLET ORAL ONCE
Qty: 0 | Refills: 0 | Status: COMPLETED | OUTPATIENT
Start: 2017-09-18 | End: 2017-09-18

## 2017-09-18 RX ADMIN — Medication 125 MILLIGRAM(S): at 01:00

## 2017-09-18 RX ADMIN — Medication 20 MILLIGRAM(S): at 15:57

## 2017-09-18 RX ADMIN — PIPERACILLIN AND TAZOBACTAM 200 GRAM(S): 4; .5 INJECTION, POWDER, LYOPHILIZED, FOR SOLUTION INTRAVENOUS at 03:25

## 2017-09-18 RX ADMIN — Medication 40 MILLIGRAM(S): at 02:20

## 2017-09-18 RX ADMIN — SIMVASTATIN 20 MILLIGRAM(S): 20 TABLET, FILM COATED ORAL at 22:11

## 2017-09-18 RX ADMIN — Medication 250 MILLIGRAM(S): at 04:46

## 2017-09-18 RX ADMIN — Medication 80 MILLIGRAM(S): at 00:46

## 2017-09-18 RX ADMIN — Medication 0.4 MILLIGRAM(S): at 02:28

## 2017-09-18 RX ADMIN — Medication 10 MILLIGRAM(S): at 02:43

## 2017-09-18 RX ADMIN — Medication 3 MICROGRAM(S)/MIN: at 02:22

## 2017-09-18 RX ADMIN — Medication 3 MILLILITER(S): at 01:00

## 2017-09-18 RX ADMIN — CARVEDILOL PHOSPHATE 25 MILLIGRAM(S): 80 CAPSULE, EXTENDED RELEASE ORAL at 15:57

## 2017-09-18 RX ADMIN — Medication 2.5 MILLIGRAM(S): at 03:47

## 2017-09-18 RX ADMIN — Medication 90 MILLIGRAM(S): at 23:04

## 2017-09-18 NOTE — DISCHARGE NOTE ADULT - HOSPITAL COURSE
66F with PMHx CAD, [id number]%, pulmHTN, HTN, ESRD on HD T/TH/Sa c/b effusions and multiple hos for hypervolemia, presented to [facility] ED from home with her typical SOB of fluid overload one day after Sat HD session. States she is compliant with all medications. On admission, patient was afebrile with a /102 HR 80-99 SpO2 84% RA. Patient was started on nitro, Hydralazine 10mg, Enalapril 2.5mg, Lasix 120mg. For concern of consolidation (not c/o cough, fevers, sputum production) patient was given Vanc and Zosyn for ?PNA. Cadiology and MICU were consulted in the ED, but recommended RMF w/ HD. Patient was transferred to the floors for further management. Patient received HD, with improvement in symptoms 66F with PMHx CAD, [id number]%, pulmHTN, HTN, ESRD on HD T/TH/Sa c/b effusions and multiple hos for hypervolemia, presented to [facility] ED from home with her typical SOB of fluid overload one day after Sat HD session. States she is compliant with all medications. On admission, patient was afebrile with a /102 HR 80-99 SpO2 84% RA. Patient was started on nitro, Hydralazine 10mg, Enalapril 2.5mg, Lasix 120mg. For concern of consolidation (not c/o cough, fevers, sputum production patient was given Vanc and Zosyn for ?PNA. Cadiology and MICU were consulted in the ED, but recommended RMF w/ HD. Patient was transferred to the floors for further management. Patient received HD, with 4L removed with improvement in symptoms and hypertension. Patient had another session on 9/19 per her normal schedule; per renal, in obtaining collateral from [facility] patient was supposed to start a 4x/week schedule, with the first session 9/18. There was concern for PNA on admission given ?consolidation, however it is not easily seen on repeat CXR post HD- patient was w/o symptoms, afebrile and it was elected not to treat with ABX. Patient is HD stable, tolerating PO, saturating well on RA with safe for discharge with plan for outpatient follow up and increase in HD x 4 days per week.

## 2017-09-18 NOTE — DISCHARGE NOTE ADULT - PATIENT PORTAL LINK FT
“You can access the FollowHealth Patient Portal, offered by [facility], by registering with the following website: [email or web address]”

## 2017-09-18 NOTE — ED ADULT NURSE NOTE - OBJECTIVE STATEMENT
pt c/o difficulty breathing started 2 hr ago, had H/D on Saturday, in triage pt was hypoxic and hypertensive,  per MD Gladara order given oxygen via non- rebreather mask, nitroglycerin SL, RT by bedside, placed on BiPAP, pt tolerating BiPaP well  O2 sat improved. pt denies chest pain. speaking in full sentences. AV fistula to left arm, bruit present.

## 2017-09-18 NOTE — ED ADULT NURSE NOTE - CHPI ED SYMPTOMS NEG
no fever/no chest pain/no chills/no edema/no body aches/no cough/no headache/no hemoptysis/no diaphoresis

## 2017-09-18 NOTE — H&P ADULT - ATTENDING COMMENTS
Pt seen and examined at bedside on 9/18 @ 530am     Agree with HPIANGELICA as above. Reports that her breathing has greatly improved since admission with BiPapp. Reports that she thinks her HD session was not long enough. Denies fevers, chills, CP, n/v/d, abdominal pain.     VS, Labs, FH, SH, allergies, medications, imaging reviewed. Agree with physical exam as above    A/P: 66F w/PMH CAD, dCHF (EF 60-65%), moderate MS, mod to severe pulm HTN, HTN, AOCD, ESRD on HD T/Th/Sat, CVA (no deficits), recurrent pleural effusions s/p insertion/removal of pleurX catheter & talc pleurodesis (august 2017) presenting w/sob x1day, admitted for respiratory distress requiring bipap 2/2 fluid overloaded, leukocytosis, hypertension.     **SOB  -Pt with recurrent admissions for SOB requiring BiPapp as well as HD; this is often followed by dramatic symptomatic improvement  -Pt currently improved clinically on BiPapp - will have HD session in AM  -New leukocytosis (13.2 from normal baseline) - no fevers or localizing symptoms of infection (cough, sputum production, etc), possible new infiltrate on CXR; pt empirically dosed with Vancomycin/Zosyn in ED. Will reevaluate patient's clinical status after HD, including repeat CXR (PA/La) as well as monitor for signs of infection before continuing antibiotics  -Check procalcitonin, follow up blood cultures  -Pt initially hypertensive with SBP > 200's, now still hypertensive but closer to patient's baseline (180's) - c/w IV antihypertensives PRN, reevaluate after HD and restart home PO medications  -Pt evaluated by ICU consult who deemed patient stable for RMF, would reconsult as medically necessary    Plan otherwise as outlined above....

## 2017-09-18 NOTE — H&P ADULT - NSHPPHYSICALEXAM_GEN_ALL_CORE
.  VITAL SIGNS:  T(C): 36.7 (09-18-17 @ 00:46), Max: 36.7 (09-18-17 @ 00:46)  T(F): 98 (09-18-17 @ 00:46), Max: 98 (09-18-17 @ 00:46)  HR: 80 (09-18-17 @ 03:46) (80 - 99)  BP: 186/87 (09-18-17 @ 03:46) (175/93 - 217/102)  BP(mean): --  RR: 20 (09-18-17 @ 03:46) (18 - 30)  SpO2: 100% (09-18-17 @ 02:18) (84% - 100%)  Wt(kg): --    PHYSICAL EXAM:    Constitutional: sitting in bed w/BIPAP on, tachypneic, but not in any distress   Head: NC/AT  Eyes: PERRL, EOMI, clear conjunctiva  ENT: no nasal discharge; uvula midline, no oropharyngeal erythema or exudates; MMM  Neck: supple  Respiratory: b/l crackles, R>L, tachypneic, but w/out accessory muscle use or labored breathing    Cardiac: +S1/S2; RRR  Gastrointestinal: soft, NT/ND; no rebound or guarding; +BSx4, +reducible hernia above umbilicus   Genitourinary: normal external genitalia  Back: spine midline, no bony tenderness or step-offs; no CVAT B/L  Extremities: WWP, no clubbing or cyanosis; no peripheral edema  Musculoskeletal: NROM x4; no joint swelling, tenderness or erythema  Vascular: 2+ radial, DP pulses B/L  Dermatologic: skin warm, dry and intact  Neurologic: AAOx3; CNII-XII grossly intact; no focal deficits  Psychiatric: affect and characteristics of appearance, verbalizations, behaviors are appropriate .  VITAL SIGNS:  T(C): 36.7 (09-18-17 @ 00:46), Max: 36.7 (09-18-17 @ 00:46)  T(F): 98 (09-18-17 @ 00:46), Max: 98 (09-18-17 @ 00:46)  HR: 80 (09-18-17 @ 03:46) (80 - 99)  BP: 186/87 (09-18-17 @ 03:46) (175/93 - 217/102)  BP(mean): --  RR: 20 (09-18-17 @ 03:46) (18 - 30)  SpO2: 100% (09-18-17 @ 02:18) (84% - 100%)  Wt(kg): --    PHYSICAL EXAM:    Constitutional: sitting in bed w/BIPAP on, tachypneic, but not in any distress   Head: NC/AT  Eyes: PERRL, EOMI, clear conjunctiva  ENT: no nasal discharge; uvula midline, no oropharyngeal erythema or exudates; MMM  Neck: supple  Respiratory: b/l crackles, R>L, no accessory muscle use or labored breathing    Cardiac: +S1/S2; RRR  Gastrointestinal: soft, NT/ND; no rebound or guarding; +BSx4, +reducible hernia above umbilicus   Genitourinary: normal external genitalia  Back: spine midline, no bony tenderness or step-offs; no CVAT B/L  Extremities: WWP, no clubbing or cyanosis; no peripheral edema  Musculoskeletal: NROM x4; no joint swelling, tenderness or erythema  Vascular: 2+ radial, DP pulses B/L  Dermatologic: skin warm, dry and intact  Neurologic: AAOx3; CNII-XII grossly intact; no focal deficits  Psychiatric: affect and characteristics of appearance, verbalizations, behaviors are appropriate .  VITAL SIGNS:  T(C): 36.7 (09-18-17 @ 00:46), Max: 36.7 (09-18-17 @ 00:46)  T(F): 98 (09-18-17 @ 00:46), Max: 98 (09-18-17 @ 00:46)  HR: 80 (09-18-17 @ 03:46) (80 - 99)  BP: 186/87 (09-18-17 @ 03:46) (175/93 - 217/102)  BP(mean): --  RR: 20 (09-18-17 @ 03:46) (18 - 30)  SpO2: 100% (09-18-17 @ 02:18) (84% - 100%)  Wt(kg): --    PHYSICAL EXAM:    Constitutional: sitting in bed w/BIPAP on, tachypneic, but not in any distress   Head: NC/AT  Eyes: PERRL, EOMI, clear conjunctiva  ENT: no nasal discharge; uvula midline, no oropharyngeal erythema or exudates; MMM  Neck: supple  Respiratory: b/l crackles, R>L, no accessory muscle use or labored breathing    Cardiac: +S1/S2; RRR  Gastrointestinal: soft, NT/ND; no rebound or guarding; +BSx4, +reducible hernia above umbilicus   Genitourinary: normal external genitalia  Back: spine midline, no bony tenderness or step-offs; no CVAT B/L  Extremities: WWP, no clubbing or cyanosis; no peripheral edema, +LUE fistula   Musculoskeletal: NROM x4; no joint swelling, tenderness or erythema  Vascular: 2+ radial, DP pulses B/L  Dermatologic: skin warm, dry and intact  Neurologic: AAOx3; CNII-XII grossly intact; no focal deficits  Psychiatric: affect and characteristics of appearance, verbalizations, behaviors are appropriate

## 2017-09-18 NOTE — H&P ADULT - ASSESSMENT
66F w/PMH CAD, dCHF (EF 60-65%), moderate MS, mod to severe pulm HTN, HTN, AOCD, ESRD on HD T/Th/Sat, CVA (no deficits), recurrent pleural effusions s/p insertion/removal of pleurX catheter & talc pleurodesis (august 2017) presenting w/sob x1day, admitted for respiratory distress requiring bipap 2/2 fluid overloaded, leukocytosis, hypertension.

## 2017-09-18 NOTE — CONSULT NOTE ADULT - SUBJECTIVE AND OBJECTIVE BOX
Patient is a 66y Female with a history of ESRD on HD T/Th/Sat via left AVF, heart failure with preserved ejection fraction, coronary artery disease, anemia, and hypertension.  She came in the ED because of progressive shortness of breath since Sunday , no fever, no chest pain. IN the ED found to have pulmonary congestion and placed on BIPAP with partial improvement of her symptoms The renal service is activated for the need of HD. The patient last HD done on Saturday this week -she is getting HD  at [facility] -[phone]. Her last HD was done on Saturday this week at her HD center - does not know how much fluid was removed. Last week again was hospitalized for the same problem and her HD center was informed for that and for the need to lower her dry weight. Also fluid restriction was discussed extensively with the patient Her dry weight is between 46 and 47, has a AV fistula on the left arm. In the morning in her bed - on BIPAP - feels better. no chest pain or fever, mild shortness of breath,      Patient is a 66y Female admitted for     PAST MEDICAL & SURGICAL HISTORY:  Anemia: 2/2 CKD  Asthma  MI, old  CAD (coronary artery disease)  HLD (hyperlipidemia)  CKD (chronic kidney disease): Stage 5CKD  CVA (cerebral infarction)  Hypertension  AV fistula      MEDICATIONS  (STANDING):  aspirin enteric coated 81 milliGRAM(s) Oral daily  cinacalcet 60 milliGRAM(s) Oral daily  sevelamer hydrochloride 400 milliGRAM(s) Oral three times a day with meals  NIFEdipine XL 90 milliGRAM(s) Oral two times a day  carvedilol 25 milliGRAM(s) Oral every 12 hours  simvastatin 20 milliGRAM(s) Oral at bedtime  enalapril 20 milliGRAM(s) Oral two times a day  heparin  Injectable 5000 Unit(s) SubCutaneous every 8 hours  influenza   Vaccine 0.5 milliLiter(s) IntraMuscular once    MEDICATIONS  (PRN):  ALBUTerol/ipratropium for Nebulization 3 milliLiter(s) Nebulizer every 4 hours PRN Shortness of Breath and/or Wheezing      Allergies    No Known Allergies    Intolerances        SOCIAL HISTORY:    FAMILY HISTORY:  No pertinent family history in first degree relatives      T(C): , Max: 36.7 (09-18-17 @ 00:46)  T(F): , Max: 98.1 (09-18-17 @ 03:46)  HR: 76 (09-18-17 @ 08:55)  BP: 199/91 (09-18-17 @ 08:55)  BP(mean): --  RR: 20 (09-18-17 @ 08:55)  SpO2: 100% (09-18-17 @ 08:55)  Wt(kg): --    Height (cm): 157.48 (09-18 @ 07:25)  Weight (kg): 53.9 (09-18 @ 07:25)  BMI (kg/m2): 21.7 (09-18 @ 07:25)  BSA (m2): 1.53 (09-18 @ 07:25)        Physical exam:   Alert and oriented, on BIPAP   No JVD  Normal air entry in the lungs, no wheezing, diffuse crackles   RRR, normal s1/s2, no murmurs, rubs or gallops  Abdomen - soft, non-tender, non-distended, normal bowel sounds   Extremities mild peripheral edema   Has an AV fistula on the left       LABS:                        10.3   13.2  )-----------( 387      ( 18 Sep 2017 00:53 )             33.2     09-18    134<L>  |  92<L>  |  57<H>  ----------------------------<  140<H>  5.6<H>   |  23  |  5.23<H>    Ca    11.7<H>      18 Sep 2017 00:54    TPro  8.7<H>  /  Alb  4.3  /  TBili  0.3  /  DBili  x   /  AST  22  /  ALT  12  /  AlkPhos  200<H>  09-18    Creatine Kinase, Serum: 46 U/L [25 - 170] (09-18 @ 00:54)    PT/INR - ( 18 Sep 2017 00:53 )   PT: 11.3 sec;   INR: 1.02          PTT - ( 18 Sep 2017 00:53 )  PTT:34.5 sec          RADIOLOGY & ADDITIONAL STUDIES:          < from: Xray Chest 2 Views PA/Lat (09.11.17 @ 12:54) >  INTERPRETATION:  XR CHEST PA - LAT dated 9/11/2017 12:54 PM    CLINICAL INFORMATION: Female, 66 years old.  fluid in lungs.    PRIOR STUDIES: 8/28/2017.    FINDINGS: Heart size is enlarged. Pleural parenchymal opacities again   noted over the left mid and lower lung zones. There are pulmonary venous   congestive changes identified. No pneumothorax. Soft tissues and osseous   structures are unchanged.    IMPRESSION:  Essentially no significant interval change when compared to 8/28/2017.   Persistent pleural-parenchymal opacities noted over the left mid and   lower lung zones probably unchanged allowing for technical differences.  Pulmonary venous congestion.      < end of copied text >

## 2017-09-18 NOTE — CONSULT NOTE ADULT - PROBLEM SELECTOR RECOMMENDATION 2
- uncontrolled BP   - restart her medications   - carvedilol 25 mg twice a day, enalapril 20 mg, nifedipine 90 mg daily   - we will do UF also today.

## 2017-09-18 NOTE — DISCHARGE NOTE ADULT - SECONDARY DIAGNOSIS.
ESRD (end stage renal disease) HLD (hyperlipidemia) CAD (coronary artery disease) Hypertensive urgency

## 2017-09-18 NOTE — H&P ADULT - PROBLEM SELECTOR PLAN 1
Patient initially presented with respiratory distress likely multifactorial. Patient with uncontrolled BP upon arrival to ED which could have led to pulmonary edema and hypoxic respiratory failure. Also in setting of ESRD and fluid overload since pt reporting not being dialyzed enough yesterday vs possible RML infiltrate and underlying PNA. On exam, patient with b/l crackles, worse on R. Improved work of breathing and saturation improved on bipap.   -s/p Vanc/Zosyn in ED for ?RML PNA and leukocytosis   -Renal called in ED, plan for HD tomorrow morning   -C/w Bipap for supplemental O2 Patient initially presented with respiratory distress likely multifactorial. Patient with uncontrolled BP upon arrival to ED which could have led to pulmonary edema and hypoxic respiratory failure. Also in setting of ESRD and fluid overload since pt reporting not being dialyzed enough yesterday vs possible RML infiltrate and underlying PNA. On exam, patient with b/l crackles, worse on R. Improved work of breathing and saturation improved on bipap.   -s/p Vanc/Zosyn in ED for ?RML PNA and leukocytosis.   -Lactate negative, mixed venous O2 87%   -Renal called in ED, plan for HD tomorrow morning   -C/w Bipap for supplemental O2 Patient initially presented with respiratory distress likely multifactorial. Patient with uncontrolled BP upon arrival to ED which could have led to pulmonary edema and hypoxic respiratory failure. Also in setting of ESRD and fluid overload since pt reporting not being dialyzed enough yesterday vs possible RML infiltrate and underlying PNA w/underlying pleural effusions. On exam, patient with b/l crackles, worse on R. Improved work of breathing and saturation improved on bipap.   -s/p Vanc/Zosyn in ED for ?RML PNA and leukocytosis.   -C/w Vancomycin and Zosyn for HAP coverage as pt with multiple hospital admissions in past months.   -Lactate negative, mixed venous O2 87%   -Renal called in ED, plan for HD tomorrow morning   -Call pulm in AM to reassess need for thoracentesis as xray w/worsening pleural effusions even though pt s/p pleurodesis.   -C/w Bipap for supplemental O2 Patient initially presented with respiratory distress likely multifactorial. Patient with uncontrolled BP upon arrival to ED which could have led to pulmonary edema and hypoxic respiratory failure. Also in setting of ESRD and fluid overload since pt reporting not being dialyzed enough yesterday vs possible RML infiltrate and underlying PNA w/underlying pleural effusions. On exam, patient with b/l crackles, worse on R. Improved work of breathing and saturation improved on bipap.   -s/p Vanc/Zosyn in ED for ?RML PNA and leukocytosis. However, as pt currently afebrile, w/out cough will hold off on dosing abx. Repeat CXR after HD once patient has a better fluid status and reassess ?infiltrate at that time.  -In the interim, will f/u blood cultures, monitor for fevers and trend WBC   -Lactate negative, mixed venous O2 87%   -Renal called in ED, plan for HD tomorrow morning   -Call pulm in AM to reassess need for thoracentesis as xray w/worsening pleural effusions even though pt s/p pleurodesis.   -C/w Bipap for supplemental O2

## 2017-09-18 NOTE — H&P ADULT - PROBLEM SELECTOR PLAN 3
Patient receives HD T,TH,SAT. Last session yesterday.   -Renal called by ED, plan for HD in the AM. Followup additional recs  -C/w cincalet 60 mg QD  -C/w sevelamer 400 mg TID with meals Patient receives HD T,TH,SAT. Last session yesterday.   -Renal called by ED, plan for HD in the AM. Followup additional recs  -C/w cincalet 60 mg QD  -C/w sevelamer 400 mg TID with meals    #Hyperkalemia: likely related to CKD. EKG wnl.  -HD in AM   -f/u post HD BMP    #Hyponatremia/hypochloremia: Likely due to Metolazone use.   -F/u with renal about restarting medication     #Hypercalcemia: likely in setting of Metolazone use  -Repeat BMP post-HD.   -f/u with renal about restarting medication     #AG Metabolic acidosis 2/2 uremia. Lactate wnl   -HD in the AM Patient receives HD T,TH,SAT. Last session yesterday.   -Renal called by ED, plan for HD in the AM. Followup additional recs  -C/w cincalet 60 mg QD  -C/w sevelamer 400 mg TID with meals    #Hyperkalemia: likely related to CKD. EKG wnl.  -HD in AM   -f/u post HD BMP    #Hyponatremia/hypochloremia: Likely due to Metolazone use.   -F/u with renal about restarting medication     #Hypercalcemia: likely in setting of Metolazone use  -Repeat BMP post-HD.   -f/u with renal about restarting medication   -f/u AM EKG     #AG Metabolic acidosis 2/2 uremia. Lactate wnl   -HD in the AM

## 2017-09-18 NOTE — ED PROVIDER NOTE - MEDICAL DECISION MAKING DETAILS
hx of CHF ESRD HTN CAD  sudden sob tonite  only had 1.5 L taken off yesterday compared to 3.5 L normally  pos BNP trop leak  EF  60%  bedside U/S seen by cards  rec 7L MICU admit  renal fellow paged

## 2017-09-18 NOTE — CONSULT NOTE ADULT - PROBLEM SELECTOR RECOMMENDATION 9
we will do HD today for fluid overload - her last HD done on Saturday last week   - her usual scheduled is TTS   - dry weight 46.5 kg to 47 kg   - potassium - 5.6 - we will do HD today   - supposed to be on Cincalcet 60 mg - please restart it   - on sevelamer 400 mg three times with HD   - bicarbonate - 23  acceptable  - calcium - 11.7 - we will check PTH

## 2017-09-18 NOTE — H&P ADULT - PROBLEM SELECTOR PLAN 5
Denies chest pain, no ischemic EKG changes  -C/w ASA 81 mg  -C/w Simvastatin 20 mg QD. Hb 10 likely in setting of chronic disease  -Trend CBC Hb 10 on this admission, likely in setting of chronic disease  -Trend CBC

## 2017-09-18 NOTE — CONSULT NOTE ADULT - ATTENDING COMMENTS
Patient examined, fellow's hx and PE reviewed and confirmed. I find fluid overload improved. HTN improved. A/P reviewed and confirmed. Continue dialysis. UF on HD. BP controlled. See full note

## 2017-09-18 NOTE — DISCHARGE NOTE ADULT - ADDITIONAL INSTRUCTIONS
Please follow up with your primary care provider and ensure that you attend all your hemodialysis sessions according to your schedule, which is now FOUR DAYS per week. Please follow up with your primary care provider and ensure that you attend all your hemodialysis sessions according to your schedule, which is now FOUR DAYS per week - MONDAY TUESDAY THURSDAY SATURDAY

## 2017-09-18 NOTE — H&P ADULT - PROBLEM SELECTOR PLAN 4
Hb 10 likely in setting of chronic disease  -Trend CBC Patient with hx of diastolic CHF. Last ECHO on 8/23 w/EF 60-65%. On exam, pt with b/l crackles, but no JVD or LE edema. Lung findings likely due to 2/2 fluid overload in setting of ESRD. BNP ~20,000 is chronically elevated likely from CKD.  -Patient seen by Patient with hx of diastolic CHF. Last ECHO on 8/23 w/EF 60-65%. On exam, pt with b/l crackles, but no JVD or LE edema. Lung findings likely due to 2/2 fluid overload in setting of ESRD. BNP ~20,000 is chronically elevated likely from CKD.  -Patient seen by Cardiology fellow in ED. Bedside ECHO grossly unchanged from previous per sign out.  -C/w Enalapril 20mg BID  -C/w Nifedipine 90mg BID  -C/w Coreg 25mg BID  -C/w ASA 81mg daily Patient with hx of diastolic CHF. Last ECHO on 8/23 w/EF 60-65%. On exam, pt with b/l crackles, but no JVD or LE edema. Lung findings likely due to 2/2 fluid overload in setting of ESRD. BNP ~20,000 is chronically elevated likely from CKD.  -Patient seen by Cardiology fellow in ED. Bedside ECHO grossly unchanged from previous per sign out.  -C/w Enalapril 20mg BID  -C/w Nifedipine 90mg BID  -C/w Coreg 25mg BID  -C/w Lasix 160mg daily   -C/w ASA 81mg daily

## 2017-09-18 NOTE — H&P ADULT - HISTORY OF PRESENT ILLNESS
66F PMH CAD, dCHF (EF 60-65%), moderate MS, mod to severe pulm HTN, HTN, AOCD, ESRD on HD T/Th/Sat, CVA (no deficits), previous placement of pleurX catheter for drainage of pleural effusions, presents from home with shortness of breath x1 day. Patient reports she 66F PMH CAD, dCHF (EF 60-65%), moderate MS, mod to severe pulm HTN, HTN, AOCD, ESRD on HD T/Th/Sat, CVA (no deficits), recurrent pleural effusions s/p insertion/removal of pleurX catheter & talc pleurodesis (august 2017) presents from home with shortness of breath x1 day. Patient reports she was laying down trying to sleep when became acutely became short of breath. She reports having HD yesterday, where they may have taken off less fluid than usual. She reports still feeling overloaded after her session.   Of note, patient was admitted from 9/11-9/12 and multiple times this summer for similar symptoms. She received HD which improved her shortness of breath.   She reports usually using two pillows to sleep, can usually walk 1/2 block before getting feeling short of breath.   Denies fevers/chills, cough, abd pain, nausea/vomiting/diarrhea.    Vital Signs Last 24 Hrs  T(C): 36.7 (18 Sep 2017 00:46), Max: 36.7 (18 Sep 2017 00:46)  T(F): 98 (18 Sep 2017 00:46), Max: 98 (18 Sep 2017 00:46)  HR: 80 (18 Sep 2017 03:46) (80 - 99)  BP: 186/87 (18 Sep 2017 03:46) (175/93 - 217/102)  BP(mean): --  RR: 20 (18 Sep 2017 03:46) (18 - 30)  SpO2: 100% (18 Sep 2017 02:18) (84% - 100%)    Patient started on nitro gtt for elevated BP, sublingual nitro, solumedrol 125mg x1, Hydralazine 10mg x1, Enalaprilat 2.5mg x1, Lasix 120mg x1, Vanc x1 and Zosyn x1. 66F PMH CAD, dCHF (EF 60-65%), moderate MS, mod to severe pulm HTN, HTN, AOCD, ESRD on HD T/Th/Sat, CVA (no deficits), recurrent pleural effusions s/p insertion/removal of pleurX catheter & talc pleurodesis (august 2017) presents from home with shortness of breath x1 day. Patient reports she was laying down trying to sleep when became acutely became short of breath. She reports having HD yesterday, where they may have taken off less fluid than usual. She reports still feeling overloaded after her session.   Of note, patient was admitted from 9/11-9/12 and multiple times this summer for similar symptoms. She received HD which improved her shortness of breath.   She reports usually using two pillows to sleep, can usually walk 1/2 block before getting feeling short of breath.   Denies fevers/chills, cough, abd pain, nausea/vomiting/diarrhea.    Vital Signs Last 24 Hrs  T(C): 36.7 (18 Sep 2017 00:46), Max: 36.7 (18 Sep 2017 00:46)  T(F): 98 (18 Sep 2017 00:46), Max: 98 (18 Sep 2017 00:46)  HR: 80 (18 Sep 2017 03:46) (80 - 99)  BP: 186/87 (18 Sep 2017 03:46) (175/93 - 217/102)  BP(mean): --  RR: 20 (18 Sep 2017 03:46) (18 - 30)  SpO2: 100% (18 Sep 2017 02:18) (84% - 100%)    Patient started on nitro gtt for elevated BP, sublingual nitro, solumedrol 125mg x1, Hydralazine 10mg x1, Enalaprilat 2.5mg x1, Lasix 120mg x1, Vanc x1 and Zosyn x1.   Cardiology and MICU consulted in ED for elevated pressures and respiratory distress. 66F PMH CAD, dCHF (EF 60-65%), moderate MS, mod to severe pulm HTN, HTN, AOCD, ESRD on HD T/Th/Sat, CVA (no deficits), recurrent pleural effusions s/p insertion/removal of pleurX catheter & talc pleurodesis (august 2017) presents from home with shortness of breath x1 day. Patient reports she was laying down trying to sleep when became acutely became short of breath. She reports having HD yesterday, where they may have taken off less fluid than usual. She reports still feeling overloaded after her session.   Patient reports compliance to all medications, however did miss PM doses of enalapril, coreg and nifedipine last night.    Of note, patient was admitted from 9/11-9/12 and multiple times this summer for similar symptoms. She received HD which improved her shortness of breath.   She reports usually using two pillows to sleep, can usually walk 1/2 block before getting feeling short of breath.   Denies fevers/chills, cough, abd pain, nausea/vomiting/diarrhea.    Vital Signs Last 24 Hrs  T(C): 36.7 (18 Sep 2017 00:46), Max: 36.7 (18 Sep 2017 00:46)  T(F): 98 (18 Sep 2017 00:46), Max: 98 (18 Sep 2017 00:46)  HR: 80 (18 Sep 2017 03:46) (80 - 99)  BP: 186/87 (18 Sep 2017 03:46) (175/93 - 217/102)  BP(mean): --  RR: 20 (18 Sep 2017 03:46) (18 - 30)  SpO2: 100% (18 Sep 2017 02:18) (84% - 100%)    Patient started on nitro gtt for elevated BP, sublingual nitro, solumedrol 125mg x1, Hydralazine 10mg x1, Enalaprilat 2.5mg x1, Lasix 120mg x1, Vanc x1 and Zosyn x1.   Cardiology and MICU consulted in ED for elevated pressures and respiratory distress.

## 2017-09-18 NOTE — DISCHARGE NOTE ADULT - MEDICATION SUMMARY - MEDICATIONS TO TAKE
I will START or STAY ON the medications listed below when I get home from the hospital:    nebulizer  -- J45.N01 asthma 99 months  -- Indication: For Asthma    aspirin 81 mg oral delayed release tablet  -- 1 tab(s) by mouth once a day  -- Indication: For CAD (coronary artery disease)    enalapril 20 mg oral tablet  -- 1 tab(s) by mouth 2 times a day  -- Indication: For Hypertension    simvastatin 20 mg oral tablet  -- 1 tab(s) by mouth once a day (at bedtime)  -- Indication: For Hyperlipidemia    carvedilol 25 mg oral tablet  -- 1 tab(s) by mouth every 12 hours  -- Indication: For Hypertension    Ventolin HFA 90 mcg/inh inhalation aerosol  -- 2 puff(s) inhaled every 6 hours - for asthma  -- For inhalation only.  It is very important that you take or use this exactly as directed.  Do not skip doses or discontinue unless directed by your doctor.  Obtain medical advice before taking any non-prescription drugs as some may affect the action of this medication.  Shake well before use.    -- Indication: For Asthma    cinacalcet 60 mg oral tablet  -- 1 tab(s) by mouth once a day  -- Indication: For ESRD (end stage renal disease)    NIFEdipine 90 mg oral tablet, extended release  -- 1 tab(s) by mouth 2 times a day  -- Indication: For Hypertension    metOLazone 10 mg oral tablet  -- 1 tab(s) by mouth once a day 30 minutes before PM torsemide  -- Indication: For ESRD (end stage renal disease)    furosemide 80 mg oral tablet  -- 2 tab(s) by mouth once a day (in the morning)  -- Indication: For ESRD (end stage renal disease)    Velphoro 2500 mg (500 mg elemental iron) oral tablet, chewable  -- 2 tab(s) by mouth 3 times a day (with meals)  -- Indication: For ESRD (end stage renal disease)    sevelamer hydrochloride 400 mg oral tablet  -- 1 tab(s) by mouth 3 times a day (with meals)  -- Indication: For ESRD (end stage renal disease)

## 2017-09-18 NOTE — DISCHARGE NOTE ADULT - PLAN OF CARE
You were admitted with exceptionally high blood pressure, likely secondary to volume overload. You were dialyzed on 9/18 with over 4 liters removed and again on 9/19 according to your normal schedule. Your shortness of breath has significantly improved and you are at your baseline, breathing well on room air.   Please follow up with your primary care provider and ensure that you attend all your hemodialysis sessions according to your schedule, which is now FOUR DAYS per week. You were dialyzed on 9/18 with over 4 liters removed and again on 9/19 according to your normal schedule. Your shortness of breath and hypertension has significantly improved and you are at your baseline, breathing well on room air.   Please follow up with your primary care provider and ensure that you attend all your hemodialysis sessions according to your schedule, which is now FOUR DAYS per week. Please continue to take your medications as prescribed and follow up with your primary care provider within 1 week of discharge for further management and recommendations. Treatment Plan: You were admitted with respiratory failure with decreased oxygen saturation and rapid respiratory rate, secondary to volume overload. You were dialyzed on 9/18 with over 4 liters removed and again on 9/19 according to your normal schedule. Your shortness of breath has significantly improved and you are at your baseline, breathing well on room air.   Please follow up with your primary care provider and ensure that you attend all your hemodialysis sessions according to your schedule, which is now FOUR DAYS per week.

## 2017-09-18 NOTE — H&P ADULT - NSHPLABSRESULTS_GEN_ALL_CORE
.  LABS:                         10.3   13.2  )-----------( 387      ( 18 Sep 2017 00:53 )             33.2     09-18    134<L>  |  92<L>  |  57<H>  ----------------------------<  140<H>  5.6<H>   |  23  |  5.23<H>    Ca    11.7<H>      18 Sep 2017 00:54    TPro  8.7<H>  /  Alb  4.3  /  TBili  0.3  /  DBili  x   /  AST  22  /  ALT  12  /  AlkPhos  200<H>  09-18    PT/INR - ( 18 Sep 2017 00:53 )   PT: 11.3 sec;   INR: 1.02          PTT - ( 18 Sep 2017 00:53 )  PTT:34.5 sec    CARDIAC MARKERS ( 18 Sep 2017 00:54 )  x     / 0.02 ng/mL / 46 U/L / x     / 1.2 ng/mL      Serum Pro-Brain Natriuretic Peptide: 41713 pg/mL (09-18 @ 00:54)    Lactate, Blood: 0.9 mmoL/L (09-18 @ 03:26)      RADIOLOGY, EKG & ADDITIONAL TESTS: Reviewed.   CXR: b/l effusions, ?RML opacity at right heart border

## 2017-09-18 NOTE — DISCHARGE NOTE ADULT - CARE PROVIDER_API CALL
oJsh Salinas), Internal Medicine; Pulmonary Disease  [address] [place] [address]  Phone: [phone]  Fax: [phone]

## 2017-09-18 NOTE — H&P ADULT - PROBLEM SELECTOR PLAN 2
Patient receives HD T,TH,SAT. Last session yesterday.   -Renal called by ED, plan for HD in the AM. Followup additional recs  -C/w cincalet 60 mg QD  -C/w sevelamer 400 mg TID with meals Patient with elevated BP to 217/107, upon presentation to ED. Pt given sublingual nitro and started on a nitro gtt. Last admission, patient also with elevated pressures to 190s.    -Per signout, pt evaluated by cardiology and bedside ECHO w/out any significant abnormalities, and normal EF. Followup official cardiology recs   -s/p nitro gtt, Hydralazine 10mg x1, Enalaprilat 2.5mg x1, Lasix 120mg x1 with improvement of pressures  -Pt to be dialyzed in AM Patient with elevated BP to 217/107, upon presentation to ED. Pt given sublingual nitro and started on a nitro gtt. Last admission, patient also with elevated pressures to 190s.    -Per signout, pt evaluated by cardiology and bedside ECHO w/out any significant abnormalities, and normal EF. Followup official cardiology recs   -s/p nitro gtt, Hydralazine 10mg x1, Enalaprilat 2.5mg x1, Lasix 120mg x1 with improvement of pressures  -C/w home meds of Enalapril, Nifedipine, Carvedilol, and Metolazone.   -Pt to be dialyzed in AM Patient with elevated BP to 217/107, upon presentation to ED. Pt given sublingual nitro and started on a nitro gtt. Last admission, patient also with elevated pressures to 190s.    -Per signout, pt evaluated by cardiology and bedside ECHO w/out any significant abnormalities, and normal EF. Followup official cardiology recs   -s/p nitro gtt, Hydralazine 10mg x1, Enalaprilat 2.5mg x1, Lasix 120mg x1 with improvement of pressures  -C/w home meds of Enalapril, Nifedipine, Carvedilol, Lasix. Will hold metolazone in setting of hyponatremia/hypochloremia.   -Pt to be dialyzed in AM Patient with elevated BP to 217/107, upon presentation to ED. Pt given sublingual nitro and started on a nitro gtt. Last admission, patient also with elevated pressures to 190s.    -Per signout, pt evaluated by cardiology and bedside ECHO w/out any significant abnormalities, and normal EF. Pt with elevated troponin likely 2/2 ESRD, and at patient's baseline of 0.02-0.03. No ischemia on EKG  -s/p nitro gtt, Hydralazine 10mg x1, Enalaprilat 2.5mg x1, Lasix 120mg x1 with improvement of pressures  -C/w home meds of Enalapril, Nifedipine, Carvedilol, Lasix. Will hold metolazone in setting of hyponatremia/hypochloremia.   -Pt to be dialyzed in AM Patient with elevated BP to 217/107, upon presentation to ED. Pt given sublingual nitro and started on a nitro gtt. Last admission, patient also with elevated pressures to 190s.    -Per signout, pt evaluated by cardiology and bedside ECHO w/out any significant abnormalities, and normal EF. Pt with elevated troponin likely 2/2 ESRD, and at patient's baseline of 0.02-0.03. No ischemia on EKG  -s/p nitro gtt, Hydralazine 10mg x1, Enalaprilat 2.5mg x1, Lasix 120mg x1 with improvement of pressures to ~180s.   -C/w home meds of Enalapril, Nifedipine, Carvedilol, Lasix. Will hold metolazone in setting of hyponatremia/hypochloremia.   -Pt to be dialyzed in AM  -f/u AM EKG

## 2017-09-18 NOTE — ED PROVIDER NOTE - CRITICAL CARE PROVIDED
conducted a detailed discussion of DNR status/telephone consultation with the patient's family/consultation with other physicians/direct patient care (not related to procedure)/documentation/consult w/ pt's family directly relating to pts condition/additional history taking/interpretation of diagnostic studies

## 2017-09-18 NOTE — CONSULT NOTE ADULT - SUBJECTIVE AND OBJECTIVE BOX
ICU CONSULT    Patient is a 66y old  Female who presents with a chief complaint of SOB.     66yFemale PMH CAD, dCHF (EF 60-65%), moderate MS, mod to severe pulm HTN, HTN, AOCD, ESRD on HD T/Th/Sat, CVA (no deficits), previous placement of pleurX cathetar for drainage of pleural effusions, presents from home with shortness of breath x1 day. Pt had HD yesterday, per report had half of the amount of fluid taken off with normal HD session per pt. She became SOB after HD session worsening and came in to ER. No other complaints, denies chest pain, cough, rhinorrhea, sick contacts, recent abx, dysuria, diarrhea, n/v, joint pains, body aches. Of note, she was last here on 9/11 for same presentation hyperkalemia, SOB requiring urgent HD and discharged subsequently. She took her morning medications but did not take evening meds including nifedipine 90mg, enalipril 20mg, and coreg 25mg.     In the ER VS: Temp 98, HR 80-99, -217/, RR 18-30, O2 sat 84% on RA initially and placed on bipap subsequently Fio2 50% 10/5 with improvement to 100% O2 and reported improved symptoms. In the ER she received 40mgIV and 80mg IV x1, solumedrol 125mg, nitro 0.4 and nitro gtt, duonebs. She was seen by the cardiology fellow for a bedside ECHO who reported good EF, no change from prior. Renal was also called for HD.     PMHx - see above  PSx - denies  Meds - nifedipine 90mg BID, enalipril 20mg BID, coreg 25mg BID, ASA 81, metolazone 10mg QD, senspart 60mg, simvastatin 20mg QD  Allergies - NKDA   FHx - noncontributory  Sx - lives with her daughter Trenton Kam, denies cigarettes/ETOH/illicit drug use. She does not walk with a walker or cane, is independent with ADLs. No hx of falls in the last year.       PHYSICAL EXAM   Vital Signs Last 24 Hrs  T(C): 36.7 (18 Sep 2017 00:46), Max: 36.7 (18 Sep 2017 00:46)  T(F): 98 (18 Sep 2017 00:46), Max: 98 (18 Sep 2017 00:46)  HR: 80 (18 Sep 2017 02:42) (80 - 99)  BP: 178/92 (18 Sep 2017 02:42) (175/93 - 217/102)  BP(mean): --  RR: 20 (18 Sep 2017 02:18) (18 - 30)  SpO2: 100% (18 Sep 2017 02:18) (84% - 100%)      General - on bipap, comfortable, NAD speaking full sentences. lying in bed.   HEENT - PERRL, EOMI, MMM no oral lesions   CV - S1, S2 RRR  Resp - +crackles b/l R>L  Abdomen - soft, NT/ND +abdominal hernia, reducible +BS +midline scar 2/2 c section   Extremities - no edema, wwp, +2 pulses dp/pt, moving all extremities 4+/5 strength LE and 5/5 UE b/l       LABS                        10.3   13.2  )-----------( 387      ( 18 Sep 2017 00:53 )             33.2     09-18    134<L>  |  92<L>  |  57<H>  ----------------------------<  140<H>  5.6<H>   |  23  |  5.23<H>    Ca    11.7<H>      18 Sep 2017 00:54    TPro  8.7<H>  /  Alb  4.3  /  TBili  0.3  /  DBili  x   /  AST  22  /  ALT  12  /  AlkPhos  200<H>  09-18    PT/INR - ( 18 Sep 2017 00:53 )   PT: 11.3 sec;   INR: 1.02          PTT - ( 18 Sep 2017 00:53 )  PTT:34.5 sec            IMAGING   CXR - RML opacity concerning for possible infiltrate

## 2017-09-18 NOTE — H&P ADULT - PROBLEM SELECTOR PLAN 6
-C/w Simvastatin 20mg daily Denies chest pain, no ischemic EKG changes  -C/w ASA 81 mg  -C/w Simvastatin 20 mg QD. Denies chest pain, no ischemic EKG changes.   -Elevated troponin likely 2/2 ESRD, and at patient's baseline of 0.02-0.03.  -C/w ASA 81 mg  -C/w Simvastatin 20mg daily

## 2017-09-18 NOTE — CONSULT NOTE ADULT - ASSESSMENT
66yFemale PMH CAD, dCHF (EF 60-65%), moderate MS, mod to severe pulm HTN, HTN, AOCD, ESRD on HD T/Th/Sat, CVA (no deficits), previous placement of pleurX cathetar for drainage of pleural effusions, presents from home with shortness of breath x1 day found to be in respiratory distress requiring bipap 2/2 fluid overloaded withsimilar to previous presentation on 9/11 but also with leukocytosis concerning for possible PNA and hypertension. Both issues have resolved and pt stable for RMF.     1. Hypoxic respiratory failure requiring Bipap   -2/2 fluid overload and possible PNA seen at [facility] on CXR with leukocytosis on labwork   -Initially came in with respiratory distress as similar presentations but with both subjective+objective improvement of symptoms on bipap, in no distress very comfortable on my exam and stable for RMF with HD in AM   -consider tx for PNA, d/w ER attending   -c/w BiPAP overnight, renal was called for HD for AM no need for emergent HD     2. HTN   -also related to needing HD and missing night time medications (enalipril, nifedipine, coreg as noted above)   -while evaluating the patient, ICU recommended hydral 10mg IVx1, DCing nitro gtt with little response to BP. Enaliprilat 2.5x1 given as pt missed home PO medication, with improvement in BP.   -dialyze pt to help with pt HTN and continue with oral home anti-hypertensives once off bipap       Dispo: RMF , reconsult as needed     Pt seen and examined with Dr. Guerra.

## 2017-09-18 NOTE — DISCHARGE NOTE ADULT - CARE PLAN
Principal Discharge DX:	Acute respiratory failure with hypoxia  Goal:	Treatment Plan:  Instructions for follow-up, activity and diet:	You were admitted with respiratory failure with decreased oxygen saturation and rapid respiratory rate, secondary to volume overload. You were dialyzed on 9/18 with over 4 liters removed and again on 9/19 according to your normal schedule. Your shortness of breath has significantly improved and you are at your baseline, breathing well on room air.   Please follow up with your primary care provider and ensure that you attend all your hemodialysis sessions according to your schedule, which is now FOUR DAYS per week.  Secondary Diagnosis:	Hypertensive urgency  Instructions for follow-up, activity and diet:	You were admitted with exceptionally high blood pressure, likely secondary to volume overload. You were dialyzed on 9/18 with over 4 liters removed and again on 9/19 according to your normal schedule. Your shortness of breath has significantly improved and you are at your baseline, breathing well on room air.   Please follow up with your primary care provider and ensure that you attend all your hemodialysis sessions according to your schedule, which is now FOUR DAYS per week.  Secondary Diagnosis:	ESRD (end stage renal disease)  Instructions for follow-up, activity and diet:	You were dialyzed on 9/18 with over 4 liters removed and again on 9/19 according to your normal schedule. Your shortness of breath and hypertension has significantly improved and you are at your baseline, breathing well on room air.   Please follow up with your primary care provider and ensure that you attend all your hemodialysis sessions according to your schedule, which is now FOUR DAYS per week.  Secondary Diagnosis:	HLD (hyperlipidemia)  Instructions for follow-up, activity and diet:	Please continue to take your medications as prescribed and follow up with your primary care provider within 1 week of discharge for further management and recommendations.  Secondary Diagnosis:	CAD (coronary artery disease)  Instructions for follow-up, activity and diet:	Please continue to take your medications as prescribed and follow up with your primary care provider within 1 week of discharge for further management and recommendations.

## 2017-09-18 NOTE — CONSULT NOTE ADULT - ASSESSMENT
This is 66 year old female with PMH stated above. Renal service is activated for the need of HD. She will be dialyzed today for pulmonary congestion and worsening pleural effusion on the left

## 2017-09-18 NOTE — ED PROVIDER NOTE - CARE PLAN
Principal Discharge DX:	Volume excess  Secondary Diagnosis:	ESRD (end stage renal disease) Principal Discharge DX:	Volume excess  Secondary Diagnosis:	ESRD (end stage renal disease)  Secondary Diagnosis:	Pneumonia

## 2017-09-19 VITALS
DIASTOLIC BLOOD PRESSURE: 76 MMHG | SYSTOLIC BLOOD PRESSURE: 145 MMHG | OXYGEN SATURATION: 95 % | HEART RATE: 74 BPM | TEMPERATURE: 98 F | RESPIRATION RATE: 18 BRPM

## 2017-09-19 DIAGNOSIS — J96.01 ACUTE RESPIRATORY FAILURE WITH HYPOXIA: ICD-10-CM

## 2017-09-19 DIAGNOSIS — I50.32 CHRONIC DIASTOLIC (CONGESTIVE) HEART FAILURE: ICD-10-CM

## 2017-09-19 DIAGNOSIS — I25.10 ATHEROSCLEROTIC HEART DISEASE OF NATIVE CORONARY ARTERY WITHOUT ANGINA PECTORIS: ICD-10-CM

## 2017-09-19 PROCEDURE — 80048 BASIC METABOLIC PNL TOTAL CA: CPT

## 2017-09-19 PROCEDURE — 85025 COMPLETE CBC W/AUTO DIFF WBC: CPT

## 2017-09-19 PROCEDURE — 83880 ASSAY OF NATRIURETIC PEPTIDE: CPT

## 2017-09-19 PROCEDURE — 84484 ASSAY OF TROPONIN QUANT: CPT

## 2017-09-19 PROCEDURE — 94640 AIRWAY INHALATION TREATMENT: CPT

## 2017-09-19 PROCEDURE — 83970 ASSAY OF PARATHORMONE: CPT

## 2017-09-19 PROCEDURE — 71010: CPT | Mod: 26

## 2017-09-19 PROCEDURE — 71045 X-RAY EXAM CHEST 1 VIEW: CPT

## 2017-09-19 PROCEDURE — 99291 CRITICAL CARE FIRST HOUR: CPT | Mod: 25

## 2017-09-19 PROCEDURE — 85610 PROTHROMBIN TIME: CPT

## 2017-09-19 PROCEDURE — 99239 HOSP IP/OBS DSCHRG MGMT >30: CPT

## 2017-09-19 PROCEDURE — 85730 THROMBOPLASTIN TIME PARTIAL: CPT

## 2017-09-19 PROCEDURE — 80053 COMPREHEN METABOLIC PANEL: CPT

## 2017-09-19 PROCEDURE — 93005 ELECTROCARDIOGRAM TRACING: CPT

## 2017-09-19 PROCEDURE — 82553 CREATINE MB FRACTION: CPT

## 2017-09-19 PROCEDURE — 87040 BLOOD CULTURE FOR BACTERIA: CPT

## 2017-09-19 PROCEDURE — 82550 ASSAY OF CK (CPK): CPT

## 2017-09-19 PROCEDURE — 90935 HEMODIALYSIS ONE EVALUATION: CPT

## 2017-09-19 PROCEDURE — 96376 TX/PRO/DX INJ SAME DRUG ADON: CPT

## 2017-09-19 PROCEDURE — 96374 THER/PROPH/DIAG INJ IV PUSH: CPT

## 2017-09-19 PROCEDURE — 94660 CPAP INITIATION&MGMT: CPT

## 2017-09-19 PROCEDURE — 82310 ASSAY OF CALCIUM: CPT

## 2017-09-19 PROCEDURE — 83735 ASSAY OF MAGNESIUM: CPT

## 2017-09-19 PROCEDURE — 82803 BLOOD GASES ANY COMBINATION: CPT

## 2017-09-19 PROCEDURE — 36415 COLL VENOUS BLD VENIPUNCTURE: CPT

## 2017-09-19 PROCEDURE — 83605 ASSAY OF LACTIC ACID: CPT

## 2017-09-19 PROCEDURE — 96375 TX/PRO/DX INJ NEW DRUG ADDON: CPT

## 2017-09-19 RX ADMIN — CINACALCET 60 MILLIGRAM(S): 30 TABLET, FILM COATED ORAL at 13:23

## 2017-09-19 RX ADMIN — CARVEDILOL PHOSPHATE 25 MILLIGRAM(S): 80 CAPSULE, EXTENDED RELEASE ORAL at 06:45

## 2017-09-19 RX ADMIN — Medication 81 MILLIGRAM(S): at 13:27

## 2017-09-19 RX ADMIN — Medication 90 MILLIGRAM(S): at 13:24

## 2017-09-19 RX ADMIN — Medication 20 MILLIGRAM(S): at 06:45

## 2017-09-19 NOTE — PROGRESS NOTE ADULT - PROBLEM SELECTOR PLAN 2
- uncontrolled BP - with better control today   - restart her medications   - carvedilol 25 mg twice a day, enalapril 20 mg, nifedipine 90 mg daily   - we will do UF also today - the plan is 2.5 kg
cont. HD per Renal (4 times a week), renal diet, Renagel, Sensipar, monitor BMP and Phos, renally-dose rx

## 2017-09-19 NOTE — PROGRESS NOTE ADULT - ASSESSMENT
This is 66 year old female with PMH stated above. Renal service is activated for the need of HD. She was dialyzed yesterday - 4 liters removed. We will do HD and will try to challenge her dry weight to 45 kg
65 y/o F w/

## 2017-09-19 NOTE — PROGRESS NOTE ADULT - SUBJECTIVE AND OBJECTIVE BOX
Objective and subjective   The patient is sitting in her bed and eating her breakfast. Feels better caompared to yesterday, Has nasal cannula, overnight used the BiPAP. No chest pain or fever. Was dialyzed yesterday with 4 liters off    Patient is a 66y Female with a history of ESRD on HD T/Th/Sat via left AVF, heart failure with preserved ejection fraction, coronary artery disease, anemia, and hypertension.  The renal service follows the case for the need of HD          aspirin enteric coated 81 milliGRAM(s) daily  cinacalcet 60 milliGRAM(s) daily  sevelamer hydrochloride 400 milliGRAM(s) three times a day with meals  NIFEdipine XL 90 milliGRAM(s) two times a day  carvedilol 25 milliGRAM(s) every 12 hours  simvastatin 20 milliGRAM(s) at bedtime  enalapril 20 milliGRAM(s) two times a day  heparin  Injectable 5000 Unit(s) every 8 hours  ALBUTerol/ipratropium for Nebulization 3 milliLiter(s) every 4 hours PRN  influenza   Vaccine 0.5 milliLiter(s) once      Allergies    No Known Allergies    Intolerances        T(C): , Max: 37.1 (09-18-17 @ 15:58)  T(F): , Max: 98.7 (09-18-17 @ 15:58)  HR: 70 (09-19-17 @ 09:25)  BP: 155/82 (09-19-17 @ 09:25)  BP(mean): --  RR: 18 (09-19-17 @ 09:25)  SpO2: 96% (09-19-17 @ 09:25)  Wt(kg): --    09-18 @ 07:01  -  09-19 @ 07:00  --------------------------------------------------------  IN:  Total IN: 0 mL    OUT:    Other: 4000 mL  Total OUT: 4000 mL    Total NET: -4000 mL        Physical exam:   Alert and oriented, on nasal cannula   No JVD  Normal air entry in the lungs, no wheezing, diffuse crackles   RRR, normal s1/s2, no murmurs, rubs or gallops  Abdomen - soft, non-tender, non-distended, normal bowel sounds   Extremities mild peripheral edema   Has an AV fistula on the left with good bruit       LABS:                        8.9    6.2   )-----------( 346      ( 18 Sep 2017 11:59 )             27.4     09-18    132<L>  |  94<L>  |  65<H>  ----------------------------<  132<H>  6.4<HH>   |  22  |  6.02<H>    Ca    11.1<H>      18 Sep 2017 11:59  Mg     2.5     09-18    TPro  8.7<H>  /  Alb  4.3  /  TBili  0.3  /  DBili  x   /  AST  22  /  ALT  12  /  AlkPhos  200<H>  09-18      PT/INR - ( 18 Sep 2017 00:53 )   PT: 11.3 sec;   INR: 1.02          PTT - ( 18 Sep 2017 00:53 )  PTT:34.5 sec          RADIOLOGY & ADDITIONAL STUDIES:      < from: Xray Chest 1 View AP -PORTABLE-Routine (09.19.17 @ 07:40) >    INTERPRETATION:  INDICATION: SOB . Pleural effusion. Follow-up exam.    TECHNIQUE: Chest, single portable AP view on  9/19/2017 7:40 AM     COMPARISON: Chest radiograph 9/18/2017. Correlation with CT chest   9/27/2017    FINDINGS:  There is improved aeration of the right lower lung zones.  Persistent layering effusion with subjacent compressive atelectasis at   the left lung base.   There are also curvilinear streaky opacities in the peripheral left   midlung zone.   No focal airspace consolidation throughout the aerated right upper lung   zones.  Visualized cardiomediastinal silhouette appears grossly unchanged.   No pneumothorax isevident.      IMPRESSION:   1. Improved aeration of the right lower lung zones.    2. Persistent left pleural effusion with subjacent compressive   atelectasis and patchy/streaky airspace opacities    < end of copied text >
Patient was seen and evaluated on dialysis.   Patient is tolerating the procedure well.   HR: 70 (09-19-17 @ 09:25)  BP: 155/82 (09-19-17 @ 09:25) pusle 65, /67  Continue dialysis:   Dialyzer:  180       QB:  400      QD: 500  Goal UF 2.5 kg over 3 Hours       We will try to lower her dry weight to 45 kg. Hemodynamically stbale
Patient was seen and evaluated on dialysis.   Patient is tolerating the procedure well.   HR: 82 (09-18-17 @ 09:40)  BP: 199/91 (09-18-17 @ 08:55) pusle 65, /67  Continue dialysis:   Dialyzer:  180  QB: 400    QD:  500  Goal UF 3.5 over 3 Hours       We will try to challenge the dry weight to 46 or 46.5 kg today with HD
Patient is a 66y old  Female who presents with a chief complaint of shortness of breath (18 Sep 2017 14:59)      INTERVAL HPI/OVERNIGHT EVENTS:    Pt. seen and examined at 12PM at dialysis  Pt. feels much better; SOB resolved  No F/C, cough, CP, N/V     Review of Systems: 12 point review of systems otherwise negative    MEDICATIONS  (STANDING):  aspirin enteric coated 81 milliGRAM(s) Oral daily  cinacalcet 60 milliGRAM(s) Oral daily  sevelamer hydrochloride 400 milliGRAM(s) Oral three times a day with meals  NIFEdipine XL 90 milliGRAM(s) Oral two times a day  carvedilol 25 milliGRAM(s) Oral every 12 hours  simvastatin 20 milliGRAM(s) Oral at bedtime  enalapril 20 milliGRAM(s) Oral two times a day  heparin  Injectable 5000 Unit(s) SubCutaneous every 8 hours  influenza   Vaccine 0.5 milliLiter(s) IntraMuscular once    MEDICATIONS  (PRN):  ALBUTerol/ipratropium for Nebulization 3 milliLiter(s) Nebulizer every 4 hours PRN Shortness of Breath and/or Wheezing      Allergies    No Known Allergies    Intolerances          Vital Signs Last 24 Hrs  T(C): 36.6 (19 Sep 2017 09:25), Max: 37.1 (18 Sep 2017 15:58)  T(F): 97.8 (19 Sep 2017 09:25), Max: 98.7 (18 Sep 2017 15:58)  HR: 70 (19 Sep 2017 09:40) (68 - 87)  BP: 155/82 (19 Sep 2017 09:25) (155/82 - 208/102)  BP(mean): --  RR: 18 (19 Sep 2017 09:25) (13 - 20)  SpO2: 96% (19 Sep 2017 09:40) (96% - 100%)  CAPILLARY BLOOD GLUCOSE          - @ 07:01  -  - @ 07:00  --------------------------------------------------------  IN: 0 mL / OUT: 4000 mL / NET: -4000 mL        Physical Exam:  (at 12PM, on HD)  Daily     Daily Weight in k.7 (19 Sep 2017 09:25)  General:  comfortable-appearing  HEENT:  MMM  CV:  no JVD  Lungs:  CTA B/L, normal WOB on RA  Abdomen:  soft NT ND  Extremities:  no edema B/L LE; +LUE AVF  Skin:  WWP  Neuro:  AAOx3    LABS:                        8.9    6.2   )-----------( 346      ( 18 Sep 2017 11:59 )             27.4         132<L>  |  94<L>  |  65<H>  ----------------------------<  132<H>  6.4<HH>   |  22  |  6.02<H>    Ca    11.1<H>      18 Sep 2017 11:59  Mg     2.5         TPro  8.7<H>  /  Alb  4.3  /  TBili  0.3  /  DBili  x   /  AST  22  /  ALT  12  /  AlkPhos  200<H>      PT/INR - ( 18 Sep 2017 00:53 )   PT: 11.3 sec;   INR: 1.02          PTT - ( 18 Sep 2017 00:53 )  PTT:34.5 sec        RADIOLOGY & ADDITIONAL TESTS:    ---------------------------------------------------------------------------  I personally reviewed: [  ]EKG   [  ]CXR    [  ] CT    [  ]Other  ---------------------------------------------------------------------------  PLEASE CHECK WHEN PRESENT:     [  ]Heart Failure     [  ] Acute     [  ] Acute on Chronic     [  ] Chronic  -------------------------------------------------------------------     [  ]Diastolic [HFpEF]     [  ]Systolic [HFrEF]     [  ]Combined [HFpEF & HFrEF]     [  ]Other:  -------------------------------------------------------------------  [  ]ODIN     [  ]ATN     [  ]Reneal Medullary Necrosis     [  ]Renal Cortical Necrosis     [  ]Other Pathological Lesions:    [  ]CKD 1  [  ]CKD 2  [  ]CKD 3  [  ]CKD 4  [  ]CKD 5  [  ]Other  -------------------------------------------------------------------  [  ]Other/Unspecified:    --------------------------------------------------------------------    Abdominal Nutritional Status  [  ]Malnutrition: See Nutrition Note  [  ]Cachexia  [  ]Other:   [  ]Supplement Ordered:  [  ]Morbid Obesity (BMI >=40]

## 2017-09-19 NOTE — PROGRESS NOTE ADULT - PROBLEM SELECTOR PLAN 3
- hemoglobin 10.3  - not a good candidate for EPO because of the uncontrolled BP.
cont. BP rx per Renal, low-salt diet, HD as above

## 2017-09-19 NOTE — PROGRESS NOTE ADULT - ATTENDING COMMENTS
Dispo: d/c home today  Pt. does NOT have PNA; abx d/c'ed
Patient examined, fellow's hx and PE reviewed and confirmed. I find stable on dialysis.. A/P reviewed and confirmed. Urged compliance. Continue dialysis. See full note

## 2017-09-19 NOTE — PROGRESS NOTE ADULT - PROBLEM SELECTOR PLAN 1
- last HD on 9/18 - 4 liters off   - her usual scheduled is TTS   - we will do HD today - we will challenge her dry weight to 45 kg   - discussed the case with her nephrology - already scheduled to get 4 sessions of Hd in her center - Monday + TTS  - potassium -6.4 from yesterday - repeat labs today    - supposed to be on Cincalcet 60 mg - please restart it   - on sevelamer 400 mg three times with HD   - bicarbonate - 23  acceptable  - calcium - 11.1 - we will check 
d/t volume overload, d/t ESRD; clinically improved, CXR improved, and off O2 s/p HD yesterday and today

## 2017-09-21 ENCOUNTER — APPOINTMENT (OUTPATIENT)
Dept: PULMONOLOGY | Facility: CLINIC | Age: 67
End: 2017-09-21
Payer: MEDICARE

## 2017-09-21 VITALS — SYSTOLIC BLOOD PRESSURE: 112 MMHG | OXYGEN SATURATION: 97 % | HEART RATE: 75 BPM | DIASTOLIC BLOOD PRESSURE: 78 MMHG

## 2017-09-21 PROCEDURE — 99213 OFFICE O/P EST LOW 20 MIN: CPT | Mod: 24

## 2017-10-05 ENCOUNTER — EMERGENCY (EMERGENCY)
Facility: HOSPITAL | Age: 67
LOS: 1 days | Discharge: HOME | End: 2017-10-05
Attending: EMERGENCY MEDICINE | Admitting: EMERGENCY MEDICINE
Payer: MEDICARE

## 2017-10-05 VITALS
OXYGEN SATURATION: 98 % | HEART RATE: 86 BPM | DIASTOLIC BLOOD PRESSURE: 87 MMHG | TEMPERATURE: 98 F | RESPIRATION RATE: 18 BRPM | WEIGHT: 105.82 LBS | SYSTOLIC BLOOD PRESSURE: 168 MMHG

## 2017-10-05 DIAGNOSIS — Z79.1 LONG TERM (CURRENT) USE OF NON-STEROIDAL ANTI-INFLAMMATORIES (NSAID): ICD-10-CM

## 2017-10-05 DIAGNOSIS — J45.909 UNSPECIFIED ASTHMA, UNCOMPLICATED: ICD-10-CM

## 2017-10-05 DIAGNOSIS — I77.0 ARTERIOVENOUS FISTULA, ACQUIRED: Chronic | ICD-10-CM

## 2017-10-05 DIAGNOSIS — Z79.899 OTHER LONG TERM (CURRENT) DRUG THERAPY: ICD-10-CM

## 2017-10-05 DIAGNOSIS — E78.5 HYPERLIPIDEMIA, UNSPECIFIED: ICD-10-CM

## 2017-10-05 DIAGNOSIS — I10 ESSENTIAL (PRIMARY) HYPERTENSION: ICD-10-CM

## 2017-10-05 DIAGNOSIS — I25.10 ATHEROSCLEROTIC HEART DISEASE OF NATIVE CORONARY ARTERY WITHOUT ANGINA PECTORIS: ICD-10-CM

## 2017-10-05 DIAGNOSIS — R06.02 SHORTNESS OF BREATH: ICD-10-CM

## 2017-10-05 DIAGNOSIS — J90 PLEURAL EFFUSION, NOT ELSEWHERE CLASSIFIED: ICD-10-CM

## 2017-10-05 PROCEDURE — 93010 ELECTROCARDIOGRAM REPORT: CPT

## 2017-10-05 PROCEDURE — 99285 EMERGENCY DEPT VISIT HI MDM: CPT | Mod: 25

## 2017-10-05 NOTE — ED ADULT NURSE NOTE - OBJECTIVE STATEMENT
66 yr old female presents to the ed with c.o sob and chest tightness after her dialysis this morning. denies any chest pain, palpitations. steady gait noted. equal and b/l chest rises with unlabored breathing noted. ekg completed in triage. no distress noted at this time. waiting to be seen by md. will continue to monitor.

## 2017-10-06 VITALS
SYSTOLIC BLOOD PRESSURE: 160 MMHG | RESPIRATION RATE: 18 BRPM | HEART RATE: 77 BPM | DIASTOLIC BLOOD PRESSURE: 88 MMHG | TEMPERATURE: 98 F | OXYGEN SATURATION: 98 %

## 2017-10-06 LAB — TROPONIN T SERPL-MCNC: 0.02 NG/ML — HIGH (ref 0–0.01)

## 2017-10-06 PROCEDURE — 82550 ASSAY OF CK (CPK): CPT

## 2017-10-06 PROCEDURE — 71250 CT THORAX DX C-: CPT

## 2017-10-06 PROCEDURE — 71046 X-RAY EXAM CHEST 2 VIEWS: CPT

## 2017-10-06 PROCEDURE — 83880 ASSAY OF NATRIURETIC PEPTIDE: CPT

## 2017-10-06 PROCEDURE — 84484 ASSAY OF TROPONIN QUANT: CPT

## 2017-10-06 PROCEDURE — 93005 ELECTROCARDIOGRAM TRACING: CPT

## 2017-10-06 PROCEDURE — 71020: CPT | Mod: 26

## 2017-10-06 PROCEDURE — 99284 EMERGENCY DEPT VISIT MOD MDM: CPT | Mod: 25

## 2017-10-06 PROCEDURE — 36415 COLL VENOUS BLD VENIPUNCTURE: CPT

## 2017-10-06 PROCEDURE — 80053 COMPREHEN METABOLIC PANEL: CPT

## 2017-10-06 PROCEDURE — 85025 COMPLETE CBC W/AUTO DIFF WBC: CPT

## 2017-10-06 PROCEDURE — 71250 CT THORAX DX C-: CPT | Mod: 26

## 2017-10-06 PROCEDURE — 82553 CREATINE MB FRACTION: CPT

## 2017-10-06 NOTE — ED PROVIDER NOTE - OBJECTIVE STATEMENT
66 yof pw sob during dialysis, hx of pleural effusion, took 3.5L today, sx has improved but still some remaining tonight.  no cp, no pleurisy, no palpitation.  no abd pain.  no nvd.  sx: sob  a/w: no cp, no palpitations  duration: today  quality: sob  location: left lung  radiation: none  modifying/eliciting factors: none

## 2017-10-06 NOTE — ED PROVIDER NOTE - PHYSICAL EXAMINATION
CON: ao x 3, HENMT: clear oropharynx, soft neck, HEAD: atraumatic, CV: rrr, equal pulses b/l, RESP: dec BS noted to LLL, no retraction, no hypoxia, no tachypnea, no respiratory distress, GI: +BS, soft, nontender, no rebound, no guarding, SKIN: no rash, MSK: no edema, moving all extremities spontaneously, NEURO: no gross motor or sensory deficit

## 2017-10-06 NOTE — ED PROVIDER NOTE - PROGRESS NOTE DETAILS
pt seem comfortably resting in ED, no respiratory distress, mildly elevated trop unchanged after repeat, given CKD, likely chronically elevated.  CT reviewed, pt nad, can dc w/ follow up with PMD.

## 2017-10-06 NOTE — ED PROVIDER NOTE - MEDICAL DECISION MAKING DETAILS
sob during dialysis, hx of pleural effusion, will check xray, CT eval for extent of pleural effusion, cardiac marker, screening ekg, low suspicion for PE (no hypoxia or tachypnea or cp or pleurisy, and more likely to be chronic pleural effusion causing sob)

## 2017-10-09 ENCOUNTER — APPOINTMENT (OUTPATIENT)
Dept: INTERNAL MEDICINE | Facility: CLINIC | Age: 67
End: 2017-10-09
Payer: MEDICARE

## 2017-10-09 VITALS
RESPIRATION RATE: 12 BRPM | HEIGHT: 60.98 IN | HEART RATE: 70 BPM | OXYGEN SATURATION: 97 % | SYSTOLIC BLOOD PRESSURE: 153 MMHG | DIASTOLIC BLOOD PRESSURE: 90 MMHG | WEIGHT: 111 LBS | TEMPERATURE: 97.9 F | BODY MASS INDEX: 20.96 KG/M2

## 2017-10-09 DIAGNOSIS — K46.9 UNSPECIFIED ABDOMINAL HERNIA W/OUT OBSTRUCTION OR GANGRENE: ICD-10-CM

## 2017-10-09 DIAGNOSIS — Z78.9 OTHER SPECIFIED HEALTH STATUS: ICD-10-CM

## 2017-10-09 DIAGNOSIS — Z86.79 PERSONAL HISTORY OF OTHER DISEASES OF THE CIRCULATORY SYSTEM: ICD-10-CM

## 2017-10-09 DIAGNOSIS — Z80.1 FAMILY HISTORY OF MALIGNANT NEOPLASM OF TRACHEA, BRONCHUS AND LUNG: ICD-10-CM

## 2017-10-09 DIAGNOSIS — Z80.9 FAMILY HISTORY OF MALIGNANT NEOPLASM, UNSPECIFIED: ICD-10-CM

## 2017-10-09 DIAGNOSIS — Z87.09 PERSONAL HISTORY OF OTHER DISEASES OF THE RESPIRATORY SYSTEM: ICD-10-CM

## 2017-10-09 DIAGNOSIS — R06.2 WHEEZING: ICD-10-CM

## 2017-10-09 DIAGNOSIS — F15.90 OTHER STIMULANT USE, UNSPECIFIED, UNCOMPLICATED: ICD-10-CM

## 2017-10-09 DIAGNOSIS — R01.1 CARDIAC MURMUR, UNSPECIFIED: ICD-10-CM

## 2017-10-09 DIAGNOSIS — Z86.39 PERSONAL HISTORY OF OTHER ENDOCRINE, NUTRITIONAL AND METABOLIC DISEASE: ICD-10-CM

## 2017-10-09 PROCEDURE — 99215 OFFICE O/P EST HI 40 MIN: CPT

## 2017-10-09 RX ORDER — CARVEDILOL 25 MG/1
25 TABLET, FILM COATED ORAL
Refills: 0 | Status: DISCONTINUED | COMMUNITY
Start: 2017-01-28 | End: 2017-10-09

## 2017-10-09 RX ORDER — TORSEMIDE 20 MG/1
20 TABLET ORAL
Qty: 120 | Refills: 0 | Status: DISCONTINUED | COMMUNITY
Start: 2017-07-10 | End: 2017-10-09

## 2017-10-09 RX ORDER — CYCLOBENZAPRINE HYDROCHLORIDE 5 MG/1
5 TABLET, FILM COATED ORAL
Qty: 16 | Refills: 0 | Status: DISCONTINUED | COMMUNITY
Start: 2017-05-18 | End: 2017-10-09

## 2017-10-11 ENCOUNTER — OUTPATIENT (OUTPATIENT)
Dept: OUTPATIENT SERVICES | Facility: HOSPITAL | Age: 67
LOS: 1 days | End: 2017-10-11
Payer: MEDICARE

## 2017-10-11 DIAGNOSIS — I77.0 ARTERIOVENOUS FISTULA, ACQUIRED: Chronic | ICD-10-CM

## 2017-10-11 DIAGNOSIS — J45.909 UNSPECIFIED ASTHMA, UNCOMPLICATED: ICD-10-CM

## 2017-10-11 PROCEDURE — 94010 BREATHING CAPACITY TEST: CPT | Mod: 26

## 2017-10-11 PROCEDURE — 94726 PLETHYSMOGRAPHY LUNG VOLUMES: CPT | Mod: 26

## 2017-10-11 PROCEDURE — 94729 DIFFUSING CAPACITY: CPT | Mod: 26

## 2017-10-16 ENCOUNTER — APPOINTMENT (OUTPATIENT)
Dept: PULMONOLOGY | Facility: CLINIC | Age: 67
End: 2017-10-16
Payer: MEDICARE

## 2017-10-16 VITALS
OXYGEN SATURATION: 100 % | HEART RATE: 68 BPM | WEIGHT: 115.2 LBS | SYSTOLIC BLOOD PRESSURE: 144 MMHG | DIASTOLIC BLOOD PRESSURE: 72 MMHG | BODY MASS INDEX: 21.78 KG/M2

## 2017-10-16 PROCEDURE — 99215 OFFICE O/P EST HI 40 MIN: CPT | Mod: 24

## 2017-10-18 NOTE — PATIENT PROFILE ADULT. - NUTRITION PROFILE
no indicators present
“You can access the Follow[facility] Patient Portal, offered by [facility], by registering with the following website: [email or web address]”

## 2017-10-19 PROCEDURE — 90935 HEMODIALYSIS ONE EVALUATION: CPT

## 2017-10-19 PROCEDURE — 71046 X-RAY EXAM CHEST 2 VIEWS: CPT

## 2017-10-19 PROCEDURE — 71045 X-RAY EXAM CHEST 1 VIEW: CPT

## 2017-10-19 PROCEDURE — 83690 ASSAY OF LIPASE: CPT

## 2017-10-19 PROCEDURE — 86706 HEP B SURFACE ANTIBODY: CPT

## 2017-10-19 PROCEDURE — 84132 ASSAY OF SERUM POTASSIUM: CPT

## 2017-10-19 PROCEDURE — 87040 BLOOD CULTURE FOR BACTERIA: CPT

## 2017-10-19 PROCEDURE — 85027 COMPLETE CBC AUTOMATED: CPT

## 2017-10-19 PROCEDURE — 84484 ASSAY OF TROPONIN QUANT: CPT

## 2017-10-19 PROCEDURE — 99285 EMERGENCY DEPT VISIT HI MDM: CPT | Mod: 25

## 2017-10-19 PROCEDURE — 93971 EXTREMITY STUDY: CPT

## 2017-10-19 PROCEDURE — 94660 CPAP INITIATION&MGMT: CPT

## 2017-10-19 PROCEDURE — 85610 PROTHROMBIN TIME: CPT

## 2017-10-19 PROCEDURE — 85730 THROMBOPLASTIN TIME PARTIAL: CPT

## 2017-10-19 PROCEDURE — 83880 ASSAY OF NATRIURETIC PEPTIDE: CPT

## 2017-10-19 PROCEDURE — 80053 COMPREHEN METABOLIC PANEL: CPT

## 2017-10-19 PROCEDURE — 93005 ELECTROCARDIOGRAM TRACING: CPT

## 2017-10-19 PROCEDURE — 85025 COMPLETE CBC W/AUTO DIFF WBC: CPT

## 2017-10-19 PROCEDURE — 82330 ASSAY OF CALCIUM: CPT

## 2017-10-19 PROCEDURE — 82550 ASSAY OF CK (CPK): CPT

## 2017-10-19 PROCEDURE — 82803 BLOOD GASES ANY COMBINATION: CPT

## 2017-10-19 PROCEDURE — 83735 ASSAY OF MAGNESIUM: CPT

## 2017-10-19 PROCEDURE — 94640 AIRWAY INHALATION TREATMENT: CPT

## 2017-10-19 PROCEDURE — 82553 CREATINE MB FRACTION: CPT

## 2017-10-19 PROCEDURE — 36415 COLL VENOUS BLD VENIPUNCTURE: CPT

## 2017-10-19 PROCEDURE — 94010 BREATHING CAPACITY TEST: CPT

## 2017-10-19 PROCEDURE — 93306 TTE W/DOPPLER COMPLETE: CPT

## 2017-10-19 PROCEDURE — 84520 ASSAY OF UREA NITROGEN: CPT

## 2017-10-19 PROCEDURE — 87340 HEPATITIS B SURFACE AG IA: CPT

## 2017-10-19 PROCEDURE — 86704 HEP B CORE ANTIBODY TOTAL: CPT

## 2017-10-19 PROCEDURE — 80048 BASIC METABOLIC PNL TOTAL CA: CPT

## 2017-10-19 PROCEDURE — 84295 ASSAY OF SERUM SODIUM: CPT

## 2017-10-19 PROCEDURE — 74018 RADEX ABDOMEN 1 VIEW: CPT

## 2017-10-24 ENCOUNTER — EMERGENCY (EMERGENCY)
Facility: HOSPITAL | Age: 67
LOS: 1 days | Discharge: HOME | End: 2017-10-24
Attending: EMERGENCY MEDICINE | Admitting: EMERGENCY MEDICINE
Payer: MEDICARE

## 2017-10-24 VITALS
TEMPERATURE: 98 F | HEART RATE: 76 BPM | SYSTOLIC BLOOD PRESSURE: 186 MMHG | OXYGEN SATURATION: 99 % | DIASTOLIC BLOOD PRESSURE: 84 MMHG

## 2017-10-24 VITALS
DIASTOLIC BLOOD PRESSURE: 96 MMHG | SYSTOLIC BLOOD PRESSURE: 152 MMHG | RESPIRATION RATE: 18 BRPM | OXYGEN SATURATION: 98 % | HEART RATE: 84 BPM | TEMPERATURE: 98 F

## 2017-10-24 DIAGNOSIS — N18.6 END STAGE RENAL DISEASE: ICD-10-CM

## 2017-10-24 DIAGNOSIS — M25.551 PAIN IN RIGHT HIP: ICD-10-CM

## 2017-10-24 DIAGNOSIS — Z79.82 LONG TERM (CURRENT) USE OF ASPIRIN: ICD-10-CM

## 2017-10-24 DIAGNOSIS — I77.0 ARTERIOVENOUS FISTULA, ACQUIRED: Chronic | ICD-10-CM

## 2017-10-24 DIAGNOSIS — Z79.899 OTHER LONG TERM (CURRENT) DRUG THERAPY: ICD-10-CM

## 2017-10-24 DIAGNOSIS — I12.0 HYPERTENSIVE CHRONIC KIDNEY DISEASE WITH STAGE 5 CHRONIC KIDNEY DISEASE OR END STAGE RENAL DISEASE: ICD-10-CM

## 2017-10-24 DIAGNOSIS — I25.10 ATHEROSCLEROTIC HEART DISEASE OF NATIVE CORONARY ARTERY WITHOUT ANGINA PECTORIS: ICD-10-CM

## 2017-10-24 PROCEDURE — 73502 X-RAY EXAM HIP UNI 2-3 VIEWS: CPT

## 2017-10-24 PROCEDURE — 99283 EMERGENCY DEPT VISIT LOW MDM: CPT | Mod: 25

## 2017-10-24 PROCEDURE — 99283 EMERGENCY DEPT VISIT LOW MDM: CPT

## 2017-10-24 PROCEDURE — 73502 X-RAY EXAM HIP UNI 2-3 VIEWS: CPT | Mod: 26,RT

## 2017-10-24 RX ORDER — SUCROFERRIC OXYHYDROXIDE 500 MG/1
2 TABLET, CHEWABLE ORAL
Qty: 0 | Refills: 0 | COMMUNITY

## 2017-10-24 RX ORDER — ACETAMINOPHEN 500 MG
650 TABLET ORAL ONCE
Qty: 0 | Refills: 0 | Status: COMPLETED | OUTPATIENT
Start: 2017-10-24 | End: 2017-10-24

## 2017-10-24 RX ADMIN — Medication 650 MILLIGRAM(S): at 12:31

## 2017-10-24 NOTE — ED PROVIDER NOTE - MEDICAL DECISION MAKING DETAILS
66F with nontraumatic right hip pain. pt is ambulatory with no neuro deficits on exam. No red flags for CE. XR neg for fracture.  Suspect MSK pain/strain. Will treat with Tylenol. Stable for DC with outpt follow up. 66F with nontraumatic right hip pain. pt is ambulatory with no neuro deficits on exam. No red flags for CE. XR neg for fracture.  Suspect MSK pain/strain or arthritis of hip joint. Will treat with Tylenol. Stable for DC with outpt follow up. 66F with nontraumatic right hip pain. pt is ambulatory with no neuro deficits on exam. No red flags for CE, hx and exam not c/w septic joint. XR neg for fracture.  Suspect MSK pain/strain or arthritis of hip joint. Will treat with Tylenol. Stable for DC with outpt follow up.

## 2017-10-24 NOTE — ED PROVIDER NOTE - OBJECTIVE STATEMENT
66F with PMHx CAD, [id number]%, pulmHTN, HTN, ESRD on HD T/TH/Sa c/b effusions 66F with PMHx CAD, [id number]%, pulmHTN, HTN, ESRD on HD T/TH/Sa who p/w right hip pain x 6 days. pt completed dialysis today and was sent to ER for eval of her hip pain. She denies trauma or fall but notes she lifts her grandson every day. No f/c, no n/t/w in ext, no bowel or bladder dysfunction. no CP/SOB. She is ambulatory. Did not take anything for pain PTA>

## 2017-10-24 NOTE — ED PROVIDER NOTE - PHYSICAL EXAMINATION
GEN: Well appearing, well nourished, awake, alert, oriented to person, place, time/situation and in no apparent distress. NT AF  ENT: Airway patent, Nasal mucosa clear. Mouth with normal mucosa.  EYES: Clear bilaterally.  RESPIRATORY: Breathing comfortably with normal RR.  CARDIAC: Regular rate and rhythm  ABDOMEN: Soft, nontender, +bowel sounds, no rebound, rigidity, or guarding.  MSK: pelvis stable, pain with IR and ER for R hip, distal ext NVI with +2 pulses, compartments soft. Pt is ambulatory and able to bear weight on RLE. No midline c/t/l-spine TTP.  NEURO: Alert and oriented, no focal deficits.  SKIN: Skin normal color for race, warm, dry and intact. No evidence of rash.  PSYCH: Alert and oriented to person, place, time/situation. normal mood and affect. no apparent risk to self or others.

## 2017-10-24 NOTE — ED ADULT TRIAGE NOTE - CHIEF COMPLAINT QUOTE
As per EMS pt complaining of right sided hip and right sided lower extremity pain x 1 week. Pain is described as shooting. Denies any fall. Pt sent from dialysis  for eval. No report of cp, dizziness, or sob.

## 2017-11-06 ENCOUNTER — APPOINTMENT (OUTPATIENT)
Dept: INTERNAL MEDICINE | Facility: CLINIC | Age: 67
End: 2017-11-06
Payer: MEDICARE

## 2017-11-06 VITALS
OXYGEN SATURATION: 94 % | DIASTOLIC BLOOD PRESSURE: 85 MMHG | HEART RATE: 67 BPM | WEIGHT: 113.13 LBS | SYSTOLIC BLOOD PRESSURE: 150 MMHG | HEIGHT: 60 IN | RESPIRATION RATE: 15 BRPM | BODY MASS INDEX: 22.21 KG/M2 | TEMPERATURE: 97.7 F

## 2017-11-06 DIAGNOSIS — R25.2 CRAMP AND SPASM: ICD-10-CM

## 2017-11-06 PROCEDURE — 99214 OFFICE O/P EST MOD 30 MIN: CPT

## 2017-11-06 RX ORDER — SIMVASTATIN 20 MG/1
20 TABLET, FILM COATED ORAL
Refills: 0 | Status: DISCONTINUED | COMMUNITY
End: 2017-11-06

## 2017-11-06 RX ORDER — FUROSEMIDE 80 MG/1
80 TABLET ORAL TWICE DAILY
Refills: 0 | Status: DISCONTINUED | COMMUNITY
Start: 2017-02-14 | End: 2017-11-06

## 2017-12-18 NOTE — ED ADULT NURSE NOTE - TEMPLATE LIST FOR HEAD TO TOE ASSESSMENT
12/18/17, 1:02 PM  Kayla Brice day: 4  Location: [facility]21/021-A Reason for admit: PNA (pneumonia) [J18.9]   Clinical update: Day 4 of stay for pneumonia, IV zithromax continues, [email or web address], NSTEMI documented, cardiology following. 12/16 Pt lethargic d/t pain meds being given; lyrica and percocet held. 12/17 S/P IR placement of pigtail catheter CT, 400 ml drained from left side. PT/OT following and recommends rehab prior to going home. Discharge plan: Pt from home alone with home health. She states she has a wheelchair at home.  stated patient is current with [facility]; call placed to them to fax updated med list.      SS following. ?  Rehab prior to going home Respiratory

## 2018-01-18 ENCOUNTER — FORM ENCOUNTER (OUTPATIENT)
Age: 68
End: 2018-01-18

## 2018-01-19 ENCOUNTER — OUTPATIENT (OUTPATIENT)
Dept: OUTPATIENT SERVICES | Facility: HOSPITAL | Age: 68
LOS: 1 days | End: 2018-01-19
Payer: MEDICARE

## 2018-01-19 ENCOUNTER — APPOINTMENT (OUTPATIENT)
Dept: ORTHOPEDIC SURGERY | Facility: CLINIC | Age: 68
End: 2018-01-19
Payer: MEDICARE

## 2018-01-19 VITALS — HEIGHT: 62 IN | BODY MASS INDEX: 21.16 KG/M2 | WEIGHT: 115 LBS

## 2018-01-19 DIAGNOSIS — R29.898 OTHER SYMPTOMS AND SIGNS INVOLVING THE MUSCULOSKELETAL SYSTEM: ICD-10-CM

## 2018-01-19 DIAGNOSIS — I77.0 ARTERIOVENOUS FISTULA, ACQUIRED: Chronic | ICD-10-CM

## 2018-01-19 DIAGNOSIS — M16.0 BILATERAL PRIMARY OSTEOARTHRITIS OF HIP: ICD-10-CM

## 2018-01-19 PROCEDURE — 99203 OFFICE O/P NEW LOW 30 MIN: CPT

## 2018-01-19 PROCEDURE — 72070 X-RAY EXAM THORAC SPINE 2VWS: CPT | Mod: 26

## 2018-01-19 PROCEDURE — 72170 X-RAY EXAM OF PELVIS: CPT | Mod: 26

## 2018-01-19 PROCEDURE — 72100 X-RAY EXAM L-S SPINE 2/3 VWS: CPT | Mod: 26

## 2018-01-19 PROCEDURE — 72100 X-RAY EXAM L-S SPINE 2/3 VWS: CPT

## 2018-01-19 PROCEDURE — 72070 X-RAY EXAM THORAC SPINE 2VWS: CPT

## 2018-01-19 PROCEDURE — 72170 X-RAY EXAM OF PELVIS: CPT

## 2018-01-19 PROCEDURE — 73564 X-RAY EXAM KNEE 4 OR MORE: CPT | Mod: 26,50

## 2018-01-19 PROCEDURE — 73564 X-RAY EXAM KNEE 4 OR MORE: CPT

## 2018-01-19 RX ORDER — SUCROFERRIC OXYHYDROXIDE 500 MG/1
500 TABLET, CHEWABLE ORAL
Refills: 0 | Status: DISCONTINUED | COMMUNITY
Start: 2017-10-14 | End: 2018-01-19

## 2018-01-25 ENCOUNTER — FORM ENCOUNTER (OUTPATIENT)
Age: 68
End: 2018-01-25

## 2018-01-26 ENCOUNTER — APPOINTMENT (OUTPATIENT)
Dept: MAMMOGRAPHY | Facility: HOSPITAL | Age: 68
End: 2018-01-26

## 2018-01-26 ENCOUNTER — OTHER (OUTPATIENT)
Age: 68
End: 2018-01-26

## 2018-01-26 ENCOUNTER — OUTPATIENT (OUTPATIENT)
Dept: OUTPATIENT SERVICES | Facility: HOSPITAL | Age: 68
LOS: 1 days | End: 2018-01-26
Payer: MEDICARE

## 2018-01-26 DIAGNOSIS — I77.0 ARTERIOVENOUS FISTULA, ACQUIRED: Chronic | ICD-10-CM

## 2018-01-26 DIAGNOSIS — Z00.00 ENCOUNTER FOR GENERAL ADULT MEDICAL EXAMINATION W/OUT ABNORMAL FINDINGS: ICD-10-CM

## 2018-01-26 PROCEDURE — 77066 DX MAMMO INCL CAD BI: CPT | Mod: 26,GG

## 2018-01-26 PROCEDURE — 77066 DX MAMMO INCL CAD BI: CPT

## 2018-01-26 PROCEDURE — 76641 ULTRASOUND BREAST COMPLETE: CPT

## 2018-01-26 PROCEDURE — 76641 ULTRASOUND BREAST COMPLETE: CPT | Mod: 26,50

## 2018-01-26 PROCEDURE — 77067 SCR MAMMO BI INCL CAD: CPT | Mod: 26,59

## 2018-01-26 PROCEDURE — 77067 SCR MAMMO BI INCL CAD: CPT

## 2018-01-27 ENCOUNTER — EMERGENCY (EMERGENCY)
Facility: HOSPITAL | Age: 68
LOS: 1 days | Discharge: HOME | End: 2018-01-27
Attending: EMERGENCY MEDICINE | Admitting: EMERGENCY MEDICINE
Payer: MEDICARE

## 2018-01-27 VITALS
TEMPERATURE: 99 F | OXYGEN SATURATION: 98 % | SYSTOLIC BLOOD PRESSURE: 138 MMHG | HEART RATE: 74 BPM | RESPIRATION RATE: 16 BRPM | DIASTOLIC BLOOD PRESSURE: 74 MMHG

## 2018-01-27 VITALS
RESPIRATION RATE: 18 BRPM | OXYGEN SATURATION: 95 % | SYSTOLIC BLOOD PRESSURE: 149 MMHG | HEART RATE: 81 BPM | DIASTOLIC BLOOD PRESSURE: 81 MMHG | TEMPERATURE: 99 F

## 2018-01-27 DIAGNOSIS — K04.7 PERIAPICAL ABSCESS WITHOUT SINUS: ICD-10-CM

## 2018-01-27 DIAGNOSIS — I12.0 HYPERTENSIVE CHRONIC KIDNEY DISEASE WITH STAGE 5 CHRONIC KIDNEY DISEASE OR END STAGE RENAL DISEASE: ICD-10-CM

## 2018-01-27 DIAGNOSIS — I77.0 ARTERIOVENOUS FISTULA, ACQUIRED: Chronic | ICD-10-CM

## 2018-01-27 DIAGNOSIS — R20.0 ANESTHESIA OF SKIN: ICD-10-CM

## 2018-01-27 DIAGNOSIS — N18.9 CHRONIC KIDNEY DISEASE, UNSPECIFIED: ICD-10-CM

## 2018-01-27 PROCEDURE — 96374 THER/PROPH/DIAG INJ IV PUSH: CPT

## 2018-01-27 PROCEDURE — 99284 EMERGENCY DEPT VISIT MOD MDM: CPT

## 2018-01-27 PROCEDURE — 99284 EMERGENCY DEPT VISIT MOD MDM: CPT | Mod: 25

## 2018-01-27 RX ORDER — ACETAMINOPHEN 500 MG
650 TABLET ORAL ONCE
Qty: 0 | Refills: 0 | Status: COMPLETED | OUTPATIENT
Start: 2018-01-27 | End: 2018-01-27

## 2018-01-27 RX ORDER — ACETAMINOPHEN 500 MG
2 TABLET ORAL
Qty: 60 | Refills: 0 | OUTPATIENT
Start: 2018-01-27 | End: 2018-01-31

## 2018-01-27 RX ORDER — IBUPROFEN 200 MG
600 TABLET ORAL ONCE
Qty: 0 | Refills: 0 | Status: COMPLETED | OUTPATIENT
Start: 2018-01-27 | End: 2018-01-27

## 2018-01-27 RX ADMIN — Medication 650 MILLIGRAM(S): at 23:02

## 2018-01-27 RX ADMIN — Medication 600 MILLIGRAM(S): at 23:02

## 2018-01-27 RX ADMIN — Medication 100 MILLIGRAM(S): at 23:03

## 2018-01-27 NOTE — ED PROVIDER NOTE - PHYSICAL EXAMINATION
VITAL SIGNS: I have reviewed nursing notes and confirm.  CONSTITUTIONAL: Well-developed; well-nourished; in no acute distress.  SKIN: Agree with RN documentation regarding decubitus evaluation. Remainder of skin exam is warm and dry, no acute rash.  HEAD: Normocephalic; atraumatic.  EYES: PERRL, EOM intact; conjunctiva and sclera clear.  ENT: No nasal discharge; airway clear, + dental caries upper and lower molars though able to bite tongue blade w/out eliciting significant pain, + TTP palpating the R buccal mucosa w/out fluctuance, + swelling R buccal mucosa w/overlying erythema and warmth. no ttp of maxillary sinuses, no lifting of floor of mouth, retropharynx wnl  NECK: Supple; non tender.  CARD: S1, S2 normal; no murmurs, gallops, or rubs. RRR  RESP: No wheezes, rales or rhonchi.  ABD: Normal bowel sounds; soft; non-distended; non-tender  EXT: Normal ROM. No clubbing, cyanosis or edema.  LYMPH: No acute cervical adenopathy.  NEURO: Alert, oriented. Grossly unremarkable.  PSYCH: Cooperative, appropriate.

## 2018-01-27 NOTE — ED PROVIDER NOTE - MEDICAL DECISION MAKING DETAILS
+ dental infection likely 2/2 chronic dental caries with dentures in place, no fluctuance or fluid collection, early presentation, abx on board, no fever, can f/u in [facility] in the AM, c/w OTC pain control and abx at home. Given return precautions.

## 2018-01-27 NOTE — ED ADULT NURSE REASSESSMENT NOTE - NS ED NURSE REASSESS COMMENT FT1
Patient a/ox 3, c/o of swelling and numbness and pain 4/10 on right side of face, no fevers, no neuro deficits.  Vital signs stble.  Right FA PIV #20 in place, all labs sent, no complications.  Clindamycin IVPB completed, Tylenol PO adminsitered, Ibuprofen 600mg PO.  Discharge to home pending.

## 2018-01-27 NOTE — ED PROVIDER NOTE - OBJECTIVE STATEMENT
66 y/o F w/hx HTN, CKD, MUNA, well controlled seasonal asthma, chronic back pain, dentures, p/w R cheek swelling, redness, and pain w/overlying paresthesias for past 3-4hrs. No gum bleeding or discharge. No fever/chills. No trauma. No ear involvement or tinnitus. No known allergies or prior allergic reactions/lip swelling. Hasn't seen a dentist in years.

## 2018-01-27 NOTE — ED ADULT NURSE NOTE - OBJECTIVE STATEMENT
Patient c/o of right sided facial numbness and tingling, started 0930H this morning, denies any pain, SOB, no facial asymmetry, no weakness, no slurred speech, able to ambulate w/ steady gait.  States on dialysis, completed dialysis today.  No fever, sensation intact to right side of face.

## 2018-01-27 NOTE — ED ADULT NURSE NOTE - CHPI ED SYMPTOMS NEG
no pain/no decreased eating/drinking/no vomiting/no nausea/no weakness/no dizziness/no chills/no fever

## 2018-01-27 NOTE — ED PROVIDER NOTE - CONDUCTED A DETAILED DISCUSSION WITH PATIENT AND/OR GUARDIAN REGARDING, MDM
return to ED if symptoms worsen, persist or questions arise/need for outpatient follow-up/lab results need for outpatient follow-up/return to ED if symptoms worsen, persist or questions arise

## 2018-01-28 RX ADMIN — Medication 600 MILLIGRAM(S): at 00:02

## 2018-01-28 RX ADMIN — Medication 650 MILLIGRAM(S): at 00:02

## 2018-01-29 ENCOUNTER — APPOINTMENT (OUTPATIENT)
Dept: SURGERY | Facility: CLINIC | Age: 68
End: 2018-01-29
Payer: MEDICARE

## 2018-01-29 VITALS
HEIGHT: 62 IN | DIASTOLIC BLOOD PRESSURE: 73 MMHG | WEIGHT: 121 LBS | BODY MASS INDEX: 22.26 KG/M2 | SYSTOLIC BLOOD PRESSURE: 144 MMHG | OXYGEN SATURATION: 95 % | TEMPERATURE: 97.7 F | HEART RATE: 70 BPM

## 2018-01-29 PROCEDURE — 99213 OFFICE O/P EST LOW 20 MIN: CPT

## 2018-02-09 ENCOUNTER — APPOINTMENT (OUTPATIENT)
Dept: INTERNAL MEDICINE | Facility: CLINIC | Age: 68
End: 2018-02-09
Payer: MEDICARE

## 2018-02-09 VITALS
RESPIRATION RATE: 15 BRPM | HEIGHT: 62 IN | TEMPERATURE: 98 F | BODY MASS INDEX: 21.9 KG/M2 | SYSTOLIC BLOOD PRESSURE: 143 MMHG | WEIGHT: 119 LBS | DIASTOLIC BLOOD PRESSURE: 82 MMHG | OXYGEN SATURATION: 96 % | HEART RATE: 76 BPM

## 2018-02-09 PROCEDURE — 99215 OFFICE O/P EST HI 40 MIN: CPT

## 2018-02-09 RX ORDER — SIMVASTATIN 20 MG/1
20 TABLET, FILM COATED ORAL DAILY
Qty: 30 | Refills: 0 | Status: ACTIVE | COMMUNITY
Start: 2018-02-09 | End: 1900-01-01

## 2018-02-09 RX ORDER — ALBUTEROL SULFATE 90 UG/1
108 (90 BASE) AEROSOL, METERED RESPIRATORY (INHALATION)
Qty: 18 | Refills: 0 | Status: DISCONTINUED | COMMUNITY
Start: 2017-02-02 | End: 2018-02-09

## 2018-02-22 ENCOUNTER — FORM ENCOUNTER (OUTPATIENT)
Age: 68
End: 2018-02-22

## 2018-02-22 ENCOUNTER — APPOINTMENT (OUTPATIENT)
Dept: PULMONOLOGY | Facility: CLINIC | Age: 68
End: 2018-02-22
Payer: MEDICARE

## 2018-02-22 VITALS — DIASTOLIC BLOOD PRESSURE: 78 MMHG | OXYGEN SATURATION: 94 % | SYSTOLIC BLOOD PRESSURE: 148 MMHG | HEART RATE: 77 BPM

## 2018-02-22 DIAGNOSIS — N18.4 CHRONIC KIDNEY DISEASE, STAGE 4 (SEVERE): ICD-10-CM

## 2018-02-22 DIAGNOSIS — Z87.898 PERSONAL HISTORY OF OTHER SPECIFIED CONDITIONS: ICD-10-CM

## 2018-02-22 PROCEDURE — 99215 OFFICE O/P EST HI 40 MIN: CPT

## 2018-02-22 RX ORDER — ALBUTEROL SULFATE 2.5 MG/3ML
(2.5 MG/3ML) SOLUTION RESPIRATORY (INHALATION)
Qty: 168 | Refills: 3 | Status: ACTIVE | COMMUNITY
Start: 2017-08-31 | End: 1900-01-01

## 2018-02-23 ENCOUNTER — APPOINTMENT (OUTPATIENT)
Dept: ULTRASOUND IMAGING | Facility: HOSPITAL | Age: 68
End: 2018-02-23

## 2018-02-23 ENCOUNTER — OUTPATIENT (OUTPATIENT)
Dept: OUTPATIENT SERVICES | Facility: HOSPITAL | Age: 68
LOS: 1 days | End: 2018-02-23
Payer: MEDICARE

## 2018-02-23 DIAGNOSIS — I77.0 ARTERIOVENOUS FISTULA, ACQUIRED: Chronic | ICD-10-CM

## 2018-02-23 PROCEDURE — 71046 X-RAY EXAM CHEST 2 VIEWS: CPT | Mod: 26

## 2018-02-23 PROCEDURE — 76536 US EXAM OF HEAD AND NECK: CPT | Mod: 26

## 2018-02-23 PROCEDURE — 71046 X-RAY EXAM CHEST 2 VIEWS: CPT

## 2018-02-23 PROCEDURE — 76536 US EXAM OF HEAD AND NECK: CPT

## 2018-03-08 ENCOUNTER — APPOINTMENT (OUTPATIENT)
Dept: HEART AND VASCULAR | Facility: CLINIC | Age: 68
End: 2018-03-08

## 2018-03-21 ENCOUNTER — APPOINTMENT (OUTPATIENT)
Dept: HEART AND VASCULAR | Facility: CLINIC | Age: 68
End: 2018-03-21
Payer: MEDICARE

## 2018-03-21 VITALS
HEIGHT: 61.97 IN | SYSTOLIC BLOOD PRESSURE: 152 MMHG | DIASTOLIC BLOOD PRESSURE: 72 MMHG | TEMPERATURE: 97.6 F | OXYGEN SATURATION: 97 % | BODY MASS INDEX: 22.08 KG/M2 | HEART RATE: 71 BPM | WEIGHT: 120 LBS

## 2018-03-21 DIAGNOSIS — N28.89 HYPERTENSION SECONDARY TO OTHER RENAL DISORDERS: ICD-10-CM

## 2018-03-21 DIAGNOSIS — I15.1 HYPERTENSION SECONDARY TO OTHER RENAL DISORDERS: ICD-10-CM

## 2018-03-21 PROCEDURE — 99214 OFFICE O/P EST MOD 30 MIN: CPT | Mod: 25

## 2018-03-21 PROCEDURE — 93000 ELECTROCARDIOGRAM COMPLETE: CPT

## 2018-03-21 RX ORDER — ENALAPRIL MALEATE 20 MG/1
20 TABLET ORAL TWICE DAILY
Refills: 0 | Status: DISCONTINUED | COMMUNITY
End: 2018-03-21

## 2018-03-30 ENCOUNTER — APPOINTMENT (OUTPATIENT)
Dept: INTERNAL MEDICINE | Facility: CLINIC | Age: 68
End: 2018-03-30
Payer: MEDICARE

## 2018-03-30 VITALS
TEMPERATURE: 98.2 F | SYSTOLIC BLOOD PRESSURE: 131 MMHG | OXYGEN SATURATION: 98 % | HEART RATE: 77 BPM | WEIGHT: 118.13 LBS | DIASTOLIC BLOOD PRESSURE: 80 MMHG | HEIGHT: 61.97 IN | BODY MASS INDEX: 21.74 KG/M2

## 2018-03-30 PROCEDURE — 99214 OFFICE O/P EST MOD 30 MIN: CPT

## 2018-04-04 ENCOUNTER — APPOINTMENT (OUTPATIENT)
Dept: HEART AND VASCULAR | Facility: CLINIC | Age: 68
End: 2018-04-04
Payer: MEDICARE

## 2018-04-04 ENCOUNTER — APPOINTMENT (OUTPATIENT)
Dept: HEART AND VASCULAR | Facility: CLINIC | Age: 68
End: 2018-04-04

## 2018-04-04 VITALS — WEIGHT: 118.13 LBS | HEIGHT: 61.97 IN | BODY MASS INDEX: 21.74 KG/M2

## 2018-04-04 PROCEDURE — A9500: CPT

## 2018-04-04 PROCEDURE — ZZZZZ: CPT

## 2018-04-04 PROCEDURE — 78452 HT MUSCLE IMAGE SPECT MULT: CPT

## 2018-04-04 PROCEDURE — 93015 CV STRESS TEST SUPVJ I&R: CPT

## 2018-04-08 ENCOUNTER — FORM ENCOUNTER (OUTPATIENT)
Age: 68
End: 2018-04-08

## 2018-04-09 ENCOUNTER — OUTPATIENT (OUTPATIENT)
Dept: OUTPATIENT SERVICES | Facility: HOSPITAL | Age: 68
LOS: 1 days | End: 2018-04-09
Payer: MEDICARE

## 2018-04-09 ENCOUNTER — APPOINTMENT (OUTPATIENT)
Dept: ORTHOPEDIC SURGERY | Facility: CLINIC | Age: 68
End: 2018-04-09
Payer: MEDICARE

## 2018-04-09 DIAGNOSIS — M17.0 BILATERAL PRIMARY OSTEOARTHRITIS OF KNEE: ICD-10-CM

## 2018-04-09 DIAGNOSIS — I77.0 ARTERIOVENOUS FISTULA, ACQUIRED: Chronic | ICD-10-CM

## 2018-04-09 DIAGNOSIS — G89.29 LOW BACK PAIN: ICD-10-CM

## 2018-04-09 DIAGNOSIS — M54.5 LOW BACK PAIN: ICD-10-CM

## 2018-04-09 PROCEDURE — 73564 X-RAY EXAM KNEE 4 OR MORE: CPT

## 2018-04-09 PROCEDURE — 99214 OFFICE O/P EST MOD 30 MIN: CPT

## 2018-04-09 PROCEDURE — 73564 X-RAY EXAM KNEE 4 OR MORE: CPT | Mod: 26,50

## 2018-04-16 ENCOUNTER — APPOINTMENT (OUTPATIENT)
Dept: SURGERY | Facility: CLINIC | Age: 68
End: 2018-04-16
Payer: MEDICARE

## 2018-04-16 VITALS
HEART RATE: 66 BPM | BODY MASS INDEX: 23.11 KG/M2 | OXYGEN SATURATION: 95 % | DIASTOLIC BLOOD PRESSURE: 79 MMHG | WEIGHT: 124 LBS | SYSTOLIC BLOOD PRESSURE: 146 MMHG | TEMPERATURE: 97.8 F | HEIGHT: 61.5 IN

## 2018-04-16 DIAGNOSIS — I50.30 UNSPECIFIED DIASTOLIC (CONGESTIVE) HEART FAILURE: ICD-10-CM

## 2018-04-16 DIAGNOSIS — K43.9 VENTRAL HERNIA W/OUT OBSTRUCTION OR GANGRENE: ICD-10-CM

## 2018-04-16 DIAGNOSIS — N18.6 END STAGE RENAL DISEASE: ICD-10-CM

## 2018-04-16 PROCEDURE — 99215 OFFICE O/P EST HI 40 MIN: CPT

## 2018-04-24 PROBLEM — N18.6 END STAGE KIDNEY DISEASE: Status: ACTIVE | Noted: 2018-04-24

## 2018-04-25 ENCOUNTER — APPOINTMENT (OUTPATIENT)
Dept: HEART AND VASCULAR | Facility: CLINIC | Age: 68
End: 2018-04-25

## 2018-04-27 ENCOUNTER — APPOINTMENT (OUTPATIENT)
Dept: OTOLARYNGOLOGY | Facility: CLINIC | Age: 68
End: 2018-04-27
Payer: MEDICARE

## 2018-04-27 VITALS
DIASTOLIC BLOOD PRESSURE: 73 MMHG | WEIGHT: 118 LBS | HEIGHT: 62 IN | SYSTOLIC BLOOD PRESSURE: 146 MMHG | BODY MASS INDEX: 21.71 KG/M2 | HEART RATE: 70 BPM

## 2018-04-27 DIAGNOSIS — R04.0 EPISTAXIS: ICD-10-CM

## 2018-04-27 DIAGNOSIS — E04.1 NONTOXIC SINGLE THYROID NODULE: ICD-10-CM

## 2018-04-27 DIAGNOSIS — Z01.810 ENCOUNTER FOR PREPROCEDURAL CARDIOVASCULAR EXAMINATION: ICD-10-CM

## 2018-04-27 DIAGNOSIS — Z78.9 OTHER SPECIFIED HEALTH STATUS: ICD-10-CM

## 2018-04-27 DIAGNOSIS — D86.9 SARCOIDOSIS, UNSPECIFIED: ICD-10-CM

## 2018-04-27 PROCEDURE — 31575 DIAGNOSTIC LARYNGOSCOPY: CPT

## 2018-04-27 PROCEDURE — 99204 OFFICE O/P NEW MOD 45 MIN: CPT | Mod: 25

## 2018-04-27 RX ORDER — ALBUTEROL SULFATE 90 UG/1
108 (90 BASE) AEROSOL, METERED RESPIRATORY (INHALATION) EVERY 4 HOURS
Qty: 1 | Refills: 3 | Status: DISCONTINUED | COMMUNITY
Start: 2018-02-09 | End: 2018-04-27

## 2018-04-27 RX ORDER — CLINDAMYCIN HYDROCHLORIDE 300 MG/1
300 CAPSULE ORAL
Qty: 40 | Refills: 0 | Status: COMPLETED | COMMUNITY
Start: 2018-01-28 | End: 2018-04-27

## 2018-05-04 ENCOUNTER — APPOINTMENT (OUTPATIENT)
Dept: OTOLARYNGOLOGY | Facility: CLINIC | Age: 68
End: 2018-05-04
Payer: MEDICARE

## 2018-05-04 VITALS
HEIGHT: 62 IN | BODY MASS INDEX: 21.53 KG/M2 | DIASTOLIC BLOOD PRESSURE: 80 MMHG | WEIGHT: 117 LBS | SYSTOLIC BLOOD PRESSURE: 140 MMHG | HEART RATE: 81 BPM

## 2018-05-04 PROCEDURE — 99213 OFFICE O/P EST LOW 20 MIN: CPT

## 2018-05-24 ENCOUNTER — APPOINTMENT (OUTPATIENT)
Dept: PULMONOLOGY | Facility: CLINIC | Age: 68
End: 2018-05-24

## 2018-06-02 PROBLEM — Z01.810 PREOP CARDIOVASCULAR EXAM: Status: RESOLVED | Noted: 2018-03-21 | Resolved: 2018-06-02

## 2018-06-02 PROBLEM — Z78.9 SOCIAL ALCOHOL USE: Status: ACTIVE | Noted: 2018-06-02

## 2018-06-02 PROBLEM — D86.9 SARCOIDOSIS: Status: ACTIVE | Noted: 2018-04-27

## 2018-06-02 PROBLEM — E04.1 THYROID NODULE: Status: ACTIVE | Noted: 2017-10-14

## 2018-06-26 ENCOUNTER — EMERGENCY (EMERGENCY)
Facility: HOSPITAL | Age: 68
LOS: 1 days | Discharge: HOME | End: 2018-06-26
Attending: EMERGENCY MEDICINE | Admitting: EMERGENCY MEDICINE
Payer: MEDICARE

## 2018-06-26 VITALS
HEART RATE: 76 BPM | SYSTOLIC BLOOD PRESSURE: 138 MMHG | RESPIRATION RATE: 18 BRPM | TEMPERATURE: 99 F | WEIGHT: 119.93 LBS | DIASTOLIC BLOOD PRESSURE: 85 MMHG | OXYGEN SATURATION: 99 % | HEIGHT: 62 IN

## 2018-06-26 VITALS
RESPIRATION RATE: 18 BRPM | SYSTOLIC BLOOD PRESSURE: 144 MMHG | HEART RATE: 76 BPM | TEMPERATURE: 98 F | DIASTOLIC BLOOD PRESSURE: 75 MMHG | OXYGEN SATURATION: 100 %

## 2018-06-26 DIAGNOSIS — I77.0 ARTERIOVENOUS FISTULA, ACQUIRED: Chronic | ICD-10-CM

## 2018-06-26 DIAGNOSIS — R22.0 LOCALIZED SWELLING, MASS AND LUMP, HEAD: ICD-10-CM

## 2018-06-26 DIAGNOSIS — Z79.82 LONG TERM (CURRENT) USE OF ASPIRIN: ICD-10-CM

## 2018-06-26 DIAGNOSIS — K04.7 PERIAPICAL ABSCESS WITHOUT SINUS: ICD-10-CM

## 2018-06-26 DIAGNOSIS — J45.909 UNSPECIFIED ASTHMA, UNCOMPLICATED: ICD-10-CM

## 2018-06-26 DIAGNOSIS — Z79.899 OTHER LONG TERM (CURRENT) DRUG THERAPY: ICD-10-CM

## 2018-06-26 DIAGNOSIS — I12.9 HYPERTENSIVE CHRONIC KIDNEY DISEASE WITH STAGE 1 THROUGH STAGE 4 CHRONIC KIDNEY DISEASE, OR UNSPECIFIED CHRONIC KIDNEY DISEASE: ICD-10-CM

## 2018-06-26 DIAGNOSIS — N18.9 CHRONIC KIDNEY DISEASE, UNSPECIFIED: ICD-10-CM

## 2018-06-26 PROCEDURE — 99283 EMERGENCY DEPT VISIT LOW MDM: CPT

## 2018-06-26 RX ADMIN — Medication 1 TABLET(S): at 23:49

## 2018-06-26 NOTE — ED ADULT TRIAGE NOTE - CHIEF COMPLAINT QUOTE
Patient presents with right sided facial swelling since 5pm. pt speaks in full sentences. Airway patient, lungs clear. denies difficulty swallowing or SOB.

## 2018-06-26 NOTE — ED PROVIDER NOTE - PHYSICAL EXAMINATION
VITAL SIGNS: I have reviewed nursing notes and confirm.  CONSTITUTIONAL: Well-developed; well-nourished; in no acute distress.  SKIN: Agree with RN documentation regarding decubitus evaluation. Remainder of skin exam is warm and dry, no acute rash.  HEAD: Normocephalic; atraumatic.  EYES: PERRL, EOM intact; conjunctiva and sclera clear.  ENT: No nasal discharge; airway clear. + R lower cheek swelling w/warmth, no erythema or fluctuance, non-ttp, no dental erythema/bleeding/discharge, + dental caries. no tonsillar swelling/uvula deviation or exudates  NECK: Supple; non tender.  CARD: S1, S2 normal; no murmurs, gallops, or rubs. Regular rate and rhythm.  RESP: No wheezes, rales or rhonchi.  ABD: Normal bowel sounds; soft; non-distended; non-tender; no hepatosplenomegaly.  EXT: Normal ROM. No clubbing, cyanosis or edema.  LYMPH: No acute cervical adenopathy.  NEURO: Alert, oriented. Grossly unremarkable.  PSYCH: Cooperative, appropriate.

## 2018-06-26 NOTE — ED ADULT NURSE NOTE - OBJECTIVE STATEMENT
right facial swelling noted since 5 pm, denies pain, states she was advised to see a dentist 2 months ago, speaks clear, breathing regular non labored, no itchiness,

## 2018-06-26 NOTE — ED PROVIDER NOTE - OBJECTIVE STATEMENT
66 y/o F w/hx HTN, CKD, MUNA, well controlled seasonal asthma, chronic back pain, dentures, p/w R cheek swelling for past several hours, non-painful, w/warmth though no redness. No tooth pain/bleeding/discharge. No fever/chills or trauma. Pt received treatment in [facility] ED for similar complaint several months ago, thought to be early dental infection at that time. Sx improved at home with abx though pt never followed up with dentist.

## 2018-06-26 NOTE — ED PROVIDER NOTE - MEDICAL DECISION MAKING DETAILS
Likely early dental infection given significant dental caries, prior similar sx that improved with abx. Starting on augmentin, given f/u with [facility] [place] [facility]. Pt verbalized understanding of plan. Given return precautions.

## 2018-06-26 NOTE — ED ADULT NURSE NOTE - CHPI ED SYMPTOMS NEG
no vomiting/no dizziness/no fever/no weakness/no pain/no nausea/no tingling/no chills/no decreased eating/drinking/no numbness

## 2018-07-09 ENCOUNTER — APPOINTMENT (OUTPATIENT)
Dept: PULMONOLOGY | Facility: CLINIC | Age: 68
End: 2018-07-09
Payer: MEDICARE

## 2018-07-09 VITALS
HEART RATE: 75 BPM | SYSTOLIC BLOOD PRESSURE: 100 MMHG | HEIGHT: 62 IN | WEIGHT: 122 LBS | BODY MASS INDEX: 22.45 KG/M2 | OXYGEN SATURATION: 98 % | RESPIRATION RATE: 12 BRPM | DIASTOLIC BLOOD PRESSURE: 60 MMHG

## 2018-07-09 DIAGNOSIS — J90 PLEURAL EFFUSION, NOT ELSEWHERE CLASSIFIED: ICD-10-CM

## 2018-07-09 PROCEDURE — 99215 OFFICE O/P EST HI 40 MIN: CPT

## 2018-07-09 RX ORDER — CARVEDILOL 25 MG/1
25 TABLET, FILM COATED ORAL TWICE DAILY
Refills: 0 | Status: ACTIVE | COMMUNITY
Start: 2018-07-09

## 2018-07-10 PROBLEM — J90 PLEURAL EFFUSION: Status: ACTIVE | Noted: 2017-07-20

## 2018-08-14 ENCOUNTER — APPOINTMENT (OUTPATIENT)
Dept: INTERNAL MEDICINE | Facility: CLINIC | Age: 68
End: 2018-08-14
Payer: MEDICARE

## 2018-08-14 VITALS
DIASTOLIC BLOOD PRESSURE: 72 MMHG | BODY MASS INDEX: 21.74 KG/M2 | HEART RATE: 84 BPM | WEIGHT: 118.13 LBS | TEMPERATURE: 98.4 F | OXYGEN SATURATION: 96 % | HEIGHT: 62 IN | SYSTOLIC BLOOD PRESSURE: 121 MMHG

## 2018-08-14 DIAGNOSIS — I25.10 ATHEROSCLEROTIC HEART DISEASE OF NATIVE CORONARY ARTERY W/OUT ANGINA PECTORIS: ICD-10-CM

## 2018-08-14 DIAGNOSIS — K21.0 GASTRO-ESOPHAGEAL REFLUX DISEASE WITH ESOPHAGITIS: ICD-10-CM

## 2018-08-14 PROCEDURE — 99214 OFFICE O/P EST MOD 30 MIN: CPT

## 2018-08-14 RX ORDER — METOLAZONE 10 MG/1
10 TABLET ORAL DAILY
Refills: 0 | Status: DISCONTINUED | COMMUNITY
End: 2018-08-14

## 2018-08-17 ENCOUNTER — APPOINTMENT (OUTPATIENT)
Dept: OTOLARYNGOLOGY | Facility: CLINIC | Age: 68
End: 2018-08-17
Payer: MEDICARE

## 2018-08-17 VITALS
WEIGHT: 119 LBS | BODY MASS INDEX: 21.9 KG/M2 | DIASTOLIC BLOOD PRESSURE: 73 MMHG | SYSTOLIC BLOOD PRESSURE: 136 MMHG | HEART RATE: 75 BPM | HEIGHT: 62 IN

## 2018-08-17 DIAGNOSIS — N25.81 SECONDARY HYPERPARATHYROIDISM OF RENAL ORIGIN: ICD-10-CM

## 2018-08-17 DIAGNOSIS — R04.0 EPISTAXIS: ICD-10-CM

## 2018-08-17 DIAGNOSIS — R13.14 DYSPHAGIA, PHARYNGOESOPHAGEAL PHASE: ICD-10-CM

## 2018-08-17 DIAGNOSIS — R59.0 LOCALIZED ENLARGED LYMPH NODES: ICD-10-CM

## 2018-08-17 PROCEDURE — 99214 OFFICE O/P EST MOD 30 MIN: CPT

## 2018-08-28 ENCOUNTER — FORM ENCOUNTER (OUTPATIENT)
Age: 68
End: 2018-08-28

## 2018-08-29 ENCOUNTER — OUTPATIENT (OUTPATIENT)
Dept: OUTPATIENT SERVICES | Facility: HOSPITAL | Age: 68
LOS: 1 days | End: 2018-08-29
Payer: MEDICARE

## 2018-08-29 ENCOUNTER — APPOINTMENT (OUTPATIENT)
Dept: ULTRASOUND IMAGING | Facility: HOSPITAL | Age: 68
End: 2018-08-29
Payer: MEDICARE

## 2018-08-29 ENCOUNTER — RESULT REVIEW (OUTPATIENT)
Age: 68
End: 2018-08-29

## 2018-08-29 DIAGNOSIS — I77.0 ARTERIOVENOUS FISTULA, ACQUIRED: Chronic | ICD-10-CM

## 2018-08-29 PROCEDURE — 76942 ECHO GUIDE FOR BIOPSY: CPT

## 2018-08-29 PROCEDURE — 10022: CPT

## 2018-08-29 PROCEDURE — 76942 ECHO GUIDE FOR BIOPSY: CPT | Mod: 26

## 2018-08-29 PROCEDURE — 88305 TISSUE EXAM BY PATHOLOGIST: CPT

## 2018-08-29 PROCEDURE — 88173 CYTOPATH EVAL FNA REPORT: CPT

## 2018-08-30 LAB — NON-GYNECOLOGICAL CYTOLOGY STUDY: SIGNIFICANT CHANGE UP

## 2018-09-12 PROBLEM — R59.0 CERVICAL ADENOPATHY: Status: ACTIVE | Noted: 2018-03-30

## 2018-09-12 PROBLEM — R04.0 EPISTAXIS, RECURRENT: Status: RESOLVED | Noted: 2018-05-03 | Resolved: 2018-09-12

## 2018-09-12 PROBLEM — R13.14 PHARYNGOESOPHAGEAL DYSPHAGIA: Status: RESOLVED | Noted: 2018-06-02 | Resolved: 2018-09-12

## 2018-09-12 PROBLEM — N25.81 HYPERPARATHYROIDISM, SECONDARY: Status: ACTIVE | Noted: 2018-04-27

## 2018-09-12 RX ORDER — CINACALCET HYDROCHLORIDE 60 MG/1
60 TABLET, COATED ORAL
Refills: 0 | Status: ACTIVE | COMMUNITY
Start: 2017-02-06

## 2018-11-24 NOTE — DISCHARGE NOTE ADULT - PAIN PRESENT
Message   Recorded as Task   Date: 06/28/2017 09:54 AM, Created By: Arabella Strickland   Task Name: 4. Patient Message   Assigned To: GRACIE MANE   Regarding Patient: TACHO GOODSON, Status: Active   Comment:    Arabella Strickland - 28 Jun 2017 9:54 AM     Patient Message to Provider  \"REASON FOR CALL: ,,, Mom wants to confirm all necessary pre-authorization was done because patient was sent to ER    CALLER'S RELATIONSHIP TO PATIENT: Francoise cronin  IF OTHER, NAME AND RELATIONSHIP: ,,,    BEST NUMBER TO BE CONTACTED: [phone]  ALTERNATIVE PHONE NUMBER: ,,,    Turnaround time given to caller:  \"\"THIS MESSAGE WILL BE SENT TO YOUR PROVIDER, THE CLINICAL TEAM WILL RETURN YOUR CALL AS SOON AS THEY HAVE REVIEWED YOUR MESSAGE\"\"    READ BACK MESSAGE TO PATIENT\"   Tomasa Matthews - 28 Jun 2017 1:30 PM     TASK EDITED  Mom states was admitted through the ER, advised mom to call health plan and make sure they were notified.     Signatures   Electronically signed by : Tomasa Matthews R.N.; Jun 28 2017  1:31PM CST    
No

## 2019-05-13 NOTE — DISCHARGE NOTE ADULT - MEDICATION SUMMARY - MEDICATIONS TO STOP TAKING
HPI    SUBJECTIVE:   Vivek Simmons is a 69 year old male who presents to clinic today for the following   health issues:      Medication Followup of Breo & Spiriva    Taking Medication as prescribed: yes    Side Effects:  None    Medication Helping Symptoms:  NO-not a great difference     Was given a PA for glyccopylate, ordered Incruse as alternative on PA, but was never notified by pharmacy about med being ready and so never picked up.  Is back to using Spireva, which we've already determined does not work terribly well.  Still having frequent coughing, wheezing.      Review of Systems   Constitutional: Negative.    Respiratory: Positive for cough, sputum production, shortness of breath and wheezing. Negative for hemoptysis.    Cardiovascular: Negative.    Neurological: Negative.          Physical Exam   Constitutional: He is oriented to person, place, and time.   Eyes: Conjunctivae and EOM are normal.   Cardiovascular: Normal rate, regular rhythm and normal heart sounds.   Pulmonary/Chest: Effort normal. He has decreased breath sounds. He has wheezes.   Musculoskeletal: He exhibits no edema.   Neurological: He is alert and oriented to person, place, and time.   Skin: Skin is warm and dry.   Vitals reviewed.    (J41.1) Mucopurulent chronic bronchitis (H)  Comment: reordering Incruse, longer refill cycle of inhaled steroid.  Plan: fluticasone-vilanterol (BREO ELLIPTA) 200-25         MCG/INH inhaler, umeclidinium (INCRUSE ELLIPTA)        62.5 MCG/INH inhaler              RTC in 1m    Ish Figueroa MD       I will STOP taking the medications listed below when I get home from the hospital:  None

## 2019-10-18 NOTE — PROGRESS NOTE ADULT - PROBLEM SELECTOR PLAN 3
Called pt and he stated that he is having a procedure done on Monday and his bladder is causing him lots of pain at the moment. He stated he would like some pain pills to hold him over until his procedure on Monday and until he can get some from that dr after surgery. Informed pt that he has not been here since July and dr glover is not here so we would not be able to sent in a Rx for pain medication. He verbally understood    - restart her medications   - carvedilol 25 mg twice a day, enalapril 20 mg, furosemide 40 mg daily, nifedipine 90 mg daily   - we will do UF also today.

## 2020-02-18 NOTE — ED PROVIDER NOTE - HEME LYMPH, MLM
Depressor Anguli Oris Units: 0 No adenopathy or splenomegaly. No cervical or inguinal lymphadenopathy.

## 2020-02-27 NOTE — PROGRESS NOTE ADULT - ATTENDING COMMENTS
Seen and examined by me. Consulted by Dr. Albrecht for pleural effusions. The patient is a 66 year-old woman with ESRD on HD admitted with epistaxis, found to be markedly hypertensive, now status post hemodialysis yesterday still with a large left-sided effusion. Diagnostic thoracentesis is certainly warranted given the leukocytosis; likely to also be therapeutic. 1 L thoracentesis performed by us at the bedside today. Discussed with Dr. Albrecht and [facility] team. Opioid Pregnancy And Lactation Text: These medications can lead to premature delivery and should be avoided during pregnancy. These medications are also present in breast milk in small amounts.

## 2020-03-30 NOTE — ED ADULT NURSE NOTE - READING LANGUAGE PREFERRED
Atrial fibrillation    Degenerative Joint Disease    Diabetes mellitus, type II    H/O: GI Bleed    Hiatal hernia    Hypertension    Hypothyroidism    Obesity    Overactive Bladder    Peripheral Neuropathy    Spinal stenosis of lumbar region    Uterine Polyp
English

## 2020-08-12 NOTE — H&P ADULT - PROBLEM/PLAN-1
Pt will be assessed for [facility] tomorrow at 9am.     MARIELOS Manrique     DISPLAY PLAN FREE TEXT

## 2020-08-19 NOTE — ED PROVIDER NOTE - TEMPLATE, MLM
General Fluid/Electrolyte/Metabolic Zyclara Counseling:  I discussed with the patient the risks of imiquimod including but not limited to erythema, scaling, itching, weeping, crusting, and pain.  Patient understands that the inflammatory response to imiquimod is variable from person to person and was educated regarded proper titration schedule.  If flu-like symptoms develop, patient knows to discontinue the medication and contact us.

## 2020-10-12 NOTE — PROGRESS NOTE ADULT - PROBLEM/PLAN-2
DISPLAY PLAN FREE TEXT
6

## 2021-08-14 NOTE — ED PROVIDER NOTE - CARDIOVASCULAR NEGATIVE STATEMENT, MLM
Patient A&0x4. Up with SBA. Ambulated in the [place] this evening. He urinated well. Has tolerated icew and water this shift- Declined offer of clear liquids and full liquids 2/2 pain with swallowing. PO oxycodne for pain. Incision has some bruising around it. No drainage noted. Dressing is CDI. VSS. On 1 ltr 02 via NC for sleep. Patient states he royer sleep apnea but does not use a cpap at home. Lungs clear. No edema. Full code   normal rate and rhythm, no chest pain and no edema.

## 2021-08-30 NOTE — DISCHARGE NOTE ADULT - DISCHARGE WEIGHT
Patient calls, inquiring about why she received a call from [facility]. Chart reviewed. Patient called to Kwasi Carrillo RN on 5/20/2021 with complaint of bowel leakage. GI referral was placed for Mandeep. Patient states  patient had previously been seen bu Bryon Lee at [facility]. Patient requests referral to [facility] for GI. standing Statement Selected

## 2021-12-16 NOTE — ED PROVIDER NOTE - OBJECTIVE STATEMENT
Patient was supposed to be discharged yesterday evening. However patient spiked 101.3 fever when EMS arrived to  patient. At bedside, nauseous and vomiting. Discharge cancelled. Sepsis work up sent. Chest Xray clear. UA with few bacteria, wbc 3 but high epithelial, asymptomatic. CBC with no leukocytosis, cmp wnl. Blood cultures sent. Given 500cc LR for hydration. Zofran for N/V.   Given no source of infection identified otherwise, will  monitor off antibiotics.
67 yo F  with hx of CHF HTN CAD MI anemia ESRD dialyzed Tu th sat pw with SOB x 2 hrs-- no CP  no N/V no diaphoresis  no fevers or chills no cough- no abd pain or diarrhea  no leg swelling

## 2022-05-02 NOTE — ED ADULT NURSE NOTE - CARDIO WDL
Normal rate, regular rhythm, normal S1, S2 heart sounds heard. Pain Refusal Text: I offered to prescribe pain medication but the patient refused to take this medication.

## 2022-07-20 NOTE — PROGRESS NOTE ADULT - PROBLEM SELECTOR PLAN 1
Pitocin off at 0045 due to prolonged deceleration  SVE 8-9/100/0  RN remains at bedside - we will do HD today - 3 hours with UF of 3 liters   -  electrolytes acceptable   - phsophate - 5.1   - cinacalcet 60 mcg  - daily weight   - in and outs ( still makes urine)  - renal diet   - avoid morphine - not a good choice in ckd patient

## 2022-10-08 NOTE — PROGRESS NOTE ADULT - PROBLEM SELECTOR PROBLEM 3
Patient provided with breakfast. Report given to ELHAM Tobar. Discharge plan discussed with and endorsed to ELHAM Tobar. Anemia

## 2022-11-03 NOTE — ED ADULT NURSE NOTE - NS ED NOTE ABUSE RESPONSE YN
[facility]  Individualized Disclosure Statement      Patient: [address] provides 24 hour individualized service to patients with functional limitations due to, but not limited to: stroke, brain injury, spinal cord injury, major multiple trauma, fractures, amputation, and neurological disorders. The [address] [place] [address] provides rehabilitative nursing and medical services as well as physical, occupational, speech, and recreation therapies. [address] [facility] is fully accredited by the Commission on Accreditation of Rehabilitation Facilities (CARF) as a comprehensive provider of rehabilitation services. Patients admitted to the [address] [place] [address] receive a minimum of three hours of therapy per day, at least six days per week, with a revised therapy schedule on weekends and holidays. Physical therapy, occupational therapy, and speech therapy are provided seven days per week including holidays. Other therapeutic services are available on weekends and evenings as needed or scheduled. Intensity of Treatment  Your treatment program will consist of Nursing Care and:  1.5 hours of Physical Therapy, per day  1.5 hours of Occupational Therapy, per day   30-60 minutes of Speech Therapy, per day  1 hour of Recreational Therapy, per week    [facility] maintains contracts with most insurance plans. Depending on the type of coverage, the insurance may impose limits on the coverage for rehabilitation care. Coverage is based on the premise that you are able to fully participate in the rehabilitation program and show continued progress. Please verify your own insurance information A copy of this was given to the patient/ family on this date.   Insurance Coverage  Your insurance company has made the following determination relative to the length of your stay:   Your estimated length of stay is 14 days   Your insurance Coverage has been verified as follows:    Primary Insurance: Payor: Polly Garber /  /  /    Dax Dominican:   [phone] Coverage: Active  Secondary Insurance: None  secondary insurance policies often cover co-pay amounts, but to ensure payment please contact your insurance company.     Alternative Resources: Please ask the  for more information [phone] Yes

## 2022-12-15 NOTE — PATIENT PROFILE ADULT. - BLOOD AVOIDANCE/RESTRICTIONS, PROFILE
Warm soaks, sitz bath,  Take ibuprofen for pain, take muscle relaxer as directed, no driving or drinking while taking muscle relaxer  Follow-up with your primary care doctor or family practice, return to the emergency room if symptoms worsen.   none

## 2023-09-20 NOTE — H&P ADULT - LV FUNCTION ASSESSMENT
Last appointment: 8/29/23  Next appointment: 11/29/23  Previous refill encounter(s): 2/10/23 Pepcid #90 with 1 refill, 1/8/23 Prilosec #90 with 1 refill    Requested Prescriptions     Pending Prescriptions Disp Refills    omeprazole (PRILOSEC) 40 MG delayed release capsule [Pharmacy Med Name: OMEPRAZOLE DR 40 MG CAPSULE] 90 capsule 1     Sig: TAKE 1 CAPSULE BY MOUTH EVERY DAY    famotidine (PEPCID) 40 MG tablet [Pharmacy Med Name: FAMOTIDINE 40 MG TABLET] 90 tablet 1     Sig: TAKE 1 TABLET BY MOUTH [id number] [facility] Tracking Only    Program: Medication Refill  CPA in place:    Recommendation Provided To:    Intervention Detail: New Rx: 2, reason: Patient Preference  Intervention Accepted By:   Daniel Cuevas Closed?:    Time Spent (min): 5
yes

## 2023-12-05 NOTE — CONSULT NOTE ADULT - SUBJECTIVE AND OBJECTIVE BOX
Patient is a 66y Female with a history of ESRD on HD T/Th/Sat via left AVF, heart failure with preserved ejection fraction, coronary artery disease, anemia, and hypertension.  She came in the ED because of progressive shortness of breath since yesterday, no fever, no chest pain. IN the ED found to have pulmonary congestion. The renal service is activated for the need of HD. The patient last HD done on Saturday this week -she is getting HD  at [facility] [phone]. Her dry weight is between 47 and 48, has a AV fistula on the left arm.      Patient is a 66y Female admitted for     PAST MEDICAL & SURGICAL HISTORY:  Anemia: 2/2 CKD  Asthma  MI, old  CAD (coronary artery disease)  HLD (hyperlipidemia)  CKD (chronic kidney disease): Stage 5CKD  CVA (cerebral infarction)  Hypertension  AV fistula      MEDICATIONS  (STANDING):  aspirin enteric coated 81 milliGRAM(s) Oral daily  enalapril 20 milliGRAM(s) Oral two times a day  simvastatin 20 milliGRAM(s) Oral at bedtime  carvedilol 25 milliGRAM(s) Oral every 12 hours  furosemide    Tablet 160 milliGRAM(s) Oral daily  NIFEdipine XL 90 milliGRAM(s) Oral two times a day  cinacalcet 60 milliGRAM(s) Oral daily  sevelamer hydrochloride 800 milliGRAM(s) Oral three times a day with meals  heparin  Injectable 5000 Unit(s) SubCutaneous every 8 hours    MEDICATIONS  (PRN):      Allergies    No Known Allergies    Intolerances        SOCIAL HISTORY:    FAMILY HISTORY:  No pertinent family history in first degree relatives      T(C): , Max: 37.3 (09-11-17 @ 14:37)  T(F): , Max: 99.1 (09-11-17 @ 14:37)  HR: 83 (09-11-17 @ 14:37)  BP: 169/72 (09-11-17 @ 14:37)  BP(mean): --  RR: 18 (09-11-17 @ 14:37)  SpO2: 97% (09-11-17 @ 14:37)  Wt(kg): --    Height (cm): 157.48 (09-11 @ 11:29)  Weight (kg): 52 (09-11 @ 11:29)  BMI (kg/m2): 21 (09-11 @ 11:29)  BSA (m2): 1.51 (09-11 @ 11:29)      Physical exam:   Alert and oriented   No JVD  Normal air entry in the lungs, no wheezing, diffuse crackles   RRR, normal s1/s2, no murmurs, rubs or gallops  Abdomen - soft, non-tender, non-distended, normal bowel sounds   Extremities mild peripheral edema   Has an AV fistula on the left   LABS:                        9.1    7.9   )-----------( 331      ( 11 Sep 2017 12:51 )             27.4     09-11    130<L>  |  91<L>  |  62<H>  ----------------------------<  94  6.1<H>   |  25  |  6.00<H>    Ca    11.0<H>      11 Sep 2017 12:45    TPro  7.7  /  Alb  3.5  /  TBili  0.3  /  DBili  x   /  AST  18  /  ALT  9<L>  /  AlkPhos  172<H>  09-11      PT/INR - ( 11 Sep 2017 12:47 )   PT: 10.7 sec;   INR: 0.96          PTT - ( 11 Sep 2017 12:47 )  PTT:33.3 sec          RADIOLOGY & ADDITIONAL STUDIES:  < from: Xray Chest 2 Views PA/Lat (09.11.17 @ 12:54) >  PRIOR STUDIES: 8/28/2017.    FINDINGS: Heart size is enlarged. Pleural parenchymal opacities again   noted over the left mid and lower lung zones. There are pulmonary venous   congestive changes identified. No pneumothorax. Soft tissues and osseous   structures are unchanged.    IMPRESSION:  Essentially no significant interval change when compared to 8/28/2017.   Persistent pleural-parenchymal opacities noted over the left mid and   lower lung zones probably unchanged allowing for technical differences.  Pulmonary venous congestion.    < end of copied text >
Quality 226: Preventive Care And Screening: Tobacco Use: Screening And Cessation Intervention: Patient screened for tobacco use and is an ex/non-smoker
Quality 130: Documentation Of Current Medications In The Medical Record: Current Medications Documented
Detail Level: Detailed
Quality 110: Preventive Care And Screening: Influenza Immunization: Influenza Immunization previously received during influenza season

## 2024-01-11 NOTE — ED PROVIDER NOTE - PRINCIPAL DIAGNOSIS
1/11/2024         RE: Venkata Lynn  [address] [facility] [id number]        Dear Colleague,    Thank you for referring your patient, Venkata Lynn, to the [facility]. Please see a copy of my visit note below.    HISTORY OF PRESENT ILLNESS:    Venkata Lynn is a 57 year old male who is seen in follow up for Left ring trigger release, DOS 01/04/2024, Dr. Solis.  Present symptoms: Pt reports minimal discomfort.  Healing incision, keeping it clean and covered.  Finger motions returning without triggering.    NOTE:  Pt states has painful Right Long finger triggering issues.  Has had injection by Dr. Solis in the past with some relief, starting to bother him again.  WOuld like to discuss injection today.  Injection hx.  8/22/23 and 10/17/2023 Dr. Solis.  Denies Chest pain, Calve pain, Fever, Chills.    Current Treatment: postop dressing.    PHYSICAL EXAM:  There were no vitals taken for this visit.  There is no height or weight on file to calculate BMI.   GENERAL APPEARANCE: healthy, alert, and no distress   PSYCH:  mentation appears normal and affect normal/bright    MSK:  Left: Ring finger .  Ambulates: WNG.  Incision clean and dry, subcutaneous closure present, healing.  Appropriate incisional erythema.   No Ecchymosis.  Edema min at digit and hand.  CMS: tee incisional numbness, otherwise grossly intact.  AROM full.    Right Long finger.  Gross:  no significant issues.  Mild PIP flexion at rest.    AROM: Visible jog in flexion extension with motion.  No locking.  Palpation:  firm nodule at palm at long finger flexion tendon, moves with finger motions.  Sensation, motor, circulation intact.     ASSESSMENT:  Venkata Lynn is a 57 year old male S/P Trigger release..  Healing, motions restored.    Right Long finger A1  trigger, improved with injections in past.  Given 3 months since last injection and recent surgery we decided to proceed with injection today.       I recommended to Champ that if injection does not resolve symptoms or if they return in a couple months that he seek surgical treatment rather than a 4th injection.    PLAN:  - Surgery discussed, images reviewed if applicable, and all questions were answered at this time.  - dressing removed with sterile technique, steri-strips applied in usual fashion, care instructions given and verbally acknowledged.  - Medications: none per ortho.  - Continue to motion.  - AAT  - Notify clinic if he would like to proceed with Right long finger trigger release.    Return to clinic PRN    Kevon Kwong PA-C    Dept. Orthopedic Surgery  [facility]   1/11/2024    Hand / Upper Extremity Injection/Arthrocentesis: R long A1    Date/Time: 1/11/2024 6:40 PM    Performed by: Kevon Kwong PA-C  Authorized by: Kevon Kwong PA-C    Indications:  Therapeutic  Needle Size:  25 G  Guidance: landmark    Approach:  Volar  Condition: trigger finger    Location:  Long finger    Site:  R long A1  Medications:  1 mL dexAMETHasone 120 MG/30ML; 1 mL lidocaine 1 %  Outcome:  Tolerated well, no immediate complications  Procedure discussed: discussed risks, benefits, and alternatives    Timeout: timeout called immediately prior to procedure    Prep: patient was prepped and draped in usual sterile fashion          Again, thank you for allowing me to participate in the care of your patient.        Sincerely,        Kevon Kwong PA-C   Shortness of breath

## 2024-06-12 NOTE — PROGRESS NOTE ADULT - PROBLEM/PLAN-5
DISPLAY PLAN FREE TEXT
Apixaban/Eliquis increases your risk for bleeding. Notify your doctor if you experience any of the following side effects: bleeding, coughing or vomiting blood, red or black stool, unexpected pain or swelling, itching or hives, chest pain, chest tightness, trouble breathing, changes in how much or how often you urinate, red or pink urine, numbness or tingling in your feet, or unusual muscle weakness. When Apixaban/Eliquis is taken with other medicines, they can affect how it works. Taking other medications such as aspirin, blood thinners, nonsteroidal anti-inflammatories, and medications that treat depression can increase your risk of bleeding. It is very important to tell your health care provider about all of the other medicines, including over-the-counter medications, herbs, and vitamins you are taking. DO NOT start, stop, or change the dosage of any medicine, including over-the-counter medicines, vitamins, and herbal products without your doctor’s approval. Any products containing aspirin or are nonsteroidal anti-inflammatories lessen the blood’s ability to form clots and add to the effect of Apixaban/Eliquis. Never take aspirin or medicines that contain aspirin without speaking to your doctor.

## 2024-09-18 NOTE — ED PROVIDER NOTE - CONSTITUTIONAL DEVELOPMENT, MLM
PULMONARY FOLLOW-UP NOTE    Subjective    Ms. Talbert is a 44 year old female with abnormal CT chest, left lower lobe infiltrate, status post bronchoscopy with no notable findings.    Chief Complaint:    Chief Complaint   Patient presents with    Office Visit         HPI:  She notes she is feeling ok overall.   She notes she has not had any fevers or cough since last office visit.   She notes some intermittent dyspnea on exertion with stair climbing or hurried activities.   She notes she has started working out on elliptical daily for 30-40 min without issues or dyspnea.   She notes weight loss of 7 lbs over the last 2 months, intentional weight loss.   She notes good appetite and energy.   No recent illnesses.   No recent antibiotics or steroids since last visit.   She notes she has been experiencing some right arm joint pains, muscle aches, back pain.   No prior history of rheumatologic disease. No known family history.  She notes itchiness. No other dry eye or mouth, rashes.   She notes exposure to TB while working at [employer] [facility] [employer] at [facility] [employer]. This is years ago. She was treated for TB exposure, for 3 months but forgets what agent.   She notes dog for 9 years and cat for 3 years.   She notes travel to [place] about 2 years ago and again earlier this year.     Maintenance inhalers/sprays: none  Rescue inhalers/sprays: Albuterol, not requiring          Past Medical History:   Diagnosis Date    Asthma (CMD)     Hernia, inguinal     Hernia, umbilical     Influenza with pneumonia 11/2022       Past Surgical History:   Procedure Laterality Date    Appendectomy      Esophagogastroduodenoscopy transoral flex diag      Open access colonoscopy         No family history on file.    Social History     Tobacco Use    Smoking status: Never     Passive exposure: Never    Smokeless tobacco: Never   Vaping Use    Vaping status: never used   Substance Use Topics    Alcohol use: Not Currently    Drug use: Never        Current Outpatient Medications   Medication Sig Dispense Refill    meclizine (ANTIVERT) 25 MG tablet Take 1 tablet by mouth 3 times daily as needed for Dizziness. (Patient not taking: Reported on 9/18/2024) 9 tablet 3    albuterol 108 (90 Base) MCG/ACT inhaler Inhale 2 puffs into the lungs every 4 hours as needed for Shortness of Breath or Wheezing. 1 each 0    guaiFENesin-codeine (GUAIFENESIN AC) 100-10 MG/5ML liquid Take 5 mLs by mouth every 6 hours as needed for Cough. (Patient not taking: Reported on 9/18/2024) 118 mL 0    acetaminophen (TYLENOL) 325 MG tablet Take 2 tablets by mouth every 6 hours as needed for Pain. (Patient not taking: Reported on 9/18/2024) 30 tablet     ibuprofen (MOTRIN) 600 MG tablet Take 1 tablet by mouth every 6 hours as needed for Pain. (Patient not taking: Reported on 9/18/2024) 30 tablet 1    traMADol (ULTRAM) 50 MG tablet Take 1 tablet by mouth every 6 hours as needed for Pain. (Patient not taking: Reported on 9/18/2024) 5 tablet 0    MAGNESIUM OXIDE PO STOP NOW      BLACK CURRANT SEED OIL PO STOP NOW      albuterol 108 (90 Base) MCG/ACT inhaler Inhale 2 puffs into the lungs every 4 hours as needed for Shortness of Breath or Wheezing. (Patient not taking: Reported on 9/18/2024)      VITAMIN D PO Pt instructed to stop now      BIOTIN PO Take by mouth daily. Pt instructed to stop now       No current facility-administered medications for this visit.       ALLERGIES:  No Known Allergies      Review of Systems   All other systems reviewed and are negative.           Physical Exam  Vitals reviewed.   Constitutional:       General: She is not in acute distress.     Appearance: Normal appearance. She is not ill-appearing or diaphoretic.   HENT:      Head: Normocephalic and atraumatic.      Neck: Neck supple. No tenderness.   Eyes:      General: No scleral icterus.     Extraocular Movements: Extraocular movements intact.      Conjunctiva/sclera: Conjunctivae normal.   Cardiovascular:       Rate and Rhythm: Normal rate and regular rhythm.      Heart sounds: Normal heart sounds. No murmur heard.     No friction rub. No gallop.   Pulmonary:      Effort: Pulmonary effort is normal. No respiratory distress.      Breath sounds: Normal breath sounds. No stridor. No wheezing, rhonchi or rales.   Chest:      Chest wall: No tenderness.   Abdominal:      General: Bowel sounds are normal.      Palpations: Abdomen is soft.      Tenderness: There is no abdominal tenderness.   Musculoskeletal:         General: Normal range of motion.      Right lower leg: No edema.      Left lower leg: No edema.   Lymphadenopathy:      Cervical: No cervical adenopathy.   Skin:     General: Skin is warm and dry.      Coloration: Skin is not jaundiced.   Neurological:      General: No focal deficit present.      Mental Status: She is alert.   Psychiatric:         Mood and Affect: Mood normal.         Behavior: Behavior normal.          Patient records: Records Reviewed    Imaging Studies: CT CHEST WO CONTRAST  Narrative: PROCEDURE:  CT CHEST WO CONTRAST    COMPARISON: 6/10/2024    INDICATIONS: Follow up LLL infiltrate    TECHNIQUES: Multiple axial images of CT CHEST WO CONTRAST were obtained.  Multiplanar reconstructed images were also acquired. Adjustment of the mA  and/or kV was done based on the patient's size and age.    FINDINGS: The heart size is normal. No pericardial effusion. Thoracic aorta  shows normal caliber.    The lungs are normally aerated. Small to moderate area of  peribronchial/subpleural infiltration is demonstrated in the left lung base  posteromedially grossly unchanged from prior examination. There is  suggestion of 1.2 cm cavitary lesion in the anterior aspect with air-fluid  level which appears slightly smaller compared to the prior examination. The  rest of the lungs without acute consolidations or mass. There is no  pneumothorax, pleural effusion, or lymphadenopathy in the mediastinum.    The visualized  upper abdomen is unremarkable. The osseous structures are  within normal limit.  Impression: 1. Redemonstration of a moderate area of peribronchial and subpleural  airspace infiltration in the left lung base posteromedially surrounding a  questionable 1.2 cm cavitary lesion which containing air-fluid level..  Please see detailed discussion.    Electronically Signed by: RICKEY DOWELL MD   Signed on: 9/10/2024 9:46 AM   Workstation ID: [id number]       LAB RESULTS:  WBC (K/mcL)   Date Value   06/10/2024 5.4     RBC (mil/mcL)   Date Value   06/10/2024 4.36     HCT (%)   Date Value   06/10/2024 41.2     HGB (g/dL)   Date Value   06/10/2024 13.4     PLT (K/mcL)   Date Value   06/10/2024 447       Sodium (mmol/L)   Date Value   06/10/2024 138     Potassium (mmol/L)   Date Value   06/10/2024 3.6     Chloride (mmol/L)   Date Value   06/10/2024 103     Glucose (mg/dL)   Date Value   06/10/2024 101 (H)     Calcium (mg/dL)   Date Value   06/10/2024 8.9     Carbon Dioxide (mmol/L)   Date Value   06/10/2024 26     BUN (mg/dL)   Date Value   06/10/2024 11     Creatinine (mg/dL)   Date Value   06/10/2024 0.53       No results found for: \"IRON\"  No results found for: \"TSH\"      Impression  (J98.4) Cavitary lesion of lung  (primary encounter diagnosis)  Plan: Rheumatoid Factor, Cyclic Citrullinated Peptide        Antibody IgG & IgA, RUIZ Screen With Antibody         And IFA Reflex, ANCA Screen with MPO and PR3,         with Reflex to ANCA Titer, C Reactive Protein,         Quantiferon TB Plus, ANCA Screen with MPO and         PR3, with Reflex to ANCA Titer    (R91.8) Pulmonary infiltrate  Plan: Rheumatoid Factor, Cyclic Citrullinated Peptide        Antibody IgG & IgA, RUIZ Screen With Antibody         And IFA Reflex, ANCA Screen with MPO and PR3,         with Reflex to ANCA Titer, C Reactive Protein,         Quantiferon TB Plus, ANCA Screen with MPO and         PR3, with Reflex to ANCA Titer    (R93.89) Abnormal CT of the  chest  Plan: Rheumatoid Factor, Cyclic Citrullinated Peptide        Antibody IgG & IgA, RUIZ Screen With Antibody         And IFA Reflex, ANCA Screen with MPO and PR3,         with Reflex to ANCA Titer, C Reactive Protein,         Quantiferon TB Plus, ANCA Screen with MPO and         PR3, with Reflex to ANCA Titer    This is a 44-year-old female who presents for follow-up of pulmonary infiltrates and abnormal CT chest.  She notes some dyspnea on exertion, right joint pains, muscle aches, back pain.  No prior history of rheumatologic disease.  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  She had repeat CT chest which shows persistent infiltrates, now with a cavitary lesion.  Therefore, we will further evaluate blood work.    Please obtain blood work to further evaluate changes on CT chest.  Albuterol as needed.      Orders Placed This Encounter    Rheumatoid Factor    Cyclic Citrullinated Peptide Antibody IgG & IgA    RUIZ Screen With Antibody And IFA Reflex    ANCA Screen with MPO and PR3, with Reflex to ANCA Titer    C Reactive Protein    Quantiferon TB Plus       Return in about 4 weeks (around 10/16/2024).    Total time: 31 minutes    Electronically signed by: Gifty Arvizu MD 09/18/24   well developed

## 2024-09-27 NOTE — CONSULT NOTE ADULT - NEUROLOGICAL
Ch provided emotional and spiritual support to grieving family members. Withdraw of Care to begin.  provided bedside prayer service and a prayer blanket to mother of pt. Several family members were present at bedside to pray and say their final goodbye to pt. Mother of pt in wheelchair. All appear to be accepting of pt condition and grieving is appropriate at this time.  will continue to  to family throughout the day and evening hours.   It was an honor to be able to serve this patient and family.    negative detailed exam

## 2025-04-03 NOTE — CONSULT NOTE ADULT - PROBLEM SELECTOR PROBLEM 3
Outpatient Care Management  Patient Does Not Consent    Patient: Nate Bah  MRN:  [id number]  Date of Service:  4/3/2025  Completed by:  Giovana Dumont RN    Chief Complaint   Patient presents with    [facility] Enrollment Call    Case Closure     Declined.       Patient Summary           Consent Received:  Decline  Decline Reason:  Not interested             
Hypertension

## 2025-05-12 NOTE — ED ADULT NURSE NOTE - CAS DISCH CONDITION
Refill Routing Note   Medication(s) are not appropriate for processing by [facility] for the following reason(s):        Other    [facility] action(s):  Defer        Medication Therapy Plan: Need negative pregnancy status      Appointments  past 12m or future 3m with PCP    Date Provider   Last Visit   4/28/2025 Josie Khan, DO   Next Visit   5/12/2025 Josie Khan, DO   ED visits in past 90 days: 0        Note composed:7:27 PM 05/11/2025           Stable

## 2025-07-17 NOTE — PROGRESS NOTE ADULT - PROBLEM SELECTOR PROBLEM 8
Ozempic Approved    Filling Pharmacy: [facility]  Additional Information: Pharmacy contacted, left detailed message with approval information.    
Nutrition, metabolism, and development symptoms